# Patient Record
Sex: FEMALE | Race: WHITE | NOT HISPANIC OR LATINO | Employment: OTHER | URBAN - METROPOLITAN AREA
[De-identification: names, ages, dates, MRNs, and addresses within clinical notes are randomized per-mention and may not be internally consistent; named-entity substitution may affect disease eponyms.]

---

## 2023-03-10 ENCOUNTER — DOCUMENTATION (OUTPATIENT)
Dept: CARE COORDINATION | Facility: CLINIC | Age: 63
End: 2023-03-10
Payer: COMMERCIAL

## 2023-03-10 NOTE — PROGRESS NOTES
Reviewed Convers[employer] chat. No call back needed. Patient had a follow up with PCP 2/23/2023. Scheduled to see Pulmonologist on 3/13/2023.

## 2023-03-20 PROBLEM — R10.32 LEFT LOWER QUADRANT ABDOMINAL PAIN: Status: ACTIVE | Noted: 2023-03-20

## 2023-03-20 PROBLEM — M48.061 LUMBAR CANAL STENOSIS: Status: ACTIVE | Noted: 2023-03-20

## 2023-03-20 PROBLEM — M89.9 SKULL LESION: Status: ACTIVE | Noted: 2023-03-20

## 2023-03-20 PROBLEM — E78.5 HLD (HYPERLIPIDEMIA): Status: ACTIVE | Noted: 2023-03-20

## 2023-03-20 PROBLEM — N84.0 ENDOMETRIAL POLYP: Status: ACTIVE | Noted: 2023-03-20

## 2023-03-20 PROBLEM — N95.0 POSTMENOPAUSAL BLEEDING: Status: ACTIVE | Noted: 2023-03-20

## 2023-03-20 PROBLEM — M47.816 DJD (DEGENERATIVE JOINT DISEASE), LUMBAR: Status: ACTIVE | Noted: 2023-03-20

## 2023-03-20 PROBLEM — F09 COGNITIVE DISORDER: Status: ACTIVE | Noted: 2023-03-20

## 2023-03-20 PROBLEM — I27.0: Status: ACTIVE | Noted: 2023-03-20

## 2023-03-20 PROBLEM — N94.89 ADNEXAL MASS: Status: ACTIVE | Noted: 2023-03-20

## 2023-03-20 PROBLEM — E66.01 OBESITY, MORBID (MORE THAN 100 LBS OVER IDEAL WEIGHT OR BMI > 40) (MULTI): Status: ACTIVE | Noted: 2023-03-20

## 2023-03-20 PROBLEM — R06.02 SOB (SHORTNESS OF BREATH): Status: ACTIVE | Noted: 2023-03-20

## 2023-03-20 PROBLEM — M47.816 LUMBAR SPONDYLOSIS: Status: ACTIVE | Noted: 2023-03-20

## 2023-03-20 PROBLEM — K59.09 CHRONIC CONSTIPATION: Status: ACTIVE | Noted: 2023-03-20

## 2023-03-20 PROBLEM — N63.20 LEFT BREAST LUMP: Status: ACTIVE | Noted: 2023-03-20

## 2023-03-20 PROBLEM — R13.10 DYSPHAGIA: Status: ACTIVE | Noted: 2023-03-20

## 2023-03-20 PROBLEM — B37.9 YEAST INFECTION: Status: ACTIVE | Noted: 2023-03-20

## 2023-03-20 PROBLEM — J96.11 CHRONIC RESPIRATORY FAILURE WITH HYPOXIA (MULTI): Status: ACTIVE | Noted: 2023-03-20

## 2023-03-20 PROBLEM — N32.81 OAB (OVERACTIVE BLADDER): Status: ACTIVE | Noted: 2023-03-20

## 2023-03-20 PROBLEM — D50.9 IRON DEFICIENCY ANEMIA: Status: ACTIVE | Noted: 2023-03-20

## 2023-03-20 PROBLEM — M54.50 LUMBAR BACK PAIN: Status: ACTIVE | Noted: 2023-03-20

## 2023-03-20 PROBLEM — R23.4 FISSURE IN SKIN OF FOOT: Status: ACTIVE | Noted: 2023-03-20

## 2023-03-20 PROBLEM — F01.53: Status: ACTIVE | Noted: 2023-03-20

## 2023-03-20 PROBLEM — R32 URINARY INCONTINENCE: Status: ACTIVE | Noted: 2023-03-20

## 2023-03-20 PROBLEM — U07.1 COVID-19: Status: ACTIVE | Noted: 2023-03-20

## 2023-03-20 PROBLEM — K59.04 CHRONIC IDIOPATHIC CONSTIPATION: Status: ACTIVE | Noted: 2023-03-20

## 2023-03-20 PROBLEM — E55.9 VITAMIN D DEFICIENCY: Status: ACTIVE | Noted: 2023-03-20

## 2023-03-20 PROBLEM — M17.0 PRIMARY OSTEOARTHRITIS OF BOTH KNEES: Status: ACTIVE | Noted: 2023-03-20

## 2023-03-20 PROBLEM — R73.02 GLUCOSE INTOLERANCE (IMPAIRED GLUCOSE TOLERANCE): Status: ACTIVE | Noted: 2023-03-20

## 2023-03-20 PROBLEM — G47.33 OBSTRUCTIVE SLEEP APNEA SYNDROME: Status: ACTIVE | Noted: 2023-03-20

## 2023-03-20 PROBLEM — F11.20 OPIATE DEPENDENCE (MULTI): Status: ACTIVE | Noted: 2023-03-20

## 2023-03-20 PROBLEM — R31.9 HEMATURIA: Status: ACTIVE | Noted: 2023-03-20

## 2023-03-20 PROBLEM — E53.8 VITAMIN B 12 DEFICIENCY: Status: ACTIVE | Noted: 2023-03-20

## 2023-03-20 PROBLEM — R30.0 DYSURIA: Status: ACTIVE | Noted: 2023-03-20

## 2023-03-20 PROBLEM — K21.9 GERD WITHOUT ESOPHAGITIS: Status: ACTIVE | Noted: 2023-03-20

## 2023-03-20 PROBLEM — I10 BENIGN ESSENTIAL HYPERTENSION: Status: ACTIVE | Noted: 2023-03-20

## 2023-03-20 RX ORDER — DONEPEZIL HYDROCHLORIDE 5 MG/1
1 TABLET, FILM COATED ORAL NIGHTLY
COMMUNITY
Start: 2022-09-18 | End: 2023-07-11 | Stop reason: ALTCHOICE

## 2023-03-20 RX ORDER — MACITENTAN 10 MG/1
TABLET, FILM COATED ORAL
COMMUNITY
Start: 2021-09-29 | End: 2023-10-13

## 2023-03-20 RX ORDER — MAGNESIUM 200 MG
1000 TABLET ORAL
COMMUNITY
Start: 2021-06-03 | End: 2023-10-13

## 2023-03-20 RX ORDER — BUSPIRONE HYDROCHLORIDE 30 MG/1
1 TABLET ORAL 2 TIMES DAILY
COMMUNITY
Start: 2021-11-08

## 2023-03-20 RX ORDER — LISINOPRIL 5 MG/1
1 TABLET ORAL DAILY
COMMUNITY
Start: 2018-05-29 | End: 2023-10-13 | Stop reason: SINTOL

## 2023-03-20 RX ORDER — ACETAMINOPHEN 500 MG
1 TABLET ORAL DAILY
COMMUNITY
Start: 2016-08-18 | End: 2023-10-13

## 2023-03-20 RX ORDER — ASPIRIN 325 MG
1 TABLET ORAL DAILY
COMMUNITY
Start: 2015-05-06

## 2023-03-20 RX ORDER — LUBIPROSTONE 8 UG/1
1 CAPSULE ORAL DAILY
COMMUNITY
Start: 2022-06-24 | End: 2024-01-03 | Stop reason: SDUPTHER

## 2023-03-20 RX ORDER — BUPROPION HYDROCHLORIDE 300 MG/1
TABLET ORAL
COMMUNITY
Start: 2017-04-13 | End: 2024-01-24 | Stop reason: WASHOUT

## 2023-03-20 RX ORDER — OMEPRAZOLE 40 MG/1
1 CAPSULE, DELAYED RELEASE ORAL 2 TIMES DAILY
COMMUNITY
Start: 2021-03-24 | End: 2024-03-04 | Stop reason: SDUPTHER

## 2023-03-20 RX ORDER — HYDROXYZINE HYDROCHLORIDE 50 MG/1
50 TABLET, FILM COATED ORAL AS NEEDED
COMMUNITY
Start: 2020-05-18 | End: 2023-10-13

## 2023-03-20 RX ORDER — ALBUTEROL SULFATE 90 UG/1
1-2 AEROSOL, METERED RESPIRATORY (INHALATION)
COMMUNITY
Start: 2020-02-20 | End: 2023-10-09 | Stop reason: SDUPTHER

## 2023-03-20 RX ORDER — SIMVASTATIN 80 MG/1
1 TABLET, FILM COATED ORAL NIGHTLY
COMMUNITY
Start: 2013-07-31 | End: 2023-03-28

## 2023-03-20 RX ORDER — METHENAMINE HIPPURATE 1000 MG/1
1 TABLET ORAL 2 TIMES DAILY
COMMUNITY
Start: 2021-07-22

## 2023-03-20 RX ORDER — TROSPIUM CHLORIDE 20 MG/1
1 TABLET, FILM COATED ORAL 2 TIMES DAILY
COMMUNITY
Start: 2021-12-16 | End: 2024-02-16

## 2023-03-20 RX ORDER — DONEPEZIL HYDROCHLORIDE 10 MG/1
10 TABLET, FILM COATED ORAL NIGHTLY
COMMUNITY
Start: 2022-11-29 | End: 2023-07-11 | Stop reason: ALTCHOICE

## 2023-03-20 RX ORDER — SERTRALINE HYDROCHLORIDE 100 MG/1
2 TABLET, FILM COATED ORAL DAILY
COMMUNITY
Start: 2016-01-25

## 2023-03-20 RX ORDER — MONTELUKAST SODIUM 10 MG/1
1 TABLET ORAL DAILY
COMMUNITY
Start: 2020-02-20 | End: 2024-02-22

## 2023-03-20 RX ORDER — FLUTICASONE PROPIONATE AND SALMETEROL 250; 50 UG/1; UG/1
1 POWDER RESPIRATORY (INHALATION)
COMMUNITY
Start: 2022-09-26

## 2023-03-22 ENCOUNTER — OFFICE VISIT (OUTPATIENT)
Dept: PRIMARY CARE | Facility: CLINIC | Age: 63
End: 2023-03-22
Payer: COMMERCIAL

## 2023-03-22 VITALS
WEIGHT: 293 LBS | DIASTOLIC BLOOD PRESSURE: 88 MMHG | SYSTOLIC BLOOD PRESSURE: 144 MMHG | BODY MASS INDEX: 55.32 KG/M2 | HEART RATE: 81 BPM | RESPIRATION RATE: 18 BRPM | TEMPERATURE: 97.4 F | OXYGEN SATURATION: 91 % | HEIGHT: 61 IN

## 2023-03-22 DIAGNOSIS — H65.03 NON-RECURRENT ACUTE SEROUS OTITIS MEDIA OF BOTH EARS: Primary | ICD-10-CM

## 2023-03-22 PROCEDURE — 3079F DIAST BP 80-89 MM HG: CPT | Performed by: NURSE PRACTITIONER

## 2023-03-22 PROCEDURE — 99213 OFFICE O/P EST LOW 20 MIN: CPT | Performed by: NURSE PRACTITIONER

## 2023-03-22 PROCEDURE — 1036F TOBACCO NON-USER: CPT | Performed by: NURSE PRACTITIONER

## 2023-03-22 PROCEDURE — 3008F BODY MASS INDEX DOCD: CPT | Performed by: NURSE PRACTITIONER

## 2023-03-22 PROCEDURE — 3077F SYST BP >= 140 MM HG: CPT | Performed by: NURSE PRACTITIONER

## 2023-03-22 RX ORDER — CIPROFLOXACIN AND DEXAMETHASONE 3; 1 MG/ML; MG/ML
4 SUSPENSION/ DROPS AURICULAR (OTIC) 2 TIMES DAILY
Qty: 2.8 ML | Refills: 0 | Status: SHIPPED | OUTPATIENT
Start: 2023-03-22 | End: 2023-03-29

## 2023-03-22 ASSESSMENT — PAIN SCALES - GENERAL: PAINLEVEL: 3

## 2023-03-22 NOTE — PATIENT INSTRUCTIONS
Lotion for your stomach- lubriderm or eucerine. Nothing with a scent  Ear infection- start ear drops that are antibiotics 2 times a day for 7 days. If the pain is no better when the ear drops are done then come back to see me.   Restart the flonase nasal spray 1 spray each nostril 2 times a day for 14 days

## 2023-03-22 NOTE — PROGRESS NOTES
"Subjective   Patient ID: Fariad Traylor is a 62 y.o. female who presents for Earache (Poping, and pain x1mo ).    Here for 2 week follow up ED for RT ear infection treated with oral atbx and nasal spray. Now has decreased hearing in the RT ear. No drainage. No fever/chills.   Saw pulm last week was told there was \"wax\".     Earache   There is pain in the right ear. This is a new problem. The current episode started 1 to 4 weeks ago. The problem occurs constantly. The problem has been waxing and waning. There has been no fever. The pain is at a severity of 0/10. The patient is experiencing no pain. She has tried antibiotics for the symptoms.        Review of Systems   HENT:  Positive for ear pain.        Objective   /88   Pulse 81   Temp 36.3 °C (97.4 °F)   Resp 18   Ht 1.549 m (5' 1\")   Wt 135 kg (298 lb)   SpO2 91%   BMI 56.31 kg/m²     Physical Exam  Vitals reviewed.   HENT:      Head: Normocephalic and atraumatic.      Right Ear: Decreased hearing noted. Swelling and tenderness present. A middle ear effusion is present.      Left Ear: No decreased hearing noted. Swelling and tenderness present. A middle ear effusion is present.      Nose: No congestion.      Mouth/Throat:      Pharynx: Posterior oropharyngeal erythema present.   Pulmonary:      Effort: Pulmonary effort is normal.      Breath sounds: Normal breath sounds.   Lymphadenopathy:      Cervical: No cervical adenopathy.   Neurological:      Mental Status: She is alert.         Assessment/Plan     Farida was seen today for earache.  Diagnoses and all orders for this visit:  Non-recurrent acute serous otitis media of both ears (Primary)  Comments:  start cipro-dex 4 gtt BID x 7 days.  Orders:  -     ciprofloxacin-dexamethasone (CiproDEX) otic suspension; Administer 4 drops into each ear in the morning and 4 drops before bedtime. Do all this for 7 days.        "

## 2023-03-27 DIAGNOSIS — E78.2 MODERATE MIXED HYPERLIPIDEMIA NOT REQUIRING STATIN THERAPY: Primary | ICD-10-CM

## 2023-03-28 RX ORDER — SIMVASTATIN 80 MG/1
80 TABLET, FILM COATED ORAL NIGHTLY
Qty: 90 TABLET | Refills: 3 | Status: SHIPPED | OUTPATIENT
Start: 2023-03-28 | End: 2024-04-02 | Stop reason: SDUPTHER

## 2023-07-11 ENCOUNTER — OFFICE VISIT (OUTPATIENT)
Dept: PRIMARY CARE | Facility: CLINIC | Age: 63
End: 2023-07-11
Payer: COMMERCIAL

## 2023-07-11 VITALS
DIASTOLIC BLOOD PRESSURE: 58 MMHG | OXYGEN SATURATION: 93 % | BODY MASS INDEX: 54.94 KG/M2 | TEMPERATURE: 96.8 F | HEIGHT: 61 IN | WEIGHT: 291 LBS | HEART RATE: 84 BPM | SYSTOLIC BLOOD PRESSURE: 112 MMHG

## 2023-07-11 DIAGNOSIS — J96.11 CHRONIC RESPIRATORY FAILURE WITH HYPOXIA (MULTI): ICD-10-CM

## 2023-07-11 DIAGNOSIS — H25.9 SENILE CATARACT OF RIGHT EYE, UNSPECIFIED AGE-RELATED CATARACT TYPE: Primary | ICD-10-CM

## 2023-07-11 PROCEDURE — 3008F BODY MASS INDEX DOCD: CPT | Performed by: FAMILY MEDICINE

## 2023-07-11 PROCEDURE — 3078F DIAST BP <80 MM HG: CPT | Performed by: FAMILY MEDICINE

## 2023-07-11 PROCEDURE — 3074F SYST BP LT 130 MM HG: CPT | Performed by: FAMILY MEDICINE

## 2023-07-11 PROCEDURE — 1036F TOBACCO NON-USER: CPT | Performed by: FAMILY MEDICINE

## 2023-07-11 PROCEDURE — 1160F RVW MEDS BY RX/DR IN RCRD: CPT | Performed by: FAMILY MEDICINE

## 2023-07-11 PROCEDURE — 1157F ADVNC CARE PLAN IN RCRD: CPT | Performed by: FAMILY MEDICINE

## 2023-07-11 PROCEDURE — 1125F AMNT PAIN NOTED PAIN PRSNT: CPT | Performed by: FAMILY MEDICINE

## 2023-07-11 PROCEDURE — 99214 OFFICE O/P EST MOD 30 MIN: CPT | Performed by: FAMILY MEDICINE

## 2023-07-11 PROCEDURE — 1159F MED LIST DOCD IN RCRD: CPT | Performed by: FAMILY MEDICINE

## 2023-07-11 RX ORDER — FERROUS SULFATE 325(65) MG
TABLET ORAL EVERY 24 HOURS
COMMUNITY
End: 2023-10-13

## 2023-07-11 RX ORDER — MEMANTINE HYDROCHLORIDE 10 MG/1
10 TABLET ORAL 2 TIMES DAILY
COMMUNITY

## 2023-07-11 RX ORDER — NITROFURANTOIN 25; 75 MG/1; MG/1
CAPSULE ORAL 2 TIMES DAILY
COMMUNITY
End: 2024-02-12 | Stop reason: WASHOUT

## 2023-07-11 ASSESSMENT — ENCOUNTER SYMPTOMS
EYE ITCHING: 0
HEMATURIA: 0
LIGHT-HEADEDNESS: 0
DYSURIA: 0
FLANK PAIN: 0
ARTHRALGIAS: 0
DIARRHEA: 0
ABDOMINAL PAIN: 0
SORE THROAT: 0
PALPITATIONS: 0
VOMITING: 0
JOINT SWELLING: 0
APPETITE CHANGE: 0
EYE DISCHARGE: 0
SHORTNESS OF BREATH: 1
UNEXPECTED WEIGHT CHANGE: 0
HEADACHES: 0
BLOOD IN STOOL: 0
FEVER: 0
DIZZINESS: 0
NUMBNESS: 0
NERVOUS/ANXIOUS: 0
DYSPHORIC MOOD: 0
SINUS PRESSURE: 0
SLEEP DISTURBANCE: 0
CONSTIPATION: 0
NAUSEA: 0
WHEEZING: 0
RHINORRHEA: 0
WEAKNESS: 0
MYALGIAS: 0
COUGH: 0
ACTIVITY CHANGE: 0

## 2023-07-11 NOTE — PROGRESS NOTES
"Subjective   Patient ID: Farida Traylor is a 63 y.o. female who presents for Establish Care (MYAH BUTTS) and Pre-op Exam (CATARACT R SIDED 7/26/23-[facility]- YUNIOR MALIN MD).    HPI STABLE PULMONARY 02 DEPENDENT DISEASE   WILL SEE DR WOMACK NEXT WEEK     Review of Systems   Constitutional:  Negative for activity change, appetite change, fever and unexpected weight change.   HENT:  Negative for congestion, ear pain, postnasal drip, rhinorrhea, sinus pressure and sore throat.    Eyes:  Positive for visual disturbance. Negative for discharge and itching.   Respiratory:  Positive for shortness of breath. Negative for cough and wheezing.    Cardiovascular:  Negative for chest pain, palpitations and leg swelling.   Gastrointestinal:  Negative for abdominal pain, blood in stool, constipation, diarrhea, nausea and vomiting.   Endocrine: Negative for cold intolerance, heat intolerance and polyuria.   Genitourinary:  Negative for dysuria, flank pain and hematuria.   Musculoskeletal:  Negative for arthralgias, gait problem, joint swelling and myalgias.   Skin:  Negative for rash.   Allergic/Immunologic: Negative for environmental allergies and food allergies.   Neurological:  Negative for dizziness, syncope, weakness, light-headedness, numbness and headaches.   Psychiatric/Behavioral:  Negative for dysphoric mood and sleep disturbance. The patient is not nervous/anxious.        Objective   /58   Pulse 84   Temp 36 °C (96.8 °F)   Ht 1.549 m (5' 1\")   Wt 132 kg (291 lb)   SpO2 93%   BMI 54.98 kg/m²     Physical Exam  Constitutional:       Appearance: Normal appearance.   HENT:      Head: Normocephalic and atraumatic.      Nose: Nose normal.      Mouth/Throat:      Mouth: Mucous membranes are moist.   Eyes:      Extraocular Movements: Extraocular movements intact.      Pupils: Pupils are equal, round, and reactive to light.   Cardiovascular:      Rate and Rhythm: Normal rate and regular rhythm.   Pulmonary: "      Effort: Respiratory distress present.      Breath sounds: No wheezing or rales.      Comments: DOES  NOT MOVE AIR WELL/ASTHMA  Abdominal:      Palpations: Abdomen is soft.   Musculoskeletal:         General: Normal range of motion.      Cervical back: Normal range of motion and neck supple.   Skin:     General: Skin is warm and dry.   Neurological:      General: No focal deficit present.      Mental Status: She is alert and oriented to person, place, and time.   Psychiatric:         Mood and Affect: Mood normal.         Behavior: Behavior normal.         Assessment/Plan   Diagnoses and all orders for this visit:  Senile cataract of right eye, unspecified age-related cataract type  Chronic respiratory failure with hypoxia (CMS/HCC)

## 2023-09-10 PROBLEM — R93.89 ABNORMAL MAGNETIC RESONANCE IMAGING STUDY: Status: ACTIVE | Noted: 2023-09-10

## 2023-09-10 PROBLEM — I65.29 CAROTID ARTERY OCCLUSION: Status: ACTIVE | Noted: 2023-09-10

## 2023-09-10 PROBLEM — M79.609 PAIN IN LIMB: Status: ACTIVE | Noted: 2023-09-10

## 2023-09-10 PROBLEM — R41.3 MEMORY LOSS: Status: ACTIVE | Noted: 2023-09-10

## 2023-09-10 PROBLEM — M54.2 CERVICALGIA: Status: ACTIVE | Noted: 2023-09-10

## 2023-09-10 PROBLEM — E66.813 CLASS 3 SEVERE OBESITY DUE TO EXCESS CALORIES WITH BODY MASS INDEX (BMI) OF 50.0 TO 59.9 IN ADULT: Status: ACTIVE | Noted: 2023-09-10

## 2023-09-10 PROBLEM — E66.01 CLASS 3 SEVERE OBESITY DUE TO EXCESS CALORIES WITH BODY MASS INDEX (BMI) OF 50.0 TO 59.9 IN ADULT (MULTI): Status: ACTIVE | Noted: 2023-09-10

## 2023-09-10 PROBLEM — R25.1 TREMOR: Status: ACTIVE | Noted: 2023-09-10

## 2023-09-10 PROBLEM — F90.2 ATTENTION-DEFICIT HYPERACTIVITY DISORDER, COMBINED TYPE: Status: ACTIVE | Noted: 2021-06-15

## 2023-09-10 PROBLEM — R53.1 ASTHENIA: Status: ACTIVE | Noted: 2023-09-10

## 2023-09-10 PROBLEM — F32.1 CURRENT MODERATE EPISODE OF MAJOR DEPRESSIVE DISORDER WITHOUT PRIOR EPISODE (MULTI): Chronic | Status: ACTIVE | Noted: 2021-01-13

## 2023-09-10 PROBLEM — M79.662 PAIN OF LEFT CALF: Status: ACTIVE | Noted: 2023-09-10

## 2023-09-10 PROBLEM — R41.3 AMNESIA: Status: ACTIVE | Noted: 2023-09-10

## 2023-09-10 PROBLEM — L30.4 ERYTHEMA INTERTRIGO: Status: ACTIVE | Noted: 2017-03-22

## 2023-09-10 PROBLEM — F33.41 RECURRENT MAJOR DEPRESSIVE DISORDER, IN PARTIAL REMISSION (CMS-HCC): Chronic | Status: ACTIVE | Noted: 2021-06-15

## 2023-09-10 PROBLEM — R42 LIGHTHEADEDNESS: Status: ACTIVE | Noted: 2023-09-10

## 2023-09-10 PROBLEM — G45.9 TRANSIENT ISCHEMIC ATTACK: Status: ACTIVE | Noted: 2023-09-10

## 2023-09-10 PROBLEM — E66.01 MORBID OBESITY WITH BMI OF 50.0-59.9, ADULT (MULTI): Status: ACTIVE | Noted: 2023-09-10

## 2023-09-10 PROBLEM — Z79.891 CHRONIC PRESCRIPTION OPIATE USE: Status: ACTIVE | Noted: 2023-09-10

## 2023-09-10 PROBLEM — F41.1 GENERALIZED ANXIETY DISORDER: Status: ACTIVE | Noted: 2021-06-15

## 2023-09-10 PROBLEM — R47.01 APHASIA: Status: ACTIVE | Noted: 2023-09-10

## 2023-09-10 PROBLEM — I83.10 VARICOSE VEINS OF UNSPECIFIED LOWER EXTREMITY WITH INFLAMMATION: Status: ACTIVE | Noted: 2017-03-22

## 2023-09-10 PROBLEM — I63.9 CEREBROVASCULAR ACCIDENT (MULTI): Status: ACTIVE | Noted: 2023-09-10
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
## 2023-09-10 RX ORDER — BUSPIRONE HYDROCHLORIDE 15 MG/1
TABLET ORAL
COMMUNITY
End: 2023-10-13

## 2023-09-10 RX ORDER — CYCLOBENZAPRINE HCL 10 MG
1 TABLET ORAL 2 TIMES DAILY
COMMUNITY
Start: 2023-06-20 | End: 2023-10-13

## 2023-09-10 RX ORDER — MOXIFLOXACIN 5 MG/ML
1 SOLUTION/ DROPS OPHTHALMIC 3 TIMES DAILY
COMMUNITY
Start: 2023-06-26 | End: 2023-10-13

## 2023-09-10 RX ORDER — LISINOPRIL 10 MG/1
TABLET ORAL
COMMUNITY
End: 2023-10-13

## 2023-09-10 RX ORDER — DONEPEZIL HYDROCHLORIDE 5 MG/1
5 TABLET, FILM COATED ORAL NIGHTLY
COMMUNITY
End: 2023-10-13

## 2023-09-10 RX ORDER — AMBRISENTAN 10 MG/1
TABLET, FILM COATED ORAL
COMMUNITY
End: 2023-10-13

## 2023-09-10 RX ORDER — LIDOCAINE HCL 4 G/100ML
LIQUID TOPICAL
COMMUNITY
Start: 2023-06-20 | End: 2023-10-13

## 2023-09-10 RX ORDER — PRENATAL VIT 91/IRON/FOLIC/DHA 28-975-200
1 COMBINATION PACKAGE (EA) ORAL
COMMUNITY
Start: 2017-03-22 | End: 2023-10-13

## 2023-09-10 RX ORDER — FUROSEMIDE 20 MG/1
TABLET ORAL
COMMUNITY
End: 2023-10-13

## 2023-09-10 RX ORDER — HYDROXYZINE PAMOATE 50 MG/1
CAPSULE ORAL
COMMUNITY
End: 2023-10-16 | Stop reason: ALTCHOICE

## 2023-09-10 RX ORDER — OMEPRAZOLE 20 MG/1
CAPSULE, DELAYED RELEASE ORAL
COMMUNITY
End: 2023-10-13

## 2023-09-10 RX ORDER — SULFAMETHOXAZOLE AND TRIMETHOPRIM 800; 160 MG/1; MG/1
TABLET ORAL
COMMUNITY
End: 2023-10-13

## 2023-09-10 RX ORDER — OXYBUTYNIN CHLORIDE 15 MG/1
TABLET, EXTENDED RELEASE ORAL
COMMUNITY
End: 2023-10-13

## 2023-09-10 RX ORDER — FLUTICASONE PROPIONATE 50 MCG
SPRAY, SUSPENSION (ML) NASAL
COMMUNITY
End: 2023-10-13

## 2023-09-10 RX ORDER — DONEPEZIL HYDROCHLORIDE 10 MG/1
10 TABLET, FILM COATED ORAL NIGHTLY
COMMUNITY
End: 2023-10-13

## 2023-09-10 RX ORDER — CEPHALEXIN 500 MG/1
CAPSULE ORAL
COMMUNITY
End: 2023-10-13

## 2023-10-05 ENCOUNTER — CLINICAL SUPPORT (OUTPATIENT)
Dept: PRIMARY CARE | Facility: CLINIC | Age: 63
End: 2023-10-05
Payer: COMMERCIAL

## 2023-10-05 DIAGNOSIS — I10 BENIGN ESSENTIAL HYPERTENSION: ICD-10-CM

## 2023-10-05 DIAGNOSIS — E78.5 HYPERLIPIDEMIA, UNSPECIFIED HYPERLIPIDEMIA TYPE: ICD-10-CM

## 2023-10-05 DIAGNOSIS — E53.8 VITAMIN B 12 DEFICIENCY: ICD-10-CM

## 2023-10-05 PROCEDURE — 80053 COMPREHEN METABOLIC PANEL: CPT

## 2023-10-05 PROCEDURE — 82607 VITAMIN B-12: CPT

## 2023-10-05 PROCEDURE — 36415 COLL VENOUS BLD VENIPUNCTURE: CPT

## 2023-10-05 PROCEDURE — 80061 LIPID PANEL: CPT

## 2023-10-06 LAB
ALBUMIN SERPL BCP-MCNC: 4.1 G/DL (ref 3.4–5)
ALP SERPL-CCNC: 68 U/L (ref 33–136)
ALT SERPL W P-5'-P-CCNC: 10 U/L (ref 7–45)
ANION GAP SERPL CALC-SCNC: 13 MMOL/L (ref 10–20)
AST SERPL W P-5'-P-CCNC: 12 U/L (ref 9–39)
BILIRUB SERPL-MCNC: 0.4 MG/DL (ref 0–1.2)
BUN SERPL-MCNC: 14 MG/DL (ref 6–23)
CALCIUM SERPL-MCNC: 9.5 MG/DL (ref 8.6–10.6)
CHLORIDE SERPL-SCNC: 103 MMOL/L (ref 98–107)
CHOLEST SERPL-MCNC: 184 MG/DL (ref 0–199)
CHOLESTEROL/HDL RATIO: 3.6
CO2 SERPL-SCNC: 28 MMOL/L (ref 21–32)
CREAT SERPL-MCNC: 0.78 MG/DL (ref 0.5–1.05)
GFR SERPL CREATININE-BSD FRML MDRD: 84 ML/MIN/1.73M*2
GLUCOSE SERPL-MCNC: 87 MG/DL (ref 74–99)
HDLC SERPL-MCNC: 51.5 MG/DL
LDLC SERPL CALC-MCNC: 98 MG/DL (ref 140–190)
NON HDL CHOLESTEROL: 133 MG/DL (ref 0–149)
POTASSIUM SERPL-SCNC: 3.9 MMOL/L (ref 3.5–5.3)
PROT SERPL-MCNC: 6.4 G/DL (ref 6.4–8.2)
SODIUM SERPL-SCNC: 140 MMOL/L (ref 136–145)
TRIGL SERPL-MCNC: 175 MG/DL (ref 0–149)
VIT B12 SERPL-MCNC: 1680 PG/ML (ref 211–911)
VLDL: 35 MG/DL (ref 0–40)

## 2023-10-09 ENCOUNTER — OFFICE VISIT (OUTPATIENT)
Dept: PRIMARY CARE | Facility: CLINIC | Age: 63
End: 2023-10-09
Payer: COMMERCIAL

## 2023-10-09 VITALS
WEIGHT: 292 LBS | OXYGEN SATURATION: 93 % | DIASTOLIC BLOOD PRESSURE: 58 MMHG | SYSTOLIC BLOOD PRESSURE: 110 MMHG | BODY MASS INDEX: 55.17 KG/M2 | TEMPERATURE: 98.2 F | HEART RATE: 81 BPM

## 2023-10-09 DIAGNOSIS — Z23 FLU VACCINE NEED: ICD-10-CM

## 2023-10-09 DIAGNOSIS — M65.342 TRIGGER RING FINGER OF LEFT HAND: Primary | ICD-10-CM

## 2023-10-09 DIAGNOSIS — J96.11 CHRONIC RESPIRATORY FAILURE WITH HYPOXIA (MULTI): ICD-10-CM

## 2023-10-09 PROCEDURE — G0008 ADMIN INFLUENZA VIRUS VAC: HCPCS | Performed by: FAMILY MEDICINE

## 2023-10-09 PROCEDURE — 99214 OFFICE O/P EST MOD 30 MIN: CPT | Performed by: FAMILY MEDICINE

## 2023-10-09 PROCEDURE — 3074F SYST BP LT 130 MM HG: CPT | Performed by: FAMILY MEDICINE

## 2023-10-09 PROCEDURE — 1036F TOBACCO NON-USER: CPT | Performed by: FAMILY MEDICINE

## 2023-10-09 PROCEDURE — 1125F AMNT PAIN NOTED PAIN PRSNT: CPT | Performed by: FAMILY MEDICINE

## 2023-10-09 PROCEDURE — 3078F DIAST BP <80 MM HG: CPT | Performed by: FAMILY MEDICINE

## 2023-10-09 PROCEDURE — 90662 IIV NO PRSV INCREASED AG IM: CPT | Performed by: FAMILY MEDICINE

## 2023-10-09 PROCEDURE — 1159F MED LIST DOCD IN RCRD: CPT | Performed by: FAMILY MEDICINE

## 2023-10-09 PROCEDURE — 3008F BODY MASS INDEX DOCD: CPT | Performed by: FAMILY MEDICINE

## 2023-10-09 PROCEDURE — 1160F RVW MEDS BY RX/DR IN RCRD: CPT | Performed by: FAMILY MEDICINE

## 2023-10-11 ENCOUNTER — APPOINTMENT (OUTPATIENT)
Dept: PRIMARY CARE | Facility: CLINIC | Age: 63
End: 2023-10-11
Payer: COMMERCIAL

## 2023-10-11 ENCOUNTER — OFFICE VISIT (OUTPATIENT)
Dept: PODIATRY | Facility: CLINIC | Age: 63
End: 2023-10-11
Payer: COMMERCIAL

## 2023-10-11 DIAGNOSIS — B35.3 TINEA PEDIS OF BOTH FEET: ICD-10-CM

## 2023-10-11 DIAGNOSIS — B35.1 ONYCHOMYCOSIS: ICD-10-CM

## 2023-10-11 DIAGNOSIS — I83.12 VARICOSE VEINS OF BOTH LOWER EXTREMITIES WITH INFLAMMATION: Primary | ICD-10-CM

## 2023-10-11 DIAGNOSIS — M79.672 FOOT PAIN, BILATERAL: ICD-10-CM

## 2023-10-11 DIAGNOSIS — I83.11 VARICOSE VEINS OF BOTH LOWER EXTREMITIES WITH INFLAMMATION: Primary | ICD-10-CM

## 2023-10-11 DIAGNOSIS — M79.671 FOOT PAIN, BILATERAL: ICD-10-CM

## 2023-10-11 PROCEDURE — 1036F TOBACCO NON-USER: CPT | Performed by: PODIATRIST

## 2023-10-11 PROCEDURE — 3008F BODY MASS INDEX DOCD: CPT | Performed by: PODIATRIST

## 2023-10-11 PROCEDURE — 1125F AMNT PAIN NOTED PAIN PRSNT: CPT | Performed by: PODIATRIST

## 2023-10-11 PROCEDURE — 1160F RVW MEDS BY RX/DR IN RCRD: CPT | Performed by: PODIATRIST

## 2023-10-11 PROCEDURE — 99213 OFFICE O/P EST LOW 20 MIN: CPT | Performed by: PODIATRIST

## 2023-10-11 PROCEDURE — 1159F MED LIST DOCD IN RCRD: CPT | Performed by: PODIATRIST

## 2023-10-11 NOTE — PROGRESS NOTES
This is a 63 y.o. 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    Acquired plantar porokeratosis    Acute upper respiratory infection, unspecified 01/14/2020    URI, acute    Allergic rhinitis due to animal (cat) (dog) hair and dander 01/02/2020    Allergic rhinitis due to dogs    Allergic rhinitis due to pollen 01/02/2020    Allergic rhinitis due to pollen    Allergic rhinitis due to pollen 02/26/2018    Seasonal allergic rhinitis due to pollen    Allergy status to unspecified drugs, medicaments and biological substances 06/30/2015    History of allergy    Anemia, unspecified 07/23/2018    Normocytic anemia    Anxiety disorder due to known physiological condition 06/05/2013    Anxiety disorder due to medical condition    Anxiety disorder, unspecified 01/28/2016    Anxiety    Asymptomatic varicose veins of bilateral lower extremities 07/16/2019    Varicose veins of legs    Atypical squamous cells of undetermined significance on cytologic smear of cervix (ASC-US) 06/26/2013    Pap smear abnormality of cervix with ASCUS favoring benign    Candidiasis, unspecified 12/10/2019    Yeast infection    Cellulitis of left finger 07/31/2018    Paronychia of finger of left hand    Changes in skin texture 03/09/2021    Cracked skin    Contusion of left lesser toe(s) with damage to nail, initial encounter 02/22/2018    Subungual contusion of toenail of left foot    Contusion of right hand, sequela 08/04/2020    Traumatic ecchymosis of hand, right, sequela    Corns and callosities 05/25/2021    Callus of foot    Disorder of pigmentation, unspecified 09/28/2016    Atypical pigmented skin lesion    Disorder of the skin and  subcutaneous tissue, unspecified 08/27/2020    Lesion of subcutaneous tissue    Dorsalgia, unspecified 09/23/2021    Acute back pain    Dorsalgia, unspecified 01/29/2019    Costovertebral angle tenderness    Encounter for general adult medical examination without abnormal findings 06/08/2020    Medicare annual wellness visit, subsequent    Encounter for gynecological examination (general) (routine) without abnormal findings 12/14/2021    Encounter for gynecological examination    Encounter for gynecological examination (general) (routine) without abnormal findings 12/11/2020    Encounter for gynecological examination    Encounter for other screening for malignant neoplasm of breast     Breast cancer screening    Encounter for screening for malignant neoplasm of cervix     Encounter for screening for malignant neoplasm of cervix    Encounter for screening for malignant neoplasm of cervix 11/04/2014    Screening for malignant neoplasm of cervix    Encounter for screening for malignant neoplasm of cervix     Cervical cancer screening    Hepatomegaly, not elsewhere classified 04/21/2021    Liver mass, right lobe    History of falling 12/28/2018    Status post fall    Inappropriate diet and eating habits 05/13/2021    Inappropriate diet and eating habits    Irregular menstruation, unspecified     Irregular periods/menstrual cycles    Localized edema 07/29/2015    Pedal edema    Localized swelling, mass and lump, left lower limb 09/11/2020    Lump of left thigh    Localized swelling, mass and lump, left lower limb 09/24/2020    Mass of left thigh    Localized swelling, mass and lump, unspecified lower limb 09/24/2020    Mass of thigh    Melanocytic nevi, unspecified 09/10/2019    Numerous skin moles    Multiple births, unspecified, all liveborn     Multiple births, all liveborn    Other allergic rhinitis 01/02/2020    Allergic rhinitis due to dust mite    Other allergic rhinitis 01/02/2020    Allergic rhinitis due to mold     Other conditions influencing health status 06/26/2013    Uterine Rupture    Other conditions influencing health status     Normal colonoscopy    Other conditions influencing health status 02/05/2019    History of burning on urination    Other conditions influencing health status 12/14/2021    History of cough    Other conditions influencing health status 02/14/2019    History of dyspareunia    Other conditions influencing health status 01/14/2020    History of cough    Other conditions influencing health status 06/05/2013    Leiomyomatous Degeneration Of The Uterus    Other conditions influencing health status 06/05/2013    Viremia    Other hypertrophic disorders of the skin 07/29/2015    Skin tag    Other shoulder lesions, right shoulder 11/11/2019    Tendinitis of right rotator cuff    Other specified disorders of bone, thigh 09/09/2020    Pain of left femur    Other specified noninflammatory disorders of vagina 03/12/2019    Vaginal dryness    Other specified postprocedural states 05/04/2017    History of arthroscopic knee surgery    Other specified soft tissue disorders 10/08/2020    Soft tissue mass    Other specified soft tissue disorders 08/04/2020    Swelling of right hand    Pain in left thigh 08/27/2020    Pain of left thigh    Pain in right foot 03/09/2021    Right foot pain    Pain in right hip 12/28/2018    Right hip pain    Pain in right knee 03/12/2021    Bilateral knee pain    Pain in right knee 03/19/2021    Right knee pain    Pain in right leg 05/25/2021    Bilateral pain of leg and foot    Pain in right leg 01/12/2022    Bilateral leg and foot pain    Pain in unspecified joint     Joint pain    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 02/22/2016    History of tetralogy of Fallot    Personal history of contraception     Personal history of contraceptive use    Personal history of diseases of the skin and subcutaneous tissue 12/22/2020    History of  ingrown nail    Personal history of malignant neoplasm of other parts of uterus     History of malignant neoplasm of endometrium    Personal history of other (healed) physical injury and trauma 03/01/2017    History of sprain of ankle    Personal history of other benign neoplasm 09/10/2019    History of other benign neoplasm    Personal history of other benign neoplasm 05/06/2014    History of uterine leiomyoma    Personal history of other benign neoplasm 12/11/2020    History of uterine leiomyoma    Personal history of other diseases of the circulatory system 04/14/2021    History of atrial fibrillation    Personal history of other diseases of the circulatory system 04/14/2021    History of atrial fibrillation    Personal history of other diseases of the circulatory system 04/14/2021    History of atrial fibrillation    Personal history of other diseases of the circulatory system 02/01/2017    History of hypertension    Personal history of other diseases of the circulatory system 04/14/2021    History of hypotension    Personal history of other diseases of the digestive system 03/24/2021    History of gastroesophageal reflux (GERD)    Personal history of other diseases of the female genital tract 12/28/2016    History of postmenopausal bleeding    Personal history of other diseases of the female genital tract 05/19/2022    History of postmenopausal bleeding    Personal history of other diseases of the female genital tract     History of vaginal discharge    Personal history of other diseases of the female genital tract     Vaginal delivery    Personal history of other diseases of the musculoskeletal system and connective tissue     Personal history of arthritis    Personal history of other diseases of the musculoskeletal system and connective tissue 11/18/2019    History of muscle spasm    Personal history of other diseases of the respiratory system 05/27/2021    History of asthma    Personal history of other  diseases of the respiratory system 04/12/2019    History of acute bronchitis    Personal history of other diseases of the respiratory system 01/04/2020    History of bronchitis    Personal history of other endocrine, nutritional and metabolic disease 02/01/2017    History of hyperlipidemia    Personal history of other endocrine, nutritional and metabolic disease 04/18/2016    History of obesity    Personal history of other endocrine, nutritional and metabolic disease 05/26/2020    History of thyroid nodule    Personal history of other infectious and parasitic diseases     History of varicella    Personal history of other medical treatment 12/11/2020    History of screening mammography    Personal history of other medical treatment 12/14/2021    History of screening mammography    Personal history of other medical treatment     History of mammogram    Personal history of other specified conditions 08/21/2019    History of gross hematuria    Personal history of other specified conditions 02/02/2017    History of weight gain    Personal history of other specified conditions 12/07/2020    History of chest pain    Personal history of other specified conditions 04/14/2021    History of palpitations    Personal history of other specified conditions 12/05/2014    History of vertigo    Personal history of other specified conditions 08/25/2021    History of exertional chest pain    Personal history of other specified conditions     History of shortness of breath    Personal history of other specified conditions     H/O heartburn    Personal history of other specified conditions 01/12/2018    History of urinary frequency    Personal history of transient ischemic attack (TIA), and 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 12/30/2015    History of TIA (transient ischemic attack)    Personal history of urinary (tract) infections 09/02/2021    History of recurrent cystitis    Personal history of urinary (tract) infections  05/23/2019    History of cystitis    Personal history of urinary (tract) infections 09/02/2021    History of recurrent urinary tract infection    Personal history of urinary calculi 09/12/2019    History of renal calculi    Polyphagia 05/13/2021    Polyphagia    Primary central sleep apnea 10/21/2017    Central sleep apnea    Pure hypercholesterolemia, unspecified     High cholesterol    Residual hemorrhoidal skin tags 07/06/2017    External hemorrhoid    Slurred speech 01/02/2015    Slurred speech    Tinea pedis 05/25/2021    Tinea pedis of right foot    Unspecified abdominal pain 04/13/2021    Abdominal pain, acute    Unspecified injury of right wrist, hand and finger(s), sequela 08/04/2020    Hand trauma, right, sequela    Unspecified internal derangement of unspecified knee 03/01/2017    Internal derangement of knee    Unspecified symptoms and signs involving the genitourinary system 12/16/2021    UTI symptoms    Unspecified symptoms and signs involving the genitourinary system 02/05/2019    UTI symptoms    Unspecified symptoms and signs involving the genitourinary system 09/03/2020    UTI symptoms    Urinary tract infection, site not specified 01/17/2020    Acute urinary tract infection    Urinary tract infection, site not specified 01/10/2022    Acute UTI    Xerosis cutis 09/10/2019    Dry skin dermatitis       Medications and Allergies have been reviewed.    Review Of Systems:  GENERAL: No weight loss, malaise or fevers.  HEENT: Negative for frequent or significant headaches,   RESPIRATORY: Negative for cough, wheezing or shortness of breath.  CARDIOVASCULAR: Negative for chest pain, leg swelling or palpitations.    Physical Exam:  Patient is a pleasant, cooperative, well developed 63 y.o.  adult female. The patient is alert and oriented to time, place and person.   Patient has normal affect and mood.    Examination of Both Lower Extremities:   Objective:   Vasc: DP and PT pulses are palpable bilateral 2/4.   CFT is less than 3 seconds bilateral.  Skin temperature is warm to cool proximal to distal bilateral.  No hair growth noted. Varicosities noted    Neuro:Gross sensation intact b    Derm: Nails 1-5 bilateral are intact.  R hallux not attached to base. HPK and fissure to medial hallux IPJ.   No SOI noted.     Ortho: Muscle strength is 5/5 for all pedal groups tested.         A comprehensive history and physical exam was performed. The patient was educated on clinical and radiographic findings, diagnosis, and treatment plans.    1. Varicose veins of both lower extremities with inflammation        2. Foot pain, bilateral        3. Onychomycosis        4. Tinea pedis of both feet              Patient educated on proper  foot care.  Debrided R hallux  Keep trimmed and filed down  Discussed oral and topical AF. Deferred treatment at this time  Debrided hpk tissue  Keep filed down  Fu prn  Patient was in agreement to this plan. All questions answered.      Brionna Douglas DPM  [phone]  [facility] [address]  Fax: [phone]

## 2023-10-12 RX ORDER — ALBUTEROL SULFATE 90 UG/1
1-2 AEROSOL, METERED RESPIRATORY (INHALATION) EVERY 4 HOURS PRN
Qty: 1 G | Refills: 11 | Status: SHIPPED | OUTPATIENT
Start: 2023-10-12

## 2023-10-13 ENCOUNTER — TELEPHONE (OUTPATIENT)
Dept: GASTROENTEROLOGY | Facility: CLINIC | Age: 63
End: 2023-10-13

## 2023-10-13 ENCOUNTER — OFFICE VISIT (OUTPATIENT)
Dept: CARDIOLOGY | Facility: HOSPITAL | Age: 63
End: 2023-10-13
Payer: COMMERCIAL

## 2023-10-13 VITALS — HEART RATE: 66 BPM | OXYGEN SATURATION: 93 % | DIASTOLIC BLOOD PRESSURE: 68 MMHG | SYSTOLIC BLOOD PRESSURE: 102 MMHG

## 2023-10-13 DIAGNOSIS — I10 HYPERTENSION, UNSPECIFIED TYPE: ICD-10-CM

## 2023-10-13 DIAGNOSIS — E78.00 HYPERCHOLESTEREMIA: Primary | ICD-10-CM

## 2023-10-13 PROCEDURE — 3078F DIAST BP <80 MM HG: CPT | Performed by: NURSE PRACTITIONER

## 2023-10-13 PROCEDURE — 1160F RVW MEDS BY RX/DR IN RCRD: CPT | Performed by: NURSE PRACTITIONER

## 2023-10-13 PROCEDURE — 99214 OFFICE O/P EST MOD 30 MIN: CPT | Performed by: NURSE PRACTITIONER

## 2023-10-13 PROCEDURE — 1159F MED LIST DOCD IN RCRD: CPT | Performed by: NURSE PRACTITIONER

## 2023-10-13 PROCEDURE — 3008F BODY MASS INDEX DOCD: CPT | Performed by: NURSE PRACTITIONER

## 2023-10-13 PROCEDURE — 1036F TOBACCO NON-USER: CPT | Performed by: NURSE PRACTITIONER

## 2023-10-13 PROCEDURE — 1125F AMNT PAIN NOTED PAIN PRSNT: CPT | Performed by: NURSE PRACTITIONER

## 2023-10-13 PROCEDURE — 3074F SYST BP LT 130 MM HG: CPT | Performed by: NURSE PRACTITIONER

## 2023-10-13 ASSESSMENT — ENCOUNTER SYMPTOMS
PSYCHIATRIC NEGATIVE: 1
SHORTNESS OF BREATH: 1
GASTROINTESTINAL NEGATIVE: 1
EYES NEGATIVE: 1
MYALGIAS: 1
DIZZINESS: 1
ALLERGIC/IMMUNOLOGIC NEGATIVE: 1
DEPRESSION: 0
HEMATOLOGIC/LYMPHATIC NEGATIVE: 1
DYSPNEA ON EXERTION: 1
ENDOCRINE NEGATIVE: 1

## 2023-10-13 NOTE — PROGRESS NOTES
Referred by Dr. Lopez ref. provider found for Follow-up (Routine.)     History Of Present Illness:    Farida Traylor is a very pleasant 63 year old female with a history of PAH, MARLON and HTN, she is here for a follow up visit. The patient is seen in collaboration with Dr. Faulkner. Mrs. Garza complains of lightheadedness and dizziness around the house or taking the garbage out. She denies chest pain or heart palpitations, states her breathing has been stable. Currently wears Oxygen all of the time.       Review of Systems   HENT: Negative.     Eyes: Negative.    Cardiovascular:  Positive for dyspnea on exertion.   Respiratory:  Positive for shortness of breath.    Endocrine: Negative.    Hematologic/Lymphatic: Negative.    Skin: Negative.    Musculoskeletal:  Positive for muscle weakness and myalgias.   Gastrointestinal: Negative.    Neurological:  Positive for dizziness.   Psychiatric/Behavioral: Negative.     Allergic/Immunologic: Negative.       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), Acute upper respiratory infection, unspecified (01/14/2020), Allergic rhinitis due to animal (cat) (dog) hair and dander (01/02/2020), Allergic rhinitis due to pollen (01/02/2020), Allergic rhinitis due to pollen (02/26/2018), Allergy status to unspecified drugs, medicaments and biological substances (06/30/2015), Anemia, unspecified (07/23/2018), Anxiety disorder due to known physiological condition (06/05/2013), Anxiety disorder, unspecified (01/28/2016), Asymptomatic varicose veins of bilateral lower extremities (07/16/2019), Atypical squamous cells of undetermined significance on cytologic smear of cervix (ASC-US) (06/26/2013), Candidiasis, unspecified (12/10/2019), Cellulitis of left finger (07/31/2018), Changes in skin texture (03/09/2021), Contusion of left lesser toe(s) with damage to nail, initial encounter (02/22/2018), Contusion of  right hand, sequela (08/04/2020), Corns and callosities (05/25/2021), Disorder of pigmentation, unspecified (09/28/2016), Disorder of the skin and subcutaneous tissue, unspecified (08/27/2020), Dorsalgia, unspecified (09/23/2021), Dorsalgia, unspecified (01/29/2019), Encounter for general adult medical examination without abnormal findings (06/08/2020), Encounter for gynecological examination (general) (routine) without abnormal findings (12/14/2021), Encounter for gynecological examination (general) (routine) without abnormal findings (12/11/2020), Encounter for other screening for malignant neoplasm of breast, Encounter for screening for malignant neoplasm of cervix, Encounter for screening for malignant neoplasm of cervix (11/04/2014), Encounter for screening for malignant neoplasm of cervix, Hepatomegaly, not elsewhere classified (04/21/2021), History of falling (12/28/2018), Inappropriate diet and eating habits (05/13/2021), Irregular menstruation, unspecified, Localized edema (07/29/2015), Localized swelling, mass and lump, left lower limb (09/11/2020), Localized swelling, mass and lump, left lower limb (09/24/2020), Localized swelling, mass and lump, unspecified lower limb (09/24/2020), Melanocytic nevi, unspecified (09/10/2019), Multiple births, unspecified, all liveborn, Other allergic rhinitis (01/02/2020), Other allergic rhinitis (01/02/2020), Other conditions influencing health status (06/26/2013), Other conditions influencing health status, Other conditions influencing health status (02/05/2019), Other conditions influencing health status (12/14/2021), Other conditions influencing health status (02/14/2019), Other conditions influencing health status (01/14/2020), Other conditions influencing health status (06/05/2013), Other conditions influencing health status (06/05/2013), Other hypertrophic disorders of the skin (07/29/2015), Other shoulder lesions, right shoulder (11/11/2019), Other specified  disorders of bone, thigh (09/09/2020), Other specified noninflammatory disorders of vagina (03/12/2019), Other specified postprocedural states (05/04/2017), Other specified soft tissue disorders (10/08/2020), Other specified soft tissue disorders (08/04/2020), Pain in left thigh (08/27/2020), Pain in right foot (03/09/2021), Pain in right hip (12/28/2018), Pain in right knee (03/12/2021), Pain in right knee (03/19/2021), Pain in right leg (05/25/2021), Pain in right leg (01/12/2022), Pain in unspecified joint, 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 (02/22/2016), Personal history of contraception, Personal history of diseases of the skin and subcutaneous tissue (12/22/2020), Personal history of malignant neoplasm of other parts of uterus, Personal history of other (healed) physical injury and trauma (03/01/2017), Personal history of other benign neoplasm (09/10/2019), Personal history of other benign neoplasm (05/06/2014), Personal history of other benign neoplasm (12/11/2020), Personal history of other diseases of the circulatory system (04/14/2021), Personal history of other diseases of the circulatory system (04/14/2021), Personal history of other diseases of the circulatory system (04/14/2021), Personal history of other diseases of the circulatory system (02/01/2017), Personal history of other diseases of the circulatory system (04/14/2021), Personal history of other diseases of the digestive system (03/24/2021), Personal history of other diseases of the female genital tract (12/28/2016), Personal history of other diseases of the female genital tract (05/19/2022), Personal history of other diseases of the female genital tract, Personal history of other diseases of the female genital tract, Personal history of other diseases of the musculoskeletal system and connective tissue, Personal history of other diseases of the musculoskeletal system and connective tissue  (11/18/2019), Personal history of other diseases of the respiratory system (05/27/2021), Personal history of other diseases of the respiratory system (04/12/2019), Personal history of other diseases of the respiratory system (01/04/2020), Personal history of other endocrine, nutritional and metabolic disease (02/01/2017), Personal history of other endocrine, nutritional and metabolic disease (04/18/2016), Personal history of other endocrine, nutritional and metabolic disease (05/26/2020), Personal history of other infectious and parasitic diseases, Personal history of other medical treatment (12/11/2020), Personal history of other medical treatment (12/14/2021), Personal history of other medical treatment, Personal history of other specified conditions (08/21/2019), Personal history of other specified conditions (02/02/2017), Personal history of other specified conditions (12/07/2020), Personal history of other specified conditions (04/14/2021), Personal history of other specified conditions (12/05/2014), Personal history of other specified conditions (08/25/2021), Personal history of other specified conditions, Personal history of other specified conditions, Personal history of other specified conditions (01/12/2018), Personal history of transient ischemic attack (TIA), and 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 (12/30/2015), Personal history of urinary (tract) infections (09/02/2021), Personal history of urinary (tract) infections (05/23/2019), Personal history of urinary (tract) infections (09/02/2021), Personal history of urinary calculi (09/12/2019), Polyphagia (05/13/2021), Primary central sleep apnea (10/21/2017), Pure hypercholesterolemia, unspecified, Residual hemorrhoidal skin tags (07/06/2017), Slurred speech (01/02/2015), Tinea pedis (05/25/2021), Unspecified abdominal pain (04/13/2021), Unspecified injury of right wrist, hand and finger(s), sequela (08/04/2020), Unspecified internal derangement  of unspecified knee (03/01/2017), Unspecified symptoms and signs involving the genitourinary system (12/16/2021), Unspecified symptoms and signs involving the genitourinary system (02/05/2019), Unspecified symptoms and signs involving the genitourinary system (09/03/2020), Urinary tract infection, site not specified (01/17/2020), Urinary tract infection, site not specified (01/10/2022), and Xerosis cutis (09/10/2019).    Past Surgical History:  She has a past surgical history that includes Other surgical history (07/31/2013); Other surgical history (09/10/2019); Other surgical history (11/30/2018); Mouth surgery (11/04/2014); Knee surgery (05/04/2017); Other surgical history (06/08/2020); and MR angio head wo IV contrast (2/9/2016).      Social History:  She reports that she has never smoked. She has never used smokeless tobacco. She reports that she does not currently use alcohol. She reports that she does not use drugs.    Family History:  Family History   Problem Relation Name Age of Onset    Hypertension Mother      Breast cancer Mother      Other (cardiac disorder) Mother      Cardiomyopathy Mother      Diabetes Mother      Other (chronic kidney disease) Mother      Stroke Brother      Brain Aneurysm Brother          Allergies:  Other and Pollen extracts    Outpatient Medications:  Current Outpatient Medications   Medication Instructions    albuterol 90 mcg/actuation inhaler 1-2 puffs, inhalation, Every 4 hours PRN, Every 4 to 6 hours as needed    aspirin 325 mg tablet 1 tablet, oral, Daily    buPROPion XL (Wellbutrin XL) 300 mg 24 hr tablet buPROPion HCl ER (XL) 300 MG Oral Tablet Extended Release 24 Hour   Quantity: 30  Refills: 0        Start : 13-Apr-2017   Active    busPIRone (Buspar) 30 mg tablet 1 tablet, oral, 2 times daily    fluticasone propion-salmeteroL (Advair Diskus) 250-50 mcg/dose diskus inhaler 1 puff, inhalation, 2 times daily RT    hydrOXYzine pamoate (Vistaril) 50 mg capsule     lisinopril 5  mg tablet 1 tablet, oral, Daily    lubiprostone (Amitiza) 8 mcg capsule 1 capsule, oral, Daily    memantine (NAMENDA) 10 mg, oral, 2 times daily    methenamine hippurate (Hiprex) 1 gram tablet 1 tablet, oral, 2 times daily    montelukast (Singulair) 10 mg tablet 1 tablet, oral, Daily    nitrofurantoin, macrocrystal-monohydrate, (Macrobid) 100 mg capsule oral, 2 times daily    omeprazole (PriLOSEC) 40 mg DR capsule 1 capsule, oral, 2 times daily    sertraline (Zoloft) 100 mg tablet 2 tablets, oral, Daily    simvastatin (ZOCOR) 80 mg, oral, Nightly    trospium (Sanctura) 20 mg tablet 1 tablet, oral, 2 times daily        Last Recorded Vitals:  Vitals:    10/13/23 1125   BP: 102/68   Pulse: 66   SpO2: 93%       Physical Exam:  Physical Exam  Vitals reviewed.   HENT:      Head: Normocephalic.      Nose: Nose normal.   Eyes:      Pupils: Pupils are equal, round, and reactive to light.   Cardiovascular:      Rate and Rhythm: Normal rate and regular rhythm with a 2/6 CARLITO   Pulmonary:      Effort: Pulmonary effort is normal.      Breath sounds: diminished in the bases   Abdominal:      General: Abdomen is flat.      Palpations: Abdomen is soft.   Musculoskeletal:         General: Normal range of motion.      Cervical back: Normal range of motion.   Skin:     General: Skin is warm and dry.   Neurological:      General: No focal deficit present.      Mental Status: He is alert and oriented to person, place, and time.   Psychiatric:         Mood and Affect: Mood normal.            Last Labs:  CBC -  Lab Results   Component Value Date    WBC 6.5 03/08/2023    HGB 13.9 03/08/2023    HCT 42.7 03/08/2023    MCV 87 03/08/2023     03/08/2023       CMP -  Lab Results   Component Value Date    CALCIUM 9.5 10/05/2023    PHOS 3.9 02/13/2023    PROT 6.4 10/05/2023    ALBUMIN 4.1 10/05/2023    AST 12 10/05/2023    ALT 10 10/05/2023    ALKPHOS 68 10/05/2023    BILITOT 0.4 10/05/2023       LIPID PANEL -   Lab Results   Component Value  Date    CHOL 184 10/05/2023    TRIG 175 (H) 10/05/2023    HDL 51.5 10/05/2023    CHHDL 3.6 10/05/2023    LDLF 94 12/15/2022    VLDL 35 10/05/2023    NHDL 133 10/05/2023       RENAL FUNCTION PANEL -   Lab Results   Component Value Date    GLUCOSE 87 10/05/2023     10/05/2023    K 3.9 10/05/2023     10/05/2023    CO2 28 10/05/2023    ANIONGAP 13 10/05/2023    BUN 14 10/05/2023    CREATININE 0.78 10/05/2023    CALCIUM 9.5 10/05/2023    PHOS 3.9 02/13/2023    ALBUMIN 4.1 10/05/2023        Lab Results   Component Value Date    BNP 37 03/08/2023    HGBA1C 5.5 12/15/2022       Last Cardiology Tests:  ECG:    Echo:  Echocardiogram 1/18/2023  1. Left ventricular systolic function is normal with a 60-65% estimated ejection fraction.  2. Technically difficult study precludes definitive valvular assessment, albeit no gross abnormalities evident.  3. There is no evidence of a patent foramen ovale.  Echocardiogram 10/7/2020   1. The left ventricular systolic function is hyperdynamic with a 65-70%  estimated ejection fraction.  2. Moderately enlarged right ventricle.  3. There is mild to moderate tricuspid regurgitation.  4. Moderately elevated pulmonary artery pressure, RVSP 62 mmHg.  Echo 11/2018:   1. Suboptimal image quality due to body habitus.   2. The left ventricular systolic function is normal with a 60-65% estimated  ejection fraction.   3. RV and RA appear mildly dilated.   4. Mildly elevated RVSP.   Ejection Fractions  LVEF 60-65%    Cath:  Right heart cath 10/11/2022  1. Normal cardiac output.  2. MPAP 31, PAOP 11, PVR 2.6, CO 7.7.  3. Pulmonary arterial hypertension.      Right heart cath 4/12/2021  1. Normal cardiac output.  2. RA 15 mmHg, RV 35/15, PA 44/26 (35 mmHg), PAOP 2 mmHg.  3. Thermo CO 7.4 l/min, CI 3.4 l/min/m2.  4. PVR 4.5.  5. Pulmonary arterial hypertension.   Stress Test:  Stress test 5/2019:  1. Normal myocardial perfusion imaging in response to pharmacologic  stress, no evidence of  ischemia or prior infarct.  2. Well-maintained left ventricular function, estimated to be greater  than 65%.  Cardiac Imaging:  Zio monitor 6/3/2019   sinus rhythm first degree AV block   12 SVT   max heart rate 182 bpm   min heart rate 42 bpm   average heart rate 60 bpm     Assessment/Plan   Very pleasant 63 year-old female with PAH, asthma, sleep apnea, and HTN, she complains of increased dizziness and lightheadedness, the Lisinopril will be stopped to help with her symptoms.  She currently is seeing pulmonary for her pulmonary hypertension. Blood pressure and heart rate are well controlled today.     Plan   -stop the Lisinopril   -call in two weeks to review symptoms   -continue Aspirin and Simvastatin   -follow up in six months   -call with any questions         Hortencia Ferraro, ANNETTE-CNP

## 2023-10-13 NOTE — PATIENT INSTRUCTIONS
CALL WITH ANY QUESTIONS   STOP THE LISINOPRIL   CALL IN TWO WEEKS TO REVIEW SYMPTOMS   FOLLOW UP IN APRIL     
yes

## 2023-10-13 NOTE — TELEPHONE ENCOUNTER
----- Message from MAGDALENA Tamayo sent at 10/13/2023  3:40 PM EDT -----  Regarding: RE: question  Contact: [phone]  Looks like she had internal hemorrhoids on her Colonoscopy in 2018. Probably from them, She should use preparation H cream. If keeps bleeding after using the cream she should see me in the office.     ----- Message -----  From: Lise Penn MA  Sent: 10/13/2023   3:36 PM EDT  To: MAGDALENA Tamayo  Subject: question                                         Pt called in stating she has blood in her stool on / off. She is taking the Amitiza and it is helping, so she is not constipated. I asked her if she was having harder stools when she was having the blood and she said she was not. I also asked her if she was ever diagnosed with hemorrhoids and she said she was not.

## 2023-10-16 ENCOUNTER — APPOINTMENT (OUTPATIENT)
Dept: PRIMARY CARE | Facility: CLINIC | Age: 63
End: 2023-10-16
Payer: COMMERCIAL

## 2023-10-16 ASSESSMENT — ENCOUNTER SYMPTOMS
BLOOD IN STOOL: 0
ARTHRALGIAS: 1
HEADACHES: 0
UNEXPECTED WEIGHT CHANGE: 0
SHORTNESS OF BREATH: 0
JOINT SWELLING: 0
APPETITE CHANGE: 0
FEVER: 0
NAUSEA: 0
FLANK PAIN: 0
DYSURIA: 0
DIARRHEA: 0
WHEEZING: 0
SINUS PRESSURE: 0
EYE ITCHING: 0
NUMBNESS: 0
MYALGIAS: 0
VOMITING: 0
SORE THROAT: 0
SLEEP DISTURBANCE: 0
HEMATURIA: 0
PALPITATIONS: 0
RHINORRHEA: 0
CONSTIPATION: 0
WEAKNESS: 0
COUGH: 0
ABDOMINAL PAIN: 0
LIGHT-HEADEDNESS: 0
ACTIVITY CHANGE: 0
DIZZINESS: 0
NERVOUS/ANXIOUS: 0
EYE DISCHARGE: 0
DYSPHORIC MOOD: 0

## 2023-10-16 NOTE — PROGRESS NOTES
Subjective   Patient ID: Farida Traylor is a 63 y.o. female who presents for Follow-up (Results/) and Trigger Finger (L hand).    HPI HAS TRIGGER FINGER WITH MINIMAL DISABILITY   HER LIPID PANEL AND OTHER LABS THAT WERE DONE ARE GOOD     Review of Systems   Constitutional:  Negative for activity change, appetite change, fever and unexpected weight change.   HENT:  Negative for congestion, ear pain, postnasal drip, rhinorrhea, sinus pressure and sore throat.    Eyes:  Negative for discharge, itching and visual disturbance.   Respiratory:  Negative for cough, shortness of breath and wheezing.    Cardiovascular:  Negative for chest pain, palpitations and leg swelling.   Gastrointestinal:  Negative for abdominal pain, blood in stool, constipation, diarrhea, nausea and vomiting.   Endocrine: Negative for cold intolerance, heat intolerance and polyuria.   Genitourinary:  Negative for dysuria, flank pain and hematuria.   Musculoskeletal:  Positive for arthralgias. Negative for gait problem, joint swelling and myalgias.   Skin:  Negative for rash.   Allergic/Immunologic: Negative for environmental allergies and food allergies.   Neurological:  Negative for dizziness, syncope, weakness, light-headedness, numbness and headaches.   Psychiatric/Behavioral:  Negative for dysphoric mood and sleep disturbance. The patient is not nervous/anxious.        Objective   /58   Pulse 81   Temp 36.8 °C (98.2 °F)   Wt 132 kg (292 lb)   SpO2 93%   BMI 55.17 kg/m²     Physical Exam  Vitals and nursing note reviewed.   Constitutional:       Appearance: Normal appearance.   HENT:      Head: Normocephalic.      Mouth/Throat:      Mouth: Mucous membranes are moist.   Cardiovascular:      Rate and Rhythm: Normal rate and regular rhythm.      Pulses: Normal pulses.      Heart sounds: Normal heart sounds. No murmur heard.     No friction rub. No gallop.   Pulmonary:      Effort: Pulmonary effort is normal. No respiratory distress.       Breath sounds: Normal breath sounds. No wheezing.   Abdominal:      General: Bowel sounds are normal. There is no distension.      Palpations: Abdomen is soft.      Tenderness: There is no abdominal tenderness.   Musculoskeletal:         General: No deformity.      Comments: TRIGGER FINGER LEFT HAND /STILL ABLE TO USE   SHE IS LEFT HANDED AND THIS DOES INTERFERE WITH WORK    Skin:     General: Skin is warm and dry.      Capillary Refill: Capillary refill takes less than 2 seconds.   Neurological:      General: No focal deficit present.      Mental Status: She is alert and oriented to person, place, and time.   Psychiatric:         Mood and Affect: Mood normal.         Assessment/Plan   Diagnoses and all orders for this visit:  Trigger ring finger of left hand  -     Referral to Orthopaedic Surgery; Future  Flu vaccine need  -     Flu vaccine, quadrivalent, high-dose, preservative free, age 65y+ (FLUZONE)

## 2023-10-18 ENCOUNTER — TELEPHONE (OUTPATIENT)
Dept: CARDIOLOGY | Facility: HOSPITAL | Age: 63
End: 2023-10-18
Payer: COMMERCIAL

## 2023-10-18 DIAGNOSIS — R03.0 ELEVATED HEART RATE WITH ELEVATED BLOOD PRESSURE WITHOUT DIAGNOSIS OF HYPERTENSION: ICD-10-CM

## 2023-10-18 DIAGNOSIS — I10 ESSENTIAL HYPERTENSION: Primary | ICD-10-CM

## 2023-10-18 DIAGNOSIS — R00.9 ELEVATED HEART RATE WITH ELEVATED BLOOD PRESSURE WITHOUT DIAGNOSIS OF HYPERTENSION: ICD-10-CM

## 2023-10-18 DIAGNOSIS — I10 BENIGN ESSENTIAL HYPERTENSION: ICD-10-CM

## 2023-10-19 RX ORDER — ACETAMINOPHEN 500 MG
1 TABLET ORAL AS NEEDED
COMMUNITY
End: 2023-10-19 | Stop reason: SDUPTHER

## 2023-10-23 RX ORDER — ACETAMINOPHEN 500 MG
1 TABLET ORAL AS NEEDED
Qty: 1 KIT | Refills: 0 | Status: SHIPPED | OUTPATIENT
Start: 2023-10-23 | End: 2023-10-24 | Stop reason: SDUPTHER

## 2023-10-24 RX ORDER — ACETAMINOPHEN 500 MG
1 TABLET ORAL AS NEEDED
Qty: 1 KIT | Refills: 0 | Status: SHIPPED | OUTPATIENT
Start: 2023-10-24

## 2023-10-24 RX ORDER — BLOOD-GLUCOSE METER
1 KIT MISCELLANEOUS 2 TIMES DAILY
COMMUNITY
End: 2023-10-24 | Stop reason: SDUPTHER

## 2023-11-06 ENCOUNTER — OFFICE VISIT (OUTPATIENT)
Dept: ORTHOPEDIC SURGERY | Facility: CLINIC | Age: 63
End: 2023-11-06
Payer: COMMERCIAL

## 2023-11-06 DIAGNOSIS — M65.342 TRIGGER RING FINGER OF LEFT HAND: Primary | ICD-10-CM

## 2023-11-06 PROCEDURE — 1159F MED LIST DOCD IN RCRD: CPT | Performed by: ORTHOPAEDIC SURGERY

## 2023-11-06 PROCEDURE — 1160F RVW MEDS BY RX/DR IN RCRD: CPT | Performed by: ORTHOPAEDIC SURGERY

## 2023-11-06 PROCEDURE — 1036F TOBACCO NON-USER: CPT | Performed by: ORTHOPAEDIC SURGERY

## 2023-11-06 PROCEDURE — 3078F DIAST BP <80 MM HG: CPT | Performed by: ORTHOPAEDIC SURGERY

## 2023-11-06 PROCEDURE — 76942 ECHO GUIDE FOR BIOPSY: CPT | Performed by: ORTHOPAEDIC SURGERY

## 2023-11-06 PROCEDURE — 1125F AMNT PAIN NOTED PAIN PRSNT: CPT | Performed by: ORTHOPAEDIC SURGERY

## 2023-11-06 PROCEDURE — 99203 OFFICE O/P NEW LOW 30 MIN: CPT | Performed by: ORTHOPAEDIC SURGERY

## 2023-11-06 PROCEDURE — 3008F BODY MASS INDEX DOCD: CPT | Performed by: ORTHOPAEDIC SURGERY

## 2023-11-06 PROCEDURE — 3074F SYST BP LT 130 MM HG: CPT | Performed by: ORTHOPAEDIC SURGERY

## 2023-11-06 PROCEDURE — 20550 NJX 1 TENDON SHEATH/LIGAMENT: CPT | Performed by: ORTHOPAEDIC SURGERY

## 2023-11-06 ASSESSMENT — PAIN - FUNCTIONAL ASSESSMENT: PAIN_FUNCTIONAL_ASSESSMENT: 0-10

## 2023-11-06 ASSESSMENT — PAIN SCALES - GENERAL: PAINLEVEL_OUTOF10: 4

## 2023-11-07 RX ORDER — METHYLPREDNISOLONE ACETATE 40 MG/ML
40 INJECTION, SUSPENSION INTRA-ARTICULAR; INTRALESIONAL; INTRAMUSCULAR; SOFT TISSUE ONCE
Status: COMPLETED | OUTPATIENT
Start: 2023-11-07 | End: 2024-04-16

## 2023-11-07 ASSESSMENT — ENCOUNTER SYMPTOMS
JOINT SWELLING: 1
SINUS PAIN: 0
TROUBLE SWALLOWING: 0
CHILLS: 0
SHORTNESS OF BREATH: 0
WHEEZING: 0
FEVER: 0
EYE DISCHARGE: 0

## 2023-11-07 NOTE — PROGRESS NOTES
Reason for Appointment  Chief Complaint   Patient presents with    Left Ring Finger - Trigger Finger     History of Present Illness  New patient is a 63 y.o. female here today for evaluation of a left ring trigger finger.  For the last few months she had increased pain with heavy gripping and the finger will lock down on her at times.  She has significant stiffness in the morning.  No specific injury she can recall.  No numbness or tingling in the hand.     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    Acquired plantar porokeratosis    Acute upper respiratory infection, unspecified 01/14/2020    URI, acute    Allergic rhinitis due to animal (cat) (dog) hair and dander 01/02/2020    Allergic rhinitis due to dogs    Allergic rhinitis due to pollen 01/02/2020    Allergic rhinitis due to pollen    Allergic rhinitis due to pollen 02/26/2018    Seasonal allergic rhinitis due to pollen    Allergy status to unspecified drugs, medicaments and biological substances 06/30/2015    History of allergy    Anemia, unspecified 07/23/2018    Normocytic anemia    Anxiety disorder due to known physiological condition 06/05/2013    Anxiety disorder due to medical condition    Anxiety disorder, unspecified 01/28/2016    Anxiety    Asymptomatic varicose veins of bilateral lower extremities 07/16/2019    Varicose veins of legs    Atypical squamous cells of undetermined significance on cytologic smear of cervix (ASC-US) 06/26/2013    Pap smear abnormality of cervix with ASCUS favoring benign    Candidiasis, unspecified 12/10/2019    Yeast infection    Cellulitis of left finger 07/31/2018    Paronychia of finger of left hand    Changes in skin texture 03/09/2021    Cracked skin    Contusion of left lesser toe(s) with damage to nail, initial encounter 02/22/2018    Subungual contusion of toenail of left foot    Contusion of right hand, sequela 08/04/2020    Traumatic ecchymosis  of hand, right, sequela    Corns and callosities 05/25/2021    Callus of foot    Disorder of pigmentation, unspecified 09/28/2016    Atypical pigmented skin lesion    Disorder of the skin and subcutaneous tissue, unspecified 08/27/2020    Lesion of subcutaneous tissue    Dorsalgia, unspecified 09/23/2021    Acute back pain    Dorsalgia, unspecified 01/29/2019    Costovertebral angle tenderness    Encounter for general adult medical examination without abnormal findings 06/08/2020    Medicare annual wellness visit, subsequent    Encounter for gynecological examination (general) (routine) without abnormal findings 12/14/2021    Encounter for gynecological examination    Encounter for gynecological examination (general) (routine) without abnormal findings 12/11/2020    Encounter for gynecological examination    Encounter for other screening for malignant neoplasm of breast     Breast cancer screening    Encounter for screening for malignant neoplasm of cervix     Encounter for screening for malignant neoplasm of cervix    Encounter for screening for malignant neoplasm of cervix 11/04/2014    Screening for malignant neoplasm of cervix    Encounter for screening for malignant neoplasm of cervix     Cervical cancer screening    Hepatomegaly, not elsewhere classified 04/21/2021    Liver mass, right lobe    History of falling 12/28/2018    Status post fall    Inappropriate diet and eating habits 05/13/2021    Inappropriate diet and eating habits    Irregular menstruation, unspecified     Irregular periods/menstrual cycles    Localized edema 07/29/2015    Pedal edema    Localized swelling, mass and lump, left lower limb 09/11/2020    Lump of left thigh    Localized swelling, mass and lump, left lower limb 09/24/2020    Mass of left thigh    Localized swelling, mass and lump, unspecified lower limb 09/24/2020    Mass of thigh    Melanocytic nevi, unspecified 09/10/2019    Numerous skin moles    Multiple births, unspecified,  all liveborn     Multiple births, all liveborn    Other allergic rhinitis 01/02/2020    Allergic rhinitis due to dust mite    Other allergic rhinitis 01/02/2020    Allergic rhinitis due to mold    Other conditions influencing health status 06/26/2013    Uterine Rupture    Other conditions influencing health status     Normal colonoscopy    Other conditions influencing health status 02/05/2019    History of burning on urination    Other conditions influencing health status 12/14/2021    History of cough    Other conditions influencing health status 02/14/2019    History of dyspareunia    Other conditions influencing health status 01/14/2020    History of cough    Other conditions influencing health status 06/05/2013    Leiomyomatous Degeneration Of The Uterus    Other conditions influencing health status 06/05/2013    Viremia    Other hypertrophic disorders of the skin 07/29/2015    Skin tag    Other shoulder lesions, right shoulder 11/11/2019    Tendinitis of right rotator cuff    Other specified disorders of bone, thigh 09/09/2020    Pain of left femur    Other specified noninflammatory disorders of vagina 03/12/2019    Vaginal dryness    Other specified postprocedural states 05/04/2017    History of arthroscopic knee surgery    Other specified soft tissue disorders 10/08/2020    Soft tissue mass    Other specified soft tissue disorders 08/04/2020    Swelling of right hand    Pain in left thigh 08/27/2020    Pain of left thigh    Pain in right foot 03/09/2021    Right foot pain    Pain in right hip 12/28/2018    Right hip pain    Pain in right knee 03/12/2021    Bilateral knee pain    Pain in right knee 03/19/2021    Right knee pain    Pain in right leg 05/25/2021    Bilateral pain of leg and foot    Pain in right leg 01/12/2022    Bilateral leg and foot pain    Pain in unspecified joint     Joint pain    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 02/22/2016    History  of tetralogy of Fallot    Personal history of contraception     Personal history of contraceptive use    Personal history of diseases of the skin and subcutaneous tissue 12/22/2020    History of ingrown nail    Personal history of malignant neoplasm of other parts of uterus     History of malignant neoplasm of endometrium    Personal history of other (healed) physical injury and trauma 03/01/2017    History of sprain of ankle    Personal history of other benign neoplasm 09/10/2019    History of other benign neoplasm    Personal history of other benign neoplasm 05/06/2014    History of uterine leiomyoma    Personal history of other benign neoplasm 12/11/2020    History of uterine leiomyoma    Personal history of other diseases of the circulatory system 04/14/2021    History of atrial fibrillation    Personal history of other diseases of the circulatory system 04/14/2021    History of atrial fibrillation    Personal history of other diseases of the circulatory system 04/14/2021    History of atrial fibrillation    Personal history of other diseases of the circulatory system 02/01/2017    History of hypertension    Personal history of other diseases of the circulatory system 04/14/2021    History of hypotension    Personal history of other diseases of the digestive system 03/24/2021    History of gastroesophageal reflux (GERD)    Personal history of other diseases of the female genital tract 12/28/2016    History of postmenopausal bleeding    Personal history of other diseases of the female genital tract 05/19/2022    History of postmenopausal bleeding    Personal history of other diseases of the female genital tract     History of vaginal discharge    Personal history of other diseases of the female genital tract     Vaginal delivery    Personal history of other diseases of the musculoskeletal system and connective tissue     Personal history of arthritis    Personal history of other diseases of the musculoskeletal  system and connective tissue 11/18/2019    History of muscle spasm    Personal history of other diseases of the respiratory system 05/27/2021    History of asthma    Personal history of other diseases of the respiratory system 04/12/2019    History of acute bronchitis    Personal history of other diseases of the respiratory system 01/04/2020    History of bronchitis    Personal history of other endocrine, nutritional and metabolic disease 02/01/2017    History of hyperlipidemia    Personal history of other endocrine, nutritional and metabolic disease 04/18/2016    History of obesity    Personal history of other endocrine, nutritional and metabolic disease 05/26/2020    History of thyroid nodule    Personal history of other infectious and parasitic diseases     History of varicella    Personal history of other medical treatment 12/11/2020    History of screening mammography    Personal history of other medical treatment 12/14/2021    History of screening mammography    Personal history of other medical treatment     History of mammogram    Personal history of other specified conditions 08/21/2019    History of gross hematuria    Personal history of other specified conditions 02/02/2017    History of weight gain    Personal history of other specified conditions 12/07/2020    History of chest pain    Personal history of other specified conditions 04/14/2021    History of palpitations    Personal history of other specified conditions 12/05/2014    History of vertigo    Personal history of other specified conditions 08/25/2021    History of exertional chest pain    Personal history of other specified conditions     History of shortness of breath    Personal history of other specified conditions     H/O heartburn    Personal history of other specified conditions 01/12/2018    History of urinary frequency    Personal history of transient ischemic attack (TIA), and 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 12/30/2015     History of TIA (transient ischemic attack)    Personal history of urinary (tract) infections 09/02/2021    History of recurrent cystitis    Personal history of urinary (tract) infections 05/23/2019    History of cystitis    Personal history of urinary (tract) infections 09/02/2021    History of recurrent urinary tract infection    Personal history of urinary calculi 09/12/2019    History of renal calculi    Polyphagia 05/13/2021    Polyphagia    Primary central sleep apnea 10/21/2017    Central sleep apnea    Pure hypercholesterolemia, unspecified     High cholesterol    Residual hemorrhoidal skin tags 07/06/2017    External hemorrhoid    Slurred speech 01/02/2015    Slurred speech    Tinea pedis 05/25/2021    Tinea pedis of right foot    Unspecified abdominal pain 04/13/2021    Abdominal pain, acute    Unspecified injury of right wrist, hand and finger(s), sequela 08/04/2020    Hand trauma, right, sequela    Unspecified internal derangement of unspecified knee 03/01/2017    Internal derangement of knee    Unspecified symptoms and signs involving the genitourinary system 12/16/2021    UTI symptoms    Unspecified symptoms and signs involving the genitourinary system 02/05/2019    UTI symptoms    Unspecified symptoms and signs involving the genitourinary system 09/03/2020    UTI symptoms    Urinary tract infection, site not specified 01/17/2020    Acute urinary tract infection    Urinary tract infection, site not specified 01/10/2022    Acute UTI    Xerosis cutis 09/10/2019    Dry skin dermatitis       Past Surgical History:   Procedure Laterality Date    KNEE SURGERY  05/04/2017    Knee Surgery    MOUTH SURGERY  11/04/2014    Oral Surgery Tooth Extraction    MR HEAD ANGIO WO IV CONTRAST  2/9/2016    MR HEAD ANGIO WO IV CONTRAST [facility]    OTHER SURGICAL HISTORY  07/31/2013    Wrist Carpectomy    OTHER SURGICAL HISTORY  09/10/2019    [place] tooth extraction    OTHER SURGICAL HISTORY  11/30/2018     Complete colonoscopy    OTHER SURGICAL HISTORY  06/08/2020    Thyroid biopsy       Medication Documentation Review Audit       Reviewed by Greta Upton MA (Medical Assistant) on 11/06/23 at 1508      Medication Order Taking? Sig Documenting Provider Last Dose Status   albuterol 90 mcg/actuation inhaler [id number] No Inhale 1-2 puffs every 4 hours if needed for wheezing. Every 4 to 6 hours as needed Richy Raman MD Taking Active   aspirin 325 mg tablet [id number] No Take 1 tablet (325 mg) by mouth once daily. Historical MD Pavithra Taking Active   blood pressure monitor (Blood Pressure Kit) kit [id number]  1 kit if needed (take BP as needed). Hortencia Sotomayor, APRN-CNP  Active   buPROPion XL (Wellbutrin XL) 300 mg 24 hr tablet [id number] No buPROPion HCl ER (XL) 300 MG Oral Tablet Extended Release 24 Hour   Quantity: 30  Refills: 0        Start : 13-Apr-2017   Active Historical Provider, MD Taking Active   busPIRone (Buspar) 30 mg tablet [id number] No Take 1 tablet (30 mg) by mouth 2 times a day. Historical Provider, MD Taking Active   fluticasone propion-salmeteroL (Advair Diskus) 250-50 mcg/dose diskus inhaler [id number] No Inhale 1 puff 2 times a day. Historical Provider, MD Taking Active   lubiprostone (Amitiza) 8 mcg capsule [id number] No Take 1 capsule (8 mcg) by mouth once daily. Historical Provider, MD Taking Active   memantine (Namenda) 10 mg tablet [id number] No Take 1 tablet (10 mg) by mouth 2 times a day. Historical Provider, MD Taking Active   methenamine hippurate (Hiprex) 1 gram tablet [id number] No Take 1 tablet (1 g) by mouth 2 times a day. Historical Provider, MD Taking Active   montelukast (Singulair) 10 mg tablet [id number] No Take 1 tablet (10 mg) by mouth once daily. Historical Provider, MD Taking Active   nitrofurantoin, macrocrystal-monohydrate, (Macrobid) 100 mg capsule [id number] No Take by mouth 2 times a day. Historical MD Pavithra Taking Active   omeprazole (PriLOSEC) 40 mg DR capsule [id number]  No Take 1 capsule (40 mg) by mouth 2 times a day. Historical Provider, MD Taking Active   sertraline (Zoloft) 100 mg tablet [id number] No Take 2 tablets (200 mg) by mouth once daily. Historical Provider, MD Taking Active   simvastatin (Zocor) 80 mg tablet [id number] No Take 1 tablet (80 mg) by mouth once daily at bedtime. Sujatha Mcneal, APRN-CNP Taking Active   trospium (Sanctura) 20 mg tablet [id number] No Take 1 tablet (20 mg) by mouth 2 times a day. Historical Provider, MD Taking Active                    Allergies   Allergen Reactions    Other Unknown    Pollen Extracts Unknown       Review of Systems   Constitutional:  Negative for chills and fever.   HENT:  Negative for sinus pain and trouble swallowing.    Eyes:  Negative for discharge.   Respiratory:  Negative for shortness of breath and wheezing.    Cardiovascular:  Negative for chest pain.   Musculoskeletal:  Positive for joint swelling.   All other systems reviewed and are negative.    Exam   On exam patient is alert, oriented, and in no acute distress.  Head is no cephalic, no JVD, no auditory wheezes.  She has decent motion of the shoulders, elbows, and wrist.  Mild DJD in the hands.  She has active triggering of the left ring finger and tenderness and crepitation over the left ring A1 pulley tendon sheath.  Triggering of the other digits.  Pulses and sensation in the upper extremity.    Assessment   L ring trigger finger     Plan   We discussed conservative treatment today with an injection to calm down her thumbs.  We sterilely injected under ultrasound guidance Depo-Medrol lidocaine into the ring A1 pulley tendon sheath.  Patient understands the small risk of infection and the signs to look for as well as flare reaction.  We will also show her a night splint.  She can follow-up with us as needed    Procedures  After discussing the risks and benefits of the procedure we proceeded with the injection. Under ultrasound guidance we identified the metacarpal bone  and overlying tendon sheath with the FDS and FDP tendons, images obtained. We then sterilely injected the left ring A1 pulley with a mixture of 20 mg of DepoMedrol and .5 cc of 1% lidocaine. Pt tolerated the procedure well without any adverse reactions.     Written by Josey Salcedo saw, evaluated, and treated the patient with the PA

## 2023-11-21 ENCOUNTER — OFFICE VISIT (OUTPATIENT)
Dept: PULMONOLOGY | Facility: CLINIC | Age: 63
End: 2023-11-21
Payer: COMMERCIAL

## 2023-11-21 VITALS
SYSTOLIC BLOOD PRESSURE: 127 MMHG | RESPIRATION RATE: 16 BRPM | DIASTOLIC BLOOD PRESSURE: 76 MMHG | HEART RATE: 71 BPM | OXYGEN SATURATION: 96 %

## 2023-11-21 DIAGNOSIS — J45.909 ASTHMA, UNSPECIFIED ASTHMA SEVERITY, UNSPECIFIED WHETHER COMPLICATED, UNSPECIFIED WHETHER PERSISTENT (HHS-HCC): ICD-10-CM

## 2023-11-21 DIAGNOSIS — G47.33 OBSTRUCTIVE SLEEP APNEA SYNDROME: ICD-10-CM

## 2023-11-21 DIAGNOSIS — I27.0 IDIOPATHIC PAH (PULMONARY ARTERIAL HYPERTENSION) (MULTI): Primary | ICD-10-CM

## 2023-11-21 DIAGNOSIS — J96.11 CHRONIC RESPIRATORY FAILURE WITH HYPOXIA (MULTI): ICD-10-CM

## 2023-11-21 PROCEDURE — 1160F RVW MEDS BY RX/DR IN RCRD: CPT | Performed by: INTERNAL MEDICINE

## 2023-11-21 PROCEDURE — 99214 OFFICE O/P EST MOD 30 MIN: CPT | Performed by: INTERNAL MEDICINE

## 2023-11-21 PROCEDURE — 1159F MED LIST DOCD IN RCRD: CPT | Performed by: INTERNAL MEDICINE

## 2023-11-21 PROCEDURE — 1036F TOBACCO NON-USER: CPT | Performed by: INTERNAL MEDICINE

## 2023-11-21 PROCEDURE — 3078F DIAST BP <80 MM HG: CPT | Performed by: INTERNAL MEDICINE

## 2023-11-21 PROCEDURE — 3074F SYST BP LT 130 MM HG: CPT | Performed by: INTERNAL MEDICINE

## 2023-11-21 PROCEDURE — 3008F BODY MASS INDEX DOCD: CPT | Performed by: INTERNAL MEDICINE

## 2023-11-21 PROCEDURE — 1126F AMNT PAIN NOTED NONE PRSNT: CPT | Performed by: INTERNAL MEDICINE

## 2023-11-21 ASSESSMENT — ENCOUNTER SYMPTOMS
DEPRESSION: 0
COUGH: 0
CARDIOVASCULAR NEGATIVE: 1
PSYCHIATRIC NEGATIVE: 1
FEVER: 0
SHORTNESS OF BREATH: 1
NEUROLOGICAL NEGATIVE: 1
GASTROINTESTINAL NEGATIVE: 1
CHILLS: 0

## 2023-11-21 ASSESSMENT — PATIENT HEALTH QUESTIONNAIRE - PHQ9
2. FEELING DOWN, DEPRESSED OR HOPELESS: NOT AT ALL
1. LITTLE INTEREST OR PLEASURE IN DOING THINGS: NOT AT ALL
SUM OF ALL RESPONSES TO PHQ9 QUESTIONS 1 AND 2: 0

## 2023-11-21 ASSESSMENT — PAIN SCALES - GENERAL: PAINLEVEL: 0-NO PAIN

## 2023-11-21 ASSESSMENT — COLUMBIA-SUICIDE SEVERITY RATING SCALE - C-SSRS
2. HAVE YOU ACTUALLY HAD ANY THOUGHTS OF KILLING YOURSELF?: NO
6. HAVE YOU EVER DONE ANYTHING, STARTED TO DO ANYTHING, OR PREPARED TO DO ANYTHING TO END YOUR LIFE?: NO

## 2023-11-21 NOTE — ASSESSMENT & PLAN NOTE
Pulmonary arterial hypertension - WHO Functional Class II/III. Idiopathic. Cont macitentan and riociguat. Letitia started 7/2021. Lasix as needed when weight increases. Repeat RHC in January.     RHC: 11/2022 mPAP 31, PAOP 11, PVR 2.6 CO 7.7 4/2021 mPAP 35, PAOP 2, PVR 4.5 CO 7.4 10/2019 mPAP 31   RVSP: 1/2023 mildly enlarged RV 1/2022 54.5 10/2020 62.1 11/2018 43.6  6MWT: 4/2023 180m 1/2022 150m 6/2021 231.7m 1/2021 140m 11/2019 250m

## 2023-11-21 NOTE — PROGRESS NOTES
Subjective   Patient ID: Farida Traylor is a 63 y.o. female who presents for Lung Eval.  h/o PAH, complex sleep apnea and asthma here for follow up visit. Pt currently on 3L. Thinks breathing is ok.  No fevers, chills.  Some cough.  No palpitations, presyncopal symptoms, LE edema. ET is a few hundred feet.        Review of Systems   Constitutional:  Negative for chills and fever.   Respiratory:  Positive for shortness of breath. Negative for cough.    Cardiovascular: Negative.    Gastrointestinal: Negative.    Neurological: Negative.    Psychiatric/Behavioral: Negative.     All other systems reviewed and are negative.      Objective   Physical Exam  Vitals reviewed.   Constitutional:       Appearance: Normal appearance. She is obese.   HENT:      Head: Normocephalic and atraumatic.   Eyes:      Extraocular Movements: Extraocular movements intact.   Cardiovascular:      Rate and Rhythm: Normal rate and regular rhythm.      Heart sounds: Normal heart sounds.   Pulmonary:      Effort: Pulmonary effort is normal.      Breath sounds: Decreased air movement present.   Abdominal:      Palpations: Abdomen is soft.      Tenderness: There is no abdominal tenderness.   Musculoskeletal:      Cervical back: Normal range of motion.   Skin:     General: Skin is warm.   Neurological:      General: No focal deficit present.      Mental Status: She is alert and oriented to person, place, and time. Mental status is at baseline.   Psychiatric:         Mood and Affect: Mood normal.         Behavior: Behavior normal.         Assessment/Plan   Problem List Items Addressed This Visit       Idiopathic PAH (pulmonary arterial hypertension) (CMS/HCC) - Primary     Pulmonary arterial hypertension - WHO Functional Class II/III. Idiopathic. Cont macitentan and riociguat. Letitia started 7/2021. Lasix as needed when weight increases. Repeat RHC in January.     RHC: 11/2022 mPAP 31, PAOP 11, PVR 2.6 CO 7.7 4/2021 mPAP 35, PAOP 2, PVR 4.5 CO 7.4  10/2019 mPAP 31   RVSP: 1/2023 mildly enlarged RV 1/2022 54.5 10/2020 62.1 11/2018 43.6  6MWT: 4/2023 180m 1/2022 150m 6/2021 231.7m 1/2021 140m 11/2019 250m          Chronic respiratory failure with hypoxia (CMS/HCC)     Cont 2L at night w/ ASV. Cont 3 L O2 during day         Obstructive sleep apnea syndrome     Complex sleep apnea - Cont ASV. Wears 100% of nights for [id number] on avg with AHI 1.1.         Asthma     NL PFT 1/2022. controlled on Advair, singulair and proair. Cont Flonase.              RTC in 3 months

## 2023-11-22 DIAGNOSIS — I27.21 PAH (PULMONARY ARTERY HYPERTENSION) (MULTI): ICD-10-CM

## 2023-11-28 ENCOUNTER — APPOINTMENT (OUTPATIENT)
Dept: OBSTETRICS AND GYNECOLOGY | Facility: CLINIC | Age: 63
End: 2023-11-28
Payer: COMMERCIAL

## 2023-12-12 ENCOUNTER — LAB (OUTPATIENT)
Dept: LAB | Facility: LAB | Age: 63
End: 2023-12-12
Payer: COMMERCIAL

## 2023-12-12 ENCOUNTER — TELEMEDICINE (OUTPATIENT)
Dept: UROLOGY | Facility: CLINIC | Age: 63
End: 2023-12-12
Payer: COMMERCIAL

## 2023-12-12 DIAGNOSIS — R30.0 DYSURIA: Primary | ICD-10-CM

## 2023-12-12 DIAGNOSIS — R30.0 DYSURIA: ICD-10-CM

## 2023-12-12 PROCEDURE — 87086 URINE CULTURE/COLONY COUNT: CPT

## 2023-12-12 PROCEDURE — 99442 PR PHYS/QHP TELEPHONE EVALUATION 11-20 MIN: CPT | Performed by: STUDENT IN AN ORGANIZED HEALTH CARE EDUCATION/TRAINING PROGRAM

## 2023-12-12 NOTE — PROGRESS NOTES
CHIEF COMPLAINT: Virtual FUV 3 months      HISTORY OF PRESENT ILLNESS:  This is a 63 y.o. female,  who presents with a virtual 3 month follow up visit. Having dysuria and feels like botox is wearing off.              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), Acute upper respiratory infection, unspecified (01/14/2020), Allergic rhinitis due to animal (cat) (dog) hair and dander (01/02/2020), Allergic rhinitis due to pollen (01/02/2020), Allergic rhinitis due to pollen (02/26/2018), Allergy status to unspecified drugs, medicaments and biological substances (06/30/2015), Anemia, unspecified (07/23/2018), Anxiety disorder due to known physiological condition (06/05/2013), Anxiety disorder, unspecified (01/28/2016), Asymptomatic varicose veins of bilateral lower extremities (07/16/2019), Atypical squamous cells of undetermined significance on cytologic smear of cervix (ASC-US) (06/26/2013), Candidiasis, unspecified (12/10/2019), Cellulitis of left finger (07/31/2018), Changes in skin texture (03/09/2021), Contusion of left lesser toe(s) with damage to nail, initial encounter (02/22/2018), Contusion of right hand, sequela (08/04/2020), Corns and callosities (05/25/2021), Disorder of pigmentation, unspecified (09/28/2016), Disorder of the skin and subcutaneous tissue, unspecified (08/27/2020), Dorsalgia, unspecified (09/23/2021), Dorsalgia, unspecified (01/29/2019), Encounter for general adult medical examination without abnormal findings (06/08/2020), Encounter for gynecological examination (general) (routine) without abnormal findings (12/14/2021), Encounter for gynecological examination (general) (routine) without abnormal findings (12/11/2020), Encounter for other screening for malignant neoplasm of breast, Encounter for screening for malignant neoplasm of cervix, Encounter for screening for  malignant neoplasm of cervix (11/04/2014), Encounter for screening for malignant neoplasm of cervix, Hepatomegaly, not elsewhere classified (04/21/2021), History of falling (12/28/2018), Inappropriate diet and eating habits (05/13/2021), Irregular menstruation, unspecified, Localized edema (07/29/2015), Localized swelling, mass and lump, left lower limb (09/11/2020), Localized swelling, mass and lump, left lower limb (09/24/2020), Localized swelling, mass and lump, unspecified lower limb (09/24/2020), Melanocytic nevi, unspecified (09/10/2019), Multiple births, unspecified, all liveborn, Other allergic rhinitis (01/02/2020), Other allergic rhinitis (01/02/2020), Other conditions influencing health status (06/26/2013), Other conditions influencing health status, Other conditions influencing health status (02/05/2019), Other conditions influencing health status (12/14/2021), Other conditions influencing health status (02/14/2019), Other conditions influencing health status (01/14/2020), Other conditions influencing health status (06/05/2013), Other conditions influencing health status (06/05/2013), Other hypertrophic disorders of the skin (07/29/2015), Other shoulder lesions, right shoulder (11/11/2019), Other specified disorders of bone, thigh (09/09/2020), Other specified noninflammatory disorders of vagina (03/12/2019), Other specified postprocedural states (05/04/2017), Other specified soft tissue disorders (10/08/2020), Other specified soft tissue disorders (08/04/2020), Pain in left thigh (08/27/2020), Pain in right foot (03/09/2021), Pain in right hip (12/28/2018), Pain in right knee (03/12/2021), Pain in right knee (03/19/2021), Pain in right leg (05/25/2021), Pain in right leg (01/12/2022), Pain in unspecified joint, 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 (02/22/2016), Personal history of contraception, Personal history of diseases of the skin and subcutaneous  tissue (12/22/2020), Personal history of malignant neoplasm of other parts of uterus, Personal history of other (healed) physical injury and trauma (03/01/2017), Personal history of other benign neoplasm (09/10/2019), Personal history of other benign neoplasm (05/06/2014), Personal history of other benign neoplasm (12/11/2020), Personal history of other diseases of the circulatory system (04/14/2021), Personal history of other diseases of the circulatory system (04/14/2021), Personal history of other diseases of the circulatory system (04/14/2021), Personal history of other diseases of the circulatory system (02/01/2017), Personal history of other diseases of the circulatory system (04/14/2021), Personal history of other diseases of the digestive system (03/24/2021), Personal history of other diseases of the female genital tract (12/28/2016), Personal history of other diseases of the female genital tract (05/19/2022), Personal history of other diseases of the female genital tract, Personal history of other diseases of the female genital tract, Personal history of other diseases of the musculoskeletal system and connective tissue, Personal history of other diseases of the musculoskeletal system and connective tissue (11/18/2019), Personal history of other diseases of the respiratory system (05/27/2021), Personal history of other diseases of the respiratory system (04/12/2019), Personal history of other diseases of the respiratory system (01/04/2020), Personal history of other endocrine, nutritional and metabolic disease (02/01/2017), Personal history of other endocrine, nutritional and metabolic disease (04/18/2016), Personal history of other endocrine, nutritional and metabolic disease (05/26/2020), Personal history of other infectious and parasitic diseases, Personal history of other medical treatment (12/11/2020), Personal history of other medical treatment (12/14/2021), Personal history of other medical  treatment, Personal history of other specified conditions (08/21/2019), Personal history of other specified conditions (02/02/2017), Personal history of other specified conditions (12/07/2020), Personal history of other specified conditions (04/14/2021), Personal history of other specified conditions (12/05/2014), Personal history of other specified conditions (08/25/2021), Personal history of other specified conditions, Personal history of other specified conditions, Personal history of other specified conditions (01/12/2018), Personal history of transient ischemic attack (TIA), and 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 (12/30/2015), Personal history of urinary (tract) infections (09/02/2021), Personal history of urinary (tract) infections (05/23/2019), Personal history of urinary (tract) infections (09/02/2021), Personal history of urinary calculi (09/12/2019), Polyphagia (05/13/2021), Primary central sleep apnea (10/21/2017), Pure hypercholesterolemia, unspecified, Residual hemorrhoidal skin tags (07/06/2017), Slurred speech (01/02/2015), Tinea pedis (05/25/2021), Unspecified abdominal pain (04/13/2021), Unspecified injury of right wrist, hand and finger(s), sequela (08/04/2020), Unspecified internal derangement of unspecified knee (03/01/2017), Unspecified symptoms and signs involving the genitourinary system (12/16/2021), Unspecified symptoms and signs involving the genitourinary system (02/05/2019), Unspecified symptoms and signs involving the genitourinary system (09/03/2020), Urinary tract infection, site not specified (01/17/2020), Urinary tract infection, site not specified (01/10/2022), and Xerosis cutis (09/10/2019).    Surgical History  She has a past surgical history that includes Other surgical history (07/31/2013); Other surgical history (09/10/2019); Other surgical history (11/30/2018); Mouth surgery (11/04/2014); Knee surgery (05/04/2017); Other surgical history (06/08/2020); and  angio  head wo IV contrast (2/9/2016).     Social History  She reports that she has never smoked. She has never used smokeless tobacco. She reports that she does not currently use alcohol. She reports that she does not use drugs.    Family History  Family History   Problem Relation Name Age of Onset    Hypertension Mother      Breast cancer Mother      Other (cardiac disorder) Mother      Cardiomyopathy Mother      Diabetes Mother      Other (chronic kidney disease) Mother      Stroke Brother      Brain Aneurysm Brother          Allergies  Other and Pollen extracts      A comprehensive 10+ review of systems was negative except for: see hpi                     Assessment:    63-year-old with recurrent UTIs, genitourinary syndrome menopause, and urinary urge incontinence, presenting for Botox.     Alyce/dysuria:   ok to stop methenamine and Macrobid for now   continue estradiol   sx improved with above      RYAN:  failed trospium and myrbetriq   Cystoscopy was performed today.    s/p Botox, 5/26/22, 5/25/2023, 100 units, not presently on trospium     Schedule another injection and check culture       Follow up in 3 months       Sanford Arredondo MD

## 2023-12-14 LAB — BACTERIA UR CULT: NO GROWTH

## 2024-01-03 ENCOUNTER — TELEPHONE (OUTPATIENT)
Dept: GASTROENTEROLOGY | Facility: CLINIC | Age: 64
End: 2024-01-03
Payer: COMMERCIAL

## 2024-01-03 DIAGNOSIS — K59.04 CHRONIC IDIOPATHIC CONSTIPATION: ICD-10-CM

## 2024-01-03 DIAGNOSIS — K59.09 CHRONIC CONSTIPATION: ICD-10-CM

## 2024-01-03 RX ORDER — LUBIPROSTONE 8 UG/1
8 CAPSULE ORAL DAILY
Qty: 90 CAPSULE | Refills: 3 | Status: SHIPPED | OUTPATIENT
Start: 2024-01-03 | End: 2024-02-22 | Stop reason: SDUPTHER

## 2024-01-03 NOTE — TELEPHONE ENCOUNTER
Requested Prescriptions     Pending Prescriptions Disp Refills    lubiprostone (Amitiza) 8 mcg capsule 90 capsule 3     Sig: Take 1 capsule (8 mcg) by mouth once daily.       ----- Message from Corine Khalil MA sent at 1/3/2024 11:02 AM EST -----  Regarding: refill med  [facility] called with a refill request for pt's script lubiprostone.

## 2024-01-04 ENCOUNTER — HOSPITAL ENCOUNTER (OUTPATIENT)
Facility: HOSPITAL | Age: 64
Setting detail: OUTPATIENT SURGERY
Discharge: HOME | End: 2024-01-04
Attending: INTERNAL MEDICINE | Admitting: INTERNAL MEDICINE
Payer: COMMERCIAL

## 2024-01-04 VITALS
RESPIRATION RATE: 15 BRPM | OXYGEN SATURATION: 95 % | WEIGHT: 291.01 LBS | HEART RATE: 65 BPM | SYSTOLIC BLOOD PRESSURE: 140 MMHG | BODY MASS INDEX: 54.99 KG/M2 | DIASTOLIC BLOOD PRESSURE: 85 MMHG

## 2024-01-04 DIAGNOSIS — I27.21 PAH (PULMONARY ARTERY HYPERTENSION) (MULTI): ICD-10-CM

## 2024-01-04 DIAGNOSIS — I27.0 PRIMARY PULMONARY HYPERTENSION (MULTI): ICD-10-CM

## 2024-01-04 LAB
ANION GAP SERPL CALC-SCNC: 12 MMOL/L (ref 10–20)
BUN SERPL-MCNC: 9 MG/DL (ref 6–23)
CALCIUM SERPL-MCNC: 8.7 MG/DL (ref 8.6–10.3)
CHLORIDE SERPL-SCNC: 105 MMOL/L (ref 98–107)
CO2 SERPL-SCNC: 27 MMOL/L (ref 21–32)
CREAT SERPL-MCNC: 0.77 MG/DL (ref 0.5–1.05)
ERYTHROCYTE [DISTWIDTH] IN BLOOD BY AUTOMATED COUNT: 14.1 % (ref 11.5–14.5)
GFR SERPL CREATININE-BSD FRML MDRD: 86 ML/MIN/1.73M*2
GLUCOSE SERPL-MCNC: 98 MG/DL (ref 74–99)
HCT VFR BLD AUTO: 42.9 % (ref 36–46)
HGB BLD-MCNC: 14 G/DL (ref 12–16)
MCH RBC QN AUTO: 28.7 PG (ref 26–34)
MCHC RBC AUTO-ENTMCNC: 32.6 G/DL (ref 32–36)
MCV RBC AUTO: 88 FL (ref 80–100)
NRBC BLD-RTO: 0 /100 WBCS (ref 0–0)
PLATELET # BLD AUTO: 209 X10*3/UL (ref 150–450)
POTASSIUM SERPL-SCNC: 3.7 MMOL/L (ref 3.5–5.3)
RBC # BLD AUTO: 4.87 X10*6/UL (ref 4–5.2)
SODIUM SERPL-SCNC: 140 MMOL/L (ref 136–145)
WBC # BLD AUTO: 5 X10*3/UL (ref 4.4–11.3)

## 2024-01-04 PROCEDURE — 99153 MOD SED SAME PHYS/QHP EA: CPT | Performed by: INTERNAL MEDICINE

## 2024-01-04 PROCEDURE — 7100000009 HC PHASE TWO TIME - INITIAL BASE CHARGE: Performed by: INTERNAL MEDICINE

## 2024-01-04 PROCEDURE — 2720000007 HC OR 272 NO HCPCS: Performed by: INTERNAL MEDICINE

## 2024-01-04 PROCEDURE — 2500000005 HC RX 250 GENERAL PHARMACY W/O HCPCS: Performed by: INTERNAL MEDICINE

## 2024-01-04 PROCEDURE — 2500000004 HC RX 250 GENERAL PHARMACY W/ HCPCS (ALT 636 FOR OP/ED): Performed by: INTERNAL MEDICINE

## 2024-01-04 PROCEDURE — 99152 MOD SED SAME PHYS/QHP 5/>YRS: CPT | Performed by: INTERNAL MEDICINE

## 2024-01-04 PROCEDURE — 93451 RIGHT HEART CATH: CPT | Performed by: INTERNAL MEDICINE

## 2024-01-04 PROCEDURE — C1894 INTRO/SHEATH, NON-LASER: HCPCS | Performed by: INTERNAL MEDICINE

## 2024-01-04 PROCEDURE — 76937 US GUIDE VASCULAR ACCESS: CPT | Performed by: INTERNAL MEDICINE

## 2024-01-04 PROCEDURE — 7100000010 HC PHASE TWO TIME - EACH INCREMENTAL 1 MINUTE: Performed by: INTERNAL MEDICINE

## 2024-01-04 PROCEDURE — 36415 COLL VENOUS BLD VENIPUNCTURE: CPT | Performed by: INTERNAL MEDICINE

## 2024-01-04 PROCEDURE — 80048 BASIC METABOLIC PNL TOTAL CA: CPT | Performed by: INTERNAL MEDICINE

## 2024-01-04 PROCEDURE — 85027 COMPLETE CBC AUTOMATED: CPT | Performed by: INTERNAL MEDICINE

## 2024-01-04 PROCEDURE — C1751 CATH, INF, PER/CENT/MIDLINE: HCPCS | Performed by: INTERNAL MEDICINE

## 2024-01-04 RX ORDER — HYDRALAZINE HYDROCHLORIDE 50 MG/1
50 TABLET, FILM COATED ORAL 2 TIMES DAILY
COMMUNITY

## 2024-01-04 RX ORDER — SODIUM CHLORIDE 9 MG/ML
INJECTION, SOLUTION INTRAVENOUS CONTINUOUS PRN
Status: DISCONTINUED | OUTPATIENT
Start: 2024-01-04 | End: 2024-01-04 | Stop reason: HOSPADM

## 2024-01-04 RX ORDER — MIDAZOLAM HYDROCHLORIDE 1 MG/ML
INJECTION, SOLUTION INTRAMUSCULAR; INTRAVENOUS AS NEEDED
Status: DISCONTINUED | OUTPATIENT
Start: 2024-01-04 | End: 2024-01-04 | Stop reason: HOSPADM

## 2024-01-04 RX ORDER — LIDOCAINE HYDROCHLORIDE 20 MG/ML
INJECTION, SOLUTION INFILTRATION; PERINEURAL AS NEEDED
Status: DISCONTINUED | OUTPATIENT
Start: 2024-01-04 | End: 2024-01-04 | Stop reason: HOSPADM

## 2024-01-04 RX ORDER — RIOCIGUAT 2.5 MG/1
2.5 TABLET, FILM COATED ORAL 3 TIMES DAILY
COMMUNITY
Start: 2023-11-13

## 2024-01-04 RX ORDER — MACITENTAN 10 MG/1
10 TABLET, FILM COATED ORAL DAILY
COMMUNITY
Start: 2023-11-13 | End: 2024-05-28 | Stop reason: SDUPTHER

## 2024-01-04 ASSESSMENT — ENCOUNTER SYMPTOMS
CARDIOVASCULAR NEGATIVE: 1
PSYCHIATRIC NEGATIVE: 1
GASTROINTESTINAL NEGATIVE: 1
RESPIRATORY NEGATIVE: 1
CHILLS: 0
COUGH: 0
NEUROLOGICAL NEGATIVE: 1
FEVER: 0

## 2024-01-04 ASSESSMENT — COLUMBIA-SUICIDE SEVERITY RATING SCALE - C-SSRS
1. IN THE PAST MONTH, HAVE YOU WISHED YOU WERE DEAD OR WISHED YOU COULD GO TO SLEEP AND NOT WAKE UP?: NO
2. HAVE YOU ACTUALLY HAD ANY THOUGHTS OF KILLING YOURSELF?: NO
6. HAVE YOU EVER DONE ANYTHING, STARTED TO DO ANYTHING, OR PREPARED TO DO ANYTHING TO END YOUR LIFE?: NO

## 2024-01-04 ASSESSMENT — PAIN - FUNCTIONAL ASSESSMENT
PAIN_FUNCTIONAL_ASSESSMENT: 0-10

## 2024-01-04 ASSESSMENT — PAIN SCALES - GENERAL
PAINLEVEL_OUTOF10: 0 - NO PAIN

## 2024-01-04 NOTE — DISCHARGE INSTRUCTIONS
Keep incision clean and dry.  May shower tomorrow.  Remove dressing in 24hrs.  Do not get into any standing water for 1 week.  Do not drive or operate any machinery for 24hrs..  Do not drink any alcohol for 24hrs.   Do not make any important decisions for 24hrs.

## 2024-01-04 NOTE — Clinical Note
Patient Clipped and Prepped: right neck and right subclavian. Prepped with ChloraPrep, a minimum of 3 minute dry time, longer if needed, no pooling noted, patient draped in sterile fashion.

## 2024-01-04 NOTE — H&P
History Of Present Illness  Farida Traylor is a 63 y.o. female presenting with follow-up [facility]HC for PAH.     Past Medical History  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    Acquired plantar porokeratosis    Acute upper respiratory infection, unspecified 01/14/2020    URI, acute    Allergic rhinitis due to animal (cat) (dog) hair and dander 01/02/2020    Allergic rhinitis due to dogs    Allergic rhinitis due to pollen 01/02/2020    Allergic rhinitis due to pollen    Allergic rhinitis due to pollen 02/26/2018    Seasonal allergic rhinitis due to pollen    Allergy status to unspecified drugs, medicaments and biological substances 06/30/2015    History of allergy    Anemia, unspecified 07/23/2018    Normocytic anemia    Anxiety disorder due to known physiological condition 06/05/2013    Anxiety disorder due to medical condition    Anxiety disorder, unspecified 01/28/2016    Anxiety    Asymptomatic varicose veins of bilateral lower extremities 07/16/2019    Varicose veins of legs    Atypical squamous cells of undetermined significance on cytologic smear of cervix (ASC-US) 06/26/2013    Pap smear abnormality of cervix with ASCUS favoring benign    Candidiasis, unspecified 12/10/2019    Yeast infection    Cellulitis of left finger 07/31/2018    Paronychia of finger of left hand    Changes in skin texture 03/09/2021    Cracked skin    Contusion of left lesser toe(s) with damage to nail, initial encounter 02/22/2018    Subungual contusion of toenail of left foot    Contusion of right hand, sequela 08/04/2020    Traumatic ecchymosis of hand, right, sequela    Corns and callosities 05/25/2021    Callus of foot    Disorder of pigmentation, unspecified 09/28/2016    Atypical pigmented skin lesion    Disorder of the skin and subcutaneous tissue, unspecified 08/27/2020    Lesion of subcutaneous tissue    Dorsalgia, unspecified 09/23/2021    Acute back pain     Dorsalgia, unspecified 01/29/2019    Costovertebral angle tenderness    Encounter for general adult medical examination without abnormal findings 06/08/2020    Medicare annual wellness visit, subsequent    Encounter for gynecological examination (general) (routine) without abnormal findings 12/14/2021    Encounter for gynecological examination    Encounter for gynecological examination (general) (routine) without abnormal findings 12/11/2020    Encounter for gynecological examination    Encounter for other screening for malignant neoplasm of breast     Breast cancer screening    Encounter for screening for malignant neoplasm of cervix     Encounter for screening for malignant neoplasm of cervix    Encounter for screening for malignant neoplasm of cervix 11/04/2014    Screening for malignant neoplasm of cervix    Encounter for screening for malignant neoplasm of cervix     Cervical cancer screening    Hepatomegaly, not elsewhere classified 04/21/2021    Liver mass, right lobe    History of falling 12/28/2018    Status post fall    Inappropriate diet and eating habits 05/13/2021    Inappropriate diet and eating habits    Irregular menstruation, unspecified     Irregular periods/menstrual cycles    Localized edema 07/29/2015    Pedal edema    Localized swelling, mass and lump, left lower limb 09/11/2020    Lump of left thigh    Localized swelling, mass and lump, left lower limb 09/24/2020    Mass of left thigh    Localized swelling, mass and lump, unspecified lower limb 09/24/2020    Mass of thigh    Melanocytic nevi, unspecified 09/10/2019    Numerous skin moles    Multiple births, unspecified, all liveborn     Multiple births, all liveborn    Other allergic rhinitis 01/02/2020    Allergic rhinitis due to dust mite    Other allergic rhinitis 01/02/2020    Allergic rhinitis due to mold    Other conditions influencing health status 06/26/2013    Uterine Rupture    Other conditions influencing health status     Normal  colonoscopy    Other conditions influencing health status 02/05/2019    History of burning on urination    Other conditions influencing health status 12/14/2021    History of cough    Other conditions influencing health status 02/14/2019    History of dyspareunia    Other conditions influencing health status 01/14/2020    History of cough    Other conditions influencing health status 06/05/2013    Leiomyomatous Degeneration Of The Uterus    Other conditions influencing health status 06/05/2013    Viremia    Other hypertrophic disorders of the skin 07/29/2015    Skin tag    Other shoulder lesions, right shoulder 11/11/2019    Tendinitis of right rotator cuff    Other specified disorders of bone, thigh 09/09/2020    Pain of left femur    Other specified noninflammatory disorders of vagina 03/12/2019    Vaginal dryness    Other specified postprocedural states 05/04/2017    History of arthroscopic knee surgery    Other specified soft tissue disorders 10/08/2020    Soft tissue mass    Other specified soft tissue disorders 08/04/2020    Swelling of right hand    Pain in left thigh 08/27/2020    Pain of left thigh    Pain in right foot 03/09/2021    Right foot pain    Pain in right hip 12/28/2018    Right hip pain    Pain in right knee 03/12/2021    Bilateral knee pain    Pain in right knee 03/19/2021    Right knee pain    Pain in right leg 05/25/2021    Bilateral pain of leg and foot    Pain in right leg 01/12/2022    Bilateral leg and foot pain    Pain in unspecified joint     Joint pain    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 02/22/2016    History of tetralogy of Fallot    Personal history of contraception     Personal history of contraceptive use    Personal history of diseases of the skin and subcutaneous tissue 12/22/2020    History of ingrown nail    Personal history of malignant neoplasm of other parts of uterus     History of malignant neoplasm of endometrium     Personal history of other (healed) physical injury and trauma 03/01/2017    History of sprain of ankle    Personal history of other benign neoplasm 09/10/2019    History of other benign neoplasm    Personal history of other benign neoplasm 05/06/2014    History of uterine leiomyoma    Personal history of other benign neoplasm 12/11/2020    History of uterine leiomyoma    Personal history of other diseases of the circulatory system 04/14/2021    History of atrial fibrillation    Personal history of other diseases of the circulatory system 04/14/2021    History of atrial fibrillation    Personal history of other diseases of the circulatory system 04/14/2021    History of atrial fibrillation    Personal history of other diseases of the circulatory system 02/01/2017    History of hypertension    Personal history of other diseases of the circulatory system 04/14/2021    History of hypotension    Personal history of other diseases of the digestive system 03/24/2021    History of gastroesophageal reflux (GERD)    Personal history of other diseases of the female genital tract 12/28/2016    History of postmenopausal bleeding    Personal history of other diseases of the female genital tract 05/19/2022    History of postmenopausal bleeding    Personal history of other diseases of the female genital tract     History of vaginal discharge    Personal history of other diseases of the female genital tract     Vaginal delivery    Personal history of other diseases of the musculoskeletal system and connective tissue     Personal history of arthritis    Personal history of other diseases of the musculoskeletal system and connective tissue 11/18/2019    History of muscle spasm    Personal history of other diseases of the respiratory system 05/27/2021    History of asthma    Personal history of other diseases of the respiratory system 04/12/2019    History of acute bronchitis    Personal history of other diseases of the respiratory  system 01/04/2020    History of bronchitis    Personal history of other endocrine, nutritional and metabolic disease 02/01/2017    History of hyperlipidemia    Personal history of other endocrine, nutritional and metabolic disease 04/18/2016    History of obesity    Personal history of other endocrine, nutritional and metabolic disease 05/26/2020    History of thyroid nodule    Personal history of other infectious and parasitic diseases     History of varicella    Personal history of other medical treatment 12/11/2020    History of screening mammography    Personal history of other medical treatment 12/14/2021    History of screening mammography    Personal history of other medical treatment     History of mammogram    Personal history of other specified conditions 08/21/2019    History of gross hematuria    Personal history of other specified conditions 02/02/2017    History of weight gain    Personal history of other specified conditions 12/07/2020    History of chest pain    Personal history of other specified conditions 04/14/2021    History of palpitations    Personal history of other specified conditions 12/05/2014    History of vertigo    Personal history of other specified conditions 08/25/2021    History of exertional chest pain    Personal history of other specified conditions     History of shortness of breath    Personal history of other specified conditions     H/O heartburn    Personal history of other specified conditions 01/12/2018    History of urinary frequency    Personal history of transient ischemic attack (TIA), and 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 12/30/2015    History of TIA (transient ischemic attack)    Personal history of urinary (tract) infections 09/02/2021    History of recurrent cystitis    Personal history of urinary (tract) infections 05/23/2019    History of cystitis    Personal history of urinary (tract) infections 09/02/2021    History of recurrent urinary tract  infection    Personal history of urinary calculi 09/12/2019    History of renal calculi    Polyphagia 05/13/2021    Polyphagia    Primary central sleep apnea 10/21/2017    Central sleep apnea    Pure hypercholesterolemia, unspecified     High cholesterol    Residual hemorrhoidal skin tags 07/06/2017    External hemorrhoid    Slurred speech 01/02/2015    Slurred speech    Tinea pedis 05/25/2021    Tinea pedis of right foot    Unspecified abdominal pain 04/13/2021    Abdominal pain, acute    Unspecified injury of right wrist, hand and finger(s), sequela 08/04/2020    Hand trauma, right, sequela    Unspecified internal derangement of unspecified knee 03/01/2017    Internal derangement of knee    Unspecified symptoms and signs involving the genitourinary system 12/16/2021    UTI symptoms    Unspecified symptoms and signs involving the genitourinary system 02/05/2019    UTI symptoms    Unspecified symptoms and signs involving the genitourinary system 09/03/2020    UTI symptoms    Urinary tract infection, site not specified 01/17/2020    Acute urinary tract infection    Urinary tract infection, site not specified 01/10/2022    Acute UTI    Xerosis cutis 09/10/2019    Dry skin dermatitis       Surgical History  Past Surgical History:   Procedure Laterality Date    KNEE SURGERY  05/04/2017    Knee Surgery    MOUTH SURGERY  11/04/2014    Oral Surgery Tooth Extraction    MR HEAD ANGIO WO IV CONTRAST  2/9/2016    MR HEAD ANGIO WO IV CONTRAST [facility]    OTHER SURGICAL HISTORY  07/31/2013    Wrist Carpectomy    OTHER SURGICAL HISTORY  09/10/2019    [place] tooth extraction    OTHER SURGICAL HISTORY  11/30/2018    Complete colonoscopy    OTHER SURGICAL HISTORY  06/08/2020    Thyroid biopsy        Social History  She reports that she has never smoked. She has never used smokeless tobacco. She reports that she does not currently use alcohol. She reports that she does not use drugs.    Family History  Family History    Problem Relation Name Age of Onset    Hypertension Mother      Breast cancer Mother      Other (cardiac disorder) Mother      Cardiomyopathy Mother      Diabetes Mother      Other (chronic kidney disease) Mother      Stroke Brother      Brain Aneurysm Brother          Allergies  Other and Pollen extracts    Review of Systems   Constitutional:  Negative for chills and fever.   Respiratory: Negative.  Negative for cough.    Cardiovascular: Negative.    Gastrointestinal: Negative.    Neurological: Negative.    Psychiatric/Behavioral: Negative.     All other systems reviewed and are negative.       Physical Exam  Vitals reviewed.   Constitutional:       Appearance: Normal appearance. She is obese.   HENT:      Head: Normocephalic and atraumatic.   Eyes:      Extraocular Movements: Extraocular movements intact.   Cardiovascular:      Rate and Rhythm: Normal rate and regular rhythm.      Heart sounds: Normal heart sounds.   Pulmonary:      Effort: Pulmonary effort is normal.      Breath sounds: Decreased breath sounds present.   Abdominal:      Palpations: Abdomen is soft.      Tenderness: There is no abdominal tenderness.   Musculoskeletal:      Cervical back: Normal range of motion.   Skin:     General: Skin is warm.   Neurological:      General: No focal deficit present.      Mental Status: She is alert and oriented to person, place, and time. Mental status is at baseline.   Psychiatric:         Mood and Affect: Mood normal.         Behavior: Behavior normal.          Last Recorded Vitals  Blood pressure 150/65, pulse 67, resp. rate 17, weight 132 kg (291 lb 0.1 oz), SpO2 95 %.    Relevant Results             Assessment/Plan   Principal Problem:    PAH (pulmonary artery hypertension) (CMS/HCC)      62 yo woman w/ h/o PAH here for follow-up [facility] for PAH.      IPAH - on adempas and macitentan.  [facility]HC for disease monitoring.     Richy Raman MD

## 2024-01-04 NOTE — SIGNIFICANT EVENT
Pt ambulatory to restroom and back to bed with standby assist, no c/o at this time, sitting up, drinking ginger ale

## 2024-01-04 NOTE — SIGNIFICANT EVENT
DC instructions given to patient with verbal affirmation of understanding. Pt ambulated without   difficulty or incident. PIV removed and patient transported to Front door via wheel chair.

## 2024-01-04 NOTE — Clinical Note
Sheath was exchanged with [employer], S-SARAH MINI, 5FR 10CM 0.018IN 40CM, NT/PT, ECHO ENHANCE NEEDLE.

## 2024-01-09 ENCOUNTER — APPOINTMENT (OUTPATIENT)
Dept: PRIMARY CARE | Facility: CLINIC | Age: 64
End: 2024-01-09
Payer: COMMERCIAL

## 2024-01-09 DIAGNOSIS — K59.04 CHRONIC IDIOPATHIC CONSTIPATION: ICD-10-CM

## 2024-01-10 ENCOUNTER — PROCEDURE VISIT (OUTPATIENT)
Dept: PODIATRY | Facility: CLINIC | Age: 64
End: 2024-01-10
Payer: COMMERCIAL

## 2024-01-10 DIAGNOSIS — B35.1 ONYCHOMYCOSIS: ICD-10-CM

## 2024-01-10 DIAGNOSIS — L84 CALLUS: ICD-10-CM

## 2024-01-10 DIAGNOSIS — K59.04 CHRONIC IDIOPATHIC CONSTIPATION: ICD-10-CM

## 2024-01-10 DIAGNOSIS — B35.3 TINEA PEDIS OF BOTH FEET: ICD-10-CM

## 2024-01-10 DIAGNOSIS — I83.12 VARICOSE VEINS OF BOTH LOWER EXTREMITIES WITH INFLAMMATION: Primary | ICD-10-CM

## 2024-01-10 DIAGNOSIS — I83.11 VARICOSE VEINS OF BOTH LOWER EXTREMITIES WITH INFLAMMATION: Primary | ICD-10-CM

## 2024-01-10 DIAGNOSIS — M79.672 FOOT PAIN, BILATERAL: ICD-10-CM

## 2024-01-10 DIAGNOSIS — M79.671 FOOT PAIN, BILATERAL: ICD-10-CM

## 2024-01-10 PROCEDURE — 99212 OFFICE O/P EST SF 10 MIN: CPT | Performed by: PODIATRIST

## 2024-01-10 RX ORDER — LUBIPROSTONE 8 UG/1
8 CAPSULE ORAL 2 TIMES DAILY
Qty: 60 CAPSULE | Refills: 10 | OUTPATIENT
Start: 2024-01-10

## 2024-01-10 NOTE — PROGRESS NOTES
This is a 63 y.o. 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    Acquired plantar porokeratosis    Acute upper respiratory infection, unspecified 01/14/2020    URI, acute    Allergic rhinitis due to animal (cat) (dog) hair and dander 01/02/2020    Allergic rhinitis due to dogs    Allergic rhinitis due to pollen 01/02/2020    Allergic rhinitis due to pollen    Allergic rhinitis due to pollen 02/26/2018    Seasonal allergic rhinitis due to pollen    Allergy status to unspecified drugs, medicaments and biological substances 06/30/2015    History of allergy    Anemia, unspecified 07/23/2018    Normocytic anemia    Anxiety disorder due to known physiological condition 06/05/2013    Anxiety disorder due to medical condition    Anxiety disorder, unspecified 01/28/2016    Anxiety    Asymptomatic varicose veins of bilateral lower extremities 07/16/2019    Varicose veins of legs    Atypical squamous cells of undetermined significance on cytologic smear of cervix (ASC-US) 06/26/2013    Pap smear abnormality of cervix with ASCUS favoring benign    Candidiasis, unspecified 12/10/2019    Yeast infection    Cellulitis of left finger 07/31/2018    Paronychia of finger of left hand    Changes in skin texture 03/09/2021    Cracked skin    Contusion of left lesser toe(s) with damage to nail, initial encounter 02/22/2018    Subungual contusion of toenail of left foot    Contusion of right hand, sequela 08/04/2020    Traumatic ecchymosis of hand, right, sequela    Corns and callosities 05/25/2021    Callus of foot    Disorder of pigmentation, unspecified 09/28/2016    Atypical pigmented skin lesion    Disorder of the skin and  subcutaneous tissue, unspecified 08/27/2020    Lesion of subcutaneous tissue    Dorsalgia, unspecified 09/23/2021    Acute back pain    Dorsalgia, unspecified 01/29/2019    Costovertebral angle tenderness    Encounter for general adult medical examination without abnormal findings 06/08/2020    Medicare annual wellness visit, subsequent    Encounter for gynecological examination (general) (routine) without abnormal findings 12/14/2021    Encounter for gynecological examination    Encounter for gynecological examination (general) (routine) without abnormal findings 12/11/2020    Encounter for gynecological examination    Encounter for other screening for malignant neoplasm of breast     Breast cancer screening    Encounter for screening for malignant neoplasm of cervix     Encounter for screening for malignant neoplasm of cervix    Encounter for screening for malignant neoplasm of cervix 11/04/2014    Screening for malignant neoplasm of cervix    Encounter for screening for malignant neoplasm of cervix     Cervical cancer screening    Hepatomegaly, not elsewhere classified 04/21/2021    Liver mass, right lobe    History of falling 12/28/2018    Status post fall    Inappropriate diet and eating habits 05/13/2021    Inappropriate diet and eating habits    Irregular menstruation, unspecified     Irregular periods/menstrual cycles    Localized edema 07/29/2015    Pedal edema    Localized swelling, mass and lump, left lower limb 09/11/2020    Lump of left thigh    Localized swelling, mass and lump, left lower limb 09/24/2020    Mass of left thigh    Localized swelling, mass and lump, unspecified lower limb 09/24/2020    Mass of thigh    Melanocytic nevi, unspecified 09/10/2019    Numerous skin moles    Multiple births, unspecified, all liveborn     Multiple births, all liveborn    Other allergic rhinitis 01/02/2020    Allergic rhinitis due to dust mite    Other allergic rhinitis 01/02/2020    Allergic rhinitis due to mold     Other conditions influencing health status 06/26/2013    Uterine Rupture    Other conditions influencing health status     Normal colonoscopy    Other conditions influencing health status 02/05/2019    History of burning on urination    Other conditions influencing health status 12/14/2021    History of cough    Other conditions influencing health status 02/14/2019    History of dyspareunia    Other conditions influencing health status 01/14/2020    History of cough    Other conditions influencing health status 06/05/2013    Leiomyomatous Degeneration Of The Uterus    Other conditions influencing health status 06/05/2013    Viremia    Other hypertrophic disorders of the skin 07/29/2015    Skin tag    Other shoulder lesions, right shoulder 11/11/2019    Tendinitis of right rotator cuff    Other specified disorders of bone, thigh 09/09/2020    Pain of left femur    Other specified noninflammatory disorders of vagina 03/12/2019    Vaginal dryness    Other specified postprocedural states 05/04/2017    History of arthroscopic knee surgery    Other specified soft tissue disorders 10/08/2020    Soft tissue mass    Other specified soft tissue disorders 08/04/2020    Swelling of right hand    Pain in left thigh 08/27/2020    Pain of left thigh    Pain in right foot 03/09/2021    Right foot pain    Pain in right hip 12/28/2018    Right hip pain    Pain in right knee 03/12/2021    Bilateral knee pain    Pain in right knee 03/19/2021    Right knee pain    Pain in right leg 05/25/2021    Bilateral pain of leg and foot    Pain in right leg 01/12/2022    Bilateral leg and foot pain    Pain in unspecified joint     Joint pain    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 02/22/2016    History of tetralogy of Fallot    Personal history of contraception     Personal history of contraceptive use    Personal history of diseases of the skin and subcutaneous tissue 12/22/2020    History of  ingrown nail    Personal history of malignant neoplasm of other parts of uterus     History of malignant neoplasm of endometrium    Personal history of other (healed) physical injury and trauma 03/01/2017    History of sprain of ankle    Personal history of other benign neoplasm 09/10/2019    History of other benign neoplasm    Personal history of other benign neoplasm 05/06/2014    History of uterine leiomyoma    Personal history of other benign neoplasm 12/11/2020    History of uterine leiomyoma    Personal history of other diseases of the circulatory system 04/14/2021    History of atrial fibrillation    Personal history of other diseases of the circulatory system 04/14/2021    History of atrial fibrillation    Personal history of other diseases of the circulatory system 04/14/2021    History of atrial fibrillation    Personal history of other diseases of the circulatory system 02/01/2017    History of hypertension    Personal history of other diseases of the circulatory system 04/14/2021    History of hypotension    Personal history of other diseases of the digestive system 03/24/2021    History of gastroesophageal reflux (GERD)    Personal history of other diseases of the female genital tract 12/28/2016    History of postmenopausal bleeding    Personal history of other diseases of the female genital tract 05/19/2022    History of postmenopausal bleeding    Personal history of other diseases of the female genital tract     History of vaginal discharge    Personal history of other diseases of the female genital tract     Vaginal delivery    Personal history of other diseases of the musculoskeletal system and connective tissue     Personal history of arthritis    Personal history of other diseases of the musculoskeletal system and connective tissue 11/18/2019    History of muscle spasm    Personal history of other diseases of the respiratory system 05/27/2021    History of asthma    Personal history of other  diseases of the respiratory system 04/12/2019    History of acute bronchitis    Personal history of other diseases of the respiratory system 01/04/2020    History of bronchitis    Personal history of other endocrine, nutritional and metabolic disease 02/01/2017    History of hyperlipidemia    Personal history of other endocrine, nutritional and metabolic disease 04/18/2016    History of obesity    Personal history of other endocrine, nutritional and metabolic disease 05/26/2020    History of thyroid nodule    Personal history of other infectious and parasitic diseases     History of varicella    Personal history of other medical treatment 12/11/2020    History of screening mammography    Personal history of other medical treatment 12/14/2021    History of screening mammography    Personal history of other medical treatment     History of mammogram    Personal history of other specified conditions 08/21/2019    History of gross hematuria    Personal history of other specified conditions 02/02/2017    History of weight gain    Personal history of other specified conditions 12/07/2020    History of chest pain    Personal history of other specified conditions 04/14/2021    History of palpitations    Personal history of other specified conditions 12/05/2014    History of vertigo    Personal history of other specified conditions 08/25/2021    History of exertional chest pain    Personal history of other specified conditions     History of shortness of breath    Personal history of other specified conditions     H/O heartburn    Personal history of other specified conditions 01/12/2018    History of urinary frequency    Personal history of transient ischemic attack (TIA), and 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 12/30/2015    History of TIA (transient ischemic attack)    Personal history of urinary (tract) infections 09/02/2021    History of recurrent cystitis    Personal history of urinary (tract) infections  05/23/2019    History of cystitis    Personal history of urinary (tract) infections 09/02/2021    History of recurrent urinary tract infection    Personal history of urinary calculi 09/12/2019    History of renal calculi    Polyphagia 05/13/2021    Polyphagia    Primary central sleep apnea 10/21/2017    Central sleep apnea    Pure hypercholesterolemia, unspecified     High cholesterol    Residual hemorrhoidal skin tags 07/06/2017    External hemorrhoid    Slurred speech 01/02/2015    Slurred speech    Tinea pedis 05/25/2021    Tinea pedis of right foot    Unspecified abdominal pain 04/13/2021    Abdominal pain, acute    Unspecified injury of right wrist, hand and finger(s), sequela 08/04/2020    Hand trauma, right, sequela    Unspecified internal derangement of unspecified knee 03/01/2017    Internal derangement of knee    Unspecified symptoms and signs involving the genitourinary system 12/16/2021    UTI symptoms    Unspecified symptoms and signs involving the genitourinary system 02/05/2019    UTI symptoms    Unspecified symptoms and signs involving the genitourinary system 09/03/2020    UTI symptoms    Urinary tract infection, site not specified 01/17/2020    Acute urinary tract infection    Urinary tract infection, site not specified 01/10/2022    Acute UTI    Xerosis cutis 09/10/2019    Dry skin dermatitis       Medications and Allergies have been reviewed.    Review Of Systems:  GENERAL: No weight loss, malaise or fevers.  HEENT: Negative for frequent or significant headaches,   RESPIRATORY: Negative for cough, wheezing or shortness of breath.  CARDIOVASCULAR: Negative for chest pain, leg swelling or palpitations.    Examination of Both Lower Extremities:   Objective:   Vasc: DP and PT pulses are palpable bilateral 2/4.  CFT is less than 3 seconds bilateral.  Skin temperature is warm to cool proximal to distal bilateral.  No hair growth noted. Varicosities noted    Neuro:Gross sensation intact bl    Derm:  Nails 1-5 bilateral are intact.  R hallux not attached to base. HPK and fissure to medial hallux IPJ.   No SOI noted.     Ortho: Muscle strength is 5/5 for all pedal groups tested.       1. Varicose veins of both lower extremities with inflammation        2. Foot pain, bilateral        3. Tinea pedis of both feet        4. Onychomycosis        5. Callus          Patient educated on proper  foot care.  Debrided R hallux  Keep trimmed and filed down  Discussed oral and topical AF. Deferred treatment at this time  Debrided hpk tissue  Keep filed down  Re-Discussed removing the nail it will still continue to grow discolored  R hallux bruised - no pain  - will continue to monitor  Fu prn  Patient was in agreement to this plan. All questions answered.      Brionna Douglas DPM  [phone]  [facility]  Fax: [phone]

## 2024-01-11 RX ORDER — LUBIPROSTONE 8 UG/1
8 CAPSULE ORAL 2 TIMES DAILY
Qty: 60 CAPSULE | Refills: 10 | OUTPATIENT
Start: 2024-01-11

## 2024-01-15 ENCOUNTER — OFFICE VISIT (OUTPATIENT)
Dept: ORTHOPEDIC SURGERY | Facility: CLINIC | Age: 64
End: 2024-01-15
Payer: COMMERCIAL

## 2024-01-15 ENCOUNTER — ANCILLARY PROCEDURE (OUTPATIENT)
Dept: RADIOLOGY | Facility: CLINIC | Age: 64
End: 2024-01-15
Payer: COMMERCIAL

## 2024-01-15 DIAGNOSIS — M65.342 TRIGGER RING FINGER OF LEFT HAND: ICD-10-CM

## 2024-01-15 DIAGNOSIS — M19.032 PRIMARY OSTEOARTHRITIS OF BOTH WRISTS: Primary | ICD-10-CM

## 2024-01-15 DIAGNOSIS — M19.031 PRIMARY OSTEOARTHRITIS OF BOTH WRISTS: Primary | ICD-10-CM

## 2024-01-15 DIAGNOSIS — M25.531 RIGHT WRIST PAIN: ICD-10-CM

## 2024-01-15 PROCEDURE — 1126F AMNT PAIN NOTED NONE PRSNT: CPT | Performed by: ORTHOPAEDIC SURGERY

## 2024-01-15 PROCEDURE — 3008F BODY MASS INDEX DOCD: CPT | Performed by: ORTHOPAEDIC SURGERY

## 2024-01-15 PROCEDURE — 73110 X-RAY EXAM OF WRIST: CPT | Mod: RT

## 2024-01-15 PROCEDURE — 99213 OFFICE O/P EST LOW 20 MIN: CPT | Performed by: ORTHOPAEDIC SURGERY

## 2024-01-15 PROCEDURE — 1036F TOBACCO NON-USER: CPT | Performed by: ORTHOPAEDIC SURGERY

## 2024-01-15 PROCEDURE — 1159F MED LIST DOCD IN RCRD: CPT | Performed by: ORTHOPAEDIC SURGERY

## 2024-01-15 PROCEDURE — 20606 DRAIN/INJ JOINT/BURSA W/US: CPT | Performed by: ORTHOPAEDIC SURGERY

## 2024-01-15 RX ORDER — METHYLPREDNISOLONE ACETATE 40 MG/ML
20 INJECTION, SUSPENSION INTRA-ARTICULAR; INTRALESIONAL; INTRAMUSCULAR; SOFT TISSUE
Status: COMPLETED | OUTPATIENT
Start: 2024-01-15 | End: 2024-01-15

## 2024-01-15 RX ORDER — LIDOCAINE HYDROCHLORIDE 10 MG/ML
0.5 INJECTION INFILTRATION; PERINEURAL
Status: COMPLETED | OUTPATIENT
Start: 2024-01-15 | End: 2024-01-15

## 2024-01-15 RX ADMIN — LIDOCAINE HYDROCHLORIDE 0.5 ML: 10 INJECTION INFILTRATION; PERINEURAL at 14:56

## 2024-01-15 RX ADMIN — METHYLPREDNISOLONE ACETATE 20 MG: 40 INJECTION, SUSPENSION INTRA-ARTICULAR; INTRALESIONAL; INTRAMUSCULAR; SOFT TISSUE at 14:56

## 2024-01-15 ASSESSMENT — ENCOUNTER SYMPTOMS
CHILLS: 0
FEVER: 0
JOINT SWELLING: 1
WHEEZING: 0
SHORTNESS OF BREATH: 0
EYE DISCHARGE: 0
TROUBLE SWALLOWING: 0

## 2024-01-15 ASSESSMENT — PAIN - FUNCTIONAL ASSESSMENT: PAIN_FUNCTIONAL_ASSESSMENT: 0-10

## 2024-01-15 ASSESSMENT — PAIN SCALES - GENERAL: PAINLEVEL_OUTOF10: 3

## 2024-01-15 NOTE — PROGRESS NOTES
Reason for Appointment  B/l thumb pain    History of Present Illness  Patient is a 63 y.o. female here today for follow-up evaluation of bilateral thumb pain.  X-rays taken today of the right wrist are reviewed and show severe CMC DJD.  She has pain at the base of the right thumb more than the left, worse with pinching and gripping.  She was seen recently for a left ring trigger finger and did well with an injection.  No recent injuries or falls.  No other changes in her past medical history, allergies, or medications..     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    Acquired plantar porokeratosis    Acute upper respiratory infection, unspecified 01/14/2020    URI, acute    Allergic rhinitis due to animal (cat) (dog) hair and dander 01/02/2020    Allergic rhinitis due to dogs    Allergic rhinitis due to pollen 01/02/2020    Allergic rhinitis due to pollen    Allergic rhinitis due to pollen 02/26/2018    Seasonal allergic rhinitis due to pollen    Allergy status to unspecified drugs, medicaments and biological substances 06/30/2015    History of allergy    Anemia, unspecified 07/23/2018    Normocytic anemia    Anxiety disorder due to known physiological condition 06/05/2013    Anxiety disorder due to medical condition    Anxiety disorder, unspecified 01/28/2016    Anxiety    Asymptomatic varicose veins of bilateral lower extremities 07/16/2019    Varicose veins of legs    Atypical squamous cells of undetermined significance on cytologic smear of cervix (ASC-US) 06/26/2013    Pap smear abnormality of cervix with ASCUS favoring benign    Candidiasis, unspecified 12/10/2019    Yeast infection    Cellulitis of left finger 07/31/2018    Paronychia of finger of left hand    Changes in skin texture 03/09/2021    Cracked skin    Contusion of left lesser toe(s) with damage to nail, initial encounter 02/22/2018    Subungual contusion of toenail of left foot     Contusion of right hand, sequela 08/04/2020    Traumatic ecchymosis of hand, right, sequela    Corns and callosities 05/25/2021    Callus of foot    Disorder of pigmentation, unspecified 09/28/2016    Atypical pigmented skin lesion    Disorder of the skin and subcutaneous tissue, unspecified 08/27/2020    Lesion of subcutaneous tissue    Dorsalgia, unspecified 09/23/2021    Acute back pain    Dorsalgia, unspecified 01/29/2019    Costovertebral angle tenderness    Encounter for general adult medical examination without abnormal findings 06/08/2020    Medicare annual wellness visit, subsequent    Encounter for gynecological examination (general) (routine) without abnormal findings 12/14/2021    Encounter for gynecological examination    Encounter for gynecological examination (general) (routine) without abnormal findings 12/11/2020    Encounter for gynecological examination    Encounter for other screening for malignant neoplasm of breast     Breast cancer screening    Encounter for screening for malignant neoplasm of cervix     Encounter for screening for malignant neoplasm of cervix    Encounter for screening for malignant neoplasm of cervix 11/04/2014    Screening for malignant neoplasm of cervix    Encounter for screening for malignant neoplasm of cervix     Cervical cancer screening    Hepatomegaly, not elsewhere classified 04/21/2021    Liver mass, right lobe    History of falling 12/28/2018    Status post fall    Inappropriate diet and eating habits 05/13/2021    Inappropriate diet and eating habits    Irregular menstruation, unspecified     Irregular periods/menstrual cycles    Localized edema 07/29/2015    Pedal edema    Localized swelling, mass and lump, left lower limb 09/11/2020    Lump of left thigh    Localized swelling, mass and lump, left lower limb 09/24/2020    Mass of left thigh    Localized swelling, mass and lump, unspecified lower limb 09/24/2020    Mass of thigh    Melanocytic nevi, unspecified  09/10/2019    Numerous skin moles    Multiple births, unspecified, all liveborn     Multiple births, all liveborn    Other allergic rhinitis 01/02/2020    Allergic rhinitis due to dust mite    Other allergic rhinitis 01/02/2020    Allergic rhinitis due to mold    Other conditions influencing health status 06/26/2013    Uterine Rupture    Other conditions influencing health status     Normal colonoscopy    Other conditions influencing health status 02/05/2019    History of burning on urination    Other conditions influencing health status 12/14/2021    History of cough    Other conditions influencing health status 02/14/2019    History of dyspareunia    Other conditions influencing health status 01/14/2020    History of cough    Other conditions influencing health status 06/05/2013    Leiomyomatous Degeneration Of The Uterus    Other conditions influencing health status 06/05/2013    Viremia    Other hypertrophic disorders of the skin 07/29/2015    Skin tag    Other shoulder lesions, right shoulder 11/11/2019    Tendinitis of right rotator cuff    Other specified disorders of bone, thigh 09/09/2020    Pain of left femur    Other specified noninflammatory disorders of vagina 03/12/2019    Vaginal dryness    Other specified postprocedural states 05/04/2017    History of arthroscopic knee surgery    Other specified soft tissue disorders 10/08/2020    Soft tissue mass    Other specified soft tissue disorders 08/04/2020    Swelling of right hand    Pain in left thigh 08/27/2020    Pain of left thigh    Pain in right foot 03/09/2021    Right foot pain    Pain in right hip 12/28/2018    Right hip pain    Pain in right knee 03/12/2021    Bilateral knee pain    Pain in right knee 03/19/2021    Right knee pain    Pain in right leg 05/25/2021    Bilateral pain of leg and foot    Pain in right leg 01/12/2022    Bilateral leg and foot pain    Pain in unspecified joint     Joint pain    Personal history of (corrected) congenital  malformations of heart and circulatory system 02/22/2016    History of tetralogy of Fallot    Personal history of contraception     Personal history of contraceptive use    Personal history of diseases of the skin and subcutaneous tissue 12/22/2020    History of ingrown nail    Personal history of malignant neoplasm of other parts of uterus     History of malignant neoplasm of endometrium    Personal history of other (healed) physical injury and trauma 03/01/2017    History of sprain of ankle    Personal history of other benign neoplasm 09/10/2019    History of other benign neoplasm    Personal history of other benign neoplasm 05/06/2014    History of uterine leiomyoma    Personal history of other benign neoplasm 12/11/2020    History of uterine leiomyoma    Personal history of other diseases of the circulatory system 04/14/2021    History of atrial fibrillation    Personal history of other diseases of the circulatory system 04/14/2021    History of atrial fibrillation    Personal history of other diseases of the circulatory system 04/14/2021    History of atrial fibrillation    Personal history of other diseases of the circulatory system 02/01/2017    History of hypertension    Personal history of other diseases of the circulatory system 04/14/2021    History of hypotension    Personal history of other diseases of the digestive system 03/24/2021    History of gastroesophageal reflux (GERD)    Personal history of other diseases of the female genital tract 12/28/2016    History of postmenopausal bleeding    Personal history of other diseases of the female genital tract 05/19/2022    History of postmenopausal bleeding    Personal history of other diseases of the female genital tract     History of vaginal discharge    Personal history of other diseases of the female genital tract     Vaginal delivery    Personal history of other diseases of the musculoskeletal system and connective tissue     Personal history of  arthritis    Personal history of other diseases of the musculoskeletal system and connective tissue 11/18/2019    History of muscle spasm    Personal history of other diseases of the respiratory system 05/27/2021    History of asthma    Personal history of other diseases of the respiratory system 04/12/2019    History of acute bronchitis    Personal history of other diseases of the respiratory system 01/04/2020    History of bronchitis    Personal history of other endocrine, nutritional and metabolic disease 02/01/2017    History of hyperlipidemia    Personal history of other endocrine, nutritional and metabolic disease 04/18/2016    History of obesity    Personal history of other endocrine, nutritional and metabolic disease 05/26/2020    History of thyroid nodule    Personal history of other infectious and parasitic diseases     History of varicella    Personal history of other medical treatment 12/11/2020    History of screening mammography    Personal history of other medical treatment 12/14/2021    History of screening mammography    Personal history of other medical treatment     History of mammogram    Personal history of other specified conditions 08/21/2019    History of gross hematuria    Personal history of other specified conditions 02/02/2017    History of weight gain    Personal history of other specified conditions 12/07/2020    History of chest pain    Personal history of other specified conditions 04/14/2021    History of palpitations    Personal history of other specified conditions 12/05/2014    History of vertigo    Personal history of other specified conditions 08/25/2021    History of exertional chest pain    Personal history of other specified conditions     History of shortness of breath    Personal history of other specified conditions     H/O heartburn    Personal history of other specified conditions 01/12/2018    History of urinary frequency    Personal history of transient ischemic attack  (TIA), and 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 12/30/2015    History of TIA (transient ischemic attack)    Personal history of urinary (tract) infections 09/02/2021    History of recurrent cystitis    Personal history of urinary (tract) infections 05/23/2019    History of cystitis    Personal history of urinary (tract) infections 09/02/2021    History of recurrent urinary tract infection    Personal history of urinary calculi 09/12/2019    History of renal calculi    Polyphagia 05/13/2021    Polyphagia    Primary central sleep apnea 10/21/2017    Central sleep apnea    Pure hypercholesterolemia, unspecified     High cholesterol    Residual hemorrhoidal skin tags 07/06/2017    External hemorrhoid    Slurred speech 01/02/2015    Slurred speech    Tinea pedis 05/25/2021    Tinea pedis of right foot    Unspecified abdominal pain 04/13/2021    Abdominal pain, acute    Unspecified injury of right wrist, hand and finger(s), sequela 08/04/2020    Hand trauma, right, sequela    Unspecified internal derangement of unspecified knee 03/01/2017    Internal derangement of knee    Unspecified symptoms and signs involving the genitourinary system 12/16/2021    UTI symptoms    Unspecified symptoms and signs involving the genitourinary system 02/05/2019    UTI symptoms    Unspecified symptoms and signs involving the genitourinary system 09/03/2020    UTI symptoms    Urinary tract infection, site not specified 01/17/2020    Acute urinary tract infection    Urinary tract infection, site not specified 01/10/2022    Acute UTI    Xerosis cutis 09/10/2019    Dry skin dermatitis       Past Surgical History:   Procedure Laterality Date    CARDIAC CATHETERIZATION Right 1/4/2024    Procedure: Right Heart Cath;  Surgeon: Richy Raman MD;  Location: [facility];  Service: Cardiovascular;  Laterality: Right;    KNEE SURGERY  05/04/2017    Knee Surgery    MOUTH SURGERY  11/04/2014    Oral Surgery Tooth Extraction    MR HEAD  ANGIO WO IV CONTRAST  2/9/2016    MR HEAD ANGIO WO IV CONTRAST [facility]    OTHER SURGICAL HISTORY  07/31/2013    Wrist Carpectomy    OTHER SURGICAL HISTORY  09/10/2019    [place] tooth extraction    OTHER SURGICAL HISTORY  11/30/2018    Complete colonoscopy    OTHER SURGICAL HISTORY  06/08/2020    Thyroid biopsy       Medication Documentation Review Audit       Reviewed by Josey Gallego PA-C (Physician Assistant) on 01/15/24 at 1441      Medication Order Taking? Sig Documenting Provider Last Dose Status   Adempas 2.5 mg tablet [id number] Yes Take 1 tablet (2.5 mg) by mouth 3 times a day. Historical Provider, MD Taking Active   albuterol 90 mcg/actuation inhaler [id number] Yes Inhale 1-2 puffs every 4 hours if needed for wheezing. Every 4 to 6 hours as needed Richy Raman MD Taking Active   aspirin 325 mg tablet [id number] Yes Take 1 tablet (325 mg) by mouth once daily. Historical Provider, MD Taking Active   blood pressure monitor (Blood Pressure Kit) kit [id number] Yes 1 kit if needed (take BP as needed). Hortencia Sotomayor APRN-CNP Taking Active   buPROPion XL (Wellbutrin XL) 300 mg 24 hr tablet [id number] Yes buPROPion HCl ER (XL) 300 MG Oral Tablet Extended Release 24 Hour   Quantity: 30  Refills: 0        Start : 13-Apr-2017   Active Historical Provider, MD Taking Active   busPIRone (Buspar) 30 mg tablet [id number] Yes Take 1 tablet (30 mg) by mouth 2 times a day. Historical Provider, MD Taking Active   fluticasone propion-salmeteroL (Advair Diskus) 250-50 mcg/dose diskus inhaler [id number] Yes Inhale 1 puff 2 times a day. Historical Provider, MD Taking Active   hydrALAZINE (Apresoline) 50 mg tablet [id number] Yes Take 1 tablet (50 mg) by mouth 2 times a day. 1 tablet in the AM and 3 tablets in the PM. Historical Provider, MD Taking Active   lubiprostone (Amitiza) 8 mcg capsule [id number] Yes Take 1 capsule (8 mcg) by mouth once daily. Mikayla Maguire APRN-CNP Taking Active   memantine (Namenda) 10 mg  tablet [id number] Yes Take 1 tablet (10 mg) by mouth 2 times a day. Historical Provider, MD Taking Active   methenamine hippurate (Hiprex) 1 gram tablet [id number] Yes Take 1 tablet (1 g) by mouth 2 times a day. Historical Provider, MD Taking Active   methylPREDNISolone acetate (DEPO-Medrol) injection 40 mg [id number]   Josey Gallego PA-C  Active   montelukast (Singulair) 10 mg tablet [id number] Yes Take 1 tablet (10 mg) by mouth once daily. Historical Provider, MD Taking Active   nitrofurantoin, macrocrystal-monohydrate, (Macrobid) 100 mg capsule [id number] Yes Take by mouth 2 times a day. Historical Provider, MD Taking Active   omeprazole (PriLOSEC) 40 mg DR capsule [id number] Yes Take 1 capsule (40 mg) by mouth 2 times a day. Historical Provider, MD Taking Active   Opsumit 10 mg tablet tablet [id number] Yes Take 1 tablet (10 mg) by mouth once daily. Historical Provider, MD Taking Active   sertraline (Zoloft) 100 mg tablet [id number] Yes Take 2 tablets (200 mg) by mouth once daily. Historical Provider, MD Taking Active   simvastatin (Zocor) 80 mg tablet [id number] Yes Take 1 tablet (80 mg) by mouth once daily at bedtime. Sujatha Mcneal APRN-CNP Taking Active   trospium (Sanctura) 20 mg tablet [id number] Yes Take 1 tablet (20 mg) by mouth 2 times a day. Historical Provider, MD Taking Active                    Allergies   Allergen Reactions    Other Unknown    Pollen Extracts Unknown       Review of Systems   Constitutional:  Negative for chills and fever.   HENT:  Negative for mouth sores and trouble swallowing.    Eyes:  Negative for discharge.   Respiratory:  Negative for shortness of breath and wheezing.    Cardiovascular:  Negative for chest pain.   Musculoskeletal:  Positive for joint swelling.   Skin:  Negative for pallor and rash.   All other systems reviewed and are negative.    Exam   On exam bilateral hands show DJD, she has swelling over the base of both thumbs right greater than left.  Tenderness over bilateral thumb  CMC joints and painful CMC grind test.  Decent motion of the digits otherwise with no active triggering today.  Good pulses and sensation in the upper extremities.    Assessment   Encounter Diagnoses   Name Primary?    Right wrist pain     Trigger ring finger of left hand    Bilateral wrist osteoarthritis    Plan   We discussed concern we discussed conservative treatment today with injections to calm down her symptoms.  We sterilely injected under ultrasound guidance Depo-Medrol and lidocaine in bilateral thumb CMC joints.  Patient understands the small risk of infection and the signs look for as well as flare reaction.  Hopefully these give her good relief.  She can follow-up with us as needed.    M Inj/Asp: R radiocarpal (R CMC) on 1/15/2024 2:56 PM  Indications: pain  Details: 25 G needle, ultrasound-guided  Medications: 0.5 mL lidocaine 10 mg/mL (1 %); 20 mg methylPREDNISolone acetate 40 mg/mL  Outcome: tolerated well, no immediate complications    After discussing the risks and benefits of the procedure we proceeded with the injection. Using ultrasound guidance we obtained images of the thumb CMC joint and subsequently we sterilely injected a mixture of 20 mg of DepoMedrol and .5 cc of 1% lidocaine into the bilateral CMC joint. Pt tolerated the procedure well without any adverse effects.   Procedure, treatment alternatives, risks and benefits explained, specific risks discussed. Consent was given by the patient. Immediately prior to procedure a time out was called to verify the correct patient, procedure, equipment, support staff and site/side marked as required. Patient was prepped and draped in the usual sterile fashion.       M Inj/Asp: L radiocarpal (L CMC) on 1/15/2024 2:56 PM  Indications: pain  Details: 25 G needle, ultrasound-guided  Medications: 0.5 mL lidocaine 10 mg/mL (1 %); 20 mg methylPREDNISolone acetate 40 mg/mL  Outcome: tolerated well, no immediate complications  Procedure, treatment  alternatives, risks and benefits explained, specific risks discussed. Consent was given by the patient. Immediately prior to procedure a time out was called to verify the correct patient, procedure, equipment, support staff and site/side marked as required. Patient was prepped and draped in the usual sterile fashion.       Written by Josey Salcedo saw, evaluated, and treated the patient with the PA

## 2024-01-16 ENCOUNTER — APPOINTMENT (OUTPATIENT)
Dept: PRIMARY CARE | Facility: CLINIC | Age: 64
End: 2024-01-16
Payer: COMMERCIAL

## 2024-01-24 ENCOUNTER — OFFICE VISIT (OUTPATIENT)
Dept: PRIMARY CARE | Facility: CLINIC | Age: 64
End: 2024-01-24
Payer: COMMERCIAL

## 2024-01-24 VITALS
HEART RATE: 86 BPM | DIASTOLIC BLOOD PRESSURE: 56 MMHG | WEIGHT: 286 LBS | BODY MASS INDEX: 54.04 KG/M2 | SYSTOLIC BLOOD PRESSURE: 122 MMHG | TEMPERATURE: 98.2 F | OXYGEN SATURATION: 92 %

## 2024-01-24 DIAGNOSIS — I87.2 VENOUS STASIS DERMATITIS OF LEFT LOWER EXTREMITY: Primary | ICD-10-CM

## 2024-01-24 PROCEDURE — 1036F TOBACCO NON-USER: CPT | Performed by: FAMILY MEDICINE

## 2024-01-24 PROCEDURE — 1160F RVW MEDS BY RX/DR IN RCRD: CPT | Performed by: FAMILY MEDICINE

## 2024-01-24 PROCEDURE — 1126F AMNT PAIN NOTED NONE PRSNT: CPT | Performed by: FAMILY MEDICINE

## 2024-01-24 PROCEDURE — 1157F ADVNC CARE PLAN IN RCRD: CPT | Performed by: FAMILY MEDICINE

## 2024-01-24 PROCEDURE — 99214 OFFICE O/P EST MOD 30 MIN: CPT | Performed by: FAMILY MEDICINE

## 2024-01-24 PROCEDURE — 3078F DIAST BP <80 MM HG: CPT | Performed by: FAMILY MEDICINE

## 2024-01-24 PROCEDURE — 3074F SYST BP LT 130 MM HG: CPT | Performed by: FAMILY MEDICINE

## 2024-01-24 PROCEDURE — 3008F BODY MASS INDEX DOCD: CPT | Performed by: FAMILY MEDICINE

## 2024-01-24 PROCEDURE — 1159F MED LIST DOCD IN RCRD: CPT | Performed by: FAMILY MEDICINE

## 2024-01-24 RX ORDER — HYDROXYZINE PAMOATE 50 MG/1
CAPSULE ORAL
COMMUNITY
Start: 2024-01-16

## 2024-01-24 RX ORDER — BUPROPION HYDROCHLORIDE 150 MG/1
TABLET, EXTENDED RELEASE ORAL
COMMUNITY
Start: 2024-01-16

## 2024-01-24 NOTE — PROGRESS NOTES
Subjective   Patient ID: Farida Traylor is a 63 y.o. female who presents for Follow-up (3 month follow up) and scabs (L lower leg and abdomen).    HPI seen for various issues, recurrent utis's and sent to DR [facility]  SHE FOLLOWS REGULARLY WITH DR [facility]   SEEN LAT IT WAS ABOUT HER TRIGGER FINGER   TODAY THIS IS NOT DISCUSSED VERY MUCH BUT SHE IS WORRIED ABOUT SCABS ON HER LOWER LEGS     Review of Systems   Constitutional:  Negative for activity change, appetite change, fever and unexpected weight change.   HENT:  Negative for congestion, ear pain, postnasal drip, rhinorrhea, sinus pressure and sore throat.    Eyes:  Negative for discharge, itching and visual disturbance.   Respiratory:  Negative for cough, shortness of breath and wheezing.    Cardiovascular:  Positive for leg swelling. Negative for chest pain and palpitations.   Gastrointestinal:  Negative for abdominal pain, blood in stool, constipation, diarrhea, nausea and vomiting.   Endocrine: Negative for cold intolerance, heat intolerance and polyuria.   Genitourinary:  Negative for dysuria, flank pain and hematuria.   Musculoskeletal:  Positive for arthralgias. Negative for gait problem, joint swelling and myalgias.        HAND ARTHRITIS AND TRIGGER FINGER    Skin:  Positive for rash and wound.        PATIENT HAS VENOUS STASIS DUE TO VARICOSE VEINS AND THESE ARE SMALL AREAS OF SCABBED OVER EXCORIATION     Allergic/Immunologic: Negative for environmental allergies and food allergies.   Neurological:  Negative for dizziness, syncope, weakness, light-headedness, numbness and headaches.   Psychiatric/Behavioral:  Negative for dysphoric mood and sleep disturbance. The patient is not nervous/anxious.        Objective   /56   Pulse 86   Temp 36.8 °C (98.2 °F)   Wt 130 kg (286 lb)   SpO2 92%   BMI 54.04 kg/m²     Physical Exam  Vitals and nursing note reviewed.   Constitutional:       Appearance: Normal appearance.   HENT:       Head: Normocephalic.      Mouth/Throat:      Mouth: Mucous membranes are moist.   Cardiovascular:      Rate and Rhythm: Normal rate and regular rhythm.      Pulses: Normal pulses.      Heart sounds: Normal heart sounds. No murmur heard.     No friction rub. No gallop.   Pulmonary:      Effort: Pulmonary effort is normal. No respiratory distress.      Breath sounds: Normal breath sounds. No wheezing.   Abdominal:      General: Bowel sounds are normal. There is no distension.      Palpations: Abdomen is soft.      Tenderness: There is no abdominal tenderness.   Musculoskeletal:         General: Deformity present.      Comments: TRIGGER FINGER WITH MILD DISABILITY NOTED    Skin:     General: Skin is warm and dry.      Capillary Refill: Capillary refill takes less than 2 seconds.   Neurological:      General: No focal deficit present.      Mental Status: She is alert and oriented to person, place, and time.   Psychiatric:         Mood and Affect: Mood normal.         Assessment/Plan   Diagnoses and all orders for this visit:  Venous stasis dermatitis of left lower extremity

## 2024-01-25 ENCOUNTER — APPOINTMENT (OUTPATIENT)
Dept: UROLOGY | Facility: CLINIC | Age: 64
End: 2024-01-25
Payer: COMMERCIAL

## 2024-01-28 ASSESSMENT — ENCOUNTER SYMPTOMS
RHINORRHEA: 0
SHORTNESS OF BREATH: 0
DIZZINESS: 0
JOINT SWELLING: 0
HEMATURIA: 0
LIGHT-HEADEDNESS: 0
FEVER: 0
MYALGIAS: 0
EYE DISCHARGE: 0
SORE THROAT: 0
DYSURIA: 0
HEADACHES: 0
VOMITING: 0
NERVOUS/ANXIOUS: 0
APPETITE CHANGE: 0
BLOOD IN STOOL: 0
FLANK PAIN: 0
SLEEP DISTURBANCE: 0
ABDOMINAL PAIN: 0
NAUSEA: 0
WEAKNESS: 0
WHEEZING: 0
DYSPHORIC MOOD: 0
ARTHRALGIAS: 1
UNEXPECTED WEIGHT CHANGE: 0
DIARRHEA: 0
PALPITATIONS: 0
EYE ITCHING: 0
NUMBNESS: 0
SINUS PRESSURE: 0
COUGH: 0
WOUND: 1
CONSTIPATION: 0
ACTIVITY CHANGE: 0

## 2024-02-06 ENCOUNTER — OFFICE VISIT (OUTPATIENT)
Dept: OBSTETRICS AND GYNECOLOGY | Facility: CLINIC | Age: 64
End: 2024-02-06
Payer: COMMERCIAL

## 2024-02-06 VITALS
DIASTOLIC BLOOD PRESSURE: 70 MMHG | SYSTOLIC BLOOD PRESSURE: 144 MMHG | BODY MASS INDEX: 53.92 KG/M2 | HEIGHT: 62 IN | WEIGHT: 293 LBS

## 2024-02-06 DIAGNOSIS — Z78.0 ASYMPTOMATIC POSTMENOPAUSAL STATE: ICD-10-CM

## 2024-02-06 DIAGNOSIS — Z12.4 CERVICAL CANCER SCREENING: ICD-10-CM

## 2024-02-06 DIAGNOSIS — Z12.31 ENCOUNTER FOR SCREENING MAMMOGRAM FOR BREAST CANCER: Primary | ICD-10-CM

## 2024-02-06 DIAGNOSIS — Z01.419 WELL WOMAN EXAM WITH ROUTINE GYNECOLOGICAL EXAM: ICD-10-CM

## 2024-02-06 DIAGNOSIS — Z13.820 SCREENING FOR OSTEOPOROSIS: ICD-10-CM

## 2024-02-06 PROCEDURE — 3078F DIAST BP <80 MM HG: CPT | Performed by: OBSTETRICS & GYNECOLOGY

## 2024-02-06 PROCEDURE — 99397 PER PM REEVAL EST PAT 65+ YR: CPT | Performed by: OBSTETRICS & GYNECOLOGY

## 2024-02-06 PROCEDURE — 1159F MED LIST DOCD IN RCRD: CPT | Performed by: OBSTETRICS & GYNECOLOGY

## 2024-02-06 PROCEDURE — 1126F AMNT PAIN NOTED NONE PRSNT: CPT | Performed by: OBSTETRICS & GYNECOLOGY

## 2024-02-06 PROCEDURE — 1157F ADVNC CARE PLAN IN RCRD: CPT | Performed by: OBSTETRICS & GYNECOLOGY

## 2024-02-06 PROCEDURE — 87624 HPV HI-RISK TYP POOLED RSLT: CPT

## 2024-02-06 PROCEDURE — 3008F BODY MASS INDEX DOCD: CPT | Performed by: OBSTETRICS & GYNECOLOGY

## 2024-02-06 PROCEDURE — 3077F SYST BP >= 140 MM HG: CPT | Performed by: OBSTETRICS & GYNECOLOGY

## 2024-02-06 PROCEDURE — 1160F RVW MEDS BY RX/DR IN RCRD: CPT | Performed by: OBSTETRICS & GYNECOLOGY

## 2024-02-06 PROCEDURE — 1036F TOBACCO NON-USER: CPT | Performed by: OBSTETRICS & GYNECOLOGY

## 2024-02-06 PROCEDURE — 88175 CYTOPATH C/V AUTO FLUID REDO: CPT

## 2024-02-06 ASSESSMENT — ENCOUNTER SYMPTOMS
SLEEP DISTURBANCE: 1
FREQUENCY: 1
FATIGUE: 1
BACK PAIN: 1
SHORTNESS OF BREATH: 1

## 2024-02-06 NOTE — PROGRESS NOTES
Subjective   Patient ID: Farida Traylor is a 63 y.o. female who presents for Annual Exam.    PAP 12-11-20 NEG always normal  MAMMO 2-28-23  DEXA NEVER, no FH  COLON 8-20-18 polyps and hemorrhoids, some bleeding. Back in 5 years. Has appointment with GI.     CHAPERONE: DECLINED.     Having stomach problems, seeing GI next week.     has kidney stones.     CVA 1 1.2 years ago. no VB since removal of polyp.     Recurrent UTIs on cranberry tablet. Used estrace cream. No more UTIs. Not using Estrace cream anymore.     Pulmonary rehab for COPD. On O2, pulmonary hypertension. A little worse. No h/o systemic steroids.     Has yeast under panus and in groin. Not using nystatin anymore. Uses aloe vera. Using hair dryer.       Review of Systems   Constitutional:  Positive for fatigue.   Eyes:         Dry eyes   Respiratory:  Positive for shortness of breath.    Genitourinary:  Positive for frequency.   Musculoskeletal:  Positive for back pain.   Psychiatric/Behavioral:  Positive for sleep disturbance.         Anxiety  depression   All other systems reviewed and are negative.      Objective   Constitutional: Alert and in no acute distress. Well developed, well nourished.  Neck: no neck asymmetry. Supple and thyroid not enlarged and there were no palpable thyroid nodules.  Chest: Breasts normal appearance, no nipple discharge and no skin changes and palpation of breasts and axillae: no palpable mass and no axillary lymphadenopathy.  Abdomen: soft nontender; no abdominal mass palpated, no organomegaly and no hernias.  Genitourinary: external genitalia: normal, no inguinal lymphadenopathy, Bartholin's urethral and [place]'s glands: normal, urethra: normal and bladder: normal on palpation.  Vagina: normal.  Cervix: normal.  Uterus: normal. Exam limited by habitus.  Right adnexa/parametria: normal.  Left adnexa/parametria: normal.  Skin: normal skin color and pigmentation, normal skin turgor and no rash.  Psychiatric: alert and  oriented x 3, affect normal to patient baseline and mood appropriate.     Assessment/Plan   -Pap-HPV  -rhett-kristine  -DEXA, taking vit D.

## 2024-02-08 ENCOUNTER — PROCEDURE VISIT (OUTPATIENT)
Dept: UROLOGY | Facility: CLINIC | Age: 64
End: 2024-02-08
Payer: COMMERCIAL

## 2024-02-08 DIAGNOSIS — N32.81 OAB (OVERACTIVE BLADDER): Primary | ICD-10-CM

## 2024-02-08 LAB
POC APPEARANCE, URINE: CLEAR
POC BILIRUBIN, URINE: NEGATIVE
POC BLOOD, URINE: ABNORMAL
POC COLOR, URINE: YELLOW
POC GLUCOSE, URINE: NEGATIVE MG/DL
POC KETONES, URINE: NEGATIVE MG/DL
POC LEUKOCYTES, URINE: ABNORMAL
POC NITRITE,URINE: NEGATIVE
POC PH, URINE: 6.5 PH
POC PROTEIN, URINE: NEGATIVE MG/DL
POC SPECIFIC GRAVITY, URINE: 1.01
POC UROBILINOGEN, URINE: 0.2 EU/DL

## 2024-02-08 PROCEDURE — 52287 CYSTOSCOPY CHEMODENERVATION: CPT | Performed by: STUDENT IN AN ORGANIZED HEALTH CARE EDUCATION/TRAINING PROGRAM

## 2024-02-08 RX ORDER — LIDOCAINE HYDROCHLORIDE 10 MG/ML
50 INJECTION INFILTRATION; PERINEURAL ONCE
Status: COMPLETED | OUTPATIENT
Start: 2024-02-08 | End: 2024-02-08

## 2024-02-08 RX ORDER — NITROFURANTOIN 25; 75 MG/1; MG/1
100 CAPSULE ORAL 2 TIMES DAILY
Qty: 14 CAPSULE | Refills: 0 | Status: SHIPPED | OUTPATIENT
Start: 2024-02-08 | End: 2024-02-15

## 2024-02-08 RX ORDER — SODIUM BICARBONATE 1 MEQ/ML
5 SYRINGE (ML) INTRAVENOUS ONCE
Status: COMPLETED | OUTPATIENT
Start: 2024-02-08 | End: 2024-02-08

## 2024-02-08 RX ADMIN — Medication 5 MEQ: at 16:00

## 2024-02-08 RX ADMIN — LIDOCAINE HYDROCHLORIDE 500 MG: 10 INJECTION INFILTRATION; PERINEURAL at 15:58

## 2024-02-08 NOTE — PROGRESS NOTES
CHIEF COMPLAINT: Botox         HISTORY OF PRESENT ILLNESS:  Farida Traylor is a 62 y/o female presenting on 2/8/24 for a Botox 100 units injection.            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), Acute upper respiratory infection, unspecified (01/14/2020), Allergic rhinitis due to animal (cat) (dog) hair and dander (01/02/2020), Allergic rhinitis due to pollen (01/02/2020), Allergic rhinitis due to pollen (02/26/2018), Allergy status to unspecified drugs, medicaments and biological substances (06/30/2015), Anemia, unspecified (07/23/2018), Anxiety disorder due to known physiological condition (06/05/2013), Anxiety disorder, unspecified (01/28/2016), Asymptomatic varicose veins of bilateral lower extremities (07/16/2019), Atypical squamous cells of undetermined significance on cytologic smear of cervix (ASC-US) (06/26/2013), Candidiasis, unspecified (12/10/2019), Cellulitis of left finger (07/31/2018), Changes in skin texture (03/09/2021), Contusion of left lesser toe(s) with damage to nail, initial encounter (02/22/2018), Contusion of right hand, sequela (08/04/2020), Corns and callosities (05/25/2021), Disorder of pigmentation, unspecified (09/28/2016), Disorder of the skin and subcutaneous tissue, unspecified (08/27/2020), Dorsalgia, unspecified (09/23/2021), Dorsalgia, unspecified (01/29/2019), Encounter for general adult medical examination without abnormal findings (06/08/2020), Encounter for gynecological examination (general) (routine) without abnormal findings (12/14/2021), Encounter for gynecological examination (general) (routine) without abnormal findings (12/11/2020), Encounter for other screening for malignant neoplasm of breast, Encounter for screening for malignant neoplasm of cervix, Encounter for screening for malignant neoplasm of cervix (11/04/2014), Encounter for screening for malignant neoplasm of  cervix, Hepatomegaly, not elsewhere classified (04/21/2021), History of falling (12/28/2018), Inappropriate diet and eating habits (05/13/2021), Irregular menstruation, unspecified, Localized edema (07/29/2015), Localized swelling, mass and lump, left lower limb (09/11/2020), Localized swelling, mass and lump, left lower limb (09/24/2020), Localized swelling, mass and lump, unspecified lower limb (09/24/2020), Melanocytic nevi, unspecified (09/10/2019), Multiple births, unspecified, all liveborn, Other allergic rhinitis (01/02/2020), Other allergic rhinitis (01/02/2020), Other conditions influencing health status (06/26/2013), Other conditions influencing health status, Other conditions influencing health status (02/05/2019), Other conditions influencing health status (12/14/2021), Other conditions influencing health status (02/14/2019), Other conditions influencing health status (01/14/2020), Other conditions influencing health status (06/05/2013), Other conditions influencing health status (06/05/2013), Other hypertrophic disorders of the skin (07/29/2015), Other shoulder lesions, right shoulder (11/11/2019), Other specified disorders of bone, thigh (09/09/2020), Other specified noninflammatory disorders of vagina (03/12/2019), Other specified postprocedural states (05/04/2017), Other specified soft tissue disorders (10/08/2020), Other specified soft tissue disorders (08/04/2020), Pain in left thigh (08/27/2020), Pain in right foot (03/09/2021), Pain in right hip (12/28/2018), Pain in right knee (03/12/2021), Pain in right knee (03/19/2021), Pain in right leg (05/25/2021), Pain in right leg (01/12/2022), Pain in unspecified joint, 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 (02/22/2016), Personal history of contraception, Personal history of diseases of the skin and subcutaneous tissue (12/22/2020), Personal history of malignant neoplasm of other parts of uterus,  Personal history of other (healed) physical injury and trauma (03/01/2017), Personal history of other benign neoplasm (09/10/2019), Personal history of other benign neoplasm (05/06/2014), Personal history of other benign neoplasm (12/11/2020), Personal history of other diseases of the circulatory system (04/14/2021), Personal history of other diseases of the circulatory system (04/14/2021), Personal history of other diseases of the circulatory system (04/14/2021), Personal history of other diseases of the circulatory system (02/01/2017), Personal history of other diseases of the circulatory system (04/14/2021), Personal history of other diseases of the digestive system (03/24/2021), Personal history of other diseases of the female genital tract (12/28/2016), Personal history of other diseases of the female genital tract (05/19/2022), Personal history of other diseases of the female genital tract, Personal history of other diseases of the female genital tract, Personal history of other diseases of the musculoskeletal system and connective tissue, Personal history of other diseases of the musculoskeletal system and connective tissue (11/18/2019), Personal history of other diseases of the respiratory system (05/27/2021), Personal history of other diseases of the respiratory system (04/12/2019), Personal history of other diseases of the respiratory system (01/04/2020), Personal history of other endocrine, nutritional and metabolic disease (02/01/2017), Personal history of other endocrine, nutritional and metabolic disease (04/18/2016), Personal history of other endocrine, nutritional and metabolic disease (05/26/2020), Personal history of other infectious and parasitic diseases, Personal history of other medical treatment (12/11/2020), Personal history of other medical treatment (12/14/2021), Personal history of other medical treatment, Personal history of other specified conditions (08/21/2019), Personal history of  other specified conditions (02/02/2017), Personal history of other specified conditions (12/07/2020), Personal history of other specified conditions (04/14/2021), Personal history of other specified conditions (12/05/2014), Personal history of other specified conditions (08/25/2021), Personal history of other specified conditions, Personal history of other specified conditions, Personal history of other specified conditions (01/12/2018), Personal history of transient ischemic attack (TIA), and 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 (12/30/2015), Personal history of urinary (tract) infections (09/02/2021), Personal history of urinary (tract) infections (05/23/2019), Personal history of urinary (tract) infections (09/02/2021), Personal history of urinary calculi (09/12/2019), Polyphagia (05/13/2021), Primary central sleep apnea (10/21/2017), Pure hypercholesterolemia, unspecified, Residual hemorrhoidal skin tags (07/06/2017), Slurred speech (01/02/2015), Tinea pedis (05/25/2021), Unspecified abdominal pain (04/13/2021), Unspecified injury of right wrist, hand and finger(s), sequela (08/04/2020), Unspecified internal derangement of unspecified knee (03/01/2017), Unspecified symptoms and signs involving the genitourinary system (12/16/2021), Unspecified symptoms and signs involving the genitourinary system (02/05/2019), Unspecified symptoms and signs involving the genitourinary system (09/03/2020), Urinary tract infection, site not specified (01/17/2020), Urinary tract infection, site not specified (01/10/2022), and Xerosis cutis (09/10/2019).    Surgical History  She has a past surgical history that includes Other surgical history (07/31/2013); Other surgical history (09/10/2019); Other surgical history (11/30/2018); Mouth surgery (11/04/2014); Knee surgery (05/04/2017); Other surgical history (06/08/2020); MR angio head wo IV contrast (02/09/2016); Cardiac catheterization (Right, 01/04/2024); and Cardiac  "catheterization.     Social History  She reports that she has never smoked. She has never used smokeless tobacco. She reports that she does not currently use alcohol. She reports that she does not use drugs.    Family History  Family History   Problem Relation Name Age of Onset    Hypertension Mother      Breast cancer Mother      Other (cardiac disorder) Mother      Cardiomyopathy Mother      Diabetes Mother      Other (chronic kidney disease) Mother      Stroke Brother      Brain Aneurysm Brother          Allergies  Grass pollen, Other, Pollen extracts, and Tree and shrub pollen      A comprehensive 10+ review of systems was negative except for: see hpi                          PHYSICAL EXAMINATION:  BP Readings from Last 3 Encounters:   02/06/24 144/70   01/24/24 122/56   01/04/24 140/85      Wt Readings from Last 3 Encounters:   02/06/24 134 kg (296 lb)   01/24/24 130 kg (286 lb)   01/04/24 132 kg (291 lb 0.1 oz)      BMI: Estimated body mass index is 54.14 kg/m² as calculated from the following:    Height as of 2/6/24: 1.575 m (5' 2\").    Weight as of 2/6/24: 134 kg (296 lb).  BSA: Estimated body surface area is 2.42 meters squared as calculated from the following:    Height as of 2/6/24: 1.575 m (5' 2\").    Weight as of 2/6/24: 134 kg (296 lb).  HEENT: Normocephalic, atraumatic, PER EOMI, nonicteric, trachea normal, thyroid normal, oropharynx normal.  CARDIAC: regular rate & rhythm, S1 & S2 normal.  No heaves, thrills, gallops or murmurs.  LUNGS: Clear to auscultation, no spinal or CV tenderness.  EXTREMITIES: No evidence of cyanosis, clubbing or edema.    Procedure Note:  Botox          Procedure and Findings: After informed consent was obtained, the patient was placed under anesthesia and  the patient was placed in the dorsal lithotomy position and was prepped and draped in a sterile fashion. A 21 [place]ic scope was inserted and the entire urethra and bladder were examined.  A complete cystoscopy was " performed. There were no mucosal lesions noted. The ureteral orifices were in normal location.     100 units of Botox were injected into the bladder muscle in 6 sites diluted in 10 ml of normal saline . The patient tolerated the procedure well. There were no complications. Routine post-cystoscopy instructions were given.     I was present for the entire procedure and performed all critical aspects of the procedure myself.           Provider Impression:  63-year-old with recurrent UTIs, genitourinary syndrome menopause, and urinary urge incontinence, presenting for Botox.     Alyce/dysuria:   continue estradiol     Follow up with Dr. Demetrius ISABELI:  failed trospium and myrbetriq   C     s/p Botox, 5/26/22, 5/25/2023, 2/8/24, 100 units, not presently on trospium      Follow up in 4-6 weeks         Sanford Arredondo MD    By signing my name below, I, Harpreet Ibarra   attest that this documentation has been prepared under the direction and in the presence of Dr. Sanford Arredondo.

## 2024-02-12 ENCOUNTER — OFFICE VISIT (OUTPATIENT)
Dept: PRIMARY CARE | Facility: CLINIC | Age: 64
End: 2024-02-12
Payer: COMMERCIAL

## 2024-02-12 VITALS
SYSTOLIC BLOOD PRESSURE: 110 MMHG | TEMPERATURE: 97.9 F | WEIGHT: 292 LBS | BODY MASS INDEX: 53.41 KG/M2 | HEART RATE: 76 BPM | OXYGEN SATURATION: 94 % | DIASTOLIC BLOOD PRESSURE: 58 MMHG

## 2024-02-12 DIAGNOSIS — L03.116 CELLULITIS OF LEFT LOWER EXTREMITY: Primary | ICD-10-CM

## 2024-02-12 DIAGNOSIS — I87.2 VENOUS STASIS DERMATITIS OF LEFT LOWER EXTREMITY: ICD-10-CM

## 2024-02-12 PROCEDURE — 3008F BODY MASS INDEX DOCD: CPT | Performed by: FAMILY MEDICINE

## 2024-02-12 PROCEDURE — 1126F AMNT PAIN NOTED NONE PRSNT: CPT | Performed by: FAMILY MEDICINE

## 2024-02-12 PROCEDURE — 1160F RVW MEDS BY RX/DR IN RCRD: CPT | Performed by: FAMILY MEDICINE

## 2024-02-12 PROCEDURE — 99214 OFFICE O/P EST MOD 30 MIN: CPT | Performed by: FAMILY MEDICINE

## 2024-02-12 PROCEDURE — 3074F SYST BP LT 130 MM HG: CPT | Performed by: FAMILY MEDICINE

## 2024-02-12 PROCEDURE — 1036F TOBACCO NON-USER: CPT | Performed by: FAMILY MEDICINE

## 2024-02-12 PROCEDURE — 3078F DIAST BP <80 MM HG: CPT | Performed by: FAMILY MEDICINE

## 2024-02-12 PROCEDURE — 1157F ADVNC CARE PLAN IN RCRD: CPT | Performed by: FAMILY MEDICINE

## 2024-02-12 PROCEDURE — 1159F MED LIST DOCD IN RCRD: CPT | Performed by: FAMILY MEDICINE

## 2024-02-12 RX ORDER — MUPIROCIN 20 MG/G
OINTMENT TOPICAL 3 TIMES DAILY
Qty: 22 G | Refills: 0 | Status: SHIPPED | OUTPATIENT
Start: 2024-02-12 | End: 2024-02-22

## 2024-02-12 NOTE — PROGRESS NOTES
Subjective   Patient ID: Farida Traylor is a 63 y.o. female who presents for SCABS (L LOWER LEG X 2 WEEKS-DISCUSS SEEING VASCULAR).    HPI PATIENT IS OBESE AND OXYGEN DEPENDENT AND HAS UROLOGICAL ISSUES. THAT PHYSICIAN SUGGESTED FOLLOW UP ON A LEG SORE NOT HEALING     Review of Systems   Constitutional:  Positive for fatigue. Negative for activity change, appetite change, fever and unexpected weight change.   HENT:  Negative for congestion, ear pain, postnasal drip, rhinorrhea, sinus pressure and sore throat.    Eyes:  Negative for discharge, itching and visual disturbance.   Respiratory:  Positive for shortness of breath and wheezing. Negative for cough.         CHRONIC RESPIRATORY FAILURE    Cardiovascular:  Negative for chest pain, palpitations and leg swelling.   Gastrointestinal:  Negative for abdominal pain, blood in stool, constipation, diarrhea, nausea and vomiting.   Endocrine: Negative for cold intolerance, heat intolerance and polyuria.   Genitourinary:  Positive for difficulty urinating and dysuria. Negative for flank pain and hematuria.        SEEING UROLOGY    Musculoskeletal:  Negative for arthralgias, gait problem, joint swelling and myalgias.   Skin:  Positive for wound. Negative for rash.        LARGE SCRAPE ANTERIOR LOWER LEG    Allergic/Immunologic: Negative for environmental allergies and food allergies.   Neurological:  Negative for dizziness, syncope, weakness, light-headedness, numbness and headaches.   Psychiatric/Behavioral:  Negative for dysphoric mood and sleep disturbance. The patient is not nervous/anxious.        Objective   /58   Pulse 76   Temp 36.6 °C (97.9 °F)   Wt 132 kg (292 lb)   SpO2 94% Comment: with o2  BMI 53.41 kg/m²     Physical Exam  Vitals and nursing note reviewed.   Constitutional:       Appearance: Normal appearance. She is obese. She is ill-appearing.   HENT:      Head: Normocephalic.   Cardiovascular:      Rate and Rhythm: Normal rate and regular  rhythm.      Pulses: Normal pulses.      Heart sounds: Normal heart sounds. No murmur heard.     No friction rub. No gallop.   Pulmonary:      Effort: Respiratory distress present.      Breath sounds: Wheezing, rhonchi and rales present.      Comments: ON 02   Abdominal:      General: There is no distension.      Tenderness: There is no abdominal tenderness.   Musculoskeletal:         General: No deformity.   Skin:     General: Skin is warm and dry.      Capillary Refill: Capillary refill takes less than 2 seconds.      Findings: Lesion present.      Comments: DRY ESCHAR LOWER LOWER LEG WHERE SHE HAS EXCORIATED THE LEG DUE TO ITCHING   SLIGHT WARMTH   PULSES DIMINISHED BUT PRESENT   WILL TREAT AND FOLLOW   PERHAPS VASCULAR NEEDED    Neurological:      General: No focal deficit present.      Mental Status: She is alert and oriented to person, place, and time.   Psychiatric:         Mood and Affect: Mood normal.         Assessment/Plan   Diagnoses and all orders for this visit:  Cellulitis of left lower extremity  -     mupirocin (Bactroban) 2 % ointment; Apply topically 3 times a day for 10 days. apply to affected area  Venous stasis dermatitis of left lower extremity  -     mupirocin (Bactroban) 2 % ointment; Apply topically 3 times a day for 10 days. apply to affected area

## 2024-02-13 ENCOUNTER — APPOINTMENT (OUTPATIENT)
Dept: GASTROENTEROLOGY | Facility: CLINIC | Age: 64
End: 2024-02-13
Payer: COMMERCIAL

## 2024-02-15 DIAGNOSIS — N32.81 OAB (OVERACTIVE BLADDER): Primary | ICD-10-CM

## 2024-02-15 ASSESSMENT — ENCOUNTER SYMPTOMS
EYE DISCHARGE: 0
FEVER: 0
VOMITING: 0
NAUSEA: 0
SINUS PRESSURE: 0
RHINORRHEA: 0
SHORTNESS OF BREATH: 1
LIGHT-HEADEDNESS: 0
WHEEZING: 1
WEAKNESS: 0
SORE THROAT: 0
HEMATURIA: 0
APPETITE CHANGE: 0
FLANK PAIN: 0
CONSTIPATION: 0
ABDOMINAL PAIN: 0
HEADACHES: 0
COUGH: 0
SLEEP DISTURBANCE: 0
WOUND: 1
DYSURIA: 1
DIFFICULTY URINATING: 1
BLOOD IN STOOL: 0
DIZZINESS: 0
ARTHRALGIAS: 0
EYE ITCHING: 0
NUMBNESS: 0
MYALGIAS: 0
NERVOUS/ANXIOUS: 0
ACTIVITY CHANGE: 0
JOINT SWELLING: 0
UNEXPECTED WEIGHT CHANGE: 0
DYSPHORIC MOOD: 0
PALPITATIONS: 0
DIARRHEA: 0
FATIGUE: 1

## 2024-02-16 RX ORDER — TROSPIUM CHLORIDE 20 MG/1
20 TABLET, FILM COATED ORAL 2 TIMES DAILY
Qty: 60 TABLET | Refills: 10 | Status: SHIPPED | OUTPATIENT
Start: 2024-02-16

## 2024-02-21 DIAGNOSIS — J96.11 CHRONIC RESPIRATORY FAILURE WITH HYPOXIA (MULTI): Primary | ICD-10-CM

## 2024-02-22 DIAGNOSIS — K59.04 CHRONIC IDIOPATHIC CONSTIPATION: ICD-10-CM

## 2024-02-22 RX ORDER — LUBIPROSTONE 8 UG/1
8 CAPSULE ORAL
Qty: 180 CAPSULE | Refills: 3 | Status: SHIPPED | OUTPATIENT
Start: 2024-02-22 | End: 2025-02-21

## 2024-02-22 RX ORDER — MONTELUKAST SODIUM 10 MG/1
10 TABLET ORAL DAILY
Qty: 30 TABLET | Refills: 10 | Status: SHIPPED | OUTPATIENT
Start: 2024-02-22

## 2024-02-27 ENCOUNTER — OFFICE VISIT (OUTPATIENT)
Dept: PULMONOLOGY | Facility: CLINIC | Age: 64
End: 2024-02-27
Payer: COMMERCIAL

## 2024-02-27 VITALS
WEIGHT: 292 LBS | BODY MASS INDEX: 53.41 KG/M2 | HEART RATE: 96 BPM | DIASTOLIC BLOOD PRESSURE: 76 MMHG | OXYGEN SATURATION: 96 % | RESPIRATION RATE: 16 BRPM | SYSTOLIC BLOOD PRESSURE: 144 MMHG

## 2024-02-27 DIAGNOSIS — G47.33 OBSTRUCTIVE SLEEP APNEA SYNDROME: ICD-10-CM

## 2024-02-27 DIAGNOSIS — I27.0 IDIOPATHIC PAH (PULMONARY ARTERIAL HYPERTENSION) (MULTI): Primary | ICD-10-CM

## 2024-02-27 DIAGNOSIS — J45.909 UNCOMPLICATED ASTHMA, UNSPECIFIED ASTHMA SEVERITY, UNSPECIFIED WHETHER PERSISTENT (HHS-HCC): ICD-10-CM

## 2024-02-27 DIAGNOSIS — J96.11 CHRONIC RESPIRATORY FAILURE WITH HYPOXIA (MULTI): ICD-10-CM

## 2024-02-27 LAB
CYTOLOGY CMNT CVX/VAG CYTO-IMP: NORMAL
HPV HR 12 DNA GENITAL QL NAA+PROBE: NEGATIVE
HPV HR GENOTYPES PNL CVX NAA+PROBE: NEGATIVE
HPV16 DNA SPEC QL NAA+PROBE: NEGATIVE
HPV18 DNA SPEC QL NAA+PROBE: NEGATIVE
LAB AP HPV GENOTYPE QUESTION: YES
LAB AP HPV HR: NORMAL
LABORATORY COMMENT REPORT: NORMAL
MENSTRUAL HX REPORTED: NORMAL
PATH REPORT.TOTAL CANCER: NORMAL

## 2024-02-27 PROCEDURE — 3077F SYST BP >= 140 MM HG: CPT | Performed by: INTERNAL MEDICINE

## 2024-02-27 PROCEDURE — 1159F MED LIST DOCD IN RCRD: CPT | Performed by: INTERNAL MEDICINE

## 2024-02-27 PROCEDURE — 1157F ADVNC CARE PLAN IN RCRD: CPT | Performed by: INTERNAL MEDICINE

## 2024-02-27 PROCEDURE — 1160F RVW MEDS BY RX/DR IN RCRD: CPT | Performed by: INTERNAL MEDICINE

## 2024-02-27 PROCEDURE — 3078F DIAST BP <80 MM HG: CPT | Performed by: INTERNAL MEDICINE

## 2024-02-27 PROCEDURE — 1126F AMNT PAIN NOTED NONE PRSNT: CPT | Performed by: INTERNAL MEDICINE

## 2024-02-27 PROCEDURE — 1036F TOBACCO NON-USER: CPT | Performed by: INTERNAL MEDICINE

## 2024-02-27 PROCEDURE — 99214 OFFICE O/P EST MOD 30 MIN: CPT | Performed by: INTERNAL MEDICINE

## 2024-02-27 PROCEDURE — 3008F BODY MASS INDEX DOCD: CPT | Performed by: INTERNAL MEDICINE

## 2024-02-27 ASSESSMENT — ENCOUNTER SYMPTOMS
HEADACHES: 1
DEPRESSION: 0
FEVER: 0
SHORTNESS OF BREATH: 1
CARDIOVASCULAR NEGATIVE: 1
PSYCHIATRIC NEGATIVE: 1
CHILLS: 0
COUGH: 0
GASTROINTESTINAL NEGATIVE: 1

## 2024-02-27 ASSESSMENT — PAIN SCALES - GENERAL: PAINLEVEL: 0-NO PAIN

## 2024-02-27 NOTE — PROGRESS NOTES
Subjective   Patient ID: Farida Traylor is a 63 y.o. female who presents for Lung Eval.  h/o PAH, complex sleep apnea and asthma here for follow up visit. Pt currently on 3L. Has had some headaches.  Thinks breathing is ok.  No fevers, chills, cough.   No palpitations, presyncopal symptoms, LE edema. ET is a few hundred feet.        Review of Systems   Constitutional:  Negative for chills and fever.   Respiratory:  Positive for shortness of breath. Negative for cough.    Cardiovascular: Negative.    Gastrointestinal: Negative.    Neurological:  Positive for headaches.   Psychiatric/Behavioral: Negative.     All other systems reviewed and are negative.      Objective   Physical Exam  Vitals reviewed.   Constitutional:       Appearance: Normal appearance. She is obese.   HENT:      Head: Normocephalic and atraumatic.   Eyes:      Extraocular Movements: Extraocular movements intact.   Cardiovascular:      Rate and Rhythm: Normal rate and regular rhythm.      Heart sounds: Normal heart sounds.   Pulmonary:      Effort: Pulmonary effort is normal.      Breath sounds: Decreased air movement present.   Abdominal:      Palpations: Abdomen is soft.      Tenderness: There is no abdominal tenderness.   Musculoskeletal:      Cervical back: Normal range of motion.   Skin:     General: Skin is warm.   Neurological:      General: No focal deficit present.      Mental Status: She is alert and oriented to person, place, and time. Mental status is at baseline.   Psychiatric:         Mood and Affect: Mood normal.         Behavior: Behavior normal.         Assessment/Plan   Problem List Items Addressed This Visit       Idiopathic PAH (pulmonary arterial hypertension) (CMS/HCC) - Primary     Pulmonary arterial hypertension - WHO Functional Class II/III. Idiopathic. Cont macitentan and riociguat. Letitia started 7/2021. Lasix as needed when weight increases. Repeat 6MWT for distance.     RHC: 1/2024 mPAP 31 PAOP 10 PVR 3.1 CO 6.7   11/2022 mPAP 31, PAOP 11, PVR 2.6 CO 7.7 4/2021 mPAP 35, PAOP 2, PVR 4.5 CO 7.4 10/2019 mPAP 31   RVSP: 1/2023 mildly enlarged RV 1/2022 54.5 10/2020 62.1 11/2018 43.6  6MWT: 4/2023 180m 1/2022 150m 6/2021 231.7m 1/2021 140m 11/2019 250m          Relevant Orders    Pulmonary Stress Test (6 Min. Walk)    Chronic respiratory failure with hypoxia (CMS/HCC)     Cont 2L at night w/ ASV. Cont 3 L O2 during day         Obstructive sleep apnea syndrome     Complex sleep apnea - Cont ASV.          Asthma     NL PFT 1/2022. controlled on Advair, singulair and proair. Cont Flonase. Repeat PFTs         Relevant Orders    Complete Pulmonary Function Test Pre/Post Bronchodialator (Spirometry Pre/Post/DLCO/Lung Volumes)       RTC in 4 months    Time Spent  Prep time on day of patient encounter: 10 minutes  Time spent directly with patient, family or caregiver: 15 minutes  Additional Time Spent on Patient Care Activities: 0 minutes  Documentation Time: 5 minutes  Other Time Spent: 0 minutes  Total: 30 minutes        Richy Raman MD 02/27/24 11:18 PM

## 2024-02-28 NOTE — ASSESSMENT & PLAN NOTE
Pulmonary arterial hypertension - WHO Functional Class II/III. Idiopathic. Cont macitentan and riociguat. Letitia started 7/2021. Lasix as needed when weight increases. Repeat 6MWT for distance.     RHC: 1/2024 mPAP 31 PAOP 10 PVR 3.1 CO 6.7  11/2022 mPAP 31, PAOP 11, PVR 2.6 CO 7.7 4/2021 mPAP 35, PAOP 2, PVR 4.5 CO 7.4 10/2019 mPAP 31   RVSP: 1/2023 mildly enlarged RV 1/2022 54.5 10/2020 62.1 11/2018 43.6  6MWT: 4/2023 180m 1/2022 150m 6/2021 231.7m 1/2021 140m 11/2019 250m

## 2024-02-29 ENCOUNTER — HOSPITAL ENCOUNTER (OUTPATIENT)
Dept: RADIOLOGY | Facility: HOSPITAL | Age: 64
Discharge: HOME | End: 2024-02-29
Payer: COMMERCIAL

## 2024-02-29 DIAGNOSIS — Z78.0 ASYMPTOMATIC POSTMENOPAUSAL STATE: ICD-10-CM

## 2024-02-29 DIAGNOSIS — Z12.31 ENCOUNTER FOR SCREENING MAMMOGRAM FOR BREAST CANCER: ICD-10-CM

## 2024-02-29 DIAGNOSIS — Z13.820 SCREENING FOR OSTEOPOROSIS: ICD-10-CM

## 2024-02-29 PROCEDURE — 77067 SCR MAMMO BI INCL CAD: CPT

## 2024-02-29 PROCEDURE — 77063 BREAST TOMOSYNTHESIS BI: CPT | Performed by: RADIOLOGY

## 2024-02-29 PROCEDURE — 77067 SCR MAMMO BI INCL CAD: CPT | Performed by: RADIOLOGY

## 2024-02-29 PROCEDURE — 77080 DXA BONE DENSITY AXIAL: CPT

## 2024-03-04 DIAGNOSIS — K21.9 GERD WITHOUT ESOPHAGITIS: Primary | ICD-10-CM

## 2024-03-04 RX ORDER — OMEPRAZOLE 40 MG/1
40 CAPSULE, DELAYED RELEASE ORAL 2 TIMES DAILY
Qty: 180 CAPSULE | Refills: 0 | Status: SHIPPED | OUTPATIENT
Start: 2024-03-04 | End: 2024-06-11 | Stop reason: SDUPTHER

## 2024-03-13 ENCOUNTER — HOSPITAL ENCOUNTER (OUTPATIENT)
Dept: RESPIRATORY THERAPY | Facility: HOSPITAL | Age: 64
Discharge: HOME | End: 2024-03-13
Payer: COMMERCIAL

## 2024-03-13 DIAGNOSIS — J45.909 UNCOMPLICATED ASTHMA, UNSPECIFIED ASTHMA SEVERITY, UNSPECIFIED WHETHER PERSISTENT (HHS-HCC): ICD-10-CM

## 2024-03-13 DIAGNOSIS — I27.0 IDIOPATHIC PAH (PULMONARY ARTERIAL HYPERTENSION) (MULTI): ICD-10-CM

## 2024-03-13 PROCEDURE — 94726 PLETHYSMOGRAPHY LUNG VOLUMES: CPT

## 2024-03-13 PROCEDURE — 94060 EVALUATION OF WHEEZING: CPT | Performed by: INTERNAL MEDICINE

## 2024-03-13 PROCEDURE — 94060 EVALUATION OF WHEEZING: CPT

## 2024-03-13 PROCEDURE — 98960 EDU&TRN PT SELF-MGMT NQHP 1: CPT

## 2024-03-13 PROCEDURE — 94618 PULMONARY STRESS TESTING: CPT | Performed by: INTERNAL MEDICINE

## 2024-03-13 PROCEDURE — 94726 PLETHYSMOGRAPHY LUNG VOLUMES: CPT | Performed by: INTERNAL MEDICINE

## 2024-03-13 PROCEDURE — 94729 DIFFUSING CAPACITY: CPT | Performed by: INTERNAL MEDICINE

## 2024-03-13 PROCEDURE — 94760 N-INVAS EAR/PLS OXIMETRY 1: CPT

## 2024-03-13 PROCEDURE — 94664 DEMO&/EVAL PT USE INHALER: CPT

## 2024-03-13 PROCEDURE — 94729 DIFFUSING CAPACITY: CPT

## 2024-03-13 PROCEDURE — 94618 PULMONARY STRESS TESTING: CPT

## 2024-03-15 DIAGNOSIS — N32.81 OAB (OVERACTIVE BLADDER): ICD-10-CM

## 2024-03-21 ENCOUNTER — TELEMEDICINE (OUTPATIENT)
Dept: UROLOGY | Facility: CLINIC | Age: 64
End: 2024-03-21
Payer: COMMERCIAL

## 2024-03-21 DIAGNOSIS — R30.0 DYSURIA: ICD-10-CM

## 2024-03-21 DIAGNOSIS — N32.81 OAB (OVERACTIVE BLADDER): ICD-10-CM

## 2024-03-21 DIAGNOSIS — N39.0 RECURRENT UTI: Primary | ICD-10-CM

## 2024-03-21 PROCEDURE — 1160F RVW MEDS BY RX/DR IN RCRD: CPT | Performed by: STUDENT IN AN ORGANIZED HEALTH CARE EDUCATION/TRAINING PROGRAM

## 2024-03-21 PROCEDURE — 1157F ADVNC CARE PLAN IN RCRD: CPT | Performed by: STUDENT IN AN ORGANIZED HEALTH CARE EDUCATION/TRAINING PROGRAM

## 2024-03-21 PROCEDURE — 1159F MED LIST DOCD IN RCRD: CPT | Performed by: STUDENT IN AN ORGANIZED HEALTH CARE EDUCATION/TRAINING PROGRAM

## 2024-03-21 PROCEDURE — 1036F TOBACCO NON-USER: CPT | Performed by: STUDENT IN AN ORGANIZED HEALTH CARE EDUCATION/TRAINING PROGRAM

## 2024-03-21 PROCEDURE — 99213 OFFICE O/P EST LOW 20 MIN: CPT | Performed by: STUDENT IN AN ORGANIZED HEALTH CARE EDUCATION/TRAINING PROGRAM

## 2024-03-21 PROCEDURE — 3008F BODY MASS INDEX DOCD: CPT | Performed by: STUDENT IN AN ORGANIZED HEALTH CARE EDUCATION/TRAINING PROGRAM

## 2024-03-21 NOTE — PROGRESS NOTES
HISTORY OF PRESENT ILLNESS:  Farida Traylor is a 63 y.o. female who presents today for a virtual follow up visit. She reports she has some improvement with the botox, but she has some pain in her back. She states that occasionally her urine is darker than normal.          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), Acute upper respiratory infection, unspecified (01/14/2020), Allergic rhinitis due to animal (cat) (dog) hair and dander (01/02/2020), Allergic rhinitis due to pollen (01/02/2020), Allergic rhinitis due to pollen (02/26/2018), Allergy status to unspecified drugs, medicaments and biological substances (06/30/2015), Anemia, unspecified (07/23/2018), Anxiety disorder due to known physiological condition (06/05/2013), Anxiety disorder, unspecified (01/28/2016), Asymptomatic varicose veins of bilateral lower extremities (07/16/2019), Atypical squamous cells of undetermined significance on cytologic smear of cervix (ASC-US) (06/26/2013), Candidiasis, unspecified (12/10/2019), Cellulitis of left finger (07/31/2018), Changes in skin texture (03/09/2021), Contusion of left lesser toe(s) with damage to nail, initial encounter (02/22/2018), Contusion of right hand, sequela (08/04/2020), Corns and callosities (05/25/2021), Disorder of pigmentation, unspecified (09/28/2016), Disorder of the skin and subcutaneous tissue, unspecified (08/27/2020), Dorsalgia, unspecified (09/23/2021), Dorsalgia, unspecified (01/29/2019), Encounter for general adult medical examination without abnormal findings (06/08/2020), Encounter for gynecological examination (general) (routine) without abnormal findings (12/14/2021), Encounter for gynecological examination (general) (routine) without abnormal findings (12/11/2020), Encounter for other screening for malignant neoplasm of breast, Encounter for screening for malignant neoplasm of cervix, Encounter for  screening for malignant neoplasm of cervix (11/04/2014), Encounter for screening for malignant neoplasm of cervix, Hepatomegaly, not elsewhere classified (04/21/2021), History of falling (12/28/2018), Inappropriate diet and eating habits (05/13/2021), Irregular menstruation, unspecified, Localized edema (07/29/2015), Localized swelling, mass and lump, left lower limb (09/11/2020), Localized swelling, mass and lump, left lower limb (09/24/2020), Localized swelling, mass and lump, unspecified lower limb (09/24/2020), Melanocytic nevi, unspecified (09/10/2019), Multiple births, unspecified, all liveborn, Other allergic rhinitis (01/02/2020), Other allergic rhinitis (01/02/2020), Other conditions influencing health status (06/26/2013), Other conditions influencing health status, Other conditions influencing health status (02/05/2019), Other conditions influencing health status (12/14/2021), Other conditions influencing health status (02/14/2019), Other conditions influencing health status (01/14/2020), Other conditions influencing health status (06/05/2013), Other conditions influencing health status (06/05/2013), Other hypertrophic disorders of the skin (07/29/2015), Other shoulder lesions, right shoulder (11/11/2019), Other specified disorders of bone, thigh (09/09/2020), Other specified noninflammatory disorders of vagina (03/12/2019), Other specified postprocedural states (05/04/2017), Other specified soft tissue disorders (10/08/2020), Other specified soft tissue disorders (08/04/2020), Pain in left thigh (08/27/2020), Pain in right foot (03/09/2021), Pain in right hip (12/28/2018), Pain in right knee (03/12/2021), Pain in right knee (03/19/2021), Pain in right leg (05/25/2021), Pain in right leg (01/12/2022), Pain in unspecified joint, 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 (02/22/2016), Personal history of contraception, Personal history of diseases of the skin  and subcutaneous tissue (12/22/2020), Personal history of malignant neoplasm of other parts of uterus, Personal history of other (healed) physical injury and trauma (03/01/2017), Personal history of other benign neoplasm (09/10/2019), Personal history of other benign neoplasm (05/06/2014), Personal history of other benign neoplasm (12/11/2020), Personal history of other diseases of the circulatory system (04/14/2021), Personal history of other diseases of the circulatory system (04/14/2021), Personal history of other diseases of the circulatory system (04/14/2021), Personal history of other diseases of the circulatory system (02/01/2017), Personal history of other diseases of the circulatory system (04/14/2021), Personal history of other diseases of the digestive system (03/24/2021), Personal history of other diseases of the female genital tract (12/28/2016), Personal history of other diseases of the female genital tract (05/19/2022), Personal history of other diseases of the female genital tract, Personal history of other diseases of the female genital tract, Personal history of other diseases of the musculoskeletal system and connective tissue, Personal history of other diseases of the musculoskeletal system and connective tissue (11/18/2019), Personal history of other diseases of the respiratory system (05/27/2021), Personal history of other diseases of the respiratory system (04/12/2019), Personal history of other diseases of the respiratory system (01/04/2020), Personal history of other endocrine, nutritional and metabolic disease (02/01/2017), Personal history of other endocrine, nutritional and metabolic disease (04/18/2016), Personal history of other endocrine, nutritional and metabolic disease (05/26/2020), Personal history of other infectious and parasitic diseases, Personal history of other medical treatment (12/11/2020), Personal history of other medical treatment (12/14/2021), Personal history of other  medical treatment, Personal history of other specified conditions (08/21/2019), Personal history of other specified conditions (02/02/2017), Personal history of other specified conditions (12/07/2020), Personal history of other specified conditions (04/14/2021), Personal history of other specified conditions (12/05/2014), Personal history of other specified conditions (08/25/2021), Personal history of other specified conditions, Personal history of other specified conditions, Personal history of other specified conditions (01/12/2018), Personal history of transient ischemic attack (TIA), and 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 (12/30/2015), Personal history of urinary (tract) infections (09/02/2021), Personal history of urinary (tract) infections (05/23/2019), Personal history of urinary (tract) infections (09/02/2021), Personal history of urinary calculi (09/12/2019), Polyphagia (05/13/2021), Primary central sleep apnea (10/21/2017), Pure hypercholesterolemia, unspecified, Residual hemorrhoidal skin tags (07/06/2017), Slurred speech (01/02/2015), Tinea pedis (05/25/2021), Unspecified abdominal pain (04/13/2021), Unspecified injury of right wrist, hand and finger(s), sequela (08/04/2020), Unspecified internal derangement of unspecified knee (03/01/2017), Unspecified symptoms and signs involving the genitourinary system (12/16/2021), Unspecified symptoms and signs involving the genitourinary system (02/05/2019), Unspecified symptoms and signs involving the genitourinary system (09/03/2020), Urinary tract infection, site not specified (01/17/2020), Urinary tract infection, site not specified (01/10/2022), and Xerosis cutis (09/10/2019).    Surgical History  She has a past surgical history that includes Other surgical history (07/31/2013); Other surgical history (09/10/2019); Other surgical history (11/30/2018); Mouth surgery (11/04/2014); Knee surgery (05/04/2017); Other surgical history (06/08/2020);   angio head wo IV contrast (02/09/2016); Cardiac catheterization (Right, 01/04/2024); and Cardiac catheterization.     Social History  She reports that she has never smoked. She has never used smokeless tobacco. She reports that she does not currently use alcohol. She reports that she does not use drugs.    Family History  Family History   Problem Relation Name Age of Onset    Hypertension Mother      Breast cancer Mother      Other (cardiac disorder) Mother      Cardiomyopathy Mother      Diabetes Mother      Other (chronic kidney disease) Mother      Stroke Brother      Brain Aneurysm Brother          Allergies  Grass pollen, Other, Pollen extracts, and Tree and shrub pollen      A comprehensive 10+ review of systems was negative except for: see hpi                        Assessment:  63-year-old with recurrent UTIs, genitourinary syndrome menopause, and urinary urge incontinence, presenting for Botox.     Alyce/dysuria:   continue estradiol   Check urine culture today     RYAN:  failed trospium and myrbetriq      s/p Botox, 5/26/22, 5/25/2023, 2/8/24, 100 units, doing well     Follow up in 3 months     Sanford Arredondo MD    Scribe Attestation  By signing my name below, I, Zina Fuentes, Scribe   attest that this documentation has been prepared under the direction and in the presence of Sanford Arredondo MD.

## 2024-03-29 ENCOUNTER — LAB (OUTPATIENT)
Dept: LAB | Facility: LAB | Age: 64
End: 2024-03-29
Payer: COMMERCIAL

## 2024-03-29 ENCOUNTER — OFFICE VISIT (OUTPATIENT)
Dept: GASTROENTEROLOGY | Facility: CLINIC | Age: 64
End: 2024-03-29
Payer: COMMERCIAL

## 2024-03-29 VITALS — HEART RATE: 73 BPM | DIASTOLIC BLOOD PRESSURE: 73 MMHG | SYSTOLIC BLOOD PRESSURE: 110 MMHG

## 2024-03-29 DIAGNOSIS — R13.10 DYSPHAGIA, UNSPECIFIED TYPE: ICD-10-CM

## 2024-03-29 DIAGNOSIS — D12.6 TUBULAR ADENOMA OF COLON: Primary | ICD-10-CM

## 2024-03-29 DIAGNOSIS — R14.0 BLOATING: ICD-10-CM

## 2024-03-29 DIAGNOSIS — R10.13 DYSPEPSIA: ICD-10-CM

## 2024-03-29 DIAGNOSIS — K59.04 CHRONIC IDIOPATHIC CONSTIPATION: ICD-10-CM

## 2024-03-29 DIAGNOSIS — E66.01 MORBID OBESITY WITH BMI OF 50.0-59.9, ADULT (MULTI): ICD-10-CM

## 2024-03-29 DIAGNOSIS — R30.0 DYSURIA: ICD-10-CM

## 2024-03-29 LAB
CREAT SERPL-MCNC: 0.83 MG/DL (ref 0.5–1.05)
EGFRCR SERPLBLD CKD-EPI 2021: 78 ML/MIN/1.73M*2

## 2024-03-29 PROCEDURE — 99214 OFFICE O/P EST MOD 30 MIN: CPT | Performed by: STUDENT IN AN ORGANIZED HEALTH CARE EDUCATION/TRAINING PROGRAM

## 2024-03-29 PROCEDURE — 1159F MED LIST DOCD IN RCRD: CPT | Performed by: STUDENT IN AN ORGANIZED HEALTH CARE EDUCATION/TRAINING PROGRAM

## 2024-03-29 PROCEDURE — 1160F RVW MEDS BY RX/DR IN RCRD: CPT | Performed by: STUDENT IN AN ORGANIZED HEALTH CARE EDUCATION/TRAINING PROGRAM

## 2024-03-29 PROCEDURE — 1157F ADVNC CARE PLAN IN RCRD: CPT | Performed by: STUDENT IN AN ORGANIZED HEALTH CARE EDUCATION/TRAINING PROGRAM

## 2024-03-29 PROCEDURE — 3074F SYST BP LT 130 MM HG: CPT | Performed by: STUDENT IN AN ORGANIZED HEALTH CARE EDUCATION/TRAINING PROGRAM

## 2024-03-29 PROCEDURE — 1036F TOBACCO NON-USER: CPT | Performed by: STUDENT IN AN ORGANIZED HEALTH CARE EDUCATION/TRAINING PROGRAM

## 2024-03-29 PROCEDURE — 87086 URINE CULTURE/COLONY COUNT: CPT

## 2024-03-29 PROCEDURE — 3008F BODY MASS INDEX DOCD: CPT | Performed by: STUDENT IN AN ORGANIZED HEALTH CARE EDUCATION/TRAINING PROGRAM

## 2024-03-29 PROCEDURE — 82565 ASSAY OF CREATININE: CPT

## 2024-03-29 PROCEDURE — 36415 COLL VENOUS BLD VENIPUNCTURE: CPT

## 2024-03-29 PROCEDURE — 3078F DIAST BP <80 MM HG: CPT | Performed by: STUDENT IN AN ORGANIZED HEALTH CARE EDUCATION/TRAINING PROGRAM

## 2024-03-29 ASSESSMENT — ENCOUNTER SYMPTOMS
UNEXPECTED WEIGHT CHANGE: 0
FEVER: 0
DIARRHEA: 0
TROUBLE SWALLOWING: 1
BLOOD IN STOOL: 0
CHILLS: 0
COLOR CHANGE: 0
ANAL BLEEDING: 0
NAUSEA: 0
VOMITING: 0
SHORTNESS OF BREATH: 0
RECTAL PAIN: 0
ABDOMINAL DISTENTION: 0
ABDOMINAL PAIN: 0
CONSTIPATION: 1

## 2024-03-29 NOTE — ASSESSMENT & PLAN NOTE
Chronic intermittent dysphagia, esophagram w mild esophageal dysmotility  - discussed further w.up with manometry  - patient declines this testing at this time which is reasonable given medical co-morbids  - ensure proper mastication  - c/w PPI

## 2024-03-29 NOTE — PROGRESS NOTES
Chief Complaint:  Chief Complaint   Patient presents with    Constipation    GERD     Former pt of Mikayla Maguire, CNP       Prior patient of Mikayla Maguire NP, following for CIC and GERD. On Amitiza 8mg BID.   Medical co-morbids of morbid obesity, idiopathic PAH, sleep apnea, asthma on 3 L oxygen, following with pulmonology.     Overall doing well since last visit ~8 mo ago.     BM daily, bristol 3/4.     Last couple of days with constipation so taking Miralax    Usually has bloating and gas all the time.   Worse after eating and end of day.  Does not wake up feeling bloated     Feels she has an upset stomach when she is stressed or anxious  Denies epigastric pain or melena, weight loss, change in appetite or early satiety  Reports intermittent dysphagia, prior esophogram done with noted mild esophageal dysmotility.     Last colon 2018 with 1 TA in cecum.           Constipation  Pertinent negatives include no abdominal pain, diarrhea, fever, nausea, rectal pain or vomiting.   GERD  She reports no abdominal pain, no chest pain or no nausea.             Review of Systems   Constitutional:  Negative for chills, fever and unexpected weight change.   HENT:  Positive for trouble swallowing.    Respiratory:  Negative for shortness of breath.    Cardiovascular:  Negative for chest pain.   Gastrointestinal:  Positive for constipation. Negative for abdominal distention, abdominal pain, anal bleeding, blood in stool, diarrhea, nausea, rectal pain and vomiting.   Skin:  Negative for color change.     Family History   Problem Relation Name Age of Onset    Hypertension Mother      Breast cancer Mother      Other (cardiac disorder) Mother      Cardiomyopathy Mother      Diabetes Mother      Other (chronic kidney disease) Mother      Stroke Brother      Brain Aneurysm Brother         Medications    Current Outpatient Medications:     Adempas 2.5 mg tablet, Take 1 tablet (2.5 mg) by mouth 3 times a day., Disp: , Rfl:     albuterol 90  mcg/actuation inhaler, Inhale 1-2 puffs every 4 hours if needed for wheezing. Every 4 to 6 hours as needed, Disp: 1 g, Rfl: 11    aspirin 325 mg tablet, Take 1 tablet (325 mg) by mouth once daily., Disp: , Rfl:     buPROPion SR (Wellbutrin SR) 150 mg 12 hr tablet, TAKE 1 TABLET BY MOUTH EVERY MORNING AND AFTERNOON, Disp: , Rfl:     busPIRone (Buspar) 30 mg tablet, Take 1 tablet (30 mg) by mouth 2 times a day., Disp: , Rfl:     fluticasone propion-salmeteroL (Advair Diskus) 250-50 mcg/dose diskus inhaler, Inhale 1 puff 2 times a day., Disp: , Rfl:     hydrALAZINE (Apresoline) 50 mg tablet, Take 1 tablet (50 mg) by mouth 2 times a day. 1 tablet in the AM and 3 tablets in the PM., Disp: , Rfl:     hydrOXYzine pamoate (Vistaril) 50 mg capsule, TAKE 3 CAPSULES BY MOUTH EVERY NIGHT AT BEDTIME AND TAKE 1 CAPSULE BY MOUTH EVERY MORNING, Disp: , Rfl:     lubiprostone (Amitiza) 8 mcg capsule, Take 1 capsule (8 mcg) by mouth 2 times a day with meals., Disp: 180 capsule, Rfl: 3    memantine (Namenda) 10 mg tablet, Take 1 tablet (10 mg) by mouth 2 times a day., Disp: , Rfl:     methenamine hippurate (Hiprex) 1 gram tablet, Take 1 tablet (1 g) by mouth 2 times a day., Disp: , Rfl:     montelukast (Singulair) 10 mg tablet, TAKE 1 TABLET BY MOUTH ONCE DAILY *EMERGENCY REFILL*, Disp: 30 tablet, Rfl: 10    omeprazole (PriLOSEC) 40 mg DR capsule, Take 1 capsule (40 mg) by mouth 2 times a day., Disp: 180 capsule, Rfl: 0    Opsumit 10 mg tablet tablet, Take 1 tablet (10 mg) by mouth once daily., Disp: , Rfl:     sertraline (Zoloft) 100 mg tablet, Take 2 tablets (200 mg) by mouth once daily., Disp: , Rfl:     trospium (Sanctura) 20 mg tablet, TAKE ONE (1) TABLET BY MOUTH TWICE DAILY, Disp: 60 tablet, Rfl: 10    blood pressure monitor (Blood Pressure Kit) kit, 1 kit if needed (take BP as needed)., Disp: 1 kit, Rfl: 0    simvastatin (Zocor) 80 mg tablet, Take 1 tablet (80 mg) by mouth once daily at bedtime., Disp: 90 tablet, Rfl:  "3    Current Facility-Administered Medications:     methylPREDNISolone acetate (DEPO-Medrol) injection 40 mg, 40 mg, intra-articular, Once, Josey Gallego PA-C    Vitals  /73 (BP Location: Left arm, Patient Position: Sitting)   Pulse 73     Physical Exam  Constitutional:       General: She is not in acute distress.     Appearance: She is obese.   HENT:      Head: Normocephalic and atraumatic.   Eyes:      General: No scleral icterus.     Conjunctiva/sclera: Conjunctivae normal.   Cardiovascular:      Rate and Rhythm: Normal rate.      Heart sounds: Normal heart sounds.   Pulmonary:      Effort: Pulmonary effort is normal.      Breath sounds: No wheezing.      Comments: On supplemental nasal canula   Abdominal:      General: Bowel sounds are normal. There is no distension.      Palpations: Abdomen is soft.      Tenderness: There is no abdominal tenderness. There is no guarding or rebound.      Hernia: No hernia is present.   Skin:     Coloration: Skin is not jaundiced.   Neurological:      Mental Status: She is alert and oriented to person, place, and time.   Psychiatric:         Mood and Affect: Mood normal.           Labs:  No results found for: \"AFP\"No results found for: \"ASMAB\", \"MITOAB\"No results found for: \"FRANCISCO\"No results found for: \"ASMAB\", \"MITOAB\"  Lab Results   Component Value Date    [id number] 1,680 (H) 10/05/2023   No results found for: \"HEPCAB\"No results found for: \"HEPATOT\", \"HEPAIGM\", \"HEPBCIGM\", \"HEPBCAB\", \"HBEAG\", \"HEPCAB\"No results found for: \"HIV1X2\"  Lab Results   Component Value Date    IRON 91 01/05/2022    TIBC 336 01/05/2022    FERRITIN 88 02/13/2023     Lab Results   Component Value Date    INR 1.0 10/06/2022    INR 1.0 08/11/2021    INR 1.0 11/07/2019    PROTIME 11.5 10/06/2022    PROTIME 11.6 08/11/2021    PROTIME 10.8 11/07/2019     Lab Results   Component Value Date    TSH 1.24 12/15/2022       Radiology  XR DEXA bone density  Narrative: Interpreted By:  Aarti Walters, "   STUDY:  DEXA BONE DENSITY2/29/2024 2:18 pm      INDICATION:  Signs/Symptoms:Asymptomatic postmenopausal state.  Screening for  osteoporosis..  The patient is a 64 y/o  year old F.      COMPARISON:  None.      ACCESSION NUMBER(S):  [id number]      ORDERING CLINICIAN:  DARRYL GRAYSON      TECHNIQUE:  DEXA BONE DENSITY      FINDINGS:  SPINE L1-L4  Bone Mineral Density: 0.953  T-Score -0.9  Z-Score 1  Bone Mineral Density change vs baseline:  Not reported  Bone Mineral Density change vs previous: Not reported      LEFT FEMUR -TOTAL  Bone Mineral Density: 0.951  T-Score 0.1   Z-Score  1.3  Bone Mineral Density change vs baseline: Not reported  Bone Mineral Density change vs previous: Not reported      LEFT FEMUR -NECK  Bone Mineral Density: 0.718  T-Score -1.2  Z-Score 0.4  Bone Mineral Density change vs baseline: Not reported  Bone Mineral Density change vs previous: Not reported      LEFT FOREARM  Total  Bone Mineral Density: Not reported  T-Score Not reported  Z-Score Not reported  UD  Bone Mineral Density: Not reported  T-Score Not reported Z-Score Not reported  MID  Bone Mineral Density: Not reported  T-Score Not reported Z-Score Not reported  1/3  Bone Mineral Density: Not reported  T-Score Not reported   Z-Score Not reported  Bone Mineral Density change vs baseline:  Not reported  Bone Mineral Density change vs previous:  Not reported      RIGHT FEMUR -TOTAL  Bone Mineral Density: Not reported  T-Score Not reported   Z-Score Not reported  Bone Mineral Density change vs baseline:  Not reported  Bone Mineral Density change vs previous: Not reported      RIGHT FEMUR -NECK  Bone Mineral Density: Not reported  T-Score Not reported   Z-Score  Not reported  Bone Mineral Density change vs baseline:  Bone Mineral Density change vs previous:      RIGHT FOREARM  Bone Mineral Density: Not reported  T-Score Not reported   Z-Score Not reported  UD  Bone Mineral Density: Not reported  T-Score Not reported  Z-Score Not  reported  MID  Bone Mineral Density: Not reported  T-Score Not reported  Z-Score  Not reported  1/3  Bone Mineral Density: Not reported  T-Score Not reported  Z-Score Not reported  Bone Mineral Density change vs baseline:  Not reported  Bone Mineral Density change vs previous:  Not reported          World Health Organization (WHO) criteria for post-menopausal,   Women:  Normal:         T-score at or above -1 SD  Osteopenia:   T-score between -1 and -2.5 SD  Osteoporosis: T-score at or below -2.5 SD          10-year Fracture Risk:  Major Osteoporotic Fracture  6.8  Hip Fracture                        0.5      Note:  If no FRAX score is reported, it is because:  Some T-score for Spine Total or Hip Total or Femoral Neck at or below  -2.5      This exam was performed at [facility] on a [employer] Dexa Unit.      Impression: DEXA:  According to World Health Organization criteria,  classification is low bone mass (osteopenia)      Followup recommended in two years or sooner as clinically warranted.      All images and detailed analysis are available on the  Radiology  PACS.      MACRO:  None      Signed by: Aarti Walters 2/29/2024 3:37 PM  Dictation workstation:   [id number]  BI mammo bilateral screening tomosynthesis  Narrative: Interpreted By:  Aarti Walters,   STUDY:  BI MAMMO BILATERAL SCREENING TOMOSYNTHESIS;  2/29/2024 1:35 pm      ACCESSION NUMBER(S):  [id number]      ORDERING CLINICIAN:  DARRYL GRAYSON      INDICATION:  Screening.      COMPARISON:  02/28/2023      FINDINGS:  2D and tomosynthesis images were reviewed at 1 mm slice thickness.      Density:  There are areas of scattered fibroglandular tissue.      No suspicious masses or calcifications are identified.      Impression: No mammographic evidence of malignancy.      BI-RADS CATEGORY:  BI-RADS Category:  1 Negative.  Recommendation:  Routine Screening Mammogram in 1 Year.  Recommended Date:  1 Year.  Laterality:   Bilateral.      For any future breast imaging appointments, please call [phone]).          MACRO:  None      Signed by: Aarti Walters 2/29/2024 2:12 PM  Dictation workstation:   [id number]      A/P   Farida was seen today for constipation and gerd.  Diagnoses and all orders for this visit:  Tubular adenoma of colon (Primary)  -     Colonoscopy Screening; High Risk Patient; Hx of tubular adenoma; Future  Dysphagia, unspecified type  -     FL upper GI single contrast w small bowel follow through; Future  -     Creatinine, Serum; Future  -     Follow Up In Gastroenterology; Future  Chronic idiopathic constipation  -     Follow Up In Gastroenterology; Future  Morbid obesity with BMI of 50.0-59.9, adult (CMS/[facility])  Dyspepsia  Bloating     Problem List Items Addressed This Visit             ICD-10-CM    Chronic idiopathic constipation K59.04     - c.w amitiza 8mg BID  - Miralax prn  - water intake and physical activity as tolerated         Relevant Orders    Follow Up In Gastroenterology    Dysphagia R13.10     Chronic intermittent dysphagia, esophagram w mild esophageal dysmotility  - discussed further w.up with manometry  - patient declines this testing at this time which is reasonable given medical co-morbids  - ensure proper mastication  - c/w PPI         Relevant Orders    FL upper GI single contrast w small bowel follow through    Creatinine, Serum    Follow Up In Gastroenterology    Morbid obesity with BMI of 50.0-59.9, adult (CMS/[facility]) E66.01, Z68.43    Tubular adenoma of colon - Primary D12.6     Last colonoscopy 2018 w one tubular adenoma, report and path reviewed. Recommended 5 yr recall, discussed new recommendations for 7 yr recall for 1 TA.  - patient would like to repeat colonoscopy in fall   - R/B/A of procedure discussed w patient including but not limited to risk of bleeding, infection, missed polyps, perforation, larger polyps which can not be removed endoscopically or by myself, incomplete  procedure due to looping or preparation; benefits to detect and remove pre-cancerous polyps and alternatives such as virtual colon or stool based testing   - patient agreeable to colonoscopy  --> high risk given respiratory issues, to be done at [facility], needs pulm optimization prior   - split dose Miralax and Gatorade prep  - if having constipation the week before preparation then she is instructed to use Miralax every day the week before   - , pulmonolgy, for pulm optimization for prior to colonoscopy   - low fiber diet 3 days before   - hold fiber supplement 5 days before  - no medications need adjusting   - pre and post procedure instructions discussed in detail by myself and  with hand outs provided          Relevant Orders    Colonoscopy Screening; High Risk Patient; Hx of tubular adenoma    Dyspepsia R10.13     Reports abdominal discomfort when stressed or anxious. No alarm sxs  - plan for UGIS given high risk airway, if any concerning findings can plan for EGD  - c.w PPI          Bloating R14.0     Bothersome gas and bloating, worse after meals and at night  - Discussed mechanisms of gas and bloating including the viscerosomatic reflex, diet, SIBO, constipation and FODMAPs  - treat constipation   - discussed and provided handout on low FODMAP diet

## 2024-03-29 NOTE — PATIENT INSTRUCTIONS
See you in September for your colonoscopy.     If you are having constipation the week before your colonoscopy please take additional Miralax 1 cap BID in addition to the Amitiza for the 7 days before you start the colon prep.     Get the stomach XRAY test done to look at the stomach    Use Bean-o before eating to help with Gas.  Gas-X is also helpful   Review the low FODMAP diet, try to identify foods on the high FODMAP list that you may eat which can be causing your symptoms, Reduce or eliminate your intake of these foods for 6-8 weeks then slowly reintroduce at lower amounts

## 2024-03-29 NOTE — ASSESSMENT & PLAN NOTE
Last colonoscopy 2018 w one tubular adenoma, report and path reviewed. Recommended 5 yr recall, discussed new recommendations for 7 yr recall for 1 TA.  - patient would like to repeat colonoscopy in fall   - R/B/A of procedure discussed w patient including but not limited to risk of bleeding, infection, missed polyps, perforation, larger polyps which can not be removed endoscopically or by myself, incomplete procedure due to looping or preparation; benefits to detect and remove pre-cancerous polyps and alternatives such as virtual colon or stool based testing   - patient agreeable to colonoscopy  --> high risk given respiratory issues, to be done at [facility], needs pulm optimization prior   - split dose Miralax and Gatorade prep  - if having constipation the week before preparation then she is instructed to use Miralax every day the week before   - , pulmonolgy, for pulm optimization for prior to colonoscopy   - low fiber diet 3 days before   - hold fiber supplement 5 days before  - no medications need adjusting   - pre and post procedure instructions discussed in detail by myself and  with hand outs provided

## 2024-03-29 NOTE — ASSESSMENT & PLAN NOTE
Bothersome gas and bloating, worse after meals and at night  - Discussed mechanisms of gas and bloating including the viscerosomatic reflex, diet, SIBO, constipation and FODMAPs  - treat constipation   - discussed and provided handout on low FODMAP diet

## 2024-03-29 NOTE — ASSESSMENT & PLAN NOTE
Reports abdominal discomfort when stressed or anxious. No alarm sxs  - plan for UGIS given high risk airway, if any concerning findings can plan for EGD  - c.w PPI

## 2024-03-31 LAB — BACTERIA UR CULT: NORMAL

## 2024-04-01 LAB
MGC ASCENT PFT - FEV1 - POST: 1.65
MGC ASCENT PFT - FEV1 - POST: 1.65
MGC ASCENT PFT - FEV1 - PRE: 1.54
MGC ASCENT PFT - FEV1 - PRE: 1.54
MGC ASCENT PFT - FEV1 - PREDICTED: 2.08
MGC ASCENT PFT - FEV1 - PREDICTED: 2.08
MGC ASCENT PFT - FVC - POST: 2.19
MGC ASCENT PFT - FVC - POST: 2.19
MGC ASCENT PFT - FVC - PRE: 2.05
MGC ASCENT PFT - FVC - PRE: 2.05
MGC ASCENT PFT - FVC - PREDICTED: 2.62
MGC ASCENT PFT - FVC - PREDICTED: 2.62

## 2024-04-02 DIAGNOSIS — E78.2 MODERATE MIXED HYPERLIPIDEMIA NOT REQUIRING STATIN THERAPY: ICD-10-CM

## 2024-04-02 RX ORDER — SIMVASTATIN 80 MG/1
80 TABLET, FILM COATED ORAL NIGHTLY
Qty: 90 TABLET | Refills: 1 | Status: SHIPPED | OUTPATIENT
Start: 2024-04-02 | End: 2025-04-02

## 2024-04-09 ENCOUNTER — APPOINTMENT (OUTPATIENT)
Dept: ORTHOPEDIC SURGERY | Facility: CLINIC | Age: 64
End: 2024-04-09
Payer: COMMERCIAL

## 2024-04-10 ENCOUNTER — HOSPITAL ENCOUNTER (OUTPATIENT)
Dept: RADIOLOGY | Facility: HOSPITAL | Age: 64
Discharge: HOME | End: 2024-04-10
Payer: COMMERCIAL

## 2024-04-10 DIAGNOSIS — R10.13 DYSPEPSIA: ICD-10-CM

## 2024-04-10 DIAGNOSIS — R13.10 DYSPHAGIA, UNSPECIFIED TYPE: ICD-10-CM

## 2024-04-10 DIAGNOSIS — R14.0 BLOATING: ICD-10-CM

## 2024-04-10 PROCEDURE — 74248 X-RAY SM INT F-THRU STD: CPT

## 2024-04-10 PROCEDURE — 2500000001 HC RX 250 WO HCPCS SELF ADMINISTERED DRUGS (ALT 637 FOR MEDICARE OP): Performed by: STUDENT IN AN ORGANIZED HEALTH CARE EDUCATION/TRAINING PROGRAM

## 2024-04-10 PROCEDURE — 2500000004 HC RX 250 GENERAL PHARMACY W/ HCPCS (ALT 636 FOR OP/ED): Performed by: STUDENT IN AN ORGANIZED HEALTH CARE EDUCATION/TRAINING PROGRAM

## 2024-04-10 PROCEDURE — 74246 X-RAY XM UPR GI TRC 2CNTRST: CPT | Performed by: STUDENT IN AN ORGANIZED HEALTH CARE EDUCATION/TRAINING PROGRAM

## 2024-04-10 PROCEDURE — A9698 NON-RAD CONTRAST MATERIALNOC: HCPCS | Performed by: STUDENT IN AN ORGANIZED HEALTH CARE EDUCATION/TRAINING PROGRAM

## 2024-04-10 RX ORDER — NITROFURANTOIN 25; 75 MG/1; MG/1
100 CAPSULE ORAL 2 TIMES DAILY
Qty: 14 CAPSULE | Refills: 10 | OUTPATIENT
Start: 2024-04-10 | End: 2024-04-17

## 2024-04-10 RX ADMIN — BARIUM SULFATE 700 MG: 700 TABLET ORAL at 10:49

## 2024-04-10 RX ADMIN — BARIUM SULFATE 100 ML: 0.6 SUSPENSION ORAL at 10:48

## 2024-04-10 RX ADMIN — BARIUM SULFATE 90 ML: 980 POWDER, FOR SUSPENSION ORAL at 10:48

## 2024-04-10 RX ADMIN — ANTACID/ANTIFLATULENT 1 PACKET: 380; 550; 10; 10 GRANULE, EFFERVESCENT ORAL at 10:49

## 2024-04-12 ENCOUNTER — OFFICE VISIT (OUTPATIENT)
Dept: CARDIOLOGY | Facility: HOSPITAL | Age: 64
End: 2024-04-12
Payer: COMMERCIAL

## 2024-04-12 VITALS
BODY MASS INDEX: 54.52 KG/M2 | DIASTOLIC BLOOD PRESSURE: 70 MMHG | WEIGHT: 293 LBS | OXYGEN SATURATION: 94 % | SYSTOLIC BLOOD PRESSURE: 145 MMHG | HEART RATE: 69 BPM

## 2024-04-12 DIAGNOSIS — I27.21 PAH (PULMONARY ARTERY HYPERTENSION) (MULTI): ICD-10-CM

## 2024-04-12 DIAGNOSIS — E78.00 HYPERCHOLESTEREMIA: ICD-10-CM

## 2024-04-12 DIAGNOSIS — I10 BENIGN ESSENTIAL HYPERTENSION: Primary | ICD-10-CM

## 2024-04-12 PROCEDURE — 99213 OFFICE O/P EST LOW 20 MIN: CPT | Performed by: NURSE PRACTITIONER

## 2024-04-12 PROCEDURE — 3077F SYST BP >= 140 MM HG: CPT | Performed by: NURSE PRACTITIONER

## 2024-04-12 PROCEDURE — 1157F ADVNC CARE PLAN IN RCRD: CPT | Performed by: NURSE PRACTITIONER

## 2024-04-12 PROCEDURE — 3008F BODY MASS INDEX DOCD: CPT | Performed by: NURSE PRACTITIONER

## 2024-04-12 PROCEDURE — 1036F TOBACCO NON-USER: CPT | Performed by: NURSE PRACTITIONER

## 2024-04-12 PROCEDURE — 1159F MED LIST DOCD IN RCRD: CPT | Performed by: NURSE PRACTITIONER

## 2024-04-12 PROCEDURE — 93005 ELECTROCARDIOGRAM TRACING: CPT | Performed by: NURSE PRACTITIONER

## 2024-04-12 PROCEDURE — 3078F DIAST BP <80 MM HG: CPT | Performed by: NURSE PRACTITIONER

## 2024-04-12 PROCEDURE — 93010 ELECTROCARDIOGRAM REPORT: CPT | Performed by: INTERNAL MEDICINE

## 2024-04-12 PROCEDURE — 1160F RVW MEDS BY RX/DR IN RCRD: CPT | Performed by: NURSE PRACTITIONER

## 2024-04-12 ASSESSMENT — ENCOUNTER SYMPTOMS
DYSPNEA ON EXERTION: 1
SHORTNESS OF BREATH: 1
DEPRESSION: 0
ENDOCRINE NEGATIVE: 1
DIZZINESS: 1
MYALGIAS: 1
ALLERGIC/IMMUNOLOGIC NEGATIVE: 1
EYES NEGATIVE: 1
GASTROINTESTINAL NEGATIVE: 1
PSYCHIATRIC NEGATIVE: 1
HEMATOLOGIC/LYMPHATIC NEGATIVE: 1

## 2024-04-13 LAB
ATRIAL RATE: 67 BPM
P AXIS: 60 DEGREES
P OFFSET: 156 MS
P ONSET: 105 MS
PR INTERVAL: 202 MS
Q ONSET: 206 MS
QRS COUNT: 11 BEATS
QRS DURATION: 116 MS
QT INTERVAL: 446 MS
QTC CALCULATION(BAZETT): 471 MS
QTC FREDERICIA: 463 MS
R AXIS: 33 DEGREES
T AXIS: 83 DEGREES
T OFFSET: 429 MS
VENTRICULAR RATE: 67 BPM

## 2024-04-15 ENCOUNTER — HOSPITAL ENCOUNTER (OUTPATIENT)
Dept: RADIOLOGY | Facility: HOSPITAL | Age: 64
Discharge: HOME | End: 2024-04-15
Payer: COMMERCIAL

## 2024-04-15 DIAGNOSIS — M79.642 LEFT HAND PAIN: ICD-10-CM

## 2024-04-15 PROCEDURE — 73130 X-RAY EXAM OF HAND: CPT | Mod: LT

## 2024-04-15 PROCEDURE — 73130 X-RAY EXAM OF HAND: CPT | Mod: LEFT SIDE | Performed by: RADIOLOGY

## 2024-04-16 ENCOUNTER — OFFICE VISIT (OUTPATIENT)
Dept: ORTHOPEDIC SURGERY | Facility: CLINIC | Age: 64
End: 2024-04-16
Payer: COMMERCIAL

## 2024-04-16 DIAGNOSIS — M19.032 OSTEOARTHRITIS OF LEFT WRIST, UNSPECIFIED OSTEOARTHRITIS TYPE: Primary | ICD-10-CM

## 2024-04-16 DIAGNOSIS — M65.342 TRIGGER RING FINGER OF LEFT HAND: ICD-10-CM

## 2024-04-16 DIAGNOSIS — M79.642 LEFT HAND PAIN: ICD-10-CM

## 2024-04-16 PROCEDURE — 2500000004 HC RX 250 GENERAL PHARMACY W/ HCPCS (ALT 636 FOR OP/ED): Performed by: PHYSICIAN ASSISTANT

## 2024-04-16 PROCEDURE — 20550 NJX 1 TENDON SHEATH/LIGAMENT: CPT | Performed by: ORTHOPAEDIC SURGERY

## 2024-04-16 PROCEDURE — 99213 OFFICE O/P EST LOW 20 MIN: CPT | Performed by: ORTHOPAEDIC SURGERY

## 2024-04-16 PROCEDURE — 1125F AMNT PAIN NOTED PAIN PRSNT: CPT | Performed by: ORTHOPAEDIC SURGERY

## 2024-04-16 PROCEDURE — 1160F RVW MEDS BY RX/DR IN RCRD: CPT | Performed by: ORTHOPAEDIC SURGERY

## 2024-04-16 PROCEDURE — 1159F MED LIST DOCD IN RCRD: CPT | Performed by: ORTHOPAEDIC SURGERY

## 2024-04-16 PROCEDURE — 3008F BODY MASS INDEX DOCD: CPT | Performed by: ORTHOPAEDIC SURGERY

## 2024-04-16 PROCEDURE — 20606 DRAIN/INJ JOINT/BURSA W/US: CPT | Performed by: ORTHOPAEDIC SURGERY

## 2024-04-16 PROCEDURE — 1036F TOBACCO NON-USER: CPT | Performed by: ORTHOPAEDIC SURGERY

## 2024-04-16 PROCEDURE — 2500000004 HC RX 250 GENERAL PHARMACY W/ HCPCS (ALT 636 FOR OP/ED): Performed by: ORTHOPAEDIC SURGERY

## 2024-04-16 PROCEDURE — 2500000005 HC RX 250 GENERAL PHARMACY W/O HCPCS: Performed by: ORTHOPAEDIC SURGERY

## 2024-04-16 PROCEDURE — 76942 ECHO GUIDE FOR BIOPSY: CPT | Performed by: ORTHOPAEDIC SURGERY

## 2024-04-16 PROCEDURE — 1157F ADVNC CARE PLAN IN RCRD: CPT | Performed by: ORTHOPAEDIC SURGERY

## 2024-04-16 RX ORDER — METHYLPREDNISOLONE ACETATE 40 MG/ML
30 INJECTION, SUSPENSION INTRA-ARTICULAR; INTRALESIONAL; INTRAMUSCULAR; SOFT TISSUE
Status: COMPLETED | OUTPATIENT
Start: 2024-04-16 | End: 2024-04-16

## 2024-04-16 RX ORDER — LIDOCAINE HYDROCHLORIDE 10 MG/ML
1 INJECTION INFILTRATION; PERINEURAL
Status: COMPLETED | OUTPATIENT
Start: 2024-04-16 | End: 2024-04-16

## 2024-04-16 RX ORDER — METHYLPREDNISOLONE ACETATE 40 MG/ML
20 INJECTION, SUSPENSION INTRA-ARTICULAR; INTRALESIONAL; INTRAMUSCULAR; SOFT TISSUE
Status: COMPLETED | OUTPATIENT
Start: 2024-04-16 | End: 2024-04-16

## 2024-04-16 RX ADMIN — METHYLPREDNISOLONE ACETATE 20 MG: 40 INJECTION, SUSPENSION INTRA-ARTICULAR; INTRALESIONAL; INTRAMUSCULAR; INTRASYNOVIAL; SOFT TISSUE at 13:19

## 2024-04-16 RX ADMIN — LIDOCAINE HYDROCHLORIDE 1 ML: 10 INJECTION, SOLUTION INFILTRATION; PERINEURAL at 13:19

## 2024-04-16 RX ADMIN — METHYLPREDNISOLONE ACETATE 30 MG: 40 INJECTION, SUSPENSION INTRA-ARTICULAR; INTRALESIONAL; INTRAMUSCULAR; INTRASYNOVIAL; SOFT TISSUE at 13:19

## 2024-04-16 ASSESSMENT — ENCOUNTER SYMPTOMS
TROUBLE SWALLOWING: 0
EYE DISCHARGE: 0
JOINT SWELLING: 1
WHEEZING: 0
SHORTNESS OF BREATH: 0
FEVER: 0
CHILLS: 0

## 2024-04-16 ASSESSMENT — PAIN - FUNCTIONAL ASSESSMENT: PAIN_FUNCTIONAL_ASSESSMENT: 0-10

## 2024-04-16 ASSESSMENT — PAIN SCALES - GENERAL: PAINLEVEL_OUTOF10: 3

## 2024-04-16 NOTE — PROGRESS NOTES
Reason for Appointment  Chief Complaint   Patient presents with    Left Hand - Pain     History of Present Illness  Patient is a 63 y.o. female here today for follow-up evaluation of left thumb and ring finger pain.  She was seen previously for bilateral CMC arthritis.  She has had injections in the past that have given her good relief.  She is not having any severe right thumb pain today.  She is getting more pain at the base of the left thumb and pain in the left ring finger with gripping.  No recent injuries or falls.  No other changes in her past medical history, allergies, or medications.    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    Acquired plantar porokeratosis    Acute upper respiratory infection, unspecified 01/14/2020    URI, acute    Allergic rhinitis due to animal (cat) (dog) hair and dander 01/02/2020    Allergic rhinitis due to dogs    Allergic rhinitis due to pollen 01/02/2020    Allergic rhinitis due to pollen    Allergic rhinitis due to pollen 02/26/2018    Seasonal allergic rhinitis due to pollen    Allergy status to unspecified drugs, medicaments and biological substances 06/30/2015    History of allergy    Anemia, unspecified 07/23/2018    Normocytic anemia    Anxiety disorder due to known physiological condition 06/05/2013    Anxiety disorder due to medical condition    Anxiety disorder, unspecified 01/28/2016    Anxiety    Asymptomatic varicose veins of bilateral lower extremities 07/16/2019    Varicose veins of legs    Atypical squamous cells of undetermined significance on cytologic smear of cervix (ASC-US) 06/26/2013    Pap smear abnormality of cervix with ASCUS favoring benign    Candidiasis, unspecified 12/10/2019    Yeast infection    Cellulitis of left finger 07/31/2018    Paronychia of finger of left hand    Changes in skin texture 03/09/2021    Cracked skin    Contusion of left lesser toe(s) with damage to nail,  initial encounter 02/22/2018    Subungual contusion of toenail of left foot    Contusion of right hand, sequela 08/04/2020    Traumatic ecchymosis of hand, right, sequela    Corns and callosities 05/25/2021    Callus of foot    Disorder of pigmentation, unspecified 09/28/2016    Atypical pigmented skin lesion    Disorder of the skin and subcutaneous tissue, unspecified 08/27/2020    Lesion of subcutaneous tissue    Dorsalgia, unspecified 09/23/2021    Acute back pain    Dorsalgia, unspecified 01/29/2019    Costovertebral angle tenderness    Encounter for general adult medical examination without abnormal findings 06/08/2020    Medicare annual wellness visit, subsequent    Encounter for gynecological examination (general) (routine) without abnormal findings 12/14/2021    Encounter for gynecological examination    Encounter for gynecological examination (general) (routine) without abnormal findings 12/11/2020    Encounter for gynecological examination    Encounter for other screening for malignant neoplasm of breast     Breast cancer screening    Encounter for screening for malignant neoplasm of cervix     Encounter for screening for malignant neoplasm of cervix    Encounter for screening for malignant neoplasm of cervix 11/04/2014    Screening for malignant neoplasm of cervix    Encounter for screening for malignant neoplasm of cervix     Cervical cancer screening    Hepatomegaly, not elsewhere classified 04/21/2021    Liver mass, right lobe    History of falling 12/28/2018    Status post fall    Inappropriate diet and eating habits 05/13/2021    Inappropriate diet and eating habits    Irregular menstruation, unspecified     Irregular periods/menstrual cycles    Localized edema 07/29/2015    Pedal edema    Localized swelling, mass and lump, left lower limb 09/11/2020    Lump of left thigh    Localized swelling, mass and lump, left lower limb 09/24/2020    Mass of left thigh    Localized swelling, mass and lump,  unspecified lower limb 09/24/2020    Mass of thigh    Melanocytic nevi, unspecified 09/10/2019    Numerous skin moles    Multiple births, unspecified, all liveborn [facility])     Multiple births, all liveborn    Other allergic rhinitis 01/02/2020    Allergic rhinitis due to dust mite    Other allergic rhinitis 01/02/2020    Allergic rhinitis due to mold    Other conditions influencing health status 06/26/2013    Uterine Rupture    Other conditions influencing health status     Normal colonoscopy    Other conditions influencing health status 02/05/2019    History of burning on urination    Other conditions influencing health status 12/14/2021    History of cough    Other conditions influencing health status 02/14/2019    History of dyspareunia    Other conditions influencing health status 01/14/2020    History of cough    Other conditions influencing health status 06/05/2013    Leiomyomatous Degeneration Of The Uterus    Other conditions influencing health status 06/05/2013    Viremia    Other hypertrophic disorders of the skin 07/29/2015    Skin tag    Other shoulder lesions, right shoulder 11/11/2019    Tendinitis of right rotator cuff    Other specified disorders of bone, thigh 09/09/2020    Pain of left femur    Other specified noninflammatory disorders of vagina 03/12/2019    Vaginal dryness    Other specified postprocedural states 05/04/2017    History of arthroscopic knee surgery    Other specified soft tissue disorders 10/08/2020    Soft tissue mass    Other specified soft tissue disorders 08/04/2020    Swelling of right hand    Pain in left thigh 08/27/2020    Pain of left thigh    Pain in right foot 03/09/2021    Right foot pain    Pain in right hip 12/28/2018    Right hip pain    Pain in right knee 03/12/2021    Bilateral knee pain    Pain in right knee 03/19/2021    Right knee pain    Pain in right leg 05/25/2021    Bilateral pain of leg and foot    Pain in right leg 01/12/2022    Bilateral leg and foot  pain    Pain in unspecified joint     Joint pain    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 02/22/2016    History of tetralogy of Fallot    Personal history of contraception     Personal history of contraceptive use    Personal history of diseases of the skin and subcutaneous tissue 12/22/2020    History of ingrown nail    Personal history of malignant neoplasm of other parts of uterus     History of malignant neoplasm of endometrium    Personal history of other (healed) physical injury and trauma 03/01/2017    History of sprain of ankle    Personal history of other benign neoplasm 09/10/2019    History of other benign neoplasm    Personal history of other benign neoplasm 05/06/2014    History of uterine leiomyoma    Personal history of other benign neoplasm 12/11/2020    History of uterine leiomyoma    Personal history of other diseases of the circulatory system 04/14/2021    History of atrial fibrillation    Personal history of other diseases of the circulatory system 04/14/2021    History of atrial fibrillation    Personal history of other diseases of the circulatory system 04/14/2021    History of atrial fibrillation    Personal history of other diseases of the circulatory system 02/01/2017    History of hypertension    Personal history of other diseases of the circulatory system 04/14/2021    History of hypotension    Personal history of other diseases of the digestive system 03/24/2021    History of gastroesophageal reflux (GERD)    Personal history of other diseases of the female genital tract 12/28/2016    History of postmenopausal bleeding    Personal history of other diseases of the female genital tract 05/19/2022    History of postmenopausal bleeding    Personal history of other diseases of the female genital tract     History of vaginal discharge    Personal history of other diseases of the female genital tract     Vaginal delivery    Personal history of  other diseases of the musculoskeletal system and connective tissue     Personal history of arthritis    Personal history of other diseases of the musculoskeletal system and connective tissue 11/18/2019    History of muscle spasm    Personal history of other diseases of the respiratory system 05/27/2021    History of asthma    Personal history of other diseases of the respiratory system 04/12/2019    History of acute bronchitis    Personal history of other diseases of the respiratory system 01/04/2020    History of bronchitis    Personal history of other endocrine, nutritional and metabolic disease 02/01/2017    History of hyperlipidemia    Personal history of other endocrine, nutritional and metabolic disease 04/18/2016    History of obesity    Personal history of other endocrine, nutritional and metabolic disease 05/26/2020    History of thyroid nodule    Personal history of other infectious and parasitic diseases     History of varicella    Personal history of other medical treatment 12/11/2020    History of screening mammography    Personal history of other medical treatment 12/14/2021    History of screening mammography    Personal history of other medical treatment     History of mammogram    Personal history of other specified conditions 08/21/2019    History of gross hematuria    Personal history of other specified conditions 02/02/2017    History of weight gain    Personal history of other specified conditions 12/07/2020    History of chest pain    Personal history of other specified conditions 04/14/2021    History of palpitations    Personal history of other specified conditions 12/05/2014    History of vertigo    Personal history of other specified conditions 08/25/2021    History of exertional chest pain    Personal history of other specified conditions     History of shortness of breath    Personal history of other specified conditions     H/O heartburn    Personal history of other specified conditions  01/12/2018    History of urinary frequency    Personal history of transient ischemic attack (TIA), and 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 12/30/2015    History of TIA (transient ischemic attack)    Personal history of urinary (tract) infections 09/02/2021    History of recurrent cystitis    Personal history of urinary (tract) infections 05/23/2019    History of cystitis    Personal history of urinary (tract) infections 09/02/2021    History of recurrent urinary tract infection    Personal history of urinary calculi 09/12/2019    History of renal calculi    Polyphagia 05/13/2021    Polyphagia    Primary central sleep apnea 10/21/2017    Central sleep apnea    Pure hypercholesterolemia, unspecified     High cholesterol    Residual hemorrhoidal skin tags 07/06/2017    External hemorrhoid    Slurred speech 01/02/2015    Slurred speech    Tinea pedis 05/25/2021    Tinea pedis of right foot    Unspecified abdominal pain 04/13/2021    Abdominal pain, acute    Unspecified injury of right wrist, hand and finger(s), sequela 08/04/2020    Hand trauma, right, sequela    Unspecified internal derangement of unspecified knee 03/01/2017    Internal derangement of knee    Unspecified symptoms and signs involving the genitourinary system 12/16/2021    UTI symptoms    Unspecified symptoms and signs involving the genitourinary system 02/05/2019    UTI symptoms    Unspecified symptoms and signs involving the genitourinary system 09/03/2020    UTI symptoms    Urinary tract infection, site not specified 01/17/2020    Acute urinary tract infection    Urinary tract infection, site not specified 01/10/2022    Acute UTI    Xerosis cutis 09/10/2019    Dry skin dermatitis       Past Surgical History:   Procedure Laterality Date    CARDIAC CATHETERIZATION Right 01/04/2024    Procedure: Right Heart Cath;  Surgeon: Richy Raman MD;  Location: [facility] Cardiac [facility] Lab;  Service: Cardiovascular;  Laterality: Right;    CARDIAC  CATHETERIZATION      KNEE SURGERY  05/04/2017    Knee Surgery    MOUTH SURGERY  11/04/2014    Oral Surgery Tooth Extraction    MR HEAD ANGIO WO IV CONTRAST  02/09/2016    MR HEAD ANGIO WO IV CONTRAST [facility]    OTHER SURGICAL HISTORY  07/31/2013    Wrist Carpectomy    OTHER SURGICAL HISTORY  09/10/2019    Center tooth extraction    OTHER SURGICAL HISTORY  11/30/2018    Complete colonoscopy    OTHER SURGICAL HISTORY  06/08/2020    Thyroid biopsy       Medication Documentation Review Audit       Reviewed by Josey Gallego PA-C (Physician Assistant) on 04/16/24 at 1304      Medication Order Taking? Sig Documenting Provider Last Dose Status   Adempas 2.5 mg tablet [id number] Yes Take 1 tablet (2.5 mg) by mouth 3 times a day. Historical Provider, MD Taking Active   albuterol 90 mcg/actuation inhaler [id number] Yes Inhale 1-2 puffs every 4 hours if needed for wheezing. Every 4 to 6 hours as needed Richy Raman MD Taking Active   aspirin 325 mg tablet [id number] Yes Take 1 tablet (325 mg) by mouth once daily. Historical Provider, MD Taking Active   blood pressure monitor (Blood Pressure Kit) kit [id number] Yes 1 kit if needed (take BP as needed). Hortencia Sotomayor, APRN-CNP Taking Active   buPROPion SR (Wellbutrin SR) 150 mg 12 hr tablet [id number] Yes TAKE 1 TABLET BY MOUTH EVERY MORNING AND AFTERNOON Historical Provider, MD Taking Active   busPIRone (Buspar) 30 mg tablet [id number] Yes Take 1 tablet (30 mg) by mouth 2 times a day. Historical Provider, MD Taking Active   fluticasone propion-salmeteroL (Advair Diskus) 250-50 mcg/dose diskus inhaler [id number] Yes Inhale 1 puff 2 times a day. Historical Provider, MD Taking Active   hydrALAZINE (Apresoline) 50 mg tablet [id number] Yes Take 1 tablet (50 mg) by mouth 2 times a day. 1 tablet in the AM and 3 tablets in the PM. Historical Provider, MD Taking Active   hydrOXYzine pamoate (Vistaril) 50 mg capsule [id number] Yes TAKE 3 CAPSULES BY MOUTH EVERY NIGHT AT  BEDTIME AND TAKE 1 CAPSULE BY MOUTH EVERY MORNING Historical Provider, MD Taking Active   lubiprostone (Amitiza) 8 mcg capsule [id number] Yes Take 1 capsule (8 mcg) by mouth 2 times a day with meals. MAGDALENA Tamayo Taking Active   memantine (Namenda) 10 mg tablet [id number] Yes Take 1 tablet (10 mg) by mouth 2 times a day. Historical Provider, MD Taking Active   methenamine hippurate (Hiprex) 1 gram tablet [id number] Yes Take 1 tablet (1 g) by mouth 2 times a day. Historical Provider, MD Taking Active   methylPREDNISolone acetate (DEPO-Medrol) injection 40 mg [id number]   Josey Gallego PA-C  Active   montelukast (Singulair) 10 mg tablet [id number] Yes TAKE 1 TABLET BY MOUTH ONCE DAILY *EMERGENCY REFILL* Jelena Romo DO Taking Active   omeprazole (PriLOSEC) 40 mg DR capsule [id number] Yes Take 1 capsule (40 mg) by mouth 2 times a day. MAGDALENA Tamayo Taking Active   Opsumit 10 mg tablet tablet [id number] Yes Take 1 tablet (10 mg) by mouth once daily. Historical Provider, MD Taking Active   sertraline (Zoloft) 100 mg tablet [id number] Yes Take 2 tablets (200 mg) by mouth once daily. Historical Provider, MD Taking Active   simvastatin (Zocor) 80 mg tablet [id number] Yes Take 1 tablet (80 mg) by mouth once daily at bedtime. Jelena Romo DO Taking Active   trospium (Sanctura) 20 mg tablet [id number] Yes TAKE ONE (1) TABLET BY MOUTH TWICE DAILY Sanford Arredondo MD Taking Active                    Allergies   Allergen Reactions    Grass Pollen Other    Other Unknown    Pollen Extracts Unknown    Tree And Shrub Pollen Other       Review of Systems   Constitutional:  Negative for chills and fever.   HENT:  Negative for nosebleeds and trouble swallowing.    Eyes:  Negative for discharge.   Respiratory:  Negative for shortness of breath and wheezing.    Cardiovascular:  Negative for chest pain.   Musculoskeletal:  Positive for joint swelling.   Skin:  Negative for pallor and rash.   All other systems  reviewed and are negative.    Exam   On exam bilateral hands show DJD, she has tenderness over the left thumb CMC joint and over the left ring A1 pulley tendon sheath.  Painful CMC grind test.  Decent motion of the other digits with no triggering.  Good pulses and sensation in the upper extremity.    Assessment   Encounter Diagnosis   Name Primary?    Left hand pain    Left wrist osteoarthritis  Left ring trigger finger    Plan   We discussed treatment today with repeat injections.  We sterilely injected under ultrasound guidance Depo-Medrol and lidocaine into the left thumb CMC joint and the left ring finger A1 pulley tendon sheath.  Patient understands the small risk of infection and the signs to look for as well as flare reaction.  Hopefully these give her good relief.  She can follow-up with us as needed.    M Inj/Asp: L radiocarpal (L CMC) on 4/16/2024 1:19 PM  Indications: pain  Details: 25 G needle, ultrasound-guided  Medications: 1 mL lidocaine 10 mg/mL (1 %); 30 mg methylPREDNISolone acetate 40 mg/mL; 20 mg methylPREDNISolone acetate (DEPO-Medrol) injection 40 mg/mL  Outcome: tolerated well, no immediate complications    After discussing the risks and benefits of the procedure we proceeded with the injection. Using ultrasound guidance we obtained images of the thumb CMC joint and subsequently we sterilely injected a mixture of 20 mg of DepoMedrol and .5 cc of 1% lidocaine into the left CMC joint. Pt tolerated the procedure well without any adverse effects.   Procedure, treatment alternatives, risks and benefits explained, specific risks discussed. Consent was given by the patient. Immediately prior to procedure a time out was called to verify the correct patient, procedure, equipment, support staff and site/side marked as required. Patient was prepped and draped in the usual sterile fashion.       Hand / UE Inj/Asp: L ring A1 for trigger finger on 4/16/2024 1:19 PM  Indications: pain  Details: 25 G needle,  ultrasound-guided  Medications: 1 mL lidocaine 10 mg/mL (1 %); 20 mg methylPREDNISolone acetate 40 mg/mL  Outcome: tolerated well, no immediate complications    After discussing the risks and benefits of the procedure we proceeded with the injection. Under ultrasound guidance we identified the metacarpal bone and overlying tendon sheath with the FDS and FDP tendons, images obtained. We then sterilely injected the left ring finger A1 pulley with a mixture of 20 mg of DepoMedrol and .5 cc of 1% lidocaine. Pt tolerated the procedure well without any adverse reactions.    Procedure, treatment alternatives, risks and benefits explained, specific risks discussed. Consent was given by the patient. Immediately prior to procedure a time out was called to verify the correct patient, procedure, equipment, support staff and site/side marked as required. Patient was prepped and draped in the usual sterile fashion.       Written by Josey Salcedo saw, evaluated, and treated the patient with the PA

## 2024-04-25 ENCOUNTER — APPOINTMENT (OUTPATIENT)
Dept: GASTROENTEROLOGY | Facility: CLINIC | Age: 64
End: 2024-04-25
Payer: COMMERCIAL

## 2024-04-30 NOTE — ADDENDUM NOTE
Encounter addended by: Ulysses Velazco, RRT on: 4/30/2024 2:48 PM   Actions taken: Imaging Exam ended

## 2024-05-14 ENCOUNTER — APPOINTMENT (OUTPATIENT)
Dept: PODIATRY | Facility: CLINIC | Age: 64
End: 2024-05-14
Payer: COMMERCIAL

## 2024-05-14 DIAGNOSIS — K21.9 GERD WITHOUT ESOPHAGITIS: ICD-10-CM

## 2024-05-15 ENCOUNTER — OFFICE VISIT (OUTPATIENT)
Dept: PODIATRY | Facility: CLINIC | Age: 64
End: 2024-05-15
Payer: COMMERCIAL

## 2024-05-15 ENCOUNTER — LAB (OUTPATIENT)
Dept: LAB | Facility: LAB | Age: 64
End: 2024-05-15
Payer: COMMERCIAL

## 2024-05-15 DIAGNOSIS — R30.0 DYSURIA: ICD-10-CM

## 2024-05-15 DIAGNOSIS — I83.12 VARICOSE VEINS OF BOTH LOWER EXTREMITIES WITH INFLAMMATION: ICD-10-CM

## 2024-05-15 DIAGNOSIS — L60.1 ONYCHOLYSIS: ICD-10-CM

## 2024-05-15 DIAGNOSIS — B35.3 TINEA PEDIS OF BOTH FEET: ICD-10-CM

## 2024-05-15 DIAGNOSIS — R30.0 DYSURIA: Primary | ICD-10-CM

## 2024-05-15 DIAGNOSIS — I83.11 VARICOSE VEINS OF BOTH LOWER EXTREMITIES WITH INFLAMMATION: ICD-10-CM

## 2024-05-15 DIAGNOSIS — M79.672 FOOT PAIN, BILATERAL: Primary | ICD-10-CM

## 2024-05-15 DIAGNOSIS — M79.671 FOOT PAIN, BILATERAL: Primary | ICD-10-CM

## 2024-05-15 PROCEDURE — 1160F RVW MEDS BY RX/DR IN RCRD: CPT | Performed by: PODIATRIST

## 2024-05-15 PROCEDURE — 1157F ADVNC CARE PLAN IN RCRD: CPT | Performed by: PODIATRIST

## 2024-05-15 PROCEDURE — 1159F MED LIST DOCD IN RCRD: CPT | Performed by: PODIATRIST

## 2024-05-15 PROCEDURE — 99212 OFFICE O/P EST SF 10 MIN: CPT | Performed by: PODIATRIST

## 2024-05-15 PROCEDURE — 87086 URINE CULTURE/COLONY COUNT: CPT

## 2024-05-15 PROCEDURE — 1036F TOBACCO NON-USER: CPT | Performed by: PODIATRIST

## 2024-05-15 PROCEDURE — 3008F BODY MASS INDEX DOCD: CPT | Performed by: PODIATRIST

## 2024-05-15 NOTE — PROGRESS NOTES
This is a 63 y.o. 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    Acquired plantar porokeratosis    Acute upper respiratory infection, unspecified 01/14/2020    URI, acute    Allergic rhinitis due to animal (cat) (dog) hair and dander 01/02/2020    Allergic rhinitis due to dogs    Allergic rhinitis due to pollen 01/02/2020    Allergic rhinitis due to pollen    Allergic rhinitis due to pollen 02/26/2018    Seasonal allergic rhinitis due to pollen    Allergy status to unspecified drugs, medicaments and biological substances 06/30/2015    History of allergy    Anemia, unspecified 07/23/2018    Normocytic anemia    Anxiety disorder due to known physiological condition 06/05/2013    Anxiety disorder due to medical condition    Anxiety disorder, unspecified 01/28/2016    Anxiety    Asymptomatic varicose veins of bilateral lower extremities 07/16/2019    Varicose veins of legs    Atypical squamous cells of undetermined significance on cytologic smear of cervix (ASC-US) 06/26/2013    Pap smear abnormality of cervix with ASCUS favoring benign    Candidiasis, unspecified 12/10/2019    Yeast infection    Cellulitis of left finger 07/31/2018    Paronychia of finger of left hand    Changes in skin texture 03/09/2021    Cracked skin    Contusion of left lesser toe(s) with damage to nail, initial encounter 02/22/2018    Subungual contusion of toenail of left foot    Contusion of right hand, sequela 08/04/2020    Traumatic ecchymosis of hand, right, sequela    Corns and callosities 05/25/2021    Callus of foot    Disorder of pigmentation, unspecified 09/28/2016    Atypical  pigmented skin lesion    Disorder of the skin and subcutaneous tissue, unspecified 08/27/2020    Lesion of subcutaneous tissue    Dorsalgia, unspecified 09/23/2021    Acute back pain    Dorsalgia, unspecified 01/29/2019    Costovertebral angle tenderness    Encounter for general adult medical examination without abnormal findings 06/08/2020    Medicare annual wellness visit, subsequent    Encounter for gynecological examination (general) (routine) without abnormal findings 12/14/2021    Encounter for gynecological examination    Encounter for gynecological examination (general) (routine) without abnormal findings 12/11/2020    Encounter for gynecological examination    Encounter for other screening for malignant neoplasm of breast     Breast cancer screening    Encounter for screening for malignant neoplasm of cervix     Encounter for screening for malignant neoplasm of cervix    Encounter for screening for malignant neoplasm of cervix 11/04/2014    Screening for malignant neoplasm of cervix    Encounter for screening for malignant neoplasm of cervix     Cervical cancer screening    Hepatomegaly, not elsewhere classified 04/21/2021    Liver mass, right lobe    History of falling 12/28/2018    Status post fall    Inappropriate diet and eating habits 05/13/2021    Inappropriate diet and eating habits    Irregular menstruation, unspecified     Irregular periods/menstrual cycles    Localized edema 07/29/2015    Pedal edema    Localized swelling, mass and lump, left lower limb 09/11/2020    Lump of left thigh    Localized swelling, mass and lump, left lower limb 09/24/2020    Mass of left thigh    Localized swelling, mass and lump, unspecified lower limb 09/24/2020    Mass of thigh    Melanocytic nevi, unspecified 09/10/2019    Numerous skin moles    Multiple births, unspecified, all liveborn     Multiple births, all liveborn    Other allergic rhinitis 01/02/2020    Allergic rhinitis due to dust mite    Other allergic  rhinitis 01/02/2020    Allergic rhinitis due to mold    Other conditions influencing health status 06/26/2013    Uterine Rupture    Other conditions influencing health status     Normal colonoscopy    Other conditions influencing health status 02/05/2019    History of burning on urination    Other conditions influencing health status 12/14/2021    History of cough    Other conditions influencing health status 02/14/2019    History of dyspareunia    Other conditions influencing health status 01/14/2020    History of cough    Other conditions influencing health status 06/05/2013    Leiomyomatous Degeneration Of The Uterus    Other conditions influencing health status 06/05/2013    Viremia    Other hypertrophic disorders of the skin 07/29/2015    Skin tag    Other shoulder lesions, right shoulder 11/11/2019    Tendinitis of right rotator cuff    Other specified disorders of bone, thigh 09/09/2020    Pain of left femur    Other specified noninflammatory disorders of vagina 03/12/2019    Vaginal dryness    Other specified postprocedural states 05/04/2017    History of arthroscopic knee surgery    Other specified soft tissue disorders 10/08/2020    Soft tissue mass    Other specified soft tissue disorders 08/04/2020    Swelling of right hand    Pain in left thigh 08/27/2020    Pain of left thigh    Pain in right foot 03/09/2021    Right foot pain    Pain in right hip 12/28/2018    Right hip pain    Pain in right knee 03/12/2021    Bilateral knee pain    Pain in right knee 03/19/2021    Right knee pain    Pain in right leg 05/25/2021    Bilateral pain of leg and foot    Pain in right leg 01/12/2022    Bilateral leg and foot pain    Pain in unspecified joint     Joint pain    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 02/22/2016    History of tetralogy of Fallot    Personal history of contraception     Personal history of contraceptive use    Personal history of diseases of the  skin and subcutaneous tissue 12/22/2020    History of ingrown nail    Personal history of malignant neoplasm of other parts of uterus     History of malignant neoplasm of endometrium    Personal history of other (healed) physical injury and trauma 03/01/2017    History of sprain of ankle    Personal history of other benign neoplasm 09/10/2019    History of other benign neoplasm    Personal history of other benign neoplasm 05/06/2014    History of uterine leiomyoma    Personal history of other benign neoplasm 12/11/2020    History of uterine leiomyoma    Personal history of other diseases of the circulatory system 04/14/2021    History of atrial fibrillation    Personal history of other diseases of the circulatory system 04/14/2021    History of atrial fibrillation    Personal history of other diseases of the circulatory system 04/14/2021    History of atrial fibrillation    Personal history of other diseases of the circulatory system 02/01/2017    History of hypertension    Personal history of other diseases of the circulatory system 04/14/2021    History of hypotension    Personal history of other diseases of the digestive system 03/24/2021    History of gastroesophageal reflux (GERD)    Personal history of other diseases of the female genital tract 12/28/2016    History of postmenopausal bleeding    Personal history of other diseases of the female genital tract 05/19/2022    History of postmenopausal bleeding    Personal history of other diseases of the female genital tract     History of vaginal discharge    Personal history of other diseases of the female genital tract     Vaginal delivery    Personal history of other diseases of the musculoskeletal system and connective tissue     Personal history of arthritis    Personal history of other diseases of the musculoskeletal system and connective tissue 11/18/2019    History of muscle spasm    Personal history of other diseases of the respiratory system 05/27/2021     History of asthma    Personal history of other diseases of the respiratory system 04/12/2019    History of acute bronchitis    Personal history of other diseases of the respiratory system 01/04/2020    History of bronchitis    Personal history of other endocrine, nutritional and metabolic disease 02/01/2017    History of hyperlipidemia    Personal history of other endocrine, nutritional and metabolic disease 04/18/2016    History of obesity    Personal history of other endocrine, nutritional and metabolic disease 05/26/2020    History of thyroid nodule    Personal history of other infectious and parasitic diseases     History of varicella    Personal history of other medical treatment 12/11/2020    History of screening mammography    Personal history of other medical treatment 12/14/2021    History of screening mammography    Personal history of other medical treatment     History of mammogram    Personal history of other specified conditions 08/21/2019    History of gross hematuria    Personal history of other specified conditions 02/02/2017    History of weight gain    Personal history of other specified conditions 12/07/2020    History of chest pain    Personal history of other specified conditions 04/14/2021    History of palpitations    Personal history of other specified conditions 12/05/2014    History of vertigo    Personal history of other specified conditions 08/25/2021    History of exertional chest pain    Personal history of other specified conditions     History of shortness of breath    Personal history of other specified conditions     H/O heartburn    Personal history of other specified conditions 01/12/2018    History of urinary frequency    Personal history of transient ischemic attack (TIA), and 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 12/30/2015    History of TIA (transient ischemic attack)    Personal history of urinary (tract) infections 09/02/2021    History of recurrent cystitis     Personal history of urinary (tract) infections 05/23/2019    History of cystitis    Personal history of urinary (tract) infections 09/02/2021    History of recurrent urinary tract infection    Personal history of urinary calculi 09/12/2019    History of renal calculi    Polyphagia 05/13/2021    Polyphagia    Primary central sleep apnea 10/21/2017    Central sleep apnea    Pure hypercholesterolemia, unspecified     High cholesterol    Residual hemorrhoidal skin tags 07/06/2017    External hemorrhoid    Slurred speech 01/02/2015    Slurred speech    Tinea pedis 05/25/2021    Tinea pedis of right foot    Unspecified abdominal pain 04/13/2021    Abdominal pain, acute    Unspecified injury of right wrist, hand and finger(s), sequela 08/04/2020    Hand trauma, right, sequela    Unspecified internal derangement of unspecified knee 03/01/2017    Internal derangement of knee    Unspecified symptoms and signs involving the genitourinary system 12/16/2021    UTI symptoms    Unspecified symptoms and signs involving the genitourinary system 02/05/2019    UTI symptoms    Unspecified symptoms and signs involving the genitourinary system 09/03/2020    UTI symptoms    Urinary tract infection, site not specified 01/17/2020    Acute urinary tract infection    Urinary tract infection, site not specified 01/10/2022    Acute UTI    Xerosis cutis 09/10/2019    Dry skin dermatitis       Medications and Allergies have been reviewed.    Review Of Systems:  GENERAL: No weight loss, malaise or fevers.  HEENT: Negative for frequent or significant headaches,   RESPIRATORY: Negative for cough, wheezing or shortness of breath.  CARDIOVASCULAR: Negative for chest pain, leg swelling or palpitations.    Examination of Both Lower Extremities:   Objective:   Vasc: DP and PT pulses are palpable bilateral 2/4.  CFT is less than 3 seconds bilateral.  Skin temperature is warm to cool proximal to distal bilateral.  No hair growth noted. Varicosities  noted    Neuro:Gross sensation intact bl    Derm: Nails 1-5 bilateral are intact.  R hallux not attached to base. Debris noted HPK and fissure to medial hallux IPJ.   No SOI noted.     Ortho: Muscle strength is 5/5 for all pedal groups tested.       1. Varicose veins of both lower extremities with inflammation        2. Foot pain, bilateral        3. Tinea pedis of both feet        4. Onychomycosis        5. Callus          Patient educated on proper  foot care.  Debrided R hallux  Keep trimmed and filed down  Discussed oral and topical AF. Deferred treatment at this time  Debrided hpk tissue  Keep filed down  Re-Discussed removing the nail it will still continue to grow discolored  Debris will come off with shower . Removed lifted nail. No SOI noted.   Fu prn  Patient was in agreement to this plan. All questions answered.      Brionna Douglas DPM  [phone]  [facility]  Fax: [phone]

## 2024-05-16 LAB — BACTERIA UR CULT: NORMAL

## 2024-05-20 RX ORDER — OMEPRAZOLE 40 MG/1
40 CAPSULE, DELAYED RELEASE ORAL 2 TIMES DAILY
Qty: 60 CAPSULE | Refills: 10 | OUTPATIENT
Start: 2024-05-20

## 2024-05-28 DIAGNOSIS — R14.0 ABDOMINAL BLOATING: Primary | ICD-10-CM

## 2024-05-28 DIAGNOSIS — I27.0 IDIOPATHIC PAH (PULMONARY ARTERIAL HYPERTENSION) (MULTI): Primary | ICD-10-CM

## 2024-05-28 RX ORDER — MACITENTAN 10 MG/1
TABLET, FILM COATED ORAL
Qty: 30 TABLET | Refills: 11 | Status: SHIPPED | OUTPATIENT
Start: 2024-05-28

## 2024-06-04 ENCOUNTER — TELEPHONE (OUTPATIENT)
Dept: PRIMARY CARE | Facility: CLINIC | Age: 64
End: 2024-06-04
Payer: COMMERCIAL

## 2024-06-11 DIAGNOSIS — K21.9 GERD WITHOUT ESOPHAGITIS: ICD-10-CM

## 2024-06-11 RX ORDER — OMEPRAZOLE 40 MG/1
40 CAPSULE, DELAYED RELEASE ORAL
Qty: 90 CAPSULE | Refills: 1 | Status: SHIPPED | OUTPATIENT
Start: 2024-06-11 | End: 2025-06-11

## 2024-06-19 NOTE — PROGRESS NOTES
HISTORY OF PRESENT ILLNESS:  Farida Traylor is a 64 y.o. female who presents today for a follow-up visit. She was last seen on 3/21/2024 presenting for botox with recurrent UTIs, genitourinary syndrome menopause, and urinary urge incontinence. The patient describes no concerns with her bladder at the moment, describes herself as being 70% better. The patient is taking methenamine and trospium.          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), Acute upper respiratory infection, unspecified (01/14/2020), Allergic rhinitis due to animal (cat) (dog) hair and dander (01/02/2020), Allergic rhinitis due to pollen (01/02/2020), Allergic rhinitis due to pollen (02/26/2018), Allergy status to unspecified drugs, medicaments and biological substances (06/30/2015), Anemia, unspecified (07/23/2018), Anxiety disorder due to known physiological condition (06/05/2013), Anxiety disorder, unspecified (01/28/2016), Asymptomatic varicose veins of bilateral lower extremities (07/16/2019), Atypical squamous cells of undetermined significance on cytologic smear of cervix (ASC-US) (06/26/2013), Candidiasis, unspecified (12/10/2019), Cellulitis of left finger (07/31/2018), Changes in skin texture (03/09/2021), Contusion of left lesser toe(s) with damage to nail, initial encounter (02/22/2018), Contusion of right hand, sequela (08/04/2020), Corns and callosities (05/25/2021), Disorder of pigmentation, unspecified (09/28/2016), Disorder of the skin and subcutaneous tissue, unspecified (08/27/2020), Dorsalgia, unspecified (09/23/2021), Dorsalgia, unspecified (01/29/2019), Encounter for general adult medical examination without abnormal findings (06/08/2020), Encounter for gynecological examination (general) (routine) without abnormal findings (12/14/2021), Encounter for gynecological examination (general) (routine) without abnormal findings (12/11/2020),  Encounter for other screening for malignant neoplasm of breast, Encounter for screening for malignant neoplasm of cervix, Encounter for screening for malignant neoplasm of cervix (11/04/2014), Encounter for screening for malignant neoplasm of cervix, Hepatomegaly, not elsewhere classified (04/21/2021), History of falling (12/28/2018), Inappropriate diet and eating habits (05/13/2021), Irregular menstruation, unspecified, Localized edema (07/29/2015), Localized swelling, mass and lump, left lower limb (09/11/2020), Localized swelling, mass and lump, left lower limb (09/24/2020), Localized swelling, mass and lump, unspecified lower limb (09/24/2020), Melanocytic nevi, unspecified (09/10/2019), Multiple births, unspecified, all liveborn ([facility]), Other allergic rhinitis (01/02/2020), Other allergic rhinitis (01/02/2020), Other conditions influencing health status (06/26/2013), Other conditions influencing health status, Other conditions influencing health status (02/05/2019), Other conditions influencing health status (12/14/2021), Other conditions influencing health status (02/14/2019), Other conditions influencing health status (01/14/2020), Other conditions influencing health status (06/05/2013), Other conditions influencing health status (06/05/2013), Other hypertrophic disorders of the skin (07/29/2015), Other shoulder lesions, right shoulder (11/11/2019), Other specified disorders of bone, thigh (09/09/2020), Other specified noninflammatory disorders of vagina (03/12/2019), Other specified postprocedural states (05/04/2017), Other specified soft tissue disorders (10/08/2020), Other specified soft tissue disorders (08/04/2020), Pain in left thigh (08/27/2020), Pain in right foot (03/09/2021), Pain in right hip (12/28/2018), Pain in right knee (03/12/2021), Pain in right knee (03/19/2021), Pain in right leg (05/25/2021), Pain in right leg (01/12/2022), Pain in unspecified joint, 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), Personal history of contraception, Personal history of diseases of the skin and subcutaneous tissue (12/22/2020), Personal history of malignant neoplasm of other parts of uterus, Personal history of other (healed) physical injury and trauma (03/01/2017), Personal history of other benign neoplasm (09/10/2019), Personal history of other benign neoplasm (05/06/2014), Personal history of other benign neoplasm (12/11/2020), Personal history of other diseases of the circulatory system (04/14/2021), Personal history of other diseases of the circulatory system (04/14/2021), Personal history of other diseases of the circulatory system (04/14/2021), Personal history of other diseases of the circulatory system (02/01/2017), Personal history of other diseases of the circulatory system (04/14/2021), Personal history of other diseases of the digestive system (03/24/2021), Personal history of other diseases of the female genital tract (12/28/2016), Personal history of other diseases of the female genital tract (05/19/2022), Personal history of other diseases of the female genital tract, Personal history of other diseases of the female genital tract, Personal history of other diseases of the musculoskeletal system and connective tissue, Personal history of other diseases of the musculoskeletal system and connective tissue (11/18/2019), Personal history of other diseases of the respiratory system (05/27/2021), Personal history of other diseases of the respiratory system (04/12/2019), Personal history of other diseases of the respiratory system (01/04/2020), Personal history of other endocrine, nutritional and metabolic disease (02/01/2017), Personal history of other endocrine, nutritional and metabolic disease (04/18/2016), Personal history of other endocrine, nutritional and metabolic disease (05/26/2020), Personal history of other infectious and parasitic  diseases, Personal history of other medical treatment (12/11/2020), Personal history of other medical treatment (12/14/2021), Personal history of other medical treatment, Personal history of other specified conditions (08/21/2019), Personal history of other specified conditions (02/02/2017), Personal history of other specified conditions (12/07/2020), Personal history of other specified conditions (04/14/2021), Personal history of other specified conditions (12/05/2014), Personal history of other specified conditions (08/25/2021), Personal history of other specified conditions, Personal history of other specified conditions, Personal history of other specified conditions (01/12/2018), Personal history of transient ischemic attack (TIA), and 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 (12/30/2015), Personal history of urinary (tract) infections (09/02/2021), Personal history of urinary (tract) infections (05/23/2019), Personal history of urinary (tract) infections (09/02/2021), Personal history of urinary calculi (09/12/2019), Polyphagia (05/13/2021), Primary central sleep apnea (10/21/2017), Pure hypercholesterolemia, unspecified, Residual hemorrhoidal skin tags (07/06/2017), Slurred speech (01/02/2015), Tinea pedis (05/25/2021), Unspecified abdominal pain (04/13/2021), Unspecified injury of right wrist, hand and finger(s), sequela (08/04/2020), Unspecified internal derangement of unspecified knee (03/01/2017), Unspecified symptoms and signs involving the genitourinary system (12/16/2021), Unspecified symptoms and signs involving the genitourinary system (02/05/2019), Unspecified symptoms and signs involving the genitourinary system (09/03/2020), Urinary tract infection, site not specified (01/17/2020), Urinary tract infection, site not specified (01/10/2022), and Xerosis cutis (09/10/2019).    Surgical History  She has a past surgical history that includes Other surgical history (07/31/2013); Other surgical  "history (09/10/2019); Other surgical history (11/30/2018); Mouth surgery (11/04/2014); Knee surgery (05/04/2017); Other surgical history (06/08/2020); MR angio head wo IV contrast (02/09/2016); Cardiac catheterization (Right, 01/04/2024); and Cardiac catheterization.     Social History  She reports that she has never smoked. She has never used smokeless tobacco. She reports current alcohol use. She reports that she does not use drugs.    Family History  Family History   Problem Relation Name Age of Onset    Hypertension Mother      Breast cancer Mother      Other (cardiac disorder) Mother      Cardiomyopathy Mother      Diabetes Mother      Other (chronic kidney disease) Mother      Stroke Brother      Brain Aneurysm Brother          Allergies  Grass pollen, Other, Pollen extracts, and Tree and shrub pollen      A comprehensive 10+ review of systems was negative except for: see hpi                          PHYSICAL EXAMINATION:  BP Readings from Last 3 Encounters:   04/12/24 145/70   03/29/24 110/73   02/27/24 144/76      Wt Readings from Last 3 Encounters:   04/12/24 135 kg (298 lb 1 oz)   02/27/24 132 kg (292 lb)   02/12/24 132 kg (292 lb)      BMI: Estimated body mass index is 54.52 kg/m² as calculated from the following:    Height as of 2/6/24: 1.575 m (5' 2\").    Weight as of 4/12/24: 135 kg (298 lb 1 oz).  BSA: Estimated body surface area is 2.43 meters squared as calculated from the following:    Height as of 2/6/24: 1.575 m (5' 2\").    Weight as of 4/12/24: 135 kg (298 lb 1 oz).  HEENT: Normocephalic, atraumatic, PER EOMI, nonicteric, trachea normal, thyroid normal, oropharynx normal.  CARDIAC: regular rate & rhythm, S1 & S2 normal.  No heaves, thrills, gallops or murmurs.  LUNGS: Clear to auscultation, no spinal or CV tenderness.  EXTREMITIES: No evidence of cyanosis, clubbing or edema.         Assessment:  63-year-old with recurrent UTIs, genitourinary syndrome menopause, and urinary urge incontinence, " presenting for a virtual follow-up visit.     Alyce/dysuria:   continue estradiol   Patient is taking methenamine    RYAN:  Failed myrbetriq   Patient is currently taking trospium     s/p Botox, 5/26/22, 5/25/2023, 2/8/24, 100 units, doing well     Follow up virtually in 3 months     All questions and concerns were answered and addressed.  The patient expressed understanding and agrees with the plan.     Sanford Arredondo MD     Scribe Attestation  By signing my name below, I, Latesha Auguste, Scribfrederick   attest that this documentation has been prepared under the direction and in the presence of Sanford Arredondo MD.

## 2024-06-20 ENCOUNTER — APPOINTMENT (OUTPATIENT)
Dept: UROLOGY | Facility: CLINIC | Age: 64
End: 2024-06-20
Payer: COMMERCIAL

## 2024-06-20 DIAGNOSIS — N39.0 RECURRENT UTI: Primary | ICD-10-CM

## 2024-06-20 DIAGNOSIS — R30.0 DYSURIA: ICD-10-CM

## 2024-06-20 DIAGNOSIS — N32.81 OAB (OVERACTIVE BLADDER): ICD-10-CM

## 2024-06-20 PROCEDURE — 99213 OFFICE O/P EST LOW 20 MIN: CPT | Performed by: STUDENT IN AN ORGANIZED HEALTH CARE EDUCATION/TRAINING PROGRAM

## 2024-06-20 PROCEDURE — 1157F ADVNC CARE PLAN IN RCRD: CPT | Performed by: STUDENT IN AN ORGANIZED HEALTH CARE EDUCATION/TRAINING PROGRAM

## 2024-06-20 PROCEDURE — 3008F BODY MASS INDEX DOCD: CPT | Performed by: STUDENT IN AN ORGANIZED HEALTH CARE EDUCATION/TRAINING PROGRAM

## 2024-06-20 RX ORDER — TROSPIUM CHLORIDE 20 MG/1
20 TABLET, FILM COATED ORAL 2 TIMES DAILY
Qty: 60 TABLET | Refills: 10 | Status: SHIPPED | OUTPATIENT
Start: 2024-06-20

## 2024-06-21 ENCOUNTER — OFFICE VISIT (OUTPATIENT)
Dept: PRIMARY CARE | Facility: CLINIC | Age: 64
End: 2024-06-21
Payer: COMMERCIAL

## 2024-06-21 VITALS
BODY MASS INDEX: 51.94 KG/M2 | SYSTOLIC BLOOD PRESSURE: 110 MMHG | OXYGEN SATURATION: 92 % | TEMPERATURE: 97.1 F | DIASTOLIC BLOOD PRESSURE: 52 MMHG | WEIGHT: 284 LBS | HEART RATE: 80 BPM

## 2024-06-21 DIAGNOSIS — M54.50 ACUTE RIGHT-SIDED LOW BACK PAIN WITHOUT SCIATICA: Primary | ICD-10-CM

## 2024-06-21 PROCEDURE — 1157F ADVNC CARE PLAN IN RCRD: CPT | Performed by: FAMILY MEDICINE

## 2024-06-21 PROCEDURE — 1036F TOBACCO NON-USER: CPT | Performed by: FAMILY MEDICINE

## 2024-06-21 PROCEDURE — 3008F BODY MASS INDEX DOCD: CPT | Performed by: FAMILY MEDICINE

## 2024-06-21 PROCEDURE — 3074F SYST BP LT 130 MM HG: CPT | Performed by: FAMILY MEDICINE

## 2024-06-21 PROCEDURE — 3078F DIAST BP <80 MM HG: CPT | Performed by: FAMILY MEDICINE

## 2024-06-21 PROCEDURE — 1159F MED LIST DOCD IN RCRD: CPT | Performed by: FAMILY MEDICINE

## 2024-06-21 PROCEDURE — 99214 OFFICE O/P EST MOD 30 MIN: CPT | Performed by: FAMILY MEDICINE

## 2024-06-21 RX ORDER — CYCLOBENZAPRINE HCL 10 MG
10 TABLET ORAL 3 TIMES DAILY PRN
Qty: 60 TABLET | Refills: 0 | Status: SHIPPED | OUTPATIENT
Start: 2024-06-21 | End: 2024-07-21

## 2024-06-21 RX ORDER — LISINOPRIL 5 MG/1
1 TABLET ORAL
COMMUNITY
Start: 2023-11-15 | End: 2024-06-21 | Stop reason: WASHOUT

## 2024-06-21 ASSESSMENT — ENCOUNTER SYMPTOMS
UNEXPECTED WEIGHT CHANGE: 0
MYALGIAS: 0
WHEEZING: 0
ABDOMINAL PAIN: 0
BLOOD IN STOOL: 0
COUGH: 0
APPETITE CHANGE: 0
BACK PAIN: 1
ACTIVITY CHANGE: 0
SLEEP DISTURBANCE: 0
VOMITING: 0
SHORTNESS OF BREATH: 0
JOINT SWELLING: 0
ARTHRALGIAS: 0
NUMBNESS: 0
WEAKNESS: 0
HEADACHES: 0
NERVOUS/ANXIOUS: 0
EYE ITCHING: 0
SORE THROAT: 0
EYE DISCHARGE: 0
DIZZINESS: 0
SINUS PRESSURE: 0
FEVER: 0
PALPITATIONS: 0
CONSTIPATION: 0
DIARRHEA: 0
NAUSEA: 0
RHINORRHEA: 0
FLANK PAIN: 0
LIGHT-HEADEDNESS: 0
DYSPHORIC MOOD: 0
DYSURIA: 0
HEMATURIA: 0

## 2024-06-21 NOTE — PROGRESS NOTES
Subjective   Patient ID: Farida Traylor is a 64 y.o. female who presents for Mass (R lower back.  Unable to locate today. Farida states it was painful and made it difficult for her to do things.).    HPI PATIENT FOLLOWS WITH [facility] AND HER PULMONOLOGIST AND GI  SHE IS OBESE  B/P IS GOOD   NOTICE HAS STOPPED THE LISINOPRIL     Review of Systems   Constitutional:  Negative for activity change, appetite change, fever and unexpected weight change.   HENT:  Negative for congestion, ear pain, postnasal drip, rhinorrhea, sinus pressure and sore throat.    Eyes:  Negative for discharge, itching and visual disturbance.   Respiratory:  Negative for cough, shortness of breath and wheezing.         O2 DEPENDENT COPD   92%    Cardiovascular:  Negative for chest pain, palpitations and leg swelling.   Gastrointestinal:  Negative for abdominal pain, blood in stool, constipation, diarrhea, nausea and vomiting.   Endocrine: Negative for cold intolerance, heat intolerance and polyuria.   Genitourinary:  Negative for dysuria, flank pain and hematuria.   Musculoskeletal:  Positive for back pain. Negative for arthralgias, gait problem, joint swelling and myalgias.        LIFTING 35 LB BOXES AND FELT PAIN IN BACK AND LATER A SWELLING IN THE LOW BACK WHICH IS GONE NOW    Skin:  Negative for rash.   Allergic/Immunologic: Negative for environmental allergies and food allergies.   Neurological:  Negative for dizziness, syncope, weakness, light-headedness, numbness and headaches.   Psychiatric/Behavioral:  Negative for dysphoric mood and sleep disturbance. The patient is not nervous/anxious.        Objective   /52   Pulse 80   Temp 36.2 °C (97.1 °F)   Wt 129 kg (284 lb)   SpO2 92% Comment: with 02 on  BMI 51.94 kg/m²     Physical Exam  Vitals and nursing note reviewed.   Constitutional:       Appearance: Normal appearance.   HENT:      Head: Normocephalic.   Cardiovascular:      Rate and Rhythm: Normal rate and  What Type Of Note Output Would You Prefer (Optional)?: Bullet Format "regular rhythm.      Pulses: Normal pulses.      Heart sounds: Normal heart sounds. No murmur heard.     No friction rub. No gallop.   Pulmonary:      Effort: Pulmonary effort is normal. No respiratory distress.      Breath sounds: Normal breath sounds. No wheezing.   Abdominal:      General: Bowel sounds are normal. There is no distension.      Palpations: Abdomen is soft.      Tenderness: There is no abdominal tenderness.   Musculoskeletal:         General: Tenderness and signs of injury present. No swelling or deformity.      Comments: PALPATION REVEALS NO \"LUMP\" BUT SPASM AND TENDERNESS IN RIGHT LOWER LUMBAR AREA  KNOWN DDD IN HER SPINE   NOTHING RADICULAR    Skin:     General: Skin is warm and dry.      Capillary Refill: Capillary refill takes less than 2 seconds.   Neurological:      General: No focal deficit present.      Mental Status: She is alert and oriented to person, place, and time.   Psychiatric:         Mood and Affect: Mood normal.         Assessment/Plan   Problem List Items Addressed This Visit    None  Visit Diagnoses         Codes    Acute right-sided low back pain without sciatica    -  Primary M54.50    Relevant Medications    cyclobenzaprine (Flexeril) 10 mg tablet    Other Relevant Orders    Referral to Physical Therapy               " 

## 2024-06-25 ENCOUNTER — APPOINTMENT (OUTPATIENT)
Dept: PODIATRY | Facility: CLINIC | Age: 64
End: 2024-06-25
Payer: COMMERCIAL

## 2024-07-01 ENCOUNTER — APPOINTMENT (OUTPATIENT)
Dept: PULMONOLOGY | Facility: CLINIC | Age: 64
End: 2024-07-01
Payer: COMMERCIAL

## 2024-07-08 ENCOUNTER — EVALUATION (OUTPATIENT)
Dept: PHYSICAL THERAPY | Facility: HOSPITAL | Age: 64
End: 2024-07-08
Payer: COMMERCIAL

## 2024-07-08 DIAGNOSIS — M54.50 ACUTE RIGHT-SIDED LOW BACK PAIN WITHOUT SCIATICA: Primary | ICD-10-CM

## 2024-07-08 PROCEDURE — 97110 THERAPEUTIC EXERCISES: CPT | Mod: GP | Performed by: PHYSICAL THERAPIST

## 2024-07-08 PROCEDURE — 97162 PT EVAL MOD COMPLEX 30 MIN: CPT | Mod: GP | Performed by: PHYSICAL THERAPIST

## 2024-07-08 ASSESSMENT — COLUMBIA-SUICIDE SEVERITY RATING SCALE - C-SSRS
6. HAVE YOU EVER DONE ANYTHING, STARTED TO DO ANYTHING, OR PREPARED TO DO ANYTHING TO END YOUR LIFE?: NO
1. IN THE PAST MONTH, HAVE YOU WISHED YOU WERE DEAD OR WISHED YOU COULD GO TO SLEEP AND NOT WAKE UP?: NO
2. HAVE YOU ACTUALLY HAD ANY THOUGHTS OF KILLING YOURSELF?: NO

## 2024-07-08 ASSESSMENT — PATIENT HEALTH QUESTIONNAIRE - PHQ9
SUM OF ALL RESPONSES TO PHQ9 QUESTIONS 1 AND 2: 0
2. FEELING DOWN, DEPRESSED OR HOPELESS: NOT AT ALL
1. LITTLE INTEREST OR PLEASURE IN DOING THINGS: NOT AT ALL

## 2024-07-08 ASSESSMENT — ENCOUNTER SYMPTOMS
OCCASIONAL FEELINGS OF UNSTEADINESS: 0
LOSS OF SENSATION IN FEET: 0
DEPRESSION: 0

## 2024-07-08 ASSESSMENT — LIFESTYLE VARIABLES
HOW OFTEN DO YOU HAVE SIX OR MORE DRINKS ON ONE OCCASION: NEVER
SKIP TO QUESTIONS 9-10: 1
HOW OFTEN DO YOU HAVE A DRINK CONTAINING ALCOHOL: NEVER
AUDIT-C TOTAL SCORE: 0
HOW MANY STANDARD DRINKS CONTAINING ALCOHOL DO YOU HAVE ON A TYPICAL DAY: PATIENT DOES NOT DRINK

## 2024-07-08 ASSESSMENT — PAIN - FUNCTIONAL ASSESSMENT: PAIN_FUNCTIONAL_ASSESSMENT: 0-10

## 2024-07-08 ASSESSMENT — PAIN SCALES - GENERAL: PAINLEVEL_OUTOF10: 1

## 2024-07-08 NOTE — PROGRESS NOTES
"  Physical Therapy  Physical Therapy Orthopedic Evaluation    Patient Name: Farida Traylor  MRN: [id number]  Encounter Date: 7/8/2024  Time Calculation  Start Time: 1004  Stop Time: 1045  Time Calculation (min): 41 min    PT Evaluation Time Entry  PT Evaluation (Moderate) Time Entry: 28  PT Therapeutic Procedures Time Entry  Therapeutic Exercise Time Entry: 13      Insurance:  Visit number: 1 of [place]  Authorization info: No auth required  Payor: [employer] DUAL COMPLETE / Plan: [employer] DUAL COMPLETE / Product Type: *No Product type* /     Current Problem  Problem List Items Addressed This Visit    None  Visit Diagnoses         Codes    Acute right-sided low back pain without sciatica    -  Primary M54.50    Relevant Orders    Follow Up In Physical Therapy          1. Acute right-sided low back pain without sciatica  Referral to Physical Therapy    Follow Up In Physical Therapy          General:  General  Reason for Referral: LBP  Referred By: Jelena Romo DO  Past Medical History Relevant to Rehab: Pulmonary HTN (PAH), chronic respiratory failure, obesity  Preferred Learning Style: verbal, visual, written      Precautions:   Precautions  STEADI Fall Risk Score (The score of 4 or more indicates an increased risk of falling): 5  Medical Precautions: Cardiac precautions, Fall precautions, Oxygen therapy device and L/min (3L of O2)    Medical History Form: Reviewed (scanned into chart)    Subjective:   Subjective   Chief Complaint: Patient presents to clinic insidious onset of R sided low back starting over 3 weeks ago. The patient denies radicular symptoms. It is localized to the R side of her low back and \"feel likes a painful knot\". \"I can only stand or walk a short time and then have to sit. It does go away once I sit.\"  Onset Date: MD visit 06/21/2024  SANTOS: Insidious    Current Condition:   Better    Pain:  Pain Assessment: 0-10  0-10 (Numeric) Pain Score: 1  Highest: 7/10 pain  Lowest: 0/10 " pain  Location: R sided LBP  Description: achy  Aggravating Factors:  Standing, Walking, and Carrying  Relieving Factors:  Rest    Relevant Information (PMH & Previous Tests/Imaging): CT scan 06/2023: Marked degenerative facet osteoarthropathy, most pronounced at   L4-5 and L5-S1 bilaterally.   Previous Interventions/Treatments: None    Prior Level of Function (PLOF)  Patient previously independent with all ADLs  Exercise/Physical Activity: No  Work/School: On disability   Hobbies: Spending time on the computer, [employer], [employer], play games    Patients Living Environment: Lives in mobile home with a few steps in. No difficulty getting up/down the steps. Lives alone.    Primary Language: English    Patient's Goal(s) for Therapy: The patient frequently has to sit and would like to be able to stand/walk in order to perform household tasks and self care.    Red Flags: Do you have any of the following? No  Fever/chills, unexplained weight changes, dizziness/fainting, unexplained change in bowel or bladder functions, unexplained malaise or muscle weakness, night pain/sweats, numbness or tingling    Objective:  Objective       ROM    Lumbar AROM (%)    Flexion: 50%  Extension: 25%  (L) Side Bend: 50%  (R) Side Bend: 50%  (L) Rotation: 50%  (R) Rotation: 50%      Hip AROM (Degrees)      (R)  (L)  Flexion: 95  100         Strength Testing    Core/Abdominals: Poor TA contraction    Hip      (R)  (L)  Flexion: 4+/5  4+/5     Extension: 4/5  4/5    Abduction: 4/5  4+/5    Adduction: 4+/5  4+/5         Knee    (R)  (L)  Flexion: 4/5  4+/5      Extension: 4/5  4+/5     Ankle    (R)  (L)  Plantarflexion: 4+/5  4+/5      Dorsiflexion: 4+/5  4+/5         Functional Screening    Lower Extremity Functional Movements  Transfers: Independent  Gait: Patient ambulating with lateral trunk lean, trendelenburg gait, slow myke, flexed posture  Assistive Device: No assistive device but does have a std. Cane and RW at home  DL Squat: Unable  to perform due to weakness  Balance  Feet Together: Unable to perform due to decreased balance  Tandem: Unable to perform due to decreased balance  SLS: Unable to perform due to decreased balance  Timed Sit to Stand: 7 reps in 30 seconds    Palpation: (+) TTP R lumbar paraspinals, R glutes    Joint Mobility: Unable to easily assess. Patient unable to lay prone. (+) obesity so difficulty with deep palpation.    Flexibility: Mod. Restrict. B HF    Posture: shoulder rounded forward, head forward, and decreased lumbar lordosis    Orthotics: No          Special Tests    Hip Scouring: Unable to tolerate position for scour  SAHRA Test: (+) B  Slump Test: (-)      Outcome Measures:  Other Measures  Oswestry Disablity Index (YESSENIA): 18%     EDUCATION:   Individual(s) Educated: Patient  Education Provided: Home exercise program, plan of care, activity modifications, pain management, and injury pathology  Handout(s) Provided: Scanned into chart  Home Program: See below treatment  Risk and Benefits Discussed with Patient/Caregiver/Other: Yes   Patient/Caregiver Demonstrated Understanding: Yes   Plan of Care Discussed and Agreed Upon: Yes   Patient Response to Education: Patient/Caregiver verbalized understanding of information, Patient/Caregiver performed return demonstration of exercises/activities, and Patient/Caregiver asked appropriate questions    Assessment: Patient presents with signs and symptoms consistent with R sided LBP/R glute pain due to degenerative changes and deconditioning, resulting in limited participation in pain-free ADLs and inability to perform at their prior level of function. The patient denies radicular symptoms. The patient does not have a good bed to perform exercises, so PT focused on seated exercises today. The patient uses 3L of 02 as well. The patient demonstrates decreased L/S AROM, core stability, flexibility, posture, strength, balance, and gait. Skilled PT warranted to address the above stated  impairments, so the patient can perform FA's without increased pain or difficulty.    PT Assessment Results: Decreased strength, Decreased range of motion, Decreased endurance, Impaired balance, Decreased mobility  Rehab Prognosis: Good    Clinical Presentation: Evolving with changing characteristics    Complexity: Moderate    Plan:  Treatment/Interventions: Education/ Instruction, Electrical stimulation, Gait training, Hot pack, Manual therapy, Neuromuscular re-education, Therapeutic activities, Therapeutic exercises  PT Plan: Skilled PT  PT Frequency: 2 times per week  Duration: 5 weeks  Onset Date: 06/21/24  Certification Period Start Date: 07/08/24  Certification Period End Date: 08/05/24  Number of Treatments Authorized: 1 of 10  Rehab Potential: Good  Plan of Care Agreement: Patient    Goals: Set and discussed today  Active       PT Problem       Patient will be independent with HEP.        Start:  07/08/24    Expected End:  07/22/24            Patient will demonstrate an increase of at least 25% in all directions to improve her ability to perform FA's.       Start:  07/08/24    Expected End:  08/12/24            Patient will report no >/= 2/10 pain with standing up to 10 minutes to cook and clean at home without difficulty.       Start:  07/08/24    Expected End:  08/12/24            Patient will score </= 6% on modified YESSENIA to improve her ability to perform self care and FA's.       Start:  07/08/24    Expected End:  08/12/24            Patient will demonstrate >/= 5/5 with B LE MMT to improve her ability to stand and ambulate in the community.       Start:  07/08/24    Expected End:  08/12/24                Plan of care was developed with input and agreement by the patient      Treatment Performed:  Access Code: [email or web address]  URL: [email or web address]  Date: 07/08/2024  Prepared by: Jennifer Polanco    Exercises  - Seated Transversus Abdominis Bracing  - 2 x daily - 7 x weekly - 1 sets - 10  reps - 5 second hold  - Seated Scapular Retraction  - 2 x daily - 7 x weekly - 1 sets - 10 reps  - Seated Hip Abduction with Resistance  - 1 x daily - 7 x weekly - 2 sets - 10 reps - 5 second hold  - Seated Hip Adduction Isometrics with Ball  - 1 x daily - 7 x weekly - 2 sets - 10 reps - 5 second hold    Jennifer Polanco, PT

## 2024-07-15 ENCOUNTER — TREATMENT (OUTPATIENT)
Dept: PHYSICAL THERAPY | Facility: HOSPITAL | Age: 64
End: 2024-07-15
Payer: COMMERCIAL

## 2024-07-15 DIAGNOSIS — M54.50 ACUTE RIGHT-SIDED LOW BACK PAIN WITHOUT SCIATICA: ICD-10-CM

## 2024-07-15 DIAGNOSIS — N39.0 RECURRENT URINARY TRACT INFECTION: Primary | ICD-10-CM

## 2024-07-15 PROCEDURE — 97110 THERAPEUTIC EXERCISES: CPT | Mod: GP,CQ

## 2024-07-15 ASSESSMENT — PAIN - FUNCTIONAL ASSESSMENT: PAIN_FUNCTIONAL_ASSESSMENT: 0-10

## 2024-07-15 ASSESSMENT — PAIN SCALES - GENERAL: PAINLEVEL_OUTOF10: 0 - NO PAIN

## 2024-07-15 NOTE — PROGRESS NOTES
"  Physical Therapy Treatment    Patient Name: Farida Traylor  MRN: [id number]  Today's Date: 7/15/2024  Time Calculation  Start Time: 0955  Stop Time: 1029  Time Calculation (min): 34 min        PT Therapeutic Procedures Time Entry  Manual Therapy Time Entry: 3  Therapeutic Exercise Time Entry: 31                Current Problem  1. Acute right-sided low back pain without sciatica  Follow Up In Physical Therapy          General  Reason for Referral: LBP  Referred By: Jelena Romo DO  Past Medical History Relevant to Rehab: Pulmonary HTN (PAH), chronic respiratory failure, obesity    Subjective   Current Condition:   Same  Patient reports no problems performing the HEP issued last PT session.     Performing HEP?: Yes    Precautions  Precautions  Medical Precautions: Cardiac precautions, Fall precautions, Oxygen therapy device and L/min  Pain  Pain Assessment: 0-10  0-10 (Numeric) Pain Score: 0 - No pain  Pain Type: Chronic pain  Pain Location: Hip  Pain Orientation: Right    Objective           Treatments:    Therapeutic Exercise  Therapeutic Exercise Performed: Yes  Therapeutic Exercise Activity 1: LTR x10 3\"  Therapeutic Exercise Activity 2: His ADD x15 3\"  Therapeutic Exercise Activity 3: R Piriformis stretch with towel x3 20\"  Therapeutic Exercise Activity 4: SKTC x3 20\"  Therapeutic Exercise Activity 5: TA x10 3\"  Therapeutic Exercise Activity 6: Hip ABD Blue x15  Therapeutic Exercise Activity 7: Bridge x10 3\"  Therapeutic Exercise Activity 8: Seated Hip Toss and Golf Swing 4# MB x10 R/L  Therapeutic Exercise Activity 9: Seated blow out the candle x10  Therapeutic Exercise Activity 10: Standing Christiano x10 R/L  Therapeutic Exercise Activity 11: Standing Hip ABD x10  Therapeutic Exercise Activity 12: Standing Hip Extension x10         Manual Therapy  Manual Therapy Performed: Yes  Manual Therapy Activity 1: TPR R Gluteus Medius to decrease mm tension                   EDUCATION:   Individual(s) Educated: " Patient  Education Provided: Benefits of TPR   Risk and Benefits Discussed with Patient/Caregiver/Other: Yes   Patient/Caregiver Demonstrated Understanding: Yes   Patient Response to Education: Patient/Caregiver verbalized understanding of information    Assessment: Pt noted slight increase with pain in the R hip after performing standing exercises initiated today. Pt rated pain level a 2/10 at the end of session.   PT Assessment  PT Assessment Results: Decreased strength, Decreased range of motion, Decreased endurance, Impaired balance, Decreased mobility  Rehab Prognosis: Good    Plan: Continue to progress current POC as tolerated to facilitate ability to perform functional activities.   OP PT Plan  PT Plan: Skilled PT  PT Frequency: 2 times per week  Duration: 5 weeks  Onset Date: 06/21/24  Certification Period Start Date: 07/08/24  Certification Period End Date: 08/05/24  Number of Treatments Authorized: 2 of 10    Goals:  Active       PT Problem       Patient will be independent with HEP.        Start:  07/08/24    Expected End:  07/22/24            Patient will demonstrate an increase of at least 25% in all directions to improve her ability to perform FA's.       Start:  07/08/24    Expected End:  08/12/24            Patient will report no >/= 2/10 pain with standing up to 10 minutes to cook and clean at home without difficulty.       Start:  07/08/24    Expected End:  08/12/24            Patient will score </= 6% on modified YESSENIA to improve her ability to perform self care and FA's.       Start:  07/08/24    Expected End:  08/12/24            Patient will demonstrate >/= 5/5 with B LE MMT to improve her ability to stand and ambulate in the community.       Start:  07/08/24    Expected End:  08/12/24                 Yannick Merlos, PTA

## 2024-07-16 DIAGNOSIS — I27.0 IDIOPATHIC PAH (PULMONARY ARTERIAL HYPERTENSION) (MULTI): ICD-10-CM

## 2024-07-16 RX ORDER — METHENAMINE HIPPURATE 1000 MG/1
1 TABLET ORAL 2 TIMES DAILY
Qty: 60 TABLET | Refills: 10 | Status: SHIPPED | OUTPATIENT
Start: 2024-07-16

## 2024-07-18 ENCOUNTER — TREATMENT (OUTPATIENT)
Dept: PHYSICAL THERAPY | Facility: HOSPITAL | Age: 64
End: 2024-07-18
Payer: COMMERCIAL

## 2024-07-18 DIAGNOSIS — M54.50 ACUTE RIGHT-SIDED LOW BACK PAIN WITHOUT SCIATICA: ICD-10-CM

## 2024-07-18 DIAGNOSIS — M89.9 SKULL LESION: ICD-10-CM

## 2024-07-18 PROCEDURE — 97110 THERAPEUTIC EXERCISES: CPT | Mod: GP | Performed by: PHYSICAL THERAPIST

## 2024-07-18 PROCEDURE — 97140 MANUAL THERAPY 1/> REGIONS: CPT | Mod: GP | Performed by: PHYSICAL THERAPIST

## 2024-07-18 ASSESSMENT — PAIN - FUNCTIONAL ASSESSMENT: PAIN_FUNCTIONAL_ASSESSMENT: 0-10

## 2024-07-18 ASSESSMENT — PAIN SCALES - GENERAL: PAINLEVEL_OUTOF10: 0 - NO PAIN

## 2024-07-18 NOTE — PROGRESS NOTES
"  Physical Therapy Treatment    Patient Name: Farida Traylor  MRN: [id number]  Encounter Date: 7/18/2024  Time Calculation  Start Time: 1002  Stop Time: 1043  Time Calculation (min): 41 min    PT Therapeutic Procedures Time Entry  Manual Therapy Time Entry: 8  Therapeutic Exercise Time Entry: 31    Non-Billable Time  Non-billable time: 2  Non-billable time reason: patient used the restroom during tx time      Current Problem  Problem List Items Addressed This Visit    None  Visit Diagnoses         Codes    Acute right-sided low back pain without sciatica     M54.50          1. Acute right-sided low back pain without sciatica  Follow Up In Physical Therapy          General  Reason for Referral: LBP  Referred By: Jelena Romo DO  Past Medical History Relevant to Rehab: Pulmonary HTN (PAH), chronic respiratory failure, obesity    Subjective   Current Condition:   Better  Patient reports that \"I was in screaming pain. I was standing to clean the house. I was also cooking. I had to bend forward to alleviate the pain. The pain was across the low back.\" The patient doesn't have significant pain today.    Performing HEP?: Yes    Precautions  Precautions  Medical Precautions: Cardiac precautions, Fall precautions, Oxygen therapy device and L/min (3L of O2)  Pain  Pain Assessment: 0-10  0-10 (Numeric) Pain Score: 0 - No pain  Pain Type: Chronic pain    Objective   Patient wearing 3 L02  SpO2: 95% at baseline  Drops to 93% with exercise. Recovers quickly to 95% after stopping. Min. VC's for breathing technique to avoid holding breath during reps    Treatments:    Therapeutic Exercise  Therapeutic Exercise Performed: Yes  Therapeutic Exercise Activity 1: LTR x10 3\"  Therapeutic Exercise Activity 2: H/L Hip add sm. ball 2 x 10 5\" hold  Therapeutic Exercise Activity 3: H/L hip abd. blue band 2 x 10 5\" hold  Therapeutic Exercise Activity 4: Quad set with SLR x 10 ea. R/L  Therapeutic Exercise Activity 5: TA x10 " "3\"  Therapeutic Exercise Activity 6: Bridge x10 3\"  Therapeutic Exercise Activity 7: Seated blow out the candle x10  Therapeutic Exercise Activity 9: Standing Christiano x10 R/L  Therapeutic Exercise Activity 10: Standing Hip ABD x10  Therapeutic Exercise Activity 11: Standing Hip Extension x10    Manual Therapy  Manual Therapy Performed: Yes  Manual Therapy Activity 1: TPR R Gluteus Medius to decrease mm tension      EDUCATION:   Individual(s) Educated: Patient  Education Provided: Breathing technique with exercise  Handout(s) Provided: Scanned into chart  Home Program: Updated HEP. See below  Risk and Benefits Discussed with Patient/Caregiver/Other: Yes   Patient/Caregiver Demonstrated Understanding: Yes   Patient Response to Education: Patient/Caregiver verbalized understanding of information, Patient/Caregiver performed return demonstration of exercises/activities, and Patient/Caregiver asked appropriate questions  Access Code: [email or web address]  URL: [email or web address]  Date: 07/18/2024  Prepared by: Jennifer Polanco    Exercises  - Seated Transversus Abdominis Bracing  - 2 x daily - 7 x weekly - 1 sets - 10 reps - 5 second hold  - Seated Scapular Retraction  - 2 x daily - 7 x weekly - 1 sets - 10 reps  - Seated Hip Abduction with Resistance  - 1 x daily - 7 x weekly - 2 sets - 10 reps - 5 second hold  - Seated Hip Adduction Isometrics with Ball  - 1 x daily - 7 x weekly - 2 sets - 10 reps - 5 second hold  - Supine Bridge  - 1 x daily - 7 x weekly - 1-2 sets - 10 reps - 5 second hold  - Active Straight Leg Raise with Quad Set  - 1 x daily - 7 x weekly - 1-2 sets - 10 reps  - Standing Hip Abduction with Counter Support  - 1 x daily - 7 x weekly - 1-2 sets - 10 reps  - Standing Hip Extension with Counter Support  - 1 x daily - 7 x weekly - 1-2 sets - 10 reps    Assessment:   Patient is able to complete all exercises without significant pain. Her pain increased to a 1/10 end of session but it was more \"muscle " "soreness\" than pain. The patient requires min VC's throughout the session to improve her breathing technique. The patient is most challenged with bridges and has difficulty clearing her buttocks up off the mat table.  PT Assessment  PT Assessment Results: Decreased strength, Decreased range of motion, Decreased endurance, Impaired balance, Decreased mobility  Rehab Prognosis: Good    Plan: Continue with POC.   Continue with core stability, LE strengthening, ROM, flexibility, and balance, so the patient can perform FA's without increased pain or difficulty.     OP PT Plan  PT Plan: Skilled PT  PT Frequency: 2 times per week  Duration: 5 weeks  Onset Date: 06/21/24  Certification Period Start Date: 07/08/24  Certification Period End Date: 08/05/24  Number of Treatments Authorized: 3 of 10  Rehab Potential: Good  Plan of Care Agreement: Patient  Payor: [employer] COMPLETE / Plan: [employer] DUAL COMPLETE / Product Type: *No Product type* /     Goals:  Active       PT Problem       Patient will be independent with HEP.        Start:  07/08/24    Expected End:  07/22/24            Patient will demonstrate an increase of at least 25% in all directions to improve her ability to perform FA's.       Start:  07/08/24    Expected End:  08/12/24            Patient will report no >/= 2/10 pain with standing up to 10 minutes to cook and clean at home without difficulty.       Start:  07/08/24    Expected End:  08/12/24            Patient will score </= 6% on modified YESSENIA to improve her ability to perform self care and FA's.       Start:  07/08/24    Expected End:  08/12/24            Patient will demonstrate >/= 5/5 with B LE MMT to improve her ability to stand and ambulate in the community.       Start:  07/08/24    Expected End:  08/12/24                 Jennifer Polanco, PT  "

## 2024-07-19 ENCOUNTER — LAB (OUTPATIENT)
Dept: LAB | Facility: LAB | Age: 64
End: 2024-07-19
Payer: COMMERCIAL

## 2024-07-19 DIAGNOSIS — I27.0 IDIOPATHIC PAH (PULMONARY ARTERIAL HYPERTENSION) (MULTI): ICD-10-CM

## 2024-07-19 LAB
ALBUMIN SERPL BCP-MCNC: 3.9 G/DL (ref 3.4–5)
ALP SERPL-CCNC: 69 U/L (ref 33–136)
ALT SERPL W P-5'-P-CCNC: 15 U/L (ref 7–45)
AST SERPL W P-5'-P-CCNC: 15 U/L (ref 9–39)
BILIRUB DIRECT SERPL-MCNC: 0.1 MG/DL (ref 0–0.3)
BILIRUB SERPL-MCNC: 0.3 MG/DL (ref 0–1.2)
PROT SERPL-MCNC: 6.4 G/DL (ref 6.4–8.2)

## 2024-07-19 PROCEDURE — 36415 COLL VENOUS BLD VENIPUNCTURE: CPT

## 2024-07-19 PROCEDURE — 80076 HEPATIC FUNCTION PANEL: CPT

## 2024-07-23 ENCOUNTER — APPOINTMENT (OUTPATIENT)
Dept: PHYSICAL THERAPY | Facility: HOSPITAL | Age: 64
End: 2024-07-23
Payer: COMMERCIAL

## 2024-07-24 ENCOUNTER — APPOINTMENT (OUTPATIENT)
Dept: PRIMARY CARE | Facility: CLINIC | Age: 64
End: 2024-07-24
Payer: COMMERCIAL

## 2024-07-24 ENCOUNTER — PROCEDURE VISIT (OUTPATIENT)
Dept: GASTROENTEROLOGY | Facility: CLINIC | Age: 64
End: 2024-07-24
Payer: COMMERCIAL

## 2024-07-24 DIAGNOSIS — R14.0 ABDOMINAL BLOATING: ICD-10-CM

## 2024-07-24 PROCEDURE — 91065 BREATH HYDROGEN/METHANE TEST: CPT | Performed by: NURSE PRACTITIONER

## 2024-07-24 PROCEDURE — 91065 BREATH HYDROGEN/METHANE TEST: CPT

## 2024-07-24 NOTE — PROGRESS NOTES
Patient ID: Farida Traylor is a 64 y.o. female.    Breath Hydrogen Test-Bacterial Overgrowth    Date/Time: 7/24/2024 11:17 AM    Performed by: Betsy Peterson RN  Authorized by: Jason Hoang MD    Consent:     Consent obtained:  Verbal  Universal protocol:     Patient identity confirmed:  Verbally with patient  Post-procedure details:     Procedure completion:  Tolerated  Hydrogen Breath Analysis Consultation Sheet    Referring Provider: Jason Hoang MD  [id number] [place] [address]    Indication: Abdominal Bloating    Age: 64 y.o.  Weight: There were no vitals filed for this visit.  Substrate: 75 GRAMS DEXTROSE    Last Meal: 1700  Recent Antibiotics: Denies    RESULTS:   Time PPM (H2) APPM* (CH4) CO2 Correction   Baseline #1 0920 4 3 4.4 1.28   Baseline #2 0925 6 2 4.5 1.22   *Challenge Dose Sugar: 0930  15' 0945 10 4 4.2 1.30   30' 1000 10 3 3.9 1.41   45' 1015 7 3 4.0 1.37   60' 1030 8 3 4.3 1.27   75' 1045 7 3 3.9 1.41   90' 1100 11 3 4.0 1.37   105' 1115 9 3 4.0 1.37   120'        135'        150'        165'        180'          Impression: Negative for SIBO and methane

## 2024-07-25 ENCOUNTER — TREATMENT (OUTPATIENT)
Dept: PHYSICAL THERAPY | Facility: HOSPITAL | Age: 64
End: 2024-07-25
Payer: COMMERCIAL

## 2024-07-25 DIAGNOSIS — M54.50 ACUTE RIGHT-SIDED LOW BACK PAIN WITHOUT SCIATICA: ICD-10-CM

## 2024-07-25 PROCEDURE — 97110 THERAPEUTIC EXERCISES: CPT | Mod: GP,CQ

## 2024-07-25 PROCEDURE — 97140 MANUAL THERAPY 1/> REGIONS: CPT | Mod: GP,CQ

## 2024-07-25 ASSESSMENT — PAIN - FUNCTIONAL ASSESSMENT: PAIN_FUNCTIONAL_ASSESSMENT: 0-10

## 2024-07-25 ASSESSMENT — PAIN SCALES - GENERAL: PAINLEVEL_OUTOF10: 1

## 2024-07-25 NOTE — PROGRESS NOTES
"  Physical Therapy Treatment    Patient Name: Farida Traylor  MRN: [id number]  Today's Date: 7/25/2024  Time Calculation  Start Time: 1520  Stop Time: 1606  Time Calculation (min): 46 min  Current Problem  1. Acute right-sided low back pain without sciatica  Follow Up In Physical Therapy          General  Reason for Referral: LBP  Referred By: Jelena Romo DO  Past Medical History Relevant to Rehab: Pulmonary HTN (PAH), chronic respiratory failure, obesity    Subjective   Current Condition:   Better  Patient reports she is feeling better since starting therapy, she is having less pain and is able to tolerate more activities with less difficulty.    Performing HEP?: Partially    Precautions  Precautions  STEADI Fall Risk Score (The score of 4 or more indicates an increased risk of falling): 5  Medical Precautions: Cardiac precautions, Fall precautions, Oxygen therapy device and L/min    Pain  Pain Assessment: 0-10  0-10 (Numeric) Pain Score: 1  Pain Type: Chronic pain  Pain Location: Back  Pain Orientation: Right, Mid    Objective   Lumbar Spine  Observation   Pain decreased after manual   Lumbar Palpation/Joint Mobility Assessment   Pain with palpation R lower thoracic/lumbar area.  Lumbar AROM   Lacks rotation to the R  Hip AROM   Lacks ER to the R  Treatments:    Therapeutic Exercise  Therapeutic Exercise Performed: Yes  Therapeutic Exercise Activity 1: LTR x10 3\"  Therapeutic Exercise Activity 2: H/L Hip add sm. ball 2 x 10 5\" hold  Therapeutic Exercise Activity 3: H/L hip abd. blue band 2 x 10 5\" hold  Therapeutic Exercise Activity 4: Quad set with SLR x 10 ea. R/L  Therapeutic Exercise Activity 5: TA x10 3\"  Therapeutic Exercise Activity 6: Bridge x10 3\"  Therapeutic Exercise Activity 7: Seated blow out the candle x10  Therapeutic Exercise Activity 8: Seated Hip Toss and Golf Swing 4# MB x10 R/L  Therapeutic Exercise Activity 9: Standing Christiano x10 R/L  Therapeutic Exercise Activity 10: Standing Hip ABD " x10  Therapeutic Exercise Activity 11: Standing Hip Extension x10  Therapeutic Exercise Activity 12: standing HS curl x 10    Manual Therapy  Manual Therapy Performed: Yes  Manual Therapy Activity 1: IASTM to R glut  Assessment:Pt. Able to tolerate all exercises and reported decreased pain to 0/10 at conclusion of therapy. Pt able to complete therapy without 02 this day.  PT Assessment  PT Assessment Results: Decreased strength, Decreased range of motion, Decreased endurance, Impaired balance, Decreased mobility  Rehab Prognosis: Good    Plan:continue with current PT POC with focus on increasing core and LE strength for improved LB support.  OP PT Plan  PT Plan: Skilled PT  PT Frequency: 2 times per week  Duration: 5 weeks  Onset Date: 06/21/24  Certification Period Start Date: 07/08/24  Certification Period End Date: 08/05/24  Number of Treatments Authorized: 4 of 10  Rehab Potential: Good  Plan of Care Agreement: Patient    Goals:  Active       PT Problem       Patient will be independent with HEP.        Start:  07/08/24    Expected End:  07/22/24            Patient will demonstrate an increase of at least 25% in all directions to improve her ability to perform FA's.       Start:  07/08/24    Expected End:  08/12/24            Patient will report no >/= 2/10 pain with standing up to 10 minutes to cook and clean at home without difficulty.       Start:  07/08/24    Expected End:  08/12/24            Patient will score </= 6% on modified YESSENIA to improve her ability to perform self care and FA's.       Start:  07/08/24    Expected End:  08/12/24            Patient will demonstrate >/= 5/5 with B LE MMT to improve her ability to stand and ambulate in the community.       Start:  07/08/24    Expected End:  08/12/24                 Johanna Mckay, PTA

## 2024-07-26 ENCOUNTER — APPOINTMENT (OUTPATIENT)
Dept: PRIMARY CARE | Facility: CLINIC | Age: 64
End: 2024-07-26
Payer: COMMERCIAL

## 2024-07-30 ENCOUNTER — TELEPHONE (OUTPATIENT)
Dept: GASTROENTEROLOGY | Facility: CLINIC | Age: 64
End: 2024-07-30
Payer: COMMERCIAL

## 2024-07-30 ENCOUNTER — TREATMENT (OUTPATIENT)
Dept: PHYSICAL THERAPY | Facility: HOSPITAL | Age: 64
End: 2024-07-30
Payer: COMMERCIAL

## 2024-07-30 DIAGNOSIS — M54.50 ACUTE RIGHT-SIDED LOW BACK PAIN WITHOUT SCIATICA: ICD-10-CM

## 2024-07-30 PROCEDURE — 97140 MANUAL THERAPY 1/> REGIONS: CPT | Mod: GP,CQ

## 2024-07-30 PROCEDURE — 97110 THERAPEUTIC EXERCISES: CPT | Mod: GP,CQ

## 2024-07-30 ASSESSMENT — PAIN - FUNCTIONAL ASSESSMENT: PAIN_FUNCTIONAL_ASSESSMENT: 0-10

## 2024-07-30 ASSESSMENT — PAIN SCALES - GENERAL: PAINLEVEL_OUTOF10: 1

## 2024-07-30 NOTE — PROGRESS NOTES
"  Physical Therapy Treatment    Patient Name: Farida Traylor  MRN: [id number]  Today's Date: 7/30/2024  Time Calculation  Start Time: 1432  Stop Time: 1515  Time Calculation (min): 43 min        PT Therapeutic Procedures Time Entry  Manual Therapy Time Entry: 9  Therapeutic Exercise Time Entry: 33                Current Problem  1. Acute right-sided low back pain without sciatica  Follow Up In Physical Therapy          General  Reason for Referral: LBP  Referred By: Jelena Romo DO  Past Medical History Relevant to Rehab: Pulmonary HTN (PAH), chronic respiratory failure, obesity    Subjective   Current Condition:   Better  Patient reports her pain was a 3-4/10 earlier, states it is feeling better now.     Performing HEP?: Yes    Precautions  Precautions  Medical Precautions: Cardiac precautions, Fall precautions, Oxygen therapy device and L/min  Pain  Pain Assessment: 0-10  0-10 (Numeric) Pain Score: 1  Pain Type: Chronic pain  Pain Location: Back    Objective           Treatments:    Therapeutic Exercise  Therapeutic Exercise Performed: Yes  Therapeutic Exercise Activity 1: LTR x10 3\"  Therapeutic Exercise Activity 2: H/L Hip add sm. ball 2 x 10 5\" hold  Therapeutic Exercise Activity 3: H/L hip abd. blue band 2 x 10 5\" hold  Therapeutic Exercise Activity 4: Quad set with SLR x 10 ea. R/L  Therapeutic Exercise Activity 5: TA x10 3\"  Therapeutic Exercise Activity 6: Bridge x10 3\"  Therapeutic Exercise Activity 7: Seated blow out the candle x10  Therapeutic Exercise Activity 8: Seated Hip Toss and Golf Swing 4# MB x10 R/L  Therapeutic Exercise Activity 9: Standing March x10 R/L  Therapeutic Exercise Activity 10: Standing Hip ABD x10  Therapeutic Exercise Activity 11: Standing Hip Extension x10  Therapeutic Exercise Activity 12: Standing Rows Blue x20  Therapeutic Exercise Activity 13: STS with OH press 2# MB x10  Therapeutic Exercise Activity 14: Pallof press double Blue x10 R/L         Manual Therapy  Manual " Therapy Performed: Yes  Manual Therapy Activity 1: DTM/TPR R Gluteus minimus and Piriformis to decrease mm tension                   EDUCATION:   Individual(s) Educated: Patient  Education Provided: DTM/TPR treatmet  Risk and Benefits Discussed with Patient/Caregiver/Other: Yes   Patient/Caregiver Demonstrated Understanding: Yes   Patient Response to Education: Patient/Caregiver verbalized understanding of information    Assessment: Noted increased mm tension and tenderness with palpation to the R Gluteus minimus and piriformis which improved with manual techniques performed today. Pt requires cuing to decrease pace with most TE's, with fair follow through demonstrated.   PT Assessment  PT Assessment Results: Decreased strength, Decreased range of motion, Decreased endurance, Impaired balance, Decreased mobility  Rehab Prognosis: Good    Plan: Continue to progress current POC as tolerated to facilitate ability to perform functional activities.   OP PT Plan  PT Plan: Skilled PT  PT Frequency: 2 times per week  Duration: 5 weeks  Onset Date: 06/21/24  Certification Period Start Date: 07/08/24  Certification Period End Date: 08/05/24  Number of Treatments Authorized: 5 of 10    Goals:  Active       PT Problem       Patient will be independent with HEP.        Start:  07/08/24    Expected End:  07/22/24            Patient will demonstrate an increase of at least 25% in all directions to improve her ability to perform FA's.       Start:  07/08/24    Expected End:  08/12/24            Patient will report no >/= 2/10 pain with standing up to 10 minutes to cook and clean at home without difficulty.       Start:  07/08/24    Expected End:  08/12/24            Patient will score </= 6% on modified YESSENIA to improve her ability to perform self care and FA's.       Start:  07/08/24    Expected End:  08/12/24            Patient will demonstrate >/= 5/5 with B LE MMT to improve her ability to stand and ambulate in the community.        Start:  07/08/24    Expected End:  08/12/24                 Yannick Merlos, PTA

## 2024-07-30 NOTE — TELEPHONE ENCOUNTER
----- Message from Jason Hoang sent at 7/30/2024 12:52 PM EDT -----  Breath test is negative for both  hydrogen and methane.  That means possibility of SIBO(small intestinal bacterial overgrowth) unlikely  ----- Message -----  From: Corine Khalil MA  Sent: 7/30/2024  11:54 AM EDT  To: David Vaz MA; Jason Hoang MD    Patient called asking if the results were back and reviewed from her Hydrogen Breath Test 7/24.

## 2024-08-02 ENCOUNTER — TREATMENT (OUTPATIENT)
Dept: PHYSICAL THERAPY | Facility: HOSPITAL | Age: 64
End: 2024-08-02
Payer: COMMERCIAL

## 2024-08-02 DIAGNOSIS — M54.50 ACUTE RIGHT-SIDED LOW BACK PAIN WITHOUT SCIATICA: ICD-10-CM

## 2024-08-02 PROCEDURE — 97140 MANUAL THERAPY 1/> REGIONS: CPT | Mod: GP,CQ

## 2024-08-02 PROCEDURE — 97110 THERAPEUTIC EXERCISES: CPT | Mod: GP,CQ

## 2024-08-02 ASSESSMENT — PAIN - FUNCTIONAL ASSESSMENT: PAIN_FUNCTIONAL_ASSESSMENT: 0-10

## 2024-08-02 ASSESSMENT — PAIN SCALES - GENERAL: PAINLEVEL_OUTOF10: 2

## 2024-08-02 NOTE — PROGRESS NOTES
"  Physical Therapy Treatment    Patient Name: Farida Traylor  MRN: [id number]  Today's Date: 8/2/2024  Time Calculation  Start Time: 1413  Stop Time: 1455  Time Calculation (min): 42 min        PT Therapeutic Procedures Time Entry  Manual Therapy Time Entry: 9  Therapeutic Exercise Time Entry: 33                Current Problem  1. Acute right-sided low back pain without sciatica  Follow Up In Physical Therapy          General  Reason for Referral: LBP  Referred By: Jelena Romo DO  Past Medical History Relevant to Rehab: Pulmonary HTN (PAH), chronic respiratory failure, obesity    Subjective   Current Condition:   Better  Patient reports her back is sore today. States she had to push her scooter after it got stuck.     Performing HEP?: Yes    Precautions  Precautions  Medical Precautions: Cardiac precautions, Fall precautions, Oxygen therapy device and L/min  Pain  Pain Assessment: 0-10  0-10 (Numeric) Pain Score: 2  Pain Type: Chronic pain  Pain Location: Back    Objective       Treatments:    Therapeutic Exercise  Therapeutic Exercise Performed: Yes  Therapeutic Exercise Activity 1: Nu Step Level 4 x5'  Therapeutic Exercise Activity 2: H/L Hip add sm. ball 2 x 10 5\" hold  Therapeutic Exercise Activity 3: H/L hip abd. blue band 2 x 10 5\" hold  Therapeutic Exercise Activity 5: TA with SB x10  Therapeutic Exercise Activity 6: Bridge x10 3\"  Therapeutic Exercise Activity 8: Seated Hip Toss and Golf Swing 4# MB x10 R/L  Therapeutic Exercise Activity 9: Standing March x10 R/L  Therapeutic Exercise Activity 10: Standing Hip ABD x10  Therapeutic Exercise Activity 11: Standing Hip Extension x10  Therapeutic Exercise Activity 12: Standing Rows Blue x20  Therapeutic Exercise Activity 13: STS with OH press 2# MB x10  Therapeutic Exercise Activity 14: Pallof press double Blue x10 R/L  Therapeutic Exercise Activity 15: B Shoulder Extension Blue x15         Manual Therapy  Manual Therapy Performed: Yes  Manual Therapy " Activity 1: DTM/TPR R Gluteus minimus, piriformis and R lumbar paraspinals to decrease mm tension                   EDUCATION:   Individual(s) Educated: Patient  Education Provided: Use of MHP at home to help control pain.   Risk and Benefits Discussed with Patient/Caregiver/Other: Yes   Patient/Caregiver Demonstrated Understanding: Yes   Patient Response to Education: Patient/Caregiver verbalized understanding of information    Assessment: Pt required minimal cuing for technique and pace with fair follow through. Pt demonstrated good tolerance with all interventions completed today.  Pt noted decreased pain in the R LB after manual techniques completed today.     PT Assessment  PT Assessment Results: Decreased strength, Decreased range of motion, Decreased endurance, Impaired balance, Decreased mobility  Rehab Prognosis: Good    Plan: Continue to progress current POC as tolerated to facilitate ability to perform functional activities.   OP PT Plan  PT Plan: Skilled PT  PT Frequency: 2 times per week  Duration: 5 weeks  Onset Date: 06/21/24  Certification Period Start Date: 07/08/24  Certification Period End Date: 08/05/24  Number of Treatments Authorized: 6 of 10    Goals:  Active       PT Problem       Patient will be independent with HEP.        Start:  07/08/24    Expected End:  07/22/24            Patient will demonstrate an increase of at least 25% in all directions to improve her ability to perform FA's.       Start:  07/08/24    Expected End:  08/12/24            Patient will report no >/= 2/10 pain with standing up to 10 minutes to cook and clean at home without difficulty.       Start:  07/08/24    Expected End:  08/12/24            Patient will score </= 6% on modified YESSENIA to improve her ability to perform self care and FA's.       Start:  07/08/24    Expected End:  08/12/24            Patient will demonstrate >/= 5/5 with B LE MMT to improve her ability to stand and ambulate in the community.        Start:  07/08/24    Expected End:  08/12/24                 Yannick Merlos, PTA

## 2024-08-08 ENCOUNTER — APPOINTMENT (OUTPATIENT)
Dept: PHYSICAL THERAPY | Facility: HOSPITAL | Age: 64
End: 2024-08-08
Payer: COMMERCIAL

## 2024-08-13 ENCOUNTER — APPOINTMENT (OUTPATIENT)
Dept: PRIMARY CARE | Facility: CLINIC | Age: 64
End: 2024-08-13
Payer: COMMERCIAL

## 2024-08-14 ENCOUNTER — TREATMENT (OUTPATIENT)
Dept: PHYSICAL THERAPY | Facility: HOSPITAL | Age: 64
End: 2024-08-14
Payer: COMMERCIAL

## 2024-08-14 DIAGNOSIS — M54.50 ACUTE RIGHT-SIDED LOW BACK PAIN WITHOUT SCIATICA: ICD-10-CM

## 2024-08-14 PROCEDURE — 97110 THERAPEUTIC EXERCISES: CPT | Mod: GP,CQ

## 2024-08-14 ASSESSMENT — PAIN - FUNCTIONAL ASSESSMENT: PAIN_FUNCTIONAL_ASSESSMENT: 0-10

## 2024-08-14 ASSESSMENT — PAIN SCALES - GENERAL: PAINLEVEL_OUTOF10: 0 - NO PAIN

## 2024-08-14 NOTE — PROGRESS NOTES
"  Physical Therapy Treatment    Patient Name: Farida Traylor  MRN: [id number]  Today's Date: 8/14/2024  Time Calculation  Start Time: 1259  Stop Time: 1338  Time Calculation (min): 39 min        PT Therapeutic Procedures Time Entry  Therapeutic Exercise Time Entry: 39                Current Problem  1. Acute right-sided low back pain without sciatica  Follow Up In Physical Therapy          General  Reason for Referral: LBP  Referred By: Jelena Romo DO  Past Medical History Relevant to Rehab: Pulmonary HTN (PAH), chronic respiratory failure, obesity    Subjective   Current Condition:   Same  Patient reports no LBP today, however her L shoulder is hurting today.     Performing HEP?: Yes    Precautions  Precautions  Medical Precautions: Cardiac precautions, Fall precautions, Oxygen therapy device and L/min  Pain  Pain Assessment: 0-10  0-10 (Numeric) Pain Score: 0 - No pain    Objective           Treatments:    Therapeutic Exercise  Therapeutic Exercise Performed: Yes  Therapeutic Exercise Activity 1: Nu Step Level 4 x5'  Therapeutic Exercise Activity 2: Standing Chops 2# MB x10 R/L  Therapeutic Exercise Activity 3: H/L Hip ABD Black x20  Therapeutic Exercise Activity 4: Quad set with SLR x 10 ea. R/L  Therapeutic Exercise Activity 5: PPT with B shoulder flexion x10 2# MB  Therapeutic Exercise Activity 6: Bridge x10 3\"  Therapeutic Exercise Activity 7: Standing Blow out the candle with SB x10  Therapeutic Exercise Activity 8: LTR on SB x10 R/L  Therapeutic Exercise Activity 9: Standing March x10 R/L  Therapeutic Exercise Activity 10: Standing Hip ABD x10  Therapeutic Exercise Activity 11: Standing Hip Extension x10  Therapeutic Exercise Activity 12: Standing Rows Blue x20  Therapeutic Exercise Activity 13: STS with OH press 2# MB x10  Therapeutic Exercise Activity 14: Pallof press double Blue x10 R/L  Therapeutic Exercise Activity 15: B Shoulder Extension Blue x15                             EDUCATION: "   Individual(s) Educated: Patient  Education Provided: HEP  Risk and Benefits Discussed with Patient/Caregiver/Other: Yes   Patient/Caregiver Demonstrated Understanding: Yes   Patient Response to Education: Patient/Caregiver verbalized understanding of information    Assessment: Pt required seated rest period during standing exercises d/t pain in the LB, which diminished after. Pt has tendency to perform exercises with increased pace, requires cuing t decrease with fair follow through demonstrated. Pt able to complete session without c/o increased symptoms in the LB.   PT Assessment  PT Assessment Results: Decreased strength, Decreased range of motion, Decreased endurance, Impaired balance, Decreased mobility  Rehab Prognosis: Good    Plan: Continue to progress current POC as tolerated to facilitate ability to perform functional activities.   OP PT Plan  PT Plan: Skilled PT  PT Frequency: 2 times per week  Duration: 5 weeks  Onset Date: 06/21/24  Certification Period Start Date: 07/08/24  Certification Period End Date: 08/05/24  Number of Treatments Authorized: 7 of 10  Rehab Potential: Good    Goals:  Active       PT Problem       Patient will be independent with HEP.        Start:  07/08/24    Expected End:  07/22/24            Patient will demonstrate an increase of at least 25% in all directions to improve her ability to perform FA's.       Start:  07/08/24    Expected End:  08/12/24            Patient will report no >/= 2/10 pain with standing up to 10 minutes to cook and clean at home without difficulty.       Start:  07/08/24    Expected End:  08/12/24            Patient will score </= 6% on modified YESSENIA to improve her ability to perform self care and FA's.       Start:  07/08/24    Expected End:  08/12/24            Patient will demonstrate >/= 5/5 with B LE MMT to improve her ability to stand and ambulate in the community.       Start:  07/08/24    Expected End:  08/12/24                 Yannick Merlos  PTA

## 2024-08-16 ENCOUNTER — OFFICE VISIT (OUTPATIENT)
Dept: PRIMARY CARE | Facility: CLINIC | Age: 64
End: 2024-08-16
Payer: COMMERCIAL

## 2024-08-16 VITALS
HEART RATE: 70 BPM | SYSTOLIC BLOOD PRESSURE: 132 MMHG | DIASTOLIC BLOOD PRESSURE: 62 MMHG | OXYGEN SATURATION: 94 % | WEIGHT: 289 LBS | BODY MASS INDEX: 52.86 KG/M2 | TEMPERATURE: 96 F

## 2024-08-16 DIAGNOSIS — E61.1 IRON DEFICIENCY: ICD-10-CM

## 2024-08-16 DIAGNOSIS — I10 BENIGN ESSENTIAL HYPERTENSION: Primary | ICD-10-CM

## 2024-08-16 DIAGNOSIS — D50.8 OTHER IRON DEFICIENCY ANEMIA: ICD-10-CM

## 2024-08-16 DIAGNOSIS — E55.9 VITAMIN D DEFICIENCY: ICD-10-CM

## 2024-08-16 LAB
ALBUMIN SERPL BCP-MCNC: 4.3 G/DL (ref 3.4–5)
ALP SERPL-CCNC: 69 U/L (ref 33–136)
ALT SERPL W P-5'-P-CCNC: 13 U/L (ref 7–45)
ANION GAP SERPL CALC-SCNC: 15 MMOL/L (ref 10–20)
AST SERPL W P-5'-P-CCNC: 14 U/L (ref 9–39)
BASOPHILS # BLD AUTO: 0.03 X10*3/UL (ref 0–0.1)
BASOPHILS NFR BLD AUTO: 0.6 %
BILIRUB SERPL-MCNC: 0.4 MG/DL (ref 0–1.2)
BUN SERPL-MCNC: 10 MG/DL (ref 6–23)
CALCIUM SERPL-MCNC: 9.2 MG/DL (ref 8.6–10.3)
CHLORIDE SERPL-SCNC: 103 MMOL/L (ref 98–107)
CHOLEST SERPL-MCNC: 177 MG/DL (ref 0–199)
CHOLESTEROL/HDL RATIO: 3.2
CO2 SERPL-SCNC: 29 MMOL/L (ref 21–32)
CREAT SERPL-MCNC: 0.89 MG/DL (ref 0.5–1.05)
EGFRCR SERPLBLD CKD-EPI 2021: 72 ML/MIN/1.73M*2
EOSINOPHIL # BLD AUTO: 0.17 X10*3/UL (ref 0–0.7)
EOSINOPHIL NFR BLD AUTO: 3.5 %
ERYTHROCYTE [DISTWIDTH] IN BLOOD BY AUTOMATED COUNT: 15.4 % (ref 11.5–14.5)
GLUCOSE SERPL-MCNC: 81 MG/DL (ref 74–99)
HCT VFR BLD AUTO: 46.1 % (ref 36–46)
HDLC SERPL-MCNC: 55.9 MG/DL
HGB BLD-MCNC: 14.2 G/DL (ref 12–16)
IMM GRANULOCYTES # BLD AUTO: 0.02 X10*3/UL (ref 0–0.7)
IMM GRANULOCYTES NFR BLD AUTO: 0.4 % (ref 0–0.9)
IRON SATN MFR SERPL: 30 % (ref 25–45)
IRON SERPL-MCNC: 98 UG/DL (ref 35–150)
LDLC SERPL CALC-MCNC: 94 MG/DL
LYMPHOCYTES # BLD AUTO: 1.15 X10*3/UL (ref 1.2–4.8)
LYMPHOCYTES NFR BLD AUTO: 24 %
MCH RBC QN AUTO: 28.5 PG (ref 26–34)
MCHC RBC AUTO-ENTMCNC: 30.8 G/DL (ref 32–36)
MCV RBC AUTO: 93 FL (ref 80–100)
MONOCYTES # BLD AUTO: 0.3 X10*3/UL (ref 0.1–1)
MONOCYTES NFR BLD AUTO: 6.3 %
NEUTROPHILS # BLD AUTO: 3.13 X10*3/UL (ref 1.2–7.7)
NEUTROPHILS NFR BLD AUTO: 65.2 %
NON HDL CHOLESTEROL: 121 MG/DL (ref 0–149)
NRBC BLD-RTO: 0 /100 WBCS (ref 0–0)
PLATELET # BLD AUTO: 217 X10*3/UL (ref 150–450)
POTASSIUM SERPL-SCNC: 4.3 MMOL/L (ref 3.5–5.3)
PROT SERPL-MCNC: 6.3 G/DL (ref 6.4–8.2)
RBC # BLD AUTO: 4.98 X10*6/UL (ref 4–5.2)
SODIUM SERPL-SCNC: 143 MMOL/L (ref 136–145)
TIBC SERPL-MCNC: 332 UG/DL (ref 240–445)
TRIGL SERPL-MCNC: 134 MG/DL (ref 0–149)
UIBC SERPL-MCNC: 234 UG/DL (ref 110–370)
VLDL: 27 MG/DL (ref 0–40)
WBC # BLD AUTO: 4.8 X10*3/UL (ref 4.4–11.3)

## 2024-08-16 PROCEDURE — 80061 LIPID PANEL: CPT

## 2024-08-16 PROCEDURE — 85025 COMPLETE CBC W/AUTO DIFF WBC: CPT

## 2024-08-16 PROCEDURE — 99214 OFFICE O/P EST MOD 30 MIN: CPT | Performed by: FAMILY MEDICINE

## 2024-08-16 PROCEDURE — 83540 ASSAY OF IRON: CPT

## 2024-08-16 PROCEDURE — 3078F DIAST BP <80 MM HG: CPT | Performed by: FAMILY MEDICINE

## 2024-08-16 PROCEDURE — 3075F SYST BP GE 130 - 139MM HG: CPT | Performed by: FAMILY MEDICINE

## 2024-08-16 PROCEDURE — 1159F MED LIST DOCD IN RCRD: CPT | Performed by: FAMILY MEDICINE

## 2024-08-16 PROCEDURE — 83550 IRON BINDING TEST: CPT

## 2024-08-16 PROCEDURE — 80053 COMPREHEN METABOLIC PANEL: CPT

## 2024-08-16 PROCEDURE — 1157F ADVNC CARE PLAN IN RCRD: CPT | Performed by: FAMILY MEDICINE

## 2024-08-16 NOTE — LETTER
September 12, 2024     Farida Traylor  [address]  [facility] [address]      Dear Ms. Traylor:    Below are the results from your recent visit:    Resulted Orders   Comprehensive Metabolic Panel   Result Value Ref Range    Glucose 81 74 - 99 mg/dL    Sodium 143 136 - 145 mmol/L    Potassium 4.3 3.5 - 5.3 mmol/L    Chloride 103 98 - 107 mmol/L    Bicarbonate 29 21 - 32 mmol/L    Anion Gap 15 10 - 20 mmol/L    Urea Nitrogen 10 6 - 23 mg/dL    Creatinine 0.89 0.50 - 1.05 mg/dL    eGFR 72 >60 mL/min/1.73m*2      Comment:      Calculations of estimated GFR are performed using the 2021 CKD-EPI Study Refit equation without the race variable for the IDMS-Traceable creatinine methods.  https://jasn.asnjournals.org/content/early/2021/09/22/ASN.2184684662    Calcium 9.2 8.6 - 10.3 mg/dL    Albumin 4.3 3.4 - 5.0 g/dL    Alkaline Phosphatase 69 33 - 136 U/L    Total Protein 6.3 (L) 6.4 - 8.2 g/dL    AST 14 9 - 39 U/L    Bilirubin, Total 0.4 0.0 - 1.2 mg/dL    ALT 13 7 - 45 U/L      Comment:      Patients treated with Sulfasalazine may generate falsely decreased results for ALT.   Lipid Panel   Result Value Ref Range    Cholesterol 177 0 - 199 mg/dL      Comment:            Age      Desirable   Borderline High   High     0-19 Y     0 - 169       170 - 199     >/= 200    20-24 Y     0 - 189       190 - 224     >/= 225         >24 Y     0 - 199       200 - 239     >/= 240   **All ranges are based on fasting samples. Specific   therapeutic targets will vary based on patient-specific   cardiac risk.    Pediatric guidelines reference:Pediatrics 2011, 128(S5).Adult guidelines reference: NCEP ATPIII Guidelines,BEATRIZ 2001, 258:2486-97    Venipuncture immediately after or during the administration of Metamizole may lead to falsely low results. Testing should be performed immediately prior to Metamizole dosing.    HDL-Cholesterol 55.9 mg/dL      Comment:        Age       Very Low   Low     Normal    High    0-19 Y    < 35      < 40      40-45     ----  20-24 Y    ----     < 40      >45      ----        >24 Y      ----     < 40     40-60      >60      Cholesterol/HDL Ratio 3.2       Comment:        Ref Values  Desirable  < 3.4  High Risk  > 5.0    LDL Calculated 94 <=99 mg/dL      Comment:                                  Near   Borderline      AGE      Desirable  Optimal    High     High     Very High     0-19 Y     0 - 109     ---    110-129   >/= 130     ----    20-24 Y     0 - 119     ---    120-159   >/= 160     ----      >24 Y     0 -  99   100-129  130-159   160-189     >/=190      VLDL 27 0 - 40 mg/dL    Triglycerides 134 0 - 149 mg/dL      Comment:         Age         Desirable   Borderline High   High     Very High   0 D-90 D    19 - 174         ----         ----        ----  91 D- 9 Y     0 -  74        75 -  99     >/= 100      ----    10-19 Y     0 -  89        90 - 129     >/= 130      ----    20-24 Y     0 - 114       115 - 149     >/= 150      ----         >24 Y     0 - 149       150 - 199    200- 499    >/= 500    Venipuncture immediately after or during the administration of Metamizole may lead to falsely low results. Testing should be performed immediately prior to Metamizole dosing.    Non HDL Cholesterol 121 0 - 149 mg/dL      Comment:            Age       Desirable   Borderline High   High     Very High     0-19 Y     0 - 119       120 - 144     >/= 145    >/= 160    20-24 Y     0 - 149       150 - 189     >/= 190      ----         >24 Y    30 mg/dL above LDL Cholesterol goal     CBC and Auto Differential   Result Value Ref Range    WBC 4.8 4.4 - 11.3 x10*3/uL    nRBC 0.0 0.0 - 0.0 /100 WBCs    RBC 4.98 4.00 - 5.20 x10*6/uL    Hemoglobin 14.2 12.0 - 16.0 g/dL    Hematocrit 46.1 (H) 36.0 - 46.0 %    MCV 93 80 - 100 fL    MCH 28.5 26.0 - 34.0 pg    MCHC 30.8 (L) 32.0 - 36.0 g/dL    RDW 15.4 (H) 11.5 - 14.5 %    Platelets 217 150 - 450 x10*3/uL    Neutrophils % 65.2 40.0 - 80.0 %    Immature Granulocytes %, Automated 0.4 0.0 -  0.9 %      Comment:      Immature Granulocyte Count (IG) includes promyelocytes, myelocytes and metamyelocytes but does not include bands. Percent differential counts (%) should be interpreted in the context of the absolute cell counts (cells/UL).    Lymphocytes % 24.0 13.0 - 44.0 %    Monocytes % 6.3 2.0 - 10.0 %    Eosinophils % 3.5 0.0 - 6.0 %    Basophils % 0.6 0.0 - 2.0 %    Neutrophils Absolute 3.13 1.20 - 7.70 x10*3/uL      Comment:      Percent differential counts (%) should be interpreted in the context of the absolute cell counts (cells/uL).    Immature Granulocytes Absolute, Automated 0.02 0.00 - 0.70 x10*3/uL    Lymphocytes Absolute 1.15 (L) 1.20 - 4.80 x10*3/uL    Monocytes Absolute 0.30 0.10 - 1.00 x10*3/uL    Eosinophils Absolute 0.17 0.00 - 0.70 x10*3/uL    Basophils Absolute 0.03 0.00 - 0.10 x10*3/uL   Iron and TIBC   Result Value Ref Range    Iron 98 35 - 150 ug/dL    UIBC 234 110 - 370 ug/dL    TIBC 332 240 - 445 ug/dL    % Saturation 30 25 - 45 %     LIPID PANEL IS GOOD   REST ARE COMPATIBLE WITH HER DIAGNOSES   FOLLOWING WITH GI AND HER PULMONOLOGY   PROTEIN IS A LITTLE LOW, MORE QUALITY PROTEIN WITH MEAT CHICKEN AND FISH   NUT BUTTERS EGGS     If you have any questions or concerns, please don't hesitate to call.         Sincerely,        Jelena Romo, DO

## 2024-08-16 NOTE — PROGRESS NOTES
Subjective   Patient ID: Farida Traylor is a 64 y.o. female who presents for Follow-up (6 month routine).    HPI BLOOD PRESSURE IS WELL CONTROLLED /FEELS BETTER   ASTHMA IS STABLE   LABS ARE DISCUSSED FROM PREVIOUS AND THEY WILL BE RECHECKED TODAY    Review of Systems   Constitutional:  Negative for activity change, appetite change, fever and unexpected weight change.   HENT:  Negative for congestion, ear pain, postnasal drip, rhinorrhea, sinus pressure and sore throat.    Eyes:  Negative for discharge, itching and visual disturbance.   Respiratory:  Negative for cough, shortness of breath and wheezing.    Cardiovascular:  Negative for chest pain, palpitations and leg swelling.   Gastrointestinal:  Negative for abdominal pain, blood in stool, constipation, diarrhea, nausea and vomiting.   Endocrine: Negative for cold intolerance, heat intolerance and polyuria.   Genitourinary:  Negative for dysuria, flank pain and hematuria.   Musculoskeletal:  Negative for arthralgias, gait problem, joint swelling and myalgias.   Skin:  Negative for rash.   Allergic/Immunologic: Negative for environmental allergies and food allergies.   Neurological:  Negative for dizziness, syncope, weakness, light-headedness, numbness and headaches.   Hematological:         ANEMIA    Psychiatric/Behavioral:  Negative for dysphoric mood and sleep disturbance. The patient is not nervous/anxious.        Objective   /62   Pulse 70   Temp 35.6 °C (96 °F)   Wt 131 kg (289 lb)   SpO2 94%   BMI 52.86 kg/m²     Physical Exam  Vitals and nursing note reviewed.   Constitutional:       Appearance: Normal appearance. She is obese.   HENT:      Head: Normocephalic.   Cardiovascular:      Rate and Rhythm: Normal rate and regular rhythm.      Pulses: Normal pulses.      Heart sounds: Normal heart sounds. No murmur heard.     No friction rub. No gallop.   Pulmonary:      Effort: Pulmonary effort is normal. No respiratory distress.      Breath  sounds: Normal breath sounds. No wheezing.   Abdominal:      General: There is no distension.      Palpations: Abdomen is soft.      Tenderness: There is no abdominal tenderness.   Musculoskeletal:         General: No deformity. Normal range of motion.   Skin:     General: Skin is warm and dry.      Capillary Refill: Capillary refill takes less than 2 seconds.   Neurological:      General: No focal deficit present.      Mental Status: She is alert and oriented to person, place, and time.   Psychiatric:         Mood and Affect: Mood normal.         Assessment/Plan   Problem List Items Addressed This Visit             ICD-10-CM    Benign essential hypertension - Primary I10    Relevant Orders    Comprehensive Metabolic Panel (Completed)    Lipid Panel (Completed)    Iron deficiency anemia D50.9    Relevant Orders    Comprehensive Metabolic Panel (Completed)    Lipid Panel (Completed)    Vitamin D deficiency E55.9    Relevant Orders    CBC and Auto Differential (Completed)    Iron deficiency E61.1    Relevant Orders    CBC and Auto Differential (Completed)    Iron and TIBC (Completed)

## 2024-08-19 PROBLEM — E61.1 IRON DEFICIENCY: Status: ACTIVE | Noted: 2024-08-19

## 2024-08-19 ASSESSMENT — ENCOUNTER SYMPTOMS
RHINORRHEA: 0
ABDOMINAL PAIN: 0
SLEEP DISTURBANCE: 0
LIGHT-HEADEDNESS: 0
DYSPHORIC MOOD: 0
SHORTNESS OF BREATH: 0
COUGH: 0
PALPITATIONS: 0
NERVOUS/ANXIOUS: 0
WHEEZING: 0
WEAKNESS: 0
EYE ITCHING: 0
NAUSEA: 0
ARTHRALGIAS: 0
FEVER: 0
BLOOD IN STOOL: 0
APPETITE CHANGE: 0
EYE DISCHARGE: 0
DIZZINESS: 0
DYSURIA: 0
MYALGIAS: 0
ACTIVITY CHANGE: 0
FLANK PAIN: 0
HEMATURIA: 0
VOMITING: 0
CONSTIPATION: 0
DIARRHEA: 0
UNEXPECTED WEIGHT CHANGE: 0
SORE THROAT: 0
JOINT SWELLING: 0
NUMBNESS: 0
HEADACHES: 0
SINUS PRESSURE: 0

## 2024-08-20 ENCOUNTER — APPOINTMENT (OUTPATIENT)
Dept: PHYSICAL THERAPY | Facility: HOSPITAL | Age: 64
End: 2024-08-20
Payer: COMMERCIAL

## 2024-08-21 ENCOUNTER — APPOINTMENT (OUTPATIENT)
Dept: PODIATRY | Facility: CLINIC | Age: 64
End: 2024-08-21
Payer: COMMERCIAL

## 2024-08-21 DIAGNOSIS — I83.11 VARICOSE VEINS OF BOTH LOWER EXTREMITIES WITH INFLAMMATION: ICD-10-CM

## 2024-08-21 DIAGNOSIS — M79.672 FOOT PAIN, BILATERAL: Primary | ICD-10-CM

## 2024-08-21 DIAGNOSIS — I83.12 VARICOSE VEINS OF BOTH LOWER EXTREMITIES WITH INFLAMMATION: ICD-10-CM

## 2024-08-21 DIAGNOSIS — B35.3 TINEA PEDIS OF BOTH FEET: ICD-10-CM

## 2024-08-21 DIAGNOSIS — L60.1 ONYCHOLYSIS: ICD-10-CM

## 2024-08-21 DIAGNOSIS — M79.671 FOOT PAIN, BILATERAL: Primary | ICD-10-CM

## 2024-08-21 PROCEDURE — 99212 OFFICE O/P EST SF 10 MIN: CPT | Performed by: PODIATRIST

## 2024-08-21 NOTE — PROGRESS NOTES
This is a 63 y.o. 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    Acquired plantar porokeratosis    Acute upper respiratory infection, unspecified 01/14/2020    URI, acute    Allergic rhinitis due to animal (cat) (dog) hair and dander 01/02/2020    Allergic rhinitis due to dogs    Allergic rhinitis due to pollen 01/02/2020    Allergic rhinitis due to pollen    Allergic rhinitis due to pollen 02/26/2018    Seasonal allergic rhinitis due to pollen    Allergy status to unspecified drugs, medicaments and biological substances 06/30/2015    History of allergy    Anemia, unspecified 07/23/2018    Normocytic anemia    Anxiety disorder due to known physiological condition 06/05/2013    Anxiety disorder due to medical condition    Anxiety disorder, unspecified 01/28/2016    Anxiety    Asymptomatic varicose veins of bilateral lower extremities 07/16/2019    Varicose veins of legs    Atypical squamous cells of undetermined significance on cytologic smear of cervix (ASC-US) 06/26/2013    Pap smear abnormality of cervix with ASCUS favoring benign    Candidiasis, unspecified 12/10/2019    Yeast infection    Cellulitis of left finger 07/31/2018    Paronychia of finger of left hand    Changes in skin texture 03/09/2021    Cracked skin    Contusion of left lesser toe(s) with damage to nail, initial encounter 02/22/2018    Subungual contusion of toenail of left foot    Contusion of right hand, sequela 08/04/2020    Traumatic ecchymosis of hand, right, sequela    Corns and callosities 05/25/2021    Callus of foot    Disorder of pigmentation, unspecified  09/28/2016    Atypical pigmented skin lesion    Disorder of the skin and subcutaneous tissue, unspecified 08/27/2020    Lesion of subcutaneous tissue    Dorsalgia, unspecified 09/23/2021    Acute back pain    Dorsalgia, unspecified 01/29/2019    Costovertebral angle tenderness    Encounter for general adult medical examination without abnormal findings 06/08/2020    Medicare annual wellness visit, subsequent    Encounter for gynecological examination (general) (routine) without abnormal findings 12/14/2021    Encounter for gynecological examination    Encounter for gynecological examination (general) (routine) without abnormal findings 12/11/2020    Encounter for gynecological examination    Encounter for other screening for malignant neoplasm of breast     Breast cancer screening    Encounter for screening for malignant neoplasm of cervix     Encounter for screening for malignant neoplasm of cervix    Encounter for screening for malignant neoplasm of cervix 11/04/2014    Screening for malignant neoplasm of cervix    Encounter for screening for malignant neoplasm of cervix     Cervical cancer screening    Hepatomegaly, not elsewhere classified 04/21/2021    Liver mass, right lobe    History of falling 12/28/2018    Status post fall    Inappropriate diet and eating habits 05/13/2021    Inappropriate diet and eating habits    Irregular menstruation, unspecified     Irregular periods/menstrual cycles    Localized edema 07/29/2015    Pedal edema    Localized swelling, mass and lump, left lower limb 09/11/2020    Lump of left thigh    Localized swelling, mass and lump, left lower limb 09/24/2020    Mass of left thigh    Localized swelling, mass and lump, unspecified lower limb 09/24/2020    Mass of thigh    Melanocytic nevi, unspecified 09/10/2019    Numerous skin moles    Multiple births, unspecified, all liveborn     Multiple births, all liveborn    Other allergic rhinitis 01/02/2020    Allergic rhinitis due to dust  mite    Other allergic rhinitis 01/02/2020    Allergic rhinitis due to mold    Other conditions influencing health status 06/26/2013    Uterine Rupture    Other conditions influencing health status     Normal colonoscopy    Other conditions influencing health status 02/05/2019    History of burning on urination    Other conditions influencing health status 12/14/2021    History of cough    Other conditions influencing health status 02/14/2019    History of dyspareunia    Other conditions influencing health status 01/14/2020    History of cough    Other conditions influencing health status 06/05/2013    Leiomyomatous Degeneration Of The Uterus    Other conditions influencing health status 06/05/2013    Viremia    Other hypertrophic disorders of the skin 07/29/2015    Skin tag    Other shoulder lesions, right shoulder 11/11/2019    Tendinitis of right rotator cuff    Other specified disorders of bone, thigh 09/09/2020    Pain of left femur    Other specified noninflammatory disorders of vagina 03/12/2019    Vaginal dryness    Other specified postprocedural states 05/04/2017    History of arthroscopic knee surgery    Other specified soft tissue disorders 10/08/2020    Soft tissue mass    Other specified soft tissue disorders 08/04/2020    Swelling of right hand    Pain in left thigh 08/27/2020    Pain of left thigh    Pain in right foot 03/09/2021    Right foot pain    Pain in right hip 12/28/2018    Right hip pain    Pain in right knee 03/12/2021    Bilateral knee pain    Pain in right knee 03/19/2021    Right knee pain    Pain in right leg 05/25/2021    Bilateral pain of leg and foot    Pain in right leg 01/12/2022    Bilateral leg and foot pain    Pain in unspecified joint     Joint pain    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 02/22/2016    History of tetralogy of Fallot    Personal history of contraception     Personal history of contraceptive use    Personal  history of diseases of the skin and subcutaneous tissue 12/22/2020    History of ingrown nail    Personal history of malignant neoplasm of other parts of uterus     History of malignant neoplasm of endometrium    Personal history of other (healed) physical injury and trauma 03/01/2017    History of sprain of ankle    Personal history of other benign neoplasm 09/10/2019    History of other benign neoplasm    Personal history of other benign neoplasm 05/06/2014    History of uterine leiomyoma    Personal history of other benign neoplasm 12/11/2020    History of uterine leiomyoma    Personal history of other diseases of the circulatory system 04/14/2021    History of atrial fibrillation    Personal history of other diseases of the circulatory system 04/14/2021    History of atrial fibrillation    Personal history of other diseases of the circulatory system 04/14/2021    History of atrial fibrillation    Personal history of other diseases of the circulatory system 02/01/2017    History of hypertension    Personal history of other diseases of the circulatory system 04/14/2021    History of hypotension    Personal history of other diseases of the digestive system 03/24/2021    History of gastroesophageal reflux (GERD)    Personal history of other diseases of the female genital tract 12/28/2016    History of postmenopausal bleeding    Personal history of other diseases of the female genital tract 05/19/2022    History of postmenopausal bleeding    Personal history of other diseases of the female genital tract     History of vaginal discharge    Personal history of other diseases of the female genital tract     Vaginal delivery    Personal history of other diseases of the musculoskeletal system and connective tissue     Personal history of arthritis    Personal history of other diseases of the musculoskeletal system and connective tissue 11/18/2019    History of muscle spasm    Personal history of other diseases of the  respiratory system 05/27/2021    History of asthma    Personal history of other diseases of the respiratory system 04/12/2019    History of acute bronchitis    Personal history of other diseases of the respiratory system 01/04/2020    History of bronchitis    Personal history of other endocrine, nutritional and metabolic disease 02/01/2017    History of hyperlipidemia    Personal history of other endocrine, nutritional and metabolic disease 04/18/2016    History of obesity    Personal history of other endocrine, nutritional and metabolic disease 05/26/2020    History of thyroid nodule    Personal history of other infectious and parasitic diseases     History of varicella    Personal history of other medical treatment 12/11/2020    History of screening mammography    Personal history of other medical treatment 12/14/2021    History of screening mammography    Personal history of other medical treatment     History of mammogram    Personal history of other specified conditions 08/21/2019    History of gross hematuria    Personal history of other specified conditions 02/02/2017    History of weight gain    Personal history of other specified conditions 12/07/2020    History of chest pain    Personal history of other specified conditions 04/14/2021    History of palpitations    Personal history of other specified conditions 12/05/2014    History of vertigo    Personal history of other specified conditions 08/25/2021    History of exertional chest pain    Personal history of other specified conditions     History of shortness of breath    Personal history of other specified conditions     H/O heartburn    Personal history of other specified conditions 01/12/2018    History of urinary frequency    Personal history of transient ischemic attack (TIA), and 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 12/30/2015    History of TIA (transient ischemic attack)    Personal history of urinary (tract) infections 09/02/2021     History of recurrent cystitis    Personal history of urinary (tract) infections 05/23/2019    History of cystitis    Personal history of urinary (tract) infections 09/02/2021    History of recurrent urinary tract infection    Personal history of urinary calculi 09/12/2019    History of renal calculi    Polyphagia 05/13/2021    Polyphagia    Primary central sleep apnea 10/21/2017    Central sleep apnea    Pure hypercholesterolemia, unspecified     High cholesterol    Residual hemorrhoidal skin tags 07/06/2017    External hemorrhoid    Slurred speech 01/02/2015    Slurred speech    Tinea pedis 05/25/2021    Tinea pedis of right foot    Unspecified abdominal pain 04/13/2021    Abdominal pain, acute    Unspecified injury of right wrist, hand and finger(s), sequela 08/04/2020    Hand trauma, right, sequela    Unspecified internal derangement of unspecified knee 03/01/2017    Internal derangement of knee    Unspecified symptoms and signs involving the genitourinary system 12/16/2021    UTI symptoms    Unspecified symptoms and signs involving the genitourinary system 02/05/2019    UTI symptoms    Unspecified symptoms and signs involving the genitourinary system 09/03/2020    UTI symptoms    Urinary tract infection, site not specified 01/17/2020    Acute urinary tract infection    Urinary tract infection, site not specified 01/10/2022    Acute UTI    Xerosis cutis 09/10/2019    Dry skin dermatitis       Medications and Allergies have been reviewed.    Review Of Systems:  GENERAL: No weight loss, malaise or fevers.  HEENT: Negative for frequent or significant headaches,   RESPIRATORY: Negative for cough, wheezing or shortness of breath.  CARDIOVASCULAR: Negative for chest pain, leg swelling or palpitations.    Examination of Both Lower Extremities:   Objective:   Vasc: DP and PT pulses are palpable bilateral 2/4.  CFT is less than 3 seconds bilateral.  Skin temperature is warm to cool proximal to distal bilateral.  No hair  growth noted. Varicosities noted    Neuro:Gross sensation intact bl    Derm: Nails 1-5 bilateral are intact.  R hallux not attached to base. Debris noted HPK and fissure to medial hallux IPJ.   No SOI noted.     Ortho: Muscle strength is 5/5 for all pedal groups tested.       1. Varicose veins of both lower extremities with inflammation        2. Foot pain, bilateral        3. Tinea pedis of both feet        4. Onychomycosis        5. Callus          Patient educated on proper  foot care.  Debrided R hallux  Keep trimmed and filed down  Discussed oral and topical AF. Deferred treatment at this time  Debrided hpk tissue  Keep filed down  Fu prn  Patient was in agreement to this plan. All questions answered.      Brionna Douglas DPM  [phone]  [facility]  Fax: [phone]

## 2024-08-22 ENCOUNTER — TELEPHONE (OUTPATIENT)
Dept: PRIMARY CARE | Facility: CLINIC | Age: 64
End: 2024-08-22

## 2024-08-22 ENCOUNTER — HOSPITAL ENCOUNTER (OUTPATIENT)
Dept: RADIOLOGY | Facility: CLINIC | Age: 64
Discharge: HOME | End: 2024-08-22
Payer: COMMERCIAL

## 2024-08-22 DIAGNOSIS — R42 DIZZINESS AND GIDDINESS: ICD-10-CM

## 2024-08-22 DIAGNOSIS — R41.3 OTHER AMNESIA: ICD-10-CM

## 2024-08-22 DIAGNOSIS — R93.0 ABNORMAL FINDINGS ON DIAGNOSTIC IMAGING OF SKULL AND HEAD, NOT ELSEWHERE CLASSIFIED: ICD-10-CM

## 2024-08-22 DIAGNOSIS — Z86.73 PERSONAL HISTORY OF TRANSIENT ISCHEMIC ATTACK (TIA), AND CEREBRAL INFARCTION WITHOUT RESIDUAL DEFICITS: ICD-10-CM

## 2024-08-22 PROCEDURE — 70553 MRI BRAIN STEM W/O & W/DYE: CPT

## 2024-08-22 PROCEDURE — 2550000001 HC RX 255 CONTRASTS: Performed by: NURSE PRACTITIONER

## 2024-08-22 PROCEDURE — A9575 INJ GADOTERATE MEGLUMI 0.1ML: HCPCS | Performed by: NURSE PRACTITIONER

## 2024-08-22 RX ORDER — GADOTERATE MEGLUMINE 376.9 MG/ML
20 INJECTION INTRAVENOUS
Status: COMPLETED | OUTPATIENT
Start: 2024-08-22 | End: 2024-08-22

## 2024-08-23 ENCOUNTER — APPOINTMENT (OUTPATIENT)
Dept: PHYSICAL THERAPY | Facility: HOSPITAL | Age: 64
End: 2024-08-23
Payer: COMMERCIAL

## 2024-08-26 ENCOUNTER — OFFICE VISIT (OUTPATIENT)
Dept: PULMONOLOGY | Facility: CLINIC | Age: 64
End: 2024-08-26
Payer: COMMERCIAL

## 2024-08-26 ENCOUNTER — DOCUMENTATION (OUTPATIENT)
Dept: PHYSICAL THERAPY | Facility: HOSPITAL | Age: 64
End: 2024-08-26
Payer: COMMERCIAL

## 2024-08-26 VITALS
HEART RATE: 67 BPM | RESPIRATION RATE: 16 BRPM | OXYGEN SATURATION: 90 % | SYSTOLIC BLOOD PRESSURE: 113 MMHG | WEIGHT: 291 LBS | BODY MASS INDEX: 53.22 KG/M2 | DIASTOLIC BLOOD PRESSURE: 70 MMHG

## 2024-08-26 DIAGNOSIS — J96.11 CHRONIC RESPIRATORY FAILURE WITH HYPOXIA (MULTI): Primary | ICD-10-CM

## 2024-08-26 DIAGNOSIS — J45.909 ASTHMA, UNSPECIFIED ASTHMA SEVERITY, UNSPECIFIED WHETHER COMPLICATED, UNSPECIFIED WHETHER PERSISTENT (HHS-HCC): ICD-10-CM

## 2024-08-26 DIAGNOSIS — I27.0 IDIOPATHIC PAH (PULMONARY ARTERIAL HYPERTENSION) (MULTI): ICD-10-CM

## 2024-08-26 DIAGNOSIS — G47.33 OBSTRUCTIVE SLEEP APNEA SYNDROME: ICD-10-CM

## 2024-08-26 PROCEDURE — 3074F SYST BP LT 130 MM HG: CPT | Performed by: INTERNAL MEDICINE

## 2024-08-26 PROCEDURE — 1157F ADVNC CARE PLAN IN RCRD: CPT | Performed by: INTERNAL MEDICINE

## 2024-08-26 PROCEDURE — 1036F TOBACCO NON-USER: CPT | Performed by: INTERNAL MEDICINE

## 2024-08-26 PROCEDURE — 1126F AMNT PAIN NOTED NONE PRSNT: CPT | Performed by: INTERNAL MEDICINE

## 2024-08-26 PROCEDURE — 99215 OFFICE O/P EST HI 40 MIN: CPT | Performed by: INTERNAL MEDICINE

## 2024-08-26 PROCEDURE — 1159F MED LIST DOCD IN RCRD: CPT | Performed by: INTERNAL MEDICINE

## 2024-08-26 PROCEDURE — 3078F DIAST BP <80 MM HG: CPT | Performed by: INTERNAL MEDICINE

## 2024-08-26 PROCEDURE — 1160F RVW MEDS BY RX/DR IN RCRD: CPT | Performed by: INTERNAL MEDICINE

## 2024-08-26 ASSESSMENT — PAIN SCALES - GENERAL: PAINLEVEL: 0-NO PAIN

## 2024-08-26 ASSESSMENT — ENCOUNTER SYMPTOMS
DEPRESSION: 0
NEUROLOGICAL NEGATIVE: 1
SHORTNESS OF BREATH: 1
CARDIOVASCULAR NEGATIVE: 1
FEVER: 0
PSYCHIATRIC NEGATIVE: 1
COUGH: 0
CHILLS: 0
GASTROINTESTINAL NEGATIVE: 1

## 2024-08-26 ASSESSMENT — COLUMBIA-SUICIDE SEVERITY RATING SCALE - C-SSRS
6. HAVE YOU EVER DONE ANYTHING, STARTED TO DO ANYTHING, OR PREPARED TO DO ANYTHING TO END YOUR LIFE?: NO
2. HAVE YOU ACTUALLY HAD ANY THOUGHTS OF KILLING YOURSELF?: NO
1. IN THE PAST MONTH, HAVE YOU WISHED YOU WERE DEAD OR WISHED YOU COULD GO TO SLEEP AND NOT WAKE UP?: NO

## 2024-08-26 NOTE — PROGRESS NOTES
Subjective   Patient ID: Farida Traylor is a 64 y.o. female who presents for Lung Eval.  h/o PAH, complex sleep apnea and asthma here for follow up visit. Pt currently on 3-4L. Occasinoal episodes of dyspnea.  No fevers, chills, cough.   No palpitations, presyncopal symptoms, LE edema. ET is a few hundred feet.        Review of Systems   Constitutional:  Negative for chills and fever.   Respiratory:  Positive for shortness of breath. Negative for cough.    Cardiovascular: Negative.    Gastrointestinal: Negative.    Neurological: Negative.    Psychiatric/Behavioral: Negative.     All other systems reviewed and are negative.      Objective   Physical Exam  Vitals reviewed.   Constitutional:       Appearance: She is obese. She is ill-appearing.   HENT:      Head: Normocephalic and atraumatic.   Eyes:      Extraocular Movements: Extraocular movements intact.   Cardiovascular:      Rate and Rhythm: Normal rate and regular rhythm.      Heart sounds: Normal heart sounds.   Pulmonary:      Effort: Pulmonary effort is normal.      Breath sounds: Normal breath sounds.   Abdominal:      Palpations: Abdomen is soft.      Tenderness: There is no abdominal tenderness.   Musculoskeletal:      Cervical back: Normal range of motion.   Skin:     General: Skin is warm.   Neurological:      General: No focal deficit present.      Mental Status: She is alert and oriented to person, place, and time. Mental status is at baseline.   Psychiatric:         Mood and Affect: Mood normal.         Behavior: Behavior normal.         Assessment/Plan   Problem List Items Addressed This Visit       Idiopathic PAH (pulmonary arterial hypertension) (Multi)     Pulmonary arterial hypertension - WHO Functional Class II/III. Idiopathic. Cont macitentan and riociguat. Letitia started 7/2021. Lasix as needed when weight increases. Repeat TTE     RHC: 1/2024 mPAP 31 PAOP 10 PVR 3.1 CO 6.7  11/2022 mPAP 31, PAOP 11, PVR 2.6 CO 7.7 4/2021 mPAP 35, PAOP 2, PVR  4.5 CO 7.4 10/2019 mPAP 31   RVSP: 1/2023 mildly enlarged RV 1/2022 54.5 10/2020 62.1 11/2018 43.6  6MWT: 3/2024 240m 4/2023 180m 1/2022 150m 6/2021 231.7m 1/2021 140m 11/2019 250m          Relevant Orders    Transthoracic Echo (TTE) Complete    Chronic respiratory failure with hypoxia (Multi) - Primary     Cont 2L at night w/ ASV. Cont 3-4 L O2 during day         Obstructive sleep apnea syndrome     Complex sleep apnea - Cont ASV.  Pt wears 100% of nights for 4h57m on avg w/ AHI 0.9.         Asthma (HHS-HCC)     NL PFT 3/2024. controlled on Advair, singulair and proair. Cont Flonase.           Perioperative pulmonary risk assessment - There are no absolute contraindications for proceeding with endoscopy.  Patient is at high risk for perioperative pulmonary complications based on PAH, MARLON, asthma, and chronic hypoxic resp failure.  To further reduce patient's risk for postoperative pulmonary complications, DVT prophylaxis, incentive spirometry, and early ambulation is advised.      RTC in 4 months    Time Spent  Prep time on day of patient encounter: 10 minutes  Time spent directly with patient, family or caregiver: 20 minutes  Additional Time Spent on Patient Care Activities: 0 minutes  Documentation Time: 10 minutes  Other Time Spent: 0 minutes  Total: 40 minutes        Richy Raman MD 08/26/24 5:28 PM

## 2024-08-26 NOTE — PROGRESS NOTES
"Physical Therapy    Discharge Summary    Name: Farida Tralyor  MRN: [id number]  : 1960  Date: 24    Discharge Summary: PT    Discharge Information: Date of discharge 2024, Date of last visit 2024, Date of evaluation 2024, Number of attended visits 7, Referred by Dr. Romo, and Referred for M54.50    Therapy Summary: Patient presents to clinic insidious onset of R sided low back starting over 3 weeks ago. The patient denies radicular symptoms. It is localized to the R side of her low back and \"feel likes a painful knot\". \"I can only stand or walk a short time and then have to sit. It does go away once I sit.\"     Discharge Status: Patient partially met PT goals. She had no back pain at time of last session and demonstrating improved endurance.    Rehab Discharge Reason: Patient requested due to no reason given. Patient cancelled her sessions over the phone.   "

## 2024-08-26 NOTE — ASSESSMENT & PLAN NOTE
Pulmonary arterial hypertension - WHO Functional Class II/III. Idiopathic. Cont macitentan and riociguat. Letitia started 7/2021. Lasix as needed when weight increases. Repeat TTE     RHC: 1/2024 mPAP 31 PAOP 10 PVR 3.1 CO 6.7  11/2022 mPAP 31, PAOP 11, PVR 2.6 CO 7.7 4/2021 mPAP 35, PAOP 2, PVR 4.5 CO 7.4 10/2019 mPAP 31   RVSP: 1/2023 mildly enlarged RV 1/2022 54.5 10/2020 62.1 11/2018 43.6  6MWT: 3/2024 240m 4/2023 180m 1/2022 150m 6/2021 231.7m 1/2021 140m 11/2019 250m

## 2024-08-26 NOTE — LETTER
August 26, 2024     Mary Kate Leon MD  [address] [facility]  Specialty [facility] [address]    Patient: Farida Traylor   YOB: 1960   Date of Visit: 8/26/2024       Dear Dr. Mary Kate Leon MD:    Thank you for referring Farida Traylor to me for evaluation. Below are my notes for this consultation.  If you have questions, please do not hesitate to call me. I look forward to following your patient along with you.       Sincerely,     Richy Raman MD      CC: No Recipients  ______________________________________________________________________________________    Subjective  Patient ID: Farida Traylor is a 64 y.o. female who presents for Lung Eval.  h/o PAH, complex sleep apnea and asthma here for follow up visit. Pt currently on 3-4L. Occasinoal episodes of dyspnea.  No fevers, chills, cough.   No palpitations, presyncopal symptoms, LE edema. ET is a few hundred feet.        Review of Systems   Constitutional:  Negative for chills and fever.   Respiratory:  Positive for shortness of breath. Negative for cough.    Cardiovascular: Negative.    Gastrointestinal: Negative.    Neurological: Negative.    Psychiatric/Behavioral: Negative.     All other systems reviewed and are negative.      Objective  Physical Exam  Vitals reviewed.   Constitutional:       Appearance: She is obese. She is ill-appearing.   HENT:      Head: Normocephalic and atraumatic.   Eyes:      Extraocular Movements: Extraocular movements intact.   Cardiovascular:      Rate and Rhythm: Normal rate and regular rhythm.      Heart sounds: Normal heart sounds.   Pulmonary:      Effort: Pulmonary effort is normal.      Breath sounds: Normal breath sounds.   Abdominal:      Palpations: Abdomen is soft.      Tenderness: There is no abdominal tenderness.   Musculoskeletal:      Cervical back: Normal range of motion.   Skin:     General: Skin is warm.   Neurological:      General: No focal deficit present.      Mental Status:  She is alert and oriented to person, place, and time. Mental status is at baseline.   Psychiatric:         Mood and Affect: Mood normal.         Behavior: Behavior normal.         Assessment/Plan  Problem List Items Addressed This Visit       Idiopathic PAH (pulmonary arterial hypertension) (Multi)     Pulmonary arterial hypertension - WHO Functional Class II/III. Idiopathic. Cont macitentan and riociguat. Letitia started 7/2021. Lasix as needed when weight increases. Repeat TTE     RHC: 1/2024 mPAP 31 PAOP 10 PVR 3.1 CO 6.7  11/2022 mPAP 31, PAOP 11, PVR 2.6 CO 7.7 4/2021 mPAP 35, PAOP 2, PVR 4.5 CO 7.4 10/2019 mPAP 31   RVSP: 1/2023 mildly enlarged RV 1/2022 54.5 10/2020 62.1 11/2018 43.6  6MWT: 3/2024 240m 4/2023 180m 1/2022 150m 6/2021 231.7m 1/2021 140m 11/2019 250m          Relevant Orders    Transthoracic Echo (TTE) Complete    Chronic respiratory failure with hypoxia (Multi) - Primary     Cont 2L at night w/ ASV. Cont 3-4 L O2 during day         Obstructive sleep apnea syndrome     Complex sleep apnea - Cont ASV.  Pt wears 100% of nights for 4h57m on avg w/ AHI 0.9.         Asthma (HHS-HCC)     NL PFT 3/2024. controlled on Advair, singulair and proair. Cont Flonase.           Perioperative pulmonary risk assessment - There are no absolute contraindications for proceeding with endoscopy.  Patient is at high risk for perioperative pulmonary complications based on PAH, MARLON, asthma, and chronic hypoxic resp failure.  To further reduce patient's risk for postoperative pulmonary complications, DVT prophylaxis, incentive spirometry, and early ambulation is advised.      RTC in 4 months    Time Spent  Prep time on day of patient encounter: 10 minutes  Time spent directly with patient, family or caregiver: 20 minutes  Additional Time Spent on Patient Care Activities: 0 minutes  Documentation Time: 10 minutes  Other Time Spent: 0 minutes  Total: 40 minutes        Richy Raman MD 08/26/24 5:28 PM

## 2024-08-27 DIAGNOSIS — R13.10 DYSPHAGIA, UNSPECIFIED TYPE: Primary | ICD-10-CM

## 2024-08-27 DIAGNOSIS — R10.13 DYSPEPSIA: ICD-10-CM

## 2024-08-27 DIAGNOSIS — I27.0 IDIOPATHIC PAH (PULMONARY ARTERIAL HYPERTENSION) (MULTI): ICD-10-CM

## 2024-08-27 RX ORDER — RIOCIGUAT 2.5 MG/1
2.5 TABLET, FILM COATED ORAL 3 TIMES DAILY
Qty: 30 TABLET | Refills: 11 | Status: SHIPPED | OUTPATIENT
Start: 2024-08-27

## 2024-08-27 NOTE — PROGRESS NOTES
Pulmonary note reviewed prior to her scheduled colonoscopy. Will plan to add on EGD as well given persistent symptoms of dyspepsia, dysphagia, negative SIBO breath test and UGIS. Patient is agreeable to plan.

## 2024-09-04 ENCOUNTER — ANESTHESIA EVENT (OUTPATIENT)
Dept: OPERATING ROOM | Facility: HOSPITAL | Age: 64
End: 2024-09-04
Payer: COMMERCIAL

## 2024-09-05 ENCOUNTER — ANESTHESIA (OUTPATIENT)
Dept: OPERATING ROOM | Facility: HOSPITAL | Age: 64
End: 2024-09-05
Payer: COMMERCIAL

## 2024-09-05 ENCOUNTER — HOSPITAL ENCOUNTER (OUTPATIENT)
Dept: OPERATING ROOM | Facility: HOSPITAL | Age: 64
Setting detail: OUTPATIENT SURGERY
Discharge: HOME | End: 2024-09-05
Payer: COMMERCIAL

## 2024-09-05 VITALS
TEMPERATURE: 97.7 F | HEART RATE: 74 BPM | SYSTOLIC BLOOD PRESSURE: 139 MMHG | OXYGEN SATURATION: 97 % | RESPIRATION RATE: 15 BRPM | DIASTOLIC BLOOD PRESSURE: 72 MMHG

## 2024-09-05 DIAGNOSIS — D12.6 TUBULAR ADENOMA OF COLON: ICD-10-CM

## 2024-09-05 DIAGNOSIS — R13.10 DYSPHAGIA, UNSPECIFIED TYPE: ICD-10-CM

## 2024-09-05 DIAGNOSIS — R10.13 DYSPEPSIA: ICD-10-CM

## 2024-09-05 PROCEDURE — 3600000002 HC OR TIME - INITIAL BASE CHARGE - PROCEDURE LEVEL TWO: Performed by: ANESTHESIOLOGY

## 2024-09-05 PROCEDURE — 2500000005 HC RX 250 GENERAL PHARMACY W/O HCPCS: Performed by: ANESTHESIOLOGY

## 2024-09-05 PROCEDURE — 2500000001 HC RX 250 WO HCPCS SELF ADMINISTERED DRUGS (ALT 637 FOR MEDICARE OP): Performed by: NURSE ANESTHETIST, CERTIFIED REGISTERED

## 2024-09-05 PROCEDURE — 3600000007 HC OR TIME - EACH INCREMENTAL 1 MINUTE - PROCEDURE LEVEL TWO: Performed by: ANESTHESIOLOGY

## 2024-09-05 PROCEDURE — 45380 COLONOSCOPY AND BIOPSY: CPT | Performed by: STUDENT IN AN ORGANIZED HEALTH CARE EDUCATION/TRAINING PROGRAM

## 2024-09-05 PROCEDURE — 3700000001 HC GENERAL ANESTHESIA TIME - INITIAL BASE CHARGE: Performed by: ANESTHESIOLOGY

## 2024-09-05 PROCEDURE — 2500000005 HC RX 250 GENERAL PHARMACY W/O HCPCS: Performed by: NURSE ANESTHETIST, CERTIFIED REGISTERED

## 2024-09-05 PROCEDURE — 3700000002 HC GENERAL ANESTHESIA TIME - EACH INCREMENTAL 1 MINUTE: Performed by: ANESTHESIOLOGY

## 2024-09-05 PROCEDURE — 7100000009 HC PHASE TWO TIME - INITIAL BASE CHARGE: Performed by: ANESTHESIOLOGY

## 2024-09-05 PROCEDURE — 2500000004 HC RX 250 GENERAL PHARMACY W/ HCPCS (ALT 636 FOR OP/ED): Performed by: NURSE ANESTHETIST, CERTIFIED REGISTERED

## 2024-09-05 PROCEDURE — 43239 EGD BIOPSY SINGLE/MULTIPLE: CPT | Performed by: STUDENT IN AN ORGANIZED HEALTH CARE EDUCATION/TRAINING PROGRAM

## 2024-09-05 PROCEDURE — 2500000004 HC RX 250 GENERAL PHARMACY W/ HCPCS (ALT 636 FOR OP/ED): Performed by: ANESTHESIOLOGY

## 2024-09-05 PROCEDURE — 7100000010 HC PHASE TWO TIME - EACH INCREMENTAL 1 MINUTE: Performed by: ANESTHESIOLOGY

## 2024-09-05 PROCEDURE — 2720000007 HC OR 272 NO HCPCS: Performed by: ANESTHESIOLOGY

## 2024-09-05 RX ORDER — PROPOFOL 10 MG/ML
INJECTION, EMULSION INTRAVENOUS CONTINUOUS PRN
Status: DISCONTINUED | OUTPATIENT
Start: 2024-09-05 | End: 2024-09-05

## 2024-09-05 RX ORDER — FERROUS SULFATE 325(65) MG
65 TABLET, DELAYED RELEASE (ENTERIC COATED) ORAL
COMMUNITY

## 2024-09-05 RX ORDER — ASPIRIN 81 MG/1
81 TABLET ORAL DAILY
COMMUNITY

## 2024-09-05 RX ORDER — SODIUM CHLORIDE, SODIUM LACTATE, POTASSIUM CHLORIDE, CALCIUM CHLORIDE 600; 310; 30; 20 MG/100ML; MG/100ML; MG/100ML; MG/100ML
100 INJECTION, SOLUTION INTRAVENOUS CONTINUOUS
Status: DISCONTINUED | OUTPATIENT
Start: 2024-09-05 | End: 2024-09-06 | Stop reason: HOSPADM

## 2024-09-05 RX ORDER — ONDANSETRON HYDROCHLORIDE 2 MG/ML
4 INJECTION, SOLUTION INTRAVENOUS ONCE AS NEEDED
Status: DISCONTINUED | OUTPATIENT
Start: 2024-09-05 | End: 2024-09-06 | Stop reason: HOSPADM

## 2024-09-05 RX ORDER — LIDOCAINE HYDROCHLORIDE 20 MG/ML
SOLUTION OROPHARYNGEAL AS NEEDED
Status: DISCONTINUED | OUTPATIENT
Start: 2024-09-05 | End: 2024-09-05

## 2024-09-05 RX ORDER — LANOLIN ALCOHOL/MO/W.PET/CERES
1000 CREAM (GRAM) TOPICAL DAILY
COMMUNITY

## 2024-09-05 RX ORDER — DROPERIDOL 2.5 MG/ML
0.62 INJECTION, SOLUTION INTRAMUSCULAR; INTRAVENOUS ONCE AS NEEDED
Status: DISCONTINUED | OUTPATIENT
Start: 2024-09-05 | End: 2024-09-06 | Stop reason: HOSPADM

## 2024-09-05 RX ORDER — SODIUM CHLORIDE, SODIUM LACTATE, POTASSIUM CHLORIDE, CALCIUM CHLORIDE 600; 310; 30; 20 MG/100ML; MG/100ML; MG/100ML; MG/100ML
100 INJECTION, SOLUTION INTRAVENOUS CONTINUOUS
Status: DISCONTINUED | OUTPATIENT
Start: 2024-09-05 | End: 2024-09-05

## 2024-09-05 RX ORDER — LIDOCAINE HCL/PF 100 MG/5ML
SYRINGE (ML) INTRAVENOUS AS NEEDED
Status: DISCONTINUED | OUTPATIENT
Start: 2024-09-05 | End: 2024-09-05

## 2024-09-05 RX ORDER — SODIUM CHLORIDE, SODIUM LACTATE, POTASSIUM CHLORIDE, CALCIUM CHLORIDE 600; 310; 30; 20 MG/100ML; MG/100ML; MG/100ML; MG/100ML
20 INJECTION, SOLUTION INTRAVENOUS CONTINUOUS
Status: DISCONTINUED | OUTPATIENT
Start: 2024-09-05 | End: 2024-09-06 | Stop reason: HOSPADM

## 2024-09-05 SDOH — HEALTH STABILITY: MENTAL HEALTH: CURRENT SMOKER: 0

## 2024-09-05 ASSESSMENT — ENCOUNTER SYMPTOMS
JOINT SWELLING: 0
SHORTNESS OF BREATH: 0
TROUBLE SWALLOWING: 0
SLEEP DISTURBANCE: 0
CONSTIPATION: 0
FEVER: 0
HEADACHES: 0
CHILLS: 0
UNEXPECTED WEIGHT CHANGE: 0
DIFFICULTY URINATING: 0
VOMITING: 0
COUGH: 0
NAUSEA: 0
COLOR CHANGE: 0
DIARRHEA: 0
SPEECH DIFFICULTY: 0
DIZZINESS: 0
WHEEZING: 0
ARTHRALGIAS: 0
CONFUSION: 0
ABDOMINAL DISTENTION: 0
LIGHT-HEADEDNESS: 0
ABDOMINAL PAIN: 0

## 2024-09-05 ASSESSMENT — COLUMBIA-SUICIDE SEVERITY RATING SCALE - C-SSRS
1. IN THE PAST MONTH, HAVE YOU WISHED YOU WERE DEAD OR WISHED YOU COULD GO TO SLEEP AND NOT WAKE UP?: NO
6. HAVE YOU EVER DONE ANYTHING, STARTED TO DO ANYTHING, OR PREPARED TO DO ANYTHING TO END YOUR LIFE?: NO
2. HAVE YOU ACTUALLY HAD ANY THOUGHTS OF KILLING YOURSELF?: NO

## 2024-09-05 ASSESSMENT — PAIN - FUNCTIONAL ASSESSMENT
PAIN_FUNCTIONAL_ASSESSMENT: 0-10

## 2024-09-05 ASSESSMENT — PAIN SCALES - GENERAL
PAINLEVEL_OUTOF10: 0 - NO PAIN

## 2024-09-05 NOTE — DISCHARGE INSTRUCTIONS
Patient Instructions after a Colonoscopy      The anesthetics, sedatives or narcotics which were given to you today will be acting in your body for the next 24 hours, so you might feel a little sleepy or groggy.  This feeling should slowly wear off. Carefully read and follow the instructions.     You received sedation today:  - Do not drive or operate any machinery or power tools of any kind.   - No alcoholic beverages today, not even beer or wine.  - Do not make any important decisions or sign any legal documents.  - No over the counter medications that contain alcohol or that may cause drowsiness.  - Do not make any important decisions or sign any legal documents.  - Make sure you have someone with you for first 24 hours.    While it is common to experience mild to moderate abdominal distention, gas, or belching after your procedure, if any of these symptoms occur following discharge from the GI Lab or within one week of having your procedure, call the [facility] to be advised whether a visit to your nearest Urgent Care or Emergency Department is indicated.  Take this paper with you if you go.     - If you develop an allergic reaction to the medications that were given during your procedure such as difficulty breathing, rash, hives, severe nausea, vomiting or lightheadedness.  - If you experience chest pain, shortness of breath, severe abdominal pain, fevers and chills.  -If you develop signs and symptoms of bleeding such as blood in your spit, if your stools turn black, tarry, or bloody  - If you have not urinated within 8 hours following your procedure.  - If your IV site becomes painful, red, inflamed, or looks infected.    If you received a biopsy/polypectomy/sphincterotomy the following instructions apply below:    __ Do not use Aspirin containing products, non-steroidal medications or anti-coagulants for one week following your procedure. (Examples of these types of medications are: Advil,  Arthrotec, Aleve, Coumadin, Ecotrin, Heparin, Ibuprofen, Indocin, Motrin, Naprosyn, Nuprin, Plavix, Vioxx, and Voltarin, or their generic forms.  This list is not all-inclusive.  Check with your physician or pharmacist before resuming medications.)   __ Eat a soft diet today.  Avoid foods that are poorly digested for the next 24 hours.  These foods would include: nuts, beans, lettuce, red meats, and fried foods. Start with liquids and advance your diet as tolerated, gradually work up to eating solids.   __ Do not have a Barium Study or Enema for one week.    Your physician recommends the additional following instructions:    -You have a contact number available for emergencies. The signs and symptoms of potential delayed complications were discussed with you. You may return to normal activities tomorrow.  -Resume your previous diet.  -Continue your present medications.   -We are waiting for your pathology results.  -Your physician has recommended a repeat colonoscopy (date to be determined after pending pathology results are reviewed) for surveillance based on pathology results.  -The findings and recommendations have been discussed with you.  -The findings and recommendations were discussed with your family.  - Please see Medication Reconciliation Form for new medication/medications prescribed.       If you experience any problems or have any questions following discharge from the GI Lab, please call:        Nurse Signature                                                                        Date___________________                                                                            Patient/Responsible Party Signature                                        Date___________________

## 2024-09-05 NOTE — H&P
History Of Present Illness  Farida Traylor is a 64 y.o. female presenting for polyp surveillance colonoscopy and upper endoscopy for dysphagia and dyspepsia with negative non invasive work-up thus far, negative SIBO breath test and UGIS.   Last colonoscopy 2018 w one tubular adenoma, report and path reviewed.    Pulmonary note reviewed    Past Medical History  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    Acquired plantar porokeratosis    Acute upper respiratory infection, unspecified 01/14/2020    URI, acute    Allergic rhinitis due to animal (cat) (dog) hair and dander 01/02/2020    Allergic rhinitis due to dogs    Allergic rhinitis due to pollen 01/02/2020    Allergic rhinitis due to pollen    Allergic rhinitis due to pollen 02/26/2018    Seasonal allergic rhinitis due to pollen    Allergy status to unspecified drugs, medicaments and biological substances 06/30/2015    History of allergy    Anemia, unspecified 07/23/2018    Normocytic anemia    Anxiety disorder due to known physiological condition 06/05/2013    Anxiety disorder due to medical condition    Anxiety disorder, unspecified 01/28/2016    Anxiety    Asymptomatic varicose veins of bilateral lower extremities 07/16/2019    Varicose veins of legs    Atypical squamous cells of undetermined significance on cytologic smear of cervix (ASC-US) 06/26/2013    Pap smear abnormality of cervix with ASCUS favoring benign    Candidiasis, unspecified 12/10/2019    Yeast infection    Cellulitis of left finger 07/31/2018    Paronychia of finger of left hand    Changes in skin texture 03/09/2021    Cracked skin    Contusion of left lesser toe(s) with damage to nail, initial encounter 02/22/2018    Subungual contusion of toenail of left foot    Contusion of right hand, sequela 08/04/2020    Traumatic ecchymosis of hand, right, sequela    Corns and callosities 05/25/2021    Callus of foot    Disorder of  pigmentation, unspecified 09/28/2016    Atypical pigmented skin lesion    Disorder of the skin and subcutaneous tissue, unspecified 08/27/2020    Lesion of subcutaneous tissue    Dorsalgia, unspecified 09/23/2021    Acute back pain    Dorsalgia, unspecified 01/29/2019    Costovertebral angle tenderness    Encounter for general adult medical examination without abnormal findings 06/08/2020    Medicare annual wellness visit, subsequent    Encounter for gynecological examination (general) (routine) without abnormal findings 12/14/2021    Encounter for gynecological examination    Encounter for gynecological examination (general) (routine) without abnormal findings 12/11/2020    Encounter for gynecological examination    Encounter for other screening for malignant neoplasm of breast     Breast cancer screening    Encounter for screening for malignant neoplasm of cervix     Encounter for screening for malignant neoplasm of cervix    Encounter for screening for malignant neoplasm of cervix 11/04/2014    Screening for malignant neoplasm of cervix    Encounter for screening for malignant neoplasm of cervix     Cervical cancer screening    Hepatomegaly, not elsewhere classified 04/21/2021    Liver mass, right lobe    History of falling 12/28/2018    Status post fall    Inappropriate diet and eating habits 05/13/2021    Inappropriate diet and eating habits    Irregular menstruation, unspecified     Irregular periods/menstrual cycles    Localized edema 07/29/2015    Pedal edema    Localized swelling, mass and lump, left lower limb 09/11/2020    Lump of left thigh    Localized swelling, mass and lump, left lower limb 09/24/2020    Mass of left thigh    Localized swelling, mass and lump, unspecified lower limb 09/24/2020    Mass of thigh    Melanocytic nevi, unspecified 09/10/2019    Numerous skin moles    Multiple births, unspecified, all liveborn ([facility])     Multiple births, all liveborn    Other allergic rhinitis 01/02/2020     Allergic rhinitis due to dust mite    Other allergic rhinitis 01/02/2020    Allergic rhinitis due to mold    Other conditions influencing health status 06/26/2013    Uterine Rupture    Other conditions influencing health status     Normal colonoscopy    Other conditions influencing health status 02/05/2019    History of burning on urination    Other conditions influencing health status 12/14/2021    History of cough    Other conditions influencing health status 02/14/2019    History of dyspareunia    Other conditions influencing health status 01/14/2020    History of cough    Other conditions influencing health status 06/05/2013    Leiomyomatous Degeneration Of The Uterus    Other conditions influencing health status 06/05/2013    Viremia    Other hypertrophic disorders of the skin 07/29/2015    Skin tag    Other shoulder lesions, right shoulder 11/11/2019    Tendinitis of right rotator cuff    Other specified disorders of bone, thigh 09/09/2020    Pain of left femur    Other specified noninflammatory disorders of vagina 03/12/2019    Vaginal dryness    Other specified postprocedural states 05/04/2017    History of arthroscopic knee surgery    Other specified soft tissue disorders 10/08/2020    Soft tissue mass    Other specified soft tissue disorders 08/04/2020    Swelling of right hand    Pain in left thigh 08/27/2020    Pain of left thigh    Pain in right foot 03/09/2021    Right foot pain    Pain in right hip 12/28/2018    Right hip pain    Pain in right knee 03/12/2021    Bilateral knee pain    Pain in right knee 03/19/2021    Right knee pain    Pain in right leg 05/25/2021    Bilateral pain of leg and foot    Pain in right leg 01/12/2022    Bilateral leg and foot pain    Pain in unspecified joint     Joint pain    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 02/22/2016    History of tetralogy of Fallot    Personal history of contraception     Personal history of  contraceptive use    Personal history of diseases of the skin and subcutaneous tissue 12/22/2020    History of ingrown nail    Personal history of malignant neoplasm of other parts of uterus     History of malignant neoplasm of endometrium    Personal history of other (healed) physical injury and trauma 03/01/2017    History of sprain of ankle    Personal history of other benign neoplasm 09/10/2019    History of other benign neoplasm    Personal history of other benign neoplasm 05/06/2014    History of uterine leiomyoma    Personal history of other benign neoplasm 12/11/2020    History of uterine leiomyoma    Personal history of other diseases of the circulatory system 04/14/2021    History of atrial fibrillation    Personal history of other diseases of the circulatory system 04/14/2021    History of atrial fibrillation    Personal history of other diseases of the circulatory system 04/14/2021    History of atrial fibrillation    Personal history of other diseases of the circulatory system 02/01/2017    History of hypertension    Personal history of other diseases of the circulatory system 04/14/2021    History of hypotension    Personal history of other diseases of the digestive system 03/24/2021    History of gastroesophageal reflux (GERD)    Personal history of other diseases of the female genital tract 12/28/2016    History of postmenopausal bleeding    Personal history of other diseases of the female genital tract 05/19/2022    History of postmenopausal bleeding    Personal history of other diseases of the female genital tract     History of vaginal discharge    Personal history of other diseases of the female genital tract     Vaginal delivery    Personal history of other diseases of the musculoskeletal system and connective tissue     Personal history of arthritis    Personal history of other diseases of the musculoskeletal system and connective tissue 11/18/2019    History of muscle spasm    Personal history  of other diseases of the respiratory system 05/27/2021    History of asthma    Personal history of other diseases of the respiratory system 04/12/2019    History of acute bronchitis    Personal history of other diseases of the respiratory system 01/04/2020    History of bronchitis    Personal history of other endocrine, nutritional and metabolic disease 02/01/2017    History of hyperlipidemia    Personal history of other endocrine, nutritional and metabolic disease 04/18/2016    History of obesity    Personal history of other endocrine, nutritional and metabolic disease 05/26/2020    History of thyroid nodule    Personal history of other infectious and parasitic diseases     History of varicella    Personal history of other medical treatment 12/11/2020    History of screening mammography    Personal history of other medical treatment 12/14/2021    History of screening mammography    Personal history of other medical treatment     History of mammogram    Personal history of other specified conditions 08/21/2019    History of gross hematuria    Personal history of other specified conditions 02/02/2017    History of weight gain    Personal history of other specified conditions 12/07/2020    History of chest pain    Personal history of other specified conditions 04/14/2021    History of palpitations    Personal history of other specified conditions 12/05/2014    History of vertigo    Personal history of other specified conditions 08/25/2021    History of exertional chest pain    Personal history of other specified conditions     History of shortness of breath    Personal history of other specified conditions     H/O heartburn    Personal history of other specified conditions 01/12/2018    History of urinary frequency    Personal history of transient ischemic attack (TIA), and 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 12/30/2015    History of TIA (transient ischemic attack)    Personal history of urinary (tract)  infections 09/02/2021    History of recurrent cystitis    Personal history of urinary (tract) infections 05/23/2019    History of cystitis    Personal history of urinary (tract) infections 09/02/2021    History of recurrent urinary tract infection    Personal history of urinary calculi 09/12/2019    History of renal calculi    Polyphagia 05/13/2021    Polyphagia    Primary central sleep apnea 10/21/2017    Central sleep apnea    Pulmonary hypertension (Multi)     Pure hypercholesterolemia, unspecified     High cholesterol    Residual hemorrhoidal skin tags 07/06/2017    External hemorrhoid    Sleep apnea     Slurred speech 01/02/2015    Slurred speech    Tinea pedis 05/25/2021    Tinea pedis of right foot    Unspecified abdominal pain 04/13/2021    Abdominal pain, acute    Unspecified injury of right wrist, hand and finger(s), sequela 08/04/2020    Hand trauma, right, sequela    Unspecified internal derangement of unspecified knee 03/01/2017    Internal derangement of knee    Unspecified symptoms and signs involving the genitourinary system 12/16/2021    UTI symptoms    Unspecified symptoms and signs involving the genitourinary system 02/05/2019    UTI symptoms    Unspecified symptoms and signs involving the genitourinary system 09/03/2020    UTI symptoms    Urinary tract infection, site not specified 01/17/2020    Acute urinary tract infection    Urinary tract infection, site not specified 01/10/2022    Acute UTI    Xerosis cutis 09/10/2019    Dry skin dermatitis     Surgical History  Past Surgical History:   Procedure Laterality Date    CARDIAC CATHETERIZATION Right 01/04/2024    Procedure: Right Heart Cath;  Surgeon: Richy Raman MD;  Location: [facility] Cardiac Cath Lab;  Service: Cardiovascular;  Laterality: Right;    CARDIAC CATHETERIZATION      KNEE SURGERY  05/04/2017    Knee Surgery    MOUTH SURGERY  11/04/2014    Oral Surgery Tooth Extraction    MR HEAD ANGIO WO IV CONTRAST  02/09/2016    MR HEAD ANGIO WO IV  CONTRAST [facility]    OTHER SURGICAL HISTORY  07/31/2013    Wrist Carpectomy    OTHER SURGICAL HISTORY  09/10/2019    [place] tooth extraction    OTHER SURGICAL HISTORY  11/30/2018    Complete colonoscopy    OTHER SURGICAL HISTORY  06/08/2020    Thyroid biopsy     Social History  She reports that she has never smoked. She has never used smokeless tobacco. She reports current alcohol use. She reports that she does not use drugs.    Family History  Family History   Problem Relation Name Age of Onset    Hypertension Mother      Breast cancer Mother      Other (cardiac disorder) Mother      Cardiomyopathy Mother      Diabetes Mother      Other (chronic kidney disease) Mother      Stroke Brother      Brain Aneurysm Brother          Allergies  Allergies   Allergen Reactions    Grass Pollen Other    Other Unknown    Pollen Extracts Unknown    Tree And Shrub Pollen Other     Review of Systems   Constitutional:  Negative for chills, fever and unexpected weight change.   HENT:  Negative for congestion and trouble swallowing.    Respiratory:  Negative for cough, shortness of breath and wheezing.    Cardiovascular:  Negative for chest pain.   Gastrointestinal:  Negative for abdominal distention, abdominal pain, constipation, diarrhea, nausea and vomiting.   Genitourinary:  Negative for difficulty urinating.   Musculoskeletal:  Negative for arthralgias and joint swelling.   Skin:  Negative for color change.   Neurological:  Negative for dizziness, speech difficulty, light-headedness and headaches.   Psychiatric/Behavioral:  Negative for confusion and sleep disturbance.         Physical Exam  Constitutional:       General: She is awake.      Appearance: Normal appearance.   HENT:      Head: Normocephalic and atraumatic.      Nose: Nose normal.      Mouth/Throat:      Mouth: Mucous membranes are moist.   Eyes:      Pupils: Pupils are equal, round, and reactive to light.   Neck:      Thyroid: No thyroid mass.       Trachea: Phonation normal.   Cardiovascular:      Rate and Rhythm: Normal rate and regular rhythm.   Pulmonary:      Effort: Pulmonary effort is normal. No respiratory distress.      Breath sounds: Normal air entry. No decreased breath sounds.   Abdominal:      General: Bowel sounds are normal. There is no distension.      Palpations: Abdomen is soft.      Tenderness: There is no abdominal tenderness.   Musculoskeletal:      Cervical back: Neck supple.      Right lower leg: No edema.      Left lower leg: No edema.   Skin:     General: Skin is warm.      Capillary Refill: Capillary refill takes less than 2 seconds.   Neurological:      General: No focal deficit present.      Mental Status: She is alert and oriented to person, place, and time. Mental status is at baseline.      Cranial Nerves: Cranial nerves 2-12 are intact.      Motor: Motor function is intact.   Psychiatric:         Attention and Perception: Attention and perception normal.         Mood and Affect: Mood normal.         Speech: Speech normal.         Behavior: Behavior normal.          Last Recorded Vitals  Blood pressure (!) 147/43, pulse 71, temperature 36 °C (96.8 °F), resp. rate 20, SpO2 96%.    Assessment/Plan   Dysphagia/Dyspepsia/Hx of TA  -proceed with EGD/COLON     Mary Kate Leon MD

## 2024-09-05 NOTE — ANESTHESIA POSTPROCEDURE EVALUATION
Patient: Farida Traylor    Procedure Summary       Date: 09/05/24 Room / Location: [facility] OR    Anesthesia Start: 0843 Anesthesia Stop: 0943    Procedures:       COLONOSCOPY      EGD Diagnosis:       Tubular adenoma of colon      Dysphagia, unspecified type      Dyspepsia    Scheduled Providers: Mary Kate Leon MD; Morro Zapata MD Responsible Provider: Morro Zapata MD    Anesthesia Type: MAC ASA Status: 4            Anesthesia Type: MAC    Vitals Value Taken Time   /66 09/05/24 0938   Temp 36.5 °C (97.7 °F) 09/05/24 0938   Pulse 74 09/05/24 0945   Resp 15 09/05/24 0945   SpO2 97 % 09/05/24 0945       Anesthesia Post Evaluation    Patient location during evaluation: PACU  Patient participation: complete - patient participated  Level of consciousness: awake  Pain management: adequate  Multimodal analgesia pain management approach  Airway patency: patent  Two or more strategies used to mitigate risk of obstructive sleep apnea  Cardiovascular status: acceptable  Respiratory status: acceptable  Hydration status: acceptable  Postoperative Nausea and Vomiting: none        No notable events documented.

## 2024-09-05 NOTE — ANESTHESIA PREPROCEDURE EVALUATION
Patient: Farida Traylor    Procedure Information       Date/Time: 09/05/24 0810    Scheduled providers: Mary Kate Leon MD; Morro Zapata MD    Procedures:       COLONOSCOPY      EGD    Location: [facility] OR          Vitals:    09/05/24 0710   BP: (!) 147/43   Pulse: 71   Resp: 20   Temp: 36 °C (96.8 °F)   SpO2: 96%       Past Surgical History:   Procedure Laterality Date    CARDIAC CATHETERIZATION Right 01/04/2024    Procedure: Right Heart Cath;  Surgeon: Richy Raman MD;  Location: [facility] Cardiac Cath Lab;  Service: Cardiovascular;  Laterality: Right;    CARDIAC CATHETERIZATION      KNEE SURGERY  05/04/2017    Knee Surgery    MOUTH SURGERY  11/04/2014    Oral Surgery Tooth Extraction    MR HEAD ANGIO WO IV CONTRAST  02/09/2016    MR HEAD ANGIO WO IV CONTRAST [facility]    OTHER SURGICAL HISTORY  07/31/2013    Wrist Carpectomy    OTHER SURGICAL HISTORY  09/10/2019    [place] tooth extraction    OTHER SURGICAL HISTORY  11/30/2018    Complete colonoscopy    OTHER SURGICAL HISTORY  06/08/2020    Thyroid biopsy     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    Acquired plantar porokeratosis    Acute upper respiratory infection, unspecified 01/14/2020    URI, acute    Allergic rhinitis due to animal (cat) (dog) hair and dander 01/02/2020    Allergic rhinitis due to dogs    Allergic rhinitis due to pollen 01/02/2020    Allergic rhinitis due to pollen    Allergic rhinitis due to pollen 02/26/2018    Seasonal allergic rhinitis due to pollen    Allergy status to unspecified drugs, medicaments and biological substances 06/30/2015    History of allergy    Anemia, unspecified 07/23/2018    Normocytic anemia    Anxiety disorder due to known physiological condition 06/05/2013    Anxiety disorder due to medical condition    Anxiety disorder, unspecified 01/28/2016    Anxiety    Asymptomatic varicose veins of bilateral lower  extremities 07/16/2019    Varicose veins of legs    Atypical squamous cells of undetermined significance on cytologic smear of cervix (ASC-US) 06/26/2013    Pap smear abnormality of cervix with ASCUS favoring benign    Candidiasis, unspecified 12/10/2019    Yeast infection    Cellulitis of left finger 07/31/2018    Paronychia of finger of left hand    Changes in skin texture 03/09/2021    Cracked skin    Contusion of left lesser toe(s) with damage to nail, initial encounter 02/22/2018    Subungual contusion of toenail of left foot    Contusion of right hand, sequela 08/04/2020    Traumatic ecchymosis of hand, right, sequela    Corns and callosities 05/25/2021    Callus of foot    Disorder of pigmentation, unspecified 09/28/2016    Atypical pigmented skin lesion    Disorder of the skin and subcutaneous tissue, unspecified 08/27/2020    Lesion of subcutaneous tissue    Dorsalgia, unspecified 09/23/2021    Acute back pain    Dorsalgia, unspecified 01/29/2019    Costovertebral angle tenderness    Encounter for general adult medical examination without abnormal findings 06/08/2020    Medicare annual wellness visit, subsequent    Encounter for gynecological examination (general) (routine) without abnormal findings 12/14/2021    Encounter for gynecological examination    Encounter for gynecological examination (general) (routine) without abnormal findings 12/11/2020    Encounter for gynecological examination    Encounter for other screening for malignant neoplasm of breast     Breast cancer screening    Encounter for screening for malignant neoplasm of cervix     Encounter for screening for malignant neoplasm of cervix    Encounter for screening for malignant neoplasm of cervix 11/04/2014    Screening for malignant neoplasm of cervix    Encounter for screening for malignant neoplasm of cervix     Cervical cancer screening    Hepatomegaly, not elsewhere classified 04/21/2021    Liver mass, right lobe    History of falling  12/28/2018    Status post fall    Inappropriate diet and eating habits 05/13/2021    Inappropriate diet and eating habits    Irregular menstruation, unspecified     Irregular periods/menstrual cycles    Localized edema 07/29/2015    Pedal edema    Localized swelling, mass and lump, left lower limb 09/11/2020    Lump of left thigh    Localized swelling, mass and lump, left lower limb 09/24/2020    Mass of left thigh    Localized swelling, mass and lump, unspecified lower limb 09/24/2020    Mass of thigh    Melanocytic nevi, unspecified 09/10/2019    Numerous skin moles    Multiple births, unspecified, all liveborn ([facility])     Multiple births, all liveborn    Other allergic rhinitis 01/02/2020    Allergic rhinitis due to dust mite    Other allergic rhinitis 01/02/2020    Allergic rhinitis due to mold    Other conditions influencing health status 06/26/2013    Uterine Rupture    Other conditions influencing health status     Normal colonoscopy    Other conditions influencing health status 02/05/2019    History of burning on urination    Other conditions influencing health status 12/14/2021    History of cough    Other conditions influencing health status 02/14/2019    History of dyspareunia    Other conditions influencing health status 01/14/2020    History of cough    Other conditions influencing health status 06/05/2013    Leiomyomatous Degeneration Of The Uterus    Other conditions influencing health status 06/05/2013    Viremia    Other hypertrophic disorders of the skin 07/29/2015    Skin tag    Other shoulder lesions, right shoulder 11/11/2019    Tendinitis of right rotator cuff    Other specified disorders of bone, thigh 09/09/2020    Pain of left femur    Other specified noninflammatory disorders of vagina 03/12/2019    Vaginal dryness    Other specified postprocedural states 05/04/2017    History of arthroscopic knee surgery    Other specified soft tissue disorders 10/08/2020    Soft tissue mass    Other  specified soft tissue disorders 08/04/2020    Swelling of right hand    Pain in left thigh 08/27/2020    Pain of left thigh    Pain in right foot 03/09/2021    Right foot pain    Pain in right hip 12/28/2018    Right hip pain    Pain in right knee 03/12/2021    Bilateral knee pain    Pain in right knee 03/19/2021    Right knee pain    Pain in right leg 05/25/2021    Bilateral pain of leg and foot    Pain in right leg 01/12/2022    Bilateral leg and foot pain    Pain in unspecified joint     Joint pain    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 02/22/2016    History of tetralogy of Fallot    Personal history of contraception     Personal history of contraceptive use    Personal history of diseases of the skin and subcutaneous tissue 12/22/2020    History of ingrown nail    Personal history of malignant neoplasm of other parts of uterus     History of malignant neoplasm of endometrium    Personal history of other (healed) physical injury and trauma 03/01/2017    History of sprain of ankle    Personal history of other benign neoplasm 09/10/2019    History of other benign neoplasm    Personal history of other benign neoplasm 05/06/2014    History of uterine leiomyoma    Personal history of other benign neoplasm 12/11/2020    History of uterine leiomyoma    Personal history of other diseases of the circulatory system 04/14/2021    History of atrial fibrillation    Personal history of other diseases of the circulatory system 04/14/2021    History of atrial fibrillation    Personal history of other diseases of the circulatory system 04/14/2021    History of atrial fibrillation    Personal history of other diseases of the circulatory system 02/01/2017    History of hypertension    Personal history of other diseases of the circulatory system 04/14/2021    History of hypotension    Personal history of other diseases of the digestive system 03/24/2021    History of  gastroesophageal reflux (GERD)    Personal history of other diseases of the female genital tract 12/28/2016    History of postmenopausal bleeding    Personal history of other diseases of the female genital tract 05/19/2022    History of postmenopausal bleeding    Personal history of other diseases of the female genital tract     History of vaginal discharge    Personal history of other diseases of the female genital tract     Vaginal delivery    Personal history of other diseases of the musculoskeletal system and connective tissue     Personal history of arthritis    Personal history of other diseases of the musculoskeletal system and connective tissue 11/18/2019    History of muscle spasm    Personal history of other diseases of the respiratory system 05/27/2021    History of asthma    Personal history of other diseases of the respiratory system 04/12/2019    History of acute bronchitis    Personal history of other diseases of the respiratory system 01/04/2020    History of bronchitis    Personal history of other endocrine, nutritional and metabolic disease 02/01/2017    History of hyperlipidemia    Personal history of other endocrine, nutritional and metabolic disease 04/18/2016    History of obesity    Personal history of other endocrine, nutritional and metabolic disease 05/26/2020    History of thyroid nodule    Personal history of other infectious and parasitic diseases     History of varicella    Personal history of other medical treatment 12/11/2020    History of screening mammography    Personal history of other medical treatment 12/14/2021    History of screening mammography    Personal history of other medical treatment     History of mammogram    Personal history of other specified conditions 08/21/2019    History of gross hematuria    Personal history of other specified conditions 02/02/2017    History of weight gain    Personal history of other specified conditions 12/07/2020    History of chest pain     Personal history of other specified conditions 04/14/2021    History of palpitations    Personal history of other specified conditions 12/05/2014    History of vertigo    Personal history of other specified conditions 08/25/2021    History of exertional chest pain    Personal history of other specified conditions     History of shortness of breath    Personal history of other specified conditions     H/O heartburn    Personal history of other specified conditions 01/12/2018    History of urinary frequency    Personal history of transient ischemic attack (TIA), and 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 12/30/2015    History of TIA (transient ischemic attack)    Personal history of urinary (tract) infections 09/02/2021    History of recurrent cystitis    Personal history of urinary (tract) infections 05/23/2019    History of cystitis    Personal history of urinary (tract) infections 09/02/2021    History of recurrent urinary tract infection    Personal history of urinary calculi 09/12/2019    History of renal calculi    Polyphagia 05/13/2021    Polyphagia    Primary central sleep apnea 10/21/2017    Central sleep apnea    Pulmonary hypertension (Multi)     Pure hypercholesterolemia, unspecified     High cholesterol    Residual hemorrhoidal skin tags 07/06/2017    External hemorrhoid    Sleep apnea     Slurred speech 01/02/2015    Slurred speech    Tinea pedis 05/25/2021    Tinea pedis of right foot    Unspecified abdominal pain 04/13/2021    Abdominal pain, acute    Unspecified injury of right wrist, hand and finger(s), sequela 08/04/2020    Hand trauma, right, sequela    Unspecified internal derangement of unspecified knee 03/01/2017    Internal derangement of knee    Unspecified symptoms and signs involving the genitourinary system 12/16/2021    UTI symptoms    Unspecified symptoms and signs involving the genitourinary system 02/05/2019    UTI symptoms    Unspecified symptoms and signs involving the  genitourinary system 09/03/2020    UTI symptoms    Urinary tract infection, site not specified 01/17/2020    Acute urinary tract infection    Urinary tract infection, site not specified 01/10/2022    Acute UTI    Xerosis cutis 09/10/2019    Dry skin dermatitis       Current Outpatient Medications:     Adempas 2.5 mg tablet, Take 1 tablet (2.5 mg) by mouth 3 times a day., Disp: 30 tablet, Rfl: 11    aspirin 81 mg EC tablet, Take 1 tablet (81 mg) by mouth once daily., Disp: , Rfl:     buPROPion SR (Wellbutrin SR) 150 mg 12 hr tablet, TAKE 1 TABLET BY MOUTH EVERY MORNING AND AFTERNOON, Disp: , Rfl:     busPIRone (Buspar) 30 mg tablet, Take 1 tablet (30 mg) by mouth 2 times a day., Disp: , Rfl:     calcium carbonate (CALCIUM 600 ORAL), Take 2 tablets by mouth once daily., Disp: , Rfl:     cyanocobalamin (Vitamin B-12) 1,000 mcg tablet, Take 1 tablet (1,000 mcg) by mouth once daily., Disp: , Rfl:     ferrous sulfate 325 (65 Fe) MG EC tablet, Take 65 mg by mouth 3 times daily (morning, midday, late afternoon). Do not crush, chew, or split., Disp: , Rfl:     fluticasone propion-salmeteroL (Advair Diskus) 250-50 mcg/dose diskus inhaler, Inhale 1 puff 2 times a day., Disp: , Rfl:     hydrOXYzine pamoate (Vistaril) 50 mg capsule, TAKE 3 CAPSULES BY MOUTH EVERY NIGHT AT BEDTIME AND TAKE 1 CAPSULE BY MOUTH EVERY MORNING, Disp: , Rfl:     lubiprostone (Amitiza) 8 mcg capsule, Take 1 capsule (8 mcg) by mouth 2 times a day with meals., Disp: 180 capsule, Rfl: 3    memantine (Namenda) 10 mg tablet, Take 1 tablet (10 mg) by mouth 2 times a day., Disp: , Rfl:     methenamine hippurate (Hiprex) 1 gram tablet, TAKE 1 TABLET BY MOUTH TWICE DAILY, Disp: 60 tablet, Rfl: 10    montelukast (Singulair) 10 mg tablet, TAKE 1 TABLET BY MOUTH ONCE DAILY *EMERGENCY REFILL*, Disp: 30 tablet, Rfl: 10    omeprazole (PriLOSEC) 40 mg DR capsule, Take 1 capsule (40 mg) by mouth once daily in the morning. Take before meals., Disp: 90 capsule, Rfl: 1     Opsumit 10 mg tablet tablet, TAKE 1 TABLET BY MOUTH DAILY. DO NOT HANDLE IF PREGNANT. DO NOT SPLIT, CRUSH, OR CHEW. REVIEW MEDICATION GUIDE., Disp: 30 tablet, Rfl: 11    sertraline (Zoloft) 100 mg tablet, Take 2 tablets (200 mg) by mouth once daily., Disp: , Rfl:     simvastatin (Zocor) 80 mg tablet, Take 1 tablet (80 mg) by mouth once daily at bedtime., Disp: 90 tablet, Rfl: 1    trospium (Sanctura) 20 mg tablet, Take 1 tablet (20 mg) by mouth 2 times a day., Disp: 60 tablet, Rfl: 10    albuterol 90 mcg/actuation inhaler, Inhale 1-2 puffs every 4 hours if needed for wheezing. Every 4 to 6 hours as needed, Disp: 1 g, Rfl: 11    aspirin 325 mg tablet, Take 1 tablet (325 mg) by mouth once daily., Disp: , Rfl:     blood pressure monitor (Blood Pressure Kit) kit, 1 kit if needed (take BP as needed)., Disp: 1 kit, Rfl: 0    cyclobenzaprine (Flexeril) 10 mg tablet, Take 1 tablet (10 mg) by mouth 3 times a day as needed for muscle spasms., Disp: 60 tablet, Rfl: 0    hydrALAZINE (Apresoline) 50 mg tablet, Take 1 tablet (50 mg) by mouth 2 times a day. 1 tablet in the AM and 3 tablets in the PM., Disp: , Rfl:     Current Facility-Administered Medications:     lactated Ringer's infusion, 20 mL/hr, intravenous, Continuous, Morro Zapata MD, Last Rate: 20 mL/hr at 09/05/24 0748, 20 mL/hr at 09/05/24 0748  Prior to Admission medications    Medication Sig Start Date End Date Taking? Authorizing Provider   Adempas 2.5 mg tablet Take 1 tablet (2.5 mg) by mouth 3 times a day. 8/27/24  Yes Richy Raman MD   aspirin 81 mg EC tablet Take 1 tablet (81 mg) by mouth once daily.   Yes Historical Provider, MD   buPROPion SR (Wellbutrin SR) 150 mg 12 hr tablet TAKE 1 TABLET BY MOUTH EVERY MORNING AND AFTERNOON 1/16/24  Yes Historical Provider, MD   busPIRone (Buspar) 30 mg tablet Take 1 tablet (30 mg) by mouth 2 times a day. 11/8/21  Yes Historical Provider, MD   calcium carbonate (CALCIUM 600 ORAL) Take 2 tablets by mouth once daily.    Yes Historical Provider, MD   cyanocobalamin (Vitamin B-12) 1,000 mcg tablet Take 1 tablet (1,000 mcg) by mouth once daily.   Yes Historical Provider, MD   ferrous sulfate 325 (65 Fe) MG EC tablet Take 65 mg by mouth 3 times daily (morning, midday, late afternoon). Do not crush, chew, or split.   Yes Historical Provider, MD   fluticasone propion-salmeteroL (Advair Diskus) 250-50 mcg/dose diskus inhaler Inhale 1 puff 2 times a day. 9/26/22  Yes Historical Provider, MD   hydrOXYzine pamoate (Vistaril) 50 mg capsule TAKE 3 CAPSULES BY MOUTH EVERY NIGHT AT BEDTIME AND TAKE 1 CAPSULE BY MOUTH EVERY MORNING 1/16/24  Yes Historical Provider, MD   lubiprostone (Amitiza) 8 mcg capsule Take 1 capsule (8 mcg) by mouth 2 times a day with meals. 2/22/24 2/21/25 Yes ANNETTE Tamayo-CNP   memantine (Namenda) 10 mg tablet Take 1 tablet (10 mg) by mouth 2 times a day.   Yes Historical Provider, MD   methenamine hippurate (Hiprex) 1 gram tablet TAKE 1 TABLET BY MOUTH TWICE DAILY 7/16/24  Yes Sanford Arredondo MD   montelukast (Singulair) 10 mg tablet TAKE 1 TABLET BY MOUTH ONCE DAILY *EMERGENCY REFILL* 2/22/24  Yes Jelena Romo DO   omeprazole (PriLOSEC) 40 mg DR capsule Take 1 capsule (40 mg) by mouth once daily in the morning. Take before meals. 6/11/24 6/11/25 Yes Jason Hoang MD   Opsumit 10 mg tablet tablet TAKE 1 TABLET BY MOUTH DAILY. DO NOT HANDLE IF PREGNANT. DO NOT SPLIT, CRUSH, OR CHEW. REVIEW MEDICATION GUIDE. 5/28/24  Yes Richy Raman MD   sertraline (Zoloft) 100 mg tablet Take 2 tablets (200 mg) by mouth once daily. 1/25/16  Yes Historical Provider, MD   simvastatin (Zocor) 80 mg tablet Take 1 tablet (80 mg) by mouth once daily at bedtime. 4/2/24 4/2/25 Yes Jelena Romo DO   trospium (Sanctura) 20 mg tablet Take 1 tablet (20 mg) by mouth 2 times a day. 6/20/24  Yes Sanford Arredondo MD   albuterol 90 mcg/actuation inhaler Inhale 1-2 puffs every 4 hours if needed for wheezing. Every 4 to 6 hours as  needed 10/12/23   Richy Raman MD   aspirin 325 mg tablet Take 1 tablet (325 mg) by mouth once daily. 5/6/15   Historical Provider, MD   blood pressure monitor (Blood Pressure Kit) kit 1 kit if needed (take BP as needed). 10/24/23   Hortencia Sotomayor, APRN-CNP   cyclobenzaprine (Flexeril) 10 mg tablet Take 1 tablet (10 mg) by mouth 3 times a day as needed for muscle spasms. 6/21/24 8/16/24  Jelena Romo DO   hydrALAZINE (Apresoline) 50 mg tablet Take 1 tablet (50 mg) by mouth 2 times a day. 1 tablet in the AM and 3 tablets in the PM.    Historical Provider, MD     Allergies   Allergen Reactions    Grass Pollen Other    Other Unknown    Pollen Extracts Unknown    Tree And Shrub Pollen Other     Social History     Tobacco Use    Smoking status: Never    Smokeless tobacco: Never   Substance Use Topics    Alcohol use: Yes     Comment: Occasional         Chemistry    Lab Results   Component Value Date/Time     08/16/2024 1047    K 4.3 08/16/2024 1047     08/16/2024 1047    CO2 29 08/16/2024 1047    BUN 10 08/16/2024 1047    CREATININE 0.89 08/16/2024 1047    Lab Results   Component Value Date/Time    CALCIUM 9.2 08/16/2024 1047    ALKPHOS 69 08/16/2024 1047    AST 14 08/16/2024 1047    ALT 13 08/16/2024 1047    BILITOT 0.4 08/16/2024 1047          Lab Results   Component Value Date/Time    WBC 4.8 08/16/2024 1047    HGB 14.2 08/16/2024 1047    HCT 46.1 (H) 08/16/2024 1047     08/16/2024 1047     Lab Results   Component Value Date/Time    PROTIME 11.5 10/06/2022 1356    INR 1.0 10/06/2022 1356     Encounter Date: 04/12/24   ECG 12 lead (Clinic Performed)   Result Value    Ventricular Rate 67    Atrial Rate 67    TX Interval 202    QRS Duration 116    QT Interval 446    QTC Calculation(Bazett) 471    P Axis 60    R Axis 33    T Axis 83    QRS Count 11    Q Onset 206    P Onset 105    P Offset 156    T Offset 429    QTC Fredericia 463    Narrative    Normal sinus rhythm  Cannot rule out  Anterior infarct , age undetermined  Abnormal ECG  When compared with ECG of 08-MAR-2023 13:46,  Previous ECG has undetermined rhythm, needs review  Confirmed by Jose Faulkner ([id number]) on 4/13/2024 9:58:47 PM     No results found for this or any previous visit from the past 1095 days.    Relevant Problems   Cardiac   (+) Benign essential hypertension   (+) HLD (hyperlipidemia)      Pulmonary   (+) Asthma ([facility])   (+) Idiopathic PAH (pulmonary arterial hypertension) (Multi)   (+) Obstructive sleep apnea syndrome      Neuro   (+) Carotid artery occlusion   (+) Cerebrovascular accident (Multi)   (+) Current moderate episode of major depressive disorder without prior episode (Multi)   (+) Dementia, vascular, with depression (Multi)   (+) Generalized anxiety disorder   (+) Recurrent major depressive disorder, in partial remission ([facility])   (+) Transient ischemic attack      GI   (+) Dysphagia   (+) GERD without esophagitis      Endocrine   (+) Class 3 severe obesity due to excess calories with body mass index (BMI) of 50.0 to 59.9 in adult (Multi)   (+) Obesity, morbid (more than 100 lbs over ideal weight or BMI > 40) (Multi)      Hematology   (+) Iron deficiency anemia      Musculoskeletal   (+) DJD (degenerative joint disease), lumbar   (+) Lumbar canal stenosis   (+) Lumbar spondylosis   (+) Primary osteoarthritis of both knees      ID   (+) COVID-19   (+) Yeast infection       Clinical information reviewed:   Tobacco  Allergies  Meds   Med Hx  Surg Hx   Fam Hx  Soc Hx        NPO Detail:  NPO/Void Status  Date of Last Liquid: 09/05/24  Time of Last Liquid: 0000  Date of Last Solid: 09/03/24  Last Intake Type: GI prep         Physical Exam    Airway  Mallampati: II     Cardiovascular - normal exam     Dental    Pulmonary    Abdominal            Anesthesia Plan    History of general anesthesia?: yes  History of complications of general anesthesia?: no    ASA 4     MAC     The patient is not a current  smoker.  Patient was not previously instructed to abstain from smoking on day of procedure.  Patient did not smoke on day of procedure.  Education provided regarding risk of obstructive sleep apnea.  intravenous induction   Anesthetic plan and risks discussed with patient.    Plan discussed with CRNA.

## 2024-09-09 DIAGNOSIS — R30.0 DYSURIA: ICD-10-CM

## 2024-09-11 LAB
LABORATORY COMMENT REPORT: NORMAL
PATH REPORT.FINAL DX SPEC: NORMAL
PATH REPORT.GROSS SPEC: NORMAL
PATH REPORT.RELEVANT HX SPEC: NORMAL
PATH REPORT.TOTAL CANCER: NORMAL

## 2024-09-11 NOTE — PROGRESS NOTES
Virtual or Telephone Consent    An interactive audio and video telecommunication system which permits real time communications between the patient (at the originating site) and provider (at the distant site) was utilized to provide this telehealth service.   Verbal consent was requested and obtained from Farida Traylor on this date, 09/12/24 for a telehealth visit.     HISTORY OF PRESENT ILLNESS:  Farida Traylor is a 64 y.o. female who presents today for a virtual follow up visit.  Reports she started to leak again.  Ready to formal Botox last was several months ago.         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), Acute upper respiratory infection, unspecified (01/14/2020), Allergic rhinitis due to animal (cat) (dog) hair and dander (01/02/2020), Allergic rhinitis due to pollen (01/02/2020), Allergic rhinitis due to pollen (02/26/2018), Allergy status to unspecified drugs, medicaments and biological substances (06/30/2015), Anemia, unspecified (07/23/2018), Anxiety disorder due to known physiological condition (06/05/2013), Anxiety disorder, unspecified (01/28/2016), Asymptomatic varicose veins of bilateral lower extremities (07/16/2019), Atypical squamous cells of undetermined significance on cytologic smear of cervix (ASC-US) (06/26/2013), Candidiasis, unspecified (12/10/2019), Cellulitis of left finger (07/31/2018), Changes in skin texture (03/09/2021), Contusion of left lesser toe(s) with damage to nail, initial encounter (02/22/2018), Contusion of right hand, sequela (08/04/2020), Corns and callosities (05/25/2021), Disorder of pigmentation, unspecified (09/28/2016), Disorder of the skin and subcutaneous tissue, unspecified (08/27/2020), Dorsalgia, unspecified (09/23/2021), Dorsalgia, unspecified (01/29/2019), Encounter for general adult medical examination without abnormal findings (06/08/2020), Encounter for  gynecological examination (general) (routine) without abnormal findings (12/14/2021), Encounter for gynecological examination (general) (routine) without abnormal findings (12/11/2020), Encounter for other screening for malignant neoplasm of breast, Encounter for screening for malignant neoplasm of cervix, Encounter for screening for malignant neoplasm of cervix (11/04/2014), Encounter for screening for malignant neoplasm of cervix, Hepatomegaly, not elsewhere classified (04/21/2021), History of falling (12/28/2018), Inappropriate diet and eating habits (05/13/2021), Irregular menstruation, unspecified, Localized edema (07/29/2015), Localized swelling, mass and lump, left lower limb (09/11/2020), Localized swelling, mass and lump, left lower limb (09/24/2020), Localized swelling, mass and lump, unspecified lower limb (09/24/2020), Melanocytic nevi, unspecified (09/10/2019), Multiple births, unspecified, all liveborn ([facility]), Other allergic rhinitis (01/02/2020), Other allergic rhinitis (01/02/2020), Other conditions influencing health status (06/26/2013), Other conditions influencing health status, Other conditions influencing health status (02/05/2019), Other conditions influencing health status (12/14/2021), Other conditions influencing health status (02/14/2019), Other conditions influencing health status (01/14/2020), Other conditions influencing health status (06/05/2013), Other conditions influencing health status (06/05/2013), Other hypertrophic disorders of the skin (07/29/2015), Other shoulder lesions, right shoulder (11/11/2019), Other specified disorders of bone, thigh (09/09/2020), Other specified noninflammatory disorders of vagina (03/12/2019), Other specified postprocedural states (05/04/2017), Other specified soft tissue disorders (10/08/2020), Other specified soft tissue disorders (08/04/2020), Pain in left thigh (08/27/2020), Pain in right foot (03/09/2021), Pain in right hip (12/28/2018), Pain  in right knee (03/12/2021), Pain in right knee (03/19/2021), Pain in right leg (05/25/2021), Pain in right leg (01/12/2022), Pain in unspecified joint, 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 (02/22/2016), Personal history of contraception, Personal history of diseases of the skin and subcutaneous tissue (12/22/2020), Personal history of malignant neoplasm of other parts of uterus, Personal history of other (healed) physical injury and trauma (03/01/2017), Personal history of other benign neoplasm (09/10/2019), Personal history of other benign neoplasm (05/06/2014), Personal history of other benign neoplasm (12/11/2020), Personal history of other diseases of the circulatory system (04/14/2021), Personal history of other diseases of the circulatory system (04/14/2021), Personal history of other diseases of the circulatory system (04/14/2021), Personal history of other diseases of the circulatory system (02/01/2017), Personal history of other diseases of the circulatory system (04/14/2021), Personal history of other diseases of the digestive system (03/24/2021), Personal history of other diseases of the female genital tract (12/28/2016), Personal history of other diseases of the female genital tract (05/19/2022), Personal history of other diseases of the female genital tract, Personal history of other diseases of the female genital tract, Personal history of other diseases of the musculoskeletal system and connective tissue, Personal history of other diseases of the musculoskeletal system and connective tissue (11/18/2019), Personal history of other diseases of the respiratory system (05/27/2021), Personal history of other diseases of the respiratory system (04/12/2019), Personal history of other diseases of the respiratory system (01/04/2020), Personal history of other endocrine, nutritional and metabolic disease (02/01/2017), Personal history of other endocrine,  nutritional and metabolic disease (04/18/2016), Personal history of other endocrine, nutritional and metabolic disease (05/26/2020), Personal history of other infectious and parasitic diseases, Personal history of other medical treatment (12/11/2020), Personal history of other medical treatment (12/14/2021), Personal history of other medical treatment, Personal history of other specified conditions (08/21/2019), Personal history of other specified conditions (02/02/2017), Personal history of other specified conditions (12/07/2020), Personal history of other specified conditions (04/14/2021), Personal history of other specified conditions (12/05/2014), Personal history of other specified conditions (08/25/2021), Personal history of other specified conditions, Personal history of other specified conditions, Personal history of other specified conditions (01/12/2018), Personal history of transient ischemic attack (TIA), and 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 (12/30/2015), Personal history of urinary (tract) infections (09/02/2021), Personal history of urinary (tract) infections (05/23/2019), Personal history of urinary (tract) infections (09/02/2021), Personal history of urinary calculi (09/12/2019), Polyphagia (05/13/2021), Primary central sleep apnea (10/21/2017), Pulmonary hypertension (Multi), Pure hypercholesterolemia, unspecified, Residual hemorrhoidal skin tags (07/06/2017), Sleep apnea, Slurred speech (01/02/2015), Tinea pedis (05/25/2021), Unspecified abdominal pain (04/13/2021), Unspecified injury of right wrist, hand and finger(s), sequela (08/04/2020), Unspecified internal derangement of unspecified knee (03/01/2017), Unspecified symptoms and signs involving the genitourinary system (12/16/2021), Unspecified symptoms and signs involving the genitourinary system (02/05/2019), Unspecified symptoms and signs involving the genitourinary system (09/03/2020), Urinary tract infection, site not specified  (01/17/2020), Urinary tract infection, site not specified (01/10/2022), and Xerosis cutis (09/10/2019).    Surgical History  She has a past surgical history that includes Other surgical history (07/31/2013); Other surgical history (09/10/2019); Other surgical history (11/30/2018); Mouth surgery (11/04/2014); Knee surgery (05/04/2017); Other surgical history (06/08/2020); MR angio head wo IV contrast (02/09/2016); Cardiac catheterization (Right, 01/04/2024); and Cardiac catheterization.     Social History  She reports that she has never smoked. She has never used smokeless tobacco. She reports current alcohol use. She reports that she does not use drugs.    Family History  Family History   Problem Relation Name Age of Onset    Hypertension Mother      Breast cancer Mother      Other (cardiac disorder) Mother      Cardiomyopathy Mother      Diabetes Mother      Other (chronic kidney disease) Mother      Stroke Brother      Brain Aneurysm Brother          Allergies  Grass pollen, Other, Pollen extracts, and Tree and shrub pollen      A comprehensive 10+ review of systems was negative except for: see hpi                  Assessment:  63-year-old with recurrent UTIs, genitourinary syndrome menopause, and urinary urge incontinence, presenting for a virtual follow-up visit.     Alyce/dysuria:   continue estradiol   Patient is taking methenamine     RYAN:  Failed myrbetriq   Patient is currently taking trospium     s/p Botox, 5/26/22, 5/25/2023, 2/8/24, 100 units, sx returning plan on another injection       All questions and concerns were answered and addressed.  The patient expressed understanding and agrees with the plan.     Sanford Arredondo MD    Scribe Attestation  By signing my name below, I, Zina Fuentes, Scribfrederick   attest that this documentation has been prepared under the direction and in the presence of Sanford Arredondo MD.

## 2024-09-12 ENCOUNTER — TELEPHONE (OUTPATIENT)
Dept: PRIMARY CARE | Facility: CLINIC | Age: 64
End: 2024-09-12

## 2024-09-12 ENCOUNTER — APPOINTMENT (OUTPATIENT)
Dept: UROLOGY | Facility: CLINIC | Age: 64
End: 2024-09-12
Payer: COMMERCIAL

## 2024-09-12 DIAGNOSIS — E78.2 MODERATE MIXED HYPERLIPIDEMIA NOT REQUIRING STATIN THERAPY: ICD-10-CM

## 2024-09-12 DIAGNOSIS — J45.909 ASTHMA, UNSPECIFIED ASTHMA SEVERITY, UNSPECIFIED WHETHER COMPLICATED, UNSPECIFIED WHETHER PERSISTENT (HHS-HCC): Primary | ICD-10-CM

## 2024-09-12 DIAGNOSIS — J96.11 CHRONIC RESPIRATORY FAILURE WITH HYPOXIA (MULTI): ICD-10-CM

## 2024-09-12 DIAGNOSIS — N32.81 OAB (OVERACTIVE BLADDER): Primary | ICD-10-CM

## 2024-09-12 PROCEDURE — 1157F ADVNC CARE PLAN IN RCRD: CPT | Performed by: STUDENT IN AN ORGANIZED HEALTH CARE EDUCATION/TRAINING PROGRAM

## 2024-09-12 PROCEDURE — 99214 OFFICE O/P EST MOD 30 MIN: CPT | Performed by: STUDENT IN AN ORGANIZED HEALTH CARE EDUCATION/TRAINING PROGRAM

## 2024-09-12 NOTE — TELEPHONE ENCOUNTER
----- Message from Jelena Demetrius sent at 9/12/2024 11:09 AM EDT -----  LIPID PANEL IS GOOD  REST ARE COMPATIBLE WITH HER DIAGNOSES   FOLLOWING WITH GI AND HER PULMONOLOGY   PROTEIN IS A LITTLE LOW, MORE QUALITY PROTEIN WITH MEAT CHICKEN AND FISH   NUT BUTTERS EGGS

## 2024-09-13 RX ORDER — SIMVASTATIN 80 MG/1
80 TABLET, FILM COATED ORAL NIGHTLY
Qty: 30 TABLET | Refills: 10 | Status: SHIPPED | OUTPATIENT
Start: 2024-09-13

## 2024-09-17 RX ORDER — ALBUTEROL SULFATE 90 UG/1
INHALANT RESPIRATORY (INHALATION)
Qty: 6.7 G | Refills: 10 | Status: SHIPPED | OUTPATIENT
Start: 2024-09-17

## 2024-09-17 RX ORDER — FLUTICASONE PROPIONATE AND SALMETEROL 250; 50 UG/1; UG/1
POWDER RESPIRATORY (INHALATION)
Qty: 60 EACH | Refills: 11 | Status: SHIPPED | OUTPATIENT
Start: 2024-09-17 | End: 2025-09-17

## 2024-09-18 RX ORDER — SIMVASTATIN 80 MG/1
80 TABLET, FILM COATED ORAL NIGHTLY
Qty: 30 TABLET | Refills: 10 | OUTPATIENT
Start: 2024-09-18

## 2024-09-27 ENCOUNTER — APPOINTMENT (OUTPATIENT)
Dept: GASTROENTEROLOGY | Facility: CLINIC | Age: 64
End: 2024-09-27
Payer: COMMERCIAL

## 2024-09-27 VITALS — DIASTOLIC BLOOD PRESSURE: 74 MMHG | SYSTOLIC BLOOD PRESSURE: 145 MMHG | HEART RATE: 69 BPM

## 2024-09-27 DIAGNOSIS — K59.04 CHRONIC IDIOPATHIC CONSTIPATION: ICD-10-CM

## 2024-09-27 DIAGNOSIS — K29.40 ATROPHIC GASTRITIS WITHOUT HEMORRHAGE: ICD-10-CM

## 2024-09-27 DIAGNOSIS — R14.0 BLOATING: ICD-10-CM

## 2024-09-27 DIAGNOSIS — R13.10 DYSPHAGIA, UNSPECIFIED TYPE: Primary | ICD-10-CM

## 2024-09-27 PROCEDURE — 3078F DIAST BP <80 MM HG: CPT | Performed by: STUDENT IN AN ORGANIZED HEALTH CARE EDUCATION/TRAINING PROGRAM

## 2024-09-27 PROCEDURE — 99213 OFFICE O/P EST LOW 20 MIN: CPT | Performed by: STUDENT IN AN ORGANIZED HEALTH CARE EDUCATION/TRAINING PROGRAM

## 2024-09-27 PROCEDURE — 1159F MED LIST DOCD IN RCRD: CPT | Performed by: STUDENT IN AN ORGANIZED HEALTH CARE EDUCATION/TRAINING PROGRAM

## 2024-09-27 PROCEDURE — 3077F SYST BP >= 140 MM HG: CPT | Performed by: STUDENT IN AN ORGANIZED HEALTH CARE EDUCATION/TRAINING PROGRAM

## 2024-09-27 PROCEDURE — 1160F RVW MEDS BY RX/DR IN RCRD: CPT | Performed by: STUDENT IN AN ORGANIZED HEALTH CARE EDUCATION/TRAINING PROGRAM

## 2024-09-27 PROCEDURE — 1157F ADVNC CARE PLAN IN RCRD: CPT | Performed by: STUDENT IN AN ORGANIZED HEALTH CARE EDUCATION/TRAINING PROGRAM

## 2024-09-27 PROCEDURE — 1036F TOBACCO NON-USER: CPT | Performed by: STUDENT IN AN ORGANIZED HEALTH CARE EDUCATION/TRAINING PROGRAM

## 2024-09-27 ASSESSMENT — ENCOUNTER SYMPTOMS
ABDOMINAL DISTENTION: 1
ABDOMINAL PAIN: 1
WHEEZING: 0
FEVER: 0
CHILLS: 0
VOMITING: 0
SPEECH DIFFICULTY: 0
ARTHRALGIAS: 0
JOINT SWELLING: 0
HEADACHES: 0
DIARRHEA: 0
NAUSEA: 0
CONFUSION: 0
DIZZINESS: 0
CONSTIPATION: 0
COUGH: 0
LIGHT-HEADEDNESS: 0
SHORTNESS OF BREATH: 0
COLOR CHANGE: 0
TROUBLE SWALLOWING: 0
UNEXPECTED WEIGHT CHANGE: 0
SLEEP DISTURBANCE: 0
DIFFICULTY URINATING: 0

## 2024-09-27 NOTE — ASSESSMENT & PLAN NOTE
Chronic intermittent dysphagia, esophagram w mild esophageal dysmotility. EGD normal including bx   - discussed further w.up with manometry  - patient declines this testing at this time which is reasonable given medical co-morbids  - ensure proper mastication  - c/w PPI

## 2024-09-27 NOTE — PROGRESS NOTES
Chief Complaint:  Chief Complaint   Patient presents with    Follow-up       Prior patient of Mikayla Maguire NP, following for CIC and GERD. On Amitiza 8mg BID.   Medical co-morbids of morbid obesity, idiopathic PAH, sleep apnea, asthma on 3 L oxygen, following with pulmonology.     Here for follow-up after EGD/colon done for dyspepsia and adenoma surveillance    EGD with gastritis but normal bx   Colon w HP polyp    Symptoms largely unchanged. Main c/o bloating   Usually has bloating and gas all the time. Eats fast   Worse after eating and end of day.  Does not wake up feeling bloated     BM daily, bristol 3/4.    Feels she has an upset stomach when she is stressed or anxious  Denies epigastric pain or melena, weight loss, change in appetite or early satiety  Reports intermittent dysphagia, prior esophogram done with noted mild esophageal dysmotility.     EGD/Colon 9/2024  EGD  Impression  · No obvious cause to explain dysphagia.  · The upper third of the esophagus, middle third of the esophagus, lower third of the esophagus and Z-line appeared normal. Performed random biopsy to rule out eosinophilic esophagitis.  · Moderate erythematous, nodular mucosa in the antrum; performed cold forceps biopsy  · Performed forceps biopsies to rule out H. pylori  · The cardia, fundus of the stomach and body of the stomach appeared normal.  · The duodenal bulb, 1st part of the duodenum and 2nd part of the duodenum appeared normal.  COLON  Findings  Multiple small and large, scattered diverticula of mild severity in the ascending colon, transverse colon, descending colon and sigmoid colon  One 2 mm polyp in the sigmoid colon; performed cold forceps biopsy  There was scattered fibrous material throughout the colon which limited exam  External medium hemorrhoids observed during digital rectal exam and retroflexion; no bleeding was identified    Recommendation  Await pathology results  Follow up with me in clinic, due: 10/5/2024  Repeat  colonoscopy in 5 years, due: 9/4/2029  Repeat colonoscopy for polyp surveillance with extended prep and emphasis on low residue diet in 5 years based on overall health given age >65    A. STOMACH, ANTRUM, BIOPSY:   Chronic inactive gastritis  No Helicobacter pylori organisms identified on H&E stain     B. ESOPHAGUS, MID, BIOPSY:   Squamous mucosa with no significant pathologic abnormality     C. COLON, SIGMOID POLYP, BIOPSY:   Hyperplastic polyp    Last colon 2018 with 1 TA in cecum.               Prior EGD/Colon    Review of Systems   Constitutional:  Negative for chills, fever and unexpected weight change.   HENT:  Negative for congestion and trouble swallowing.    Respiratory:  Negative for cough, shortness of breath and wheezing.    Cardiovascular:  Negative for chest pain.   Gastrointestinal:  Positive for abdominal distention and abdominal pain. Negative for constipation, diarrhea, nausea and vomiting.   Genitourinary:  Negative for difficulty urinating.   Musculoskeletal:  Negative for arthralgias and joint swelling.   Skin:  Negative for color change.   Neurological:  Negative for dizziness, speech difficulty, light-headedness and headaches.   Psychiatric/Behavioral:  Negative for confusion and sleep disturbance.      Family History   Problem Relation Name Age of Onset    Hypertension Mother      Breast cancer Mother      Other (cardiac disorder) Mother      Cardiomyopathy Mother      Diabetes Mother      Other (chronic kidney disease) Mother      Alcohol abuse Father joel serna     Stroke Brother      Brain Aneurysm Brother         Medications    Current Outpatient Medications:     Adempas 2.5 mg tablet, Take 1 tablet (2.5 mg) by mouth 3 times a day., Disp: 30 tablet, Rfl: 11    albuterol 90 mcg/actuation inhaler, INHALE 1 TO 2 PUFFS BY MOUTH EVERY 4 TO 6 HOURS AS NEEDED, Disp: 6.7 g, Rfl: 10    aspirin 325 mg tablet, Take 1 tablet (325 mg) by mouth once daily., Disp: , Rfl:     blood pressure monitor  (Blood Pressure Kit) kit, 1 kit if needed (take BP as needed)., Disp: 1 kit, Rfl: 0    buPROPion SR (Wellbutrin SR) 150 mg 12 hr tablet, TAKE 1 TABLET BY MOUTH EVERY MORNING AND AFTERNOON, Disp: , Rfl:     busPIRone (Buspar) 30 mg tablet, Take 1 tablet (30 mg) by mouth 2 times a day., Disp: , Rfl:     calcium carbonate (CALCIUM 600 ORAL), Take 2 tablets by mouth once daily., Disp: , Rfl:     cyanocobalamin (Vitamin B-12) 1,000 mcg tablet, Take 1 tablet (1,000 mcg) by mouth once daily., Disp: , Rfl:     ferrous sulfate 325 (65 Fe) MG EC tablet, Take 65 mg by mouth 3 times daily (morning, midday, late afternoon). Do not crush, chew, or split., Disp: , Rfl:     fluticasone propion-salmeteroL (Advair Diskus) 250-50 mcg/dose diskus inhaler, INHALE ONE (1) PUFF BY MOUTH TWICE DAILY. RINSE MOUTH OUT AFTER EACH USE., Disp: 60 each, Rfl: 11    hydrALAZINE (Apresoline) 50 mg tablet, Take 1 tablet (50 mg) by mouth 2 times a day. 1 tablet in the AM and 3 tablets in the PM., Disp: , Rfl:     hydrOXYzine pamoate (Vistaril) 50 mg capsule, TAKE 3 CAPSULES BY MOUTH EVERY NIGHT AT BEDTIME AND TAKE 1 CAPSULE BY MOUTH EVERY MORNING, Disp: , Rfl:     lubiprostone (Amitiza) 8 mcg capsule, Take 1 capsule (8 mcg) by mouth 2 times a day with meals., Disp: 180 capsule, Rfl: 3    memantine (Namenda) 10 mg tablet, Take 1 tablet (10 mg) by mouth 2 times a day., Disp: , Rfl:     montelukast (Singulair) 10 mg tablet, TAKE 1 TABLET BY MOUTH ONCE DAILY *EMERGENCY REFILL*, Disp: 30 tablet, Rfl: 10    omeprazole (PriLOSEC) 40 mg DR capsule, Take 1 capsule (40 mg) by mouth once daily in the morning. Take before meals., Disp: 90 capsule, Rfl: 1    Opsumit 10 mg tablet tablet, TAKE 1 TABLET BY MOUTH DAILY. DO NOT HANDLE IF PREGNANT. DO NOT SPLIT, CRUSH, OR CHEW. REVIEW MEDICATION GUIDE., Disp: 30 tablet, Rfl: 11    sertraline (Zoloft) 100 mg tablet, Take 2 tablets (200 mg) by mouth once daily., Disp: , Rfl:     simvastatin (Zocor) 80 mg tablet, TAKE 1  "TABLET BY MOUTH ONCE DAILY AT BEDTIME, Disp: 30 tablet, Rfl: 10    trospium (Sanctura) 20 mg tablet, Take 1 tablet (20 mg) by mouth 2 times a day., Disp: 60 tablet, Rfl: 10    aspirin 81 mg EC tablet, Take 1 tablet (81 mg) by mouth once daily., Disp: , Rfl:     cyclobenzaprine (Flexeril) 10 mg tablet, Take 1 tablet (10 mg) by mouth 3 times a day as needed for muscle spasms., Disp: 60 tablet, Rfl: 0    methenamine hippurate (Hiprex) 1 gram tablet, TAKE 1 TABLET BY MOUTH TWICE DAILY, Disp: 60 tablet, Rfl: 10    Vitals  /74   Pulse 69     Physical Exam  Constitutional:       General: She is awake.      Appearance: Normal appearance. She is obese.      Comments: On supplemental O2   HENT:      Head: Normocephalic and atraumatic.      Nose: Nose normal.      Mouth/Throat:      Mouth: Mucous membranes are moist.   Eyes:      Pupils: Pupils are equal, round, and reactive to light.   Neck:      Thyroid: No thyroid mass.      Trachea: Phonation normal.   Cardiovascular:      Rate and Rhythm: Normal rate and regular rhythm.      Heart sounds: Normal heart sounds. No murmur heard.     No gallop.   Pulmonary:      Effort: Pulmonary effort is normal. No respiratory distress.      Breath sounds: Normal air entry. No decreased breath sounds.   Musculoskeletal:      Cervical back: Neck supple.      Right lower leg: No edema.      Left lower leg: No edema.   Skin:     General: Skin is warm.   Neurological:      General: No focal deficit present.      Mental Status: She is alert and oriented to person, place, and time. Mental status is at baseline.      Cranial Nerves: Cranial nerves 2-12 are intact.      Motor: Motor function is intact.   Psychiatric:         Attention and Perception: Attention and perception normal.         Mood and Affect: Mood normal.         Speech: Speech normal.         Behavior: Behavior normal.           Labs:  No results found for: \"AFP\"No results found for: \"ASMAB\", \"MITOAB\"No results found for: " "\"FRANCISCO\"No results found for: \"ASMAB\", \"MITOAB\"  Lab Results   Component Value Date    WXPFYHRA84 1,680 (H) 10/05/2023   No results found for: \"HEPCAB\"No results found for: \"HEPATOT\", \"HEPAIGM\", \"HEPBCIGM\", \"HEPBCAB\", \"HBEAG\", \"HEPCAB\"No results found for: \"HIV1X2\"  Lab Results   Component Value Date    IRON 98 08/16/2024    TIBC 332 08/16/2024    FERRITIN 88 02/13/2023     Lab Results   Component Value Date    INR 1.0 10/06/2022    INR 1.0 08/11/2021    INR 1.0 11/07/2019    PROTIME 11.5 10/06/2022    PROTIME 11.6 08/11/2021    PROTIME 10.8 11/07/2019     Lab Results   Component Value Date    TSH 1.24 12/15/2022       Radiology  Esophagogastroduodenoscopy (EGD)  Addendum: Impression   No obvious cause to explain dysphagia.   The upper third of the esophagus, middle third of the esophagus, lower  third of the esophagus and Z-line appeared normal. Performed random biopsy  to rule out eosinophilic esophagitis.   Moderate erythematous, nodular mucosa in the antrum; performed cold  forceps biopsy   Performed forceps biopsies to rule out H. pylori   The cardia, fundus of the stomach and body of the stomach appeared  normal.   The duodenal bulb, 1st part of the duodenum and 2nd part of the duodenum  appeared normal.     Findings   The upper third of the esophagus, middle third of the esophagus, lower  third of the esophagus and Z-line appeared normal. Z-line is 40 cm from  the incisors. Performed random biopsy using biopsy forceps to rule out  eosinophilic esophagitis.   Moderate, patchy erythematous and nodular mucosa in the antrum; no  bleeding was identified; performed cold forceps biopsy   Performed forceps biopsies to rule out H. pylori   The cardia, fundus of the stomach and body of the stomach appeared  normal.   The duodenal bulb, 1st part of the duodenum and 2nd part of the duodenum  appeared normal.     Recommendation   Await pathology results    Continue Omeprazole   Avoid NSAIDs   Proceed with colonoscopy to " immediately follow           Indication   Dyspepsia, Dysphagia, unspecified type     Staff   Staff Role    Mary Kate Leon MD Proceduralist      Medications   See Anesthesia Record.      Preprocedure   A history and physical has been performed, and patient medication  allergies have been reviewed. The patient's tolerance of previous  anesthesia has been reviewed. The risks and benefits of the procedure and  the sedation options and risks were discussed with the patient. All  questions were answered and informed consent obtained.     Details of the Procedure   The patient underwent monitored anesthesia care, which was administered by  an anesthesia professional. The patient's blood pressure, ECG, ETCO2,  heart rate, level of consciousness, oxygen and respirations were monitored  throughout the procedure. The scope was introduced through the mouth and  advanced to the second part of the duodenum. Retroflexion was performed in  the cardia. Prior to the procedure, the patient's H. Pylori status was  unknown. The patient experienced no blood loss. The procedure was not  difficult. The patient tolerated the procedure well. There were no  apparent adverse events.      Events   Procedure Events    Event Event Time    ENDO SCOPE IN TIME 9/5/2024  8:53 AM    ENDO SCOPE OUT TIME 9/5/2024  8:58 AM      Specimens   ID Type Source Tests Collected by Time    1 : ANTRUM AND BODY BIOPSY Tissue STOMACH ANTRUM BIOPSY SURGICAL PATHOLOGY  EXAM Mary Kate Leon MD 9/5/2024 0855    2 : RULE OUT EOE Tissue ESOPHAGUS MID BIOPSY SURGICAL PATHOLOGY EXAM  Mary Kate Leon MD 9/5/2024 0857      Procedure Location   [facility] [place]   [address] [place] [id number] [phone]     Referring Provider   Mary Kate Leon MD     Procedure Provider   Mary Kate Leon MD     Impression   No obvious cause to explain dysphagia.   The upper third of the esophagus, middle third of the  esophagus, lower  third of the esophagus and Z-line appeared normal. Performed random biopsy  to rule out eosinophilic esophagitis.   Moderate erythematous, nodular mucosa in the antrum; performed cold  forceps biopsy   Performed forceps biopsies to rule out H. pylori   The cardia, fundus of the stomach and body of the stomach appeared  normal.   The duodenal bulb, 1st part of the duodenum and 2nd part of the duodenum  appeared normal.     Findings   The upper third of the esophagus, middle third of the esophagus, lower  third of the esophagus and Z-line appeared normal. Z-line is 40 cm from  the incisors. Performed random biopsy using biopsy forceps to rule out  eosinophilic esophagitis.   Moderate, patchy erythematous and nodular mucosa in the antrum; no  bleeding was identified; performed cold forceps biopsy   Performed forceps biopsies to rule out H. pylori   The cardia, fundus of the stomach and body of the stomach appeared  normal.   The duodenal bulb, 1st part of the duodenum and 2nd part of the duodenum  appeared normal.     Recommendation   Await pathology results    Proceed with colonoscopy to immediately follow           Indication   Dyspepsia, Dysphagia, unspecified type     Staff   Staff Role    Mary Kate Leon MD Proceduralist      Medications   See Anesthesia Record.      Preprocedure   A history and physical has been performed, and patient medication  allergies have been reviewed. The patient's tolerance of previous  anesthesia has been reviewed. The risks and benefits of the procedure and  the sedation options and risks were discussed with the patient. All  questions were answered and informed consent obtained.     Details of the Procedure   The patient underwent monitored anesthesia care, which was administered by  an anesthesia professional. The patient's blood pressure, ECG, ETCO2,  heart rate, level of consciousness, oxygen and respirations were monitored  throughout the procedure. The  scope was introduced through the mouth and  advanced to the second part of the duodenum. Retroflexion was performed in  the cardia. Prior to the procedure, the patient's H. Pylori status was  unknown. The patient experienced no blood loss. The procedure was not  difficult. The patient tolerated the procedure well. There were no  apparent adverse events.      Events   Procedure Events    Event Event Time    ENDO SCOPE IN TIME 9/5/2024  8:53 AM    ENDO SCOPE OUT TIME 9/5/2024  8:58 AM      Specimens   ID Type Source Tests Collected by Time    1 : ANTRUM AND BODY BIOPSY Tissue STOMACH ANTRUM BIOPSY SURGICAL PATHOLOGY  EXAM Mary Kate Leon MD 9/5/2024 0855    2 : RULE OUT EOE Tissue ESOPHAGUS MID BIOPSY SURGICAL PATHOLOGY EXAM  Mary Kate Leon MD 9/5/2024 0857      Procedure Location   [facility] [place]   [address] [place] [address]   [phone]     Referring Provider   Mary Kate Leon MD     Procedure Provider   Mary Kate Leon MD  Narrative: Table formatting from the original result was not included.  Impression  The upper third of the esophagus, middle third of the esophagus, lower   third of the esophagus and Z-line appeared normal. Performed random biopsy   to rule out eosinophilic esophagitis.  Moderate erythematous, nodular mucosa in the antrum; performed cold   forceps biopsy  Performed forceps biopsies to rule out H. pylori  The cardia, fundus of the stomach and body of the stomach appeared normal.  The duodenal bulb, 1st part of the duodenum and 2nd part of the duodenum   appeared normal.    Findings  The upper third of the esophagus, middle third of the esophagus, lower   third of the esophagus and Z-line appeared normal. Z-line is 40 cm from   the incisors. Performed random biopsy using biopsy forceps to rule out   eosinophilic esophagitis.  Moderate, patchy erythematous and nodular mucosa in the antrum; no   bleeding was identified; performed  cold forceps biopsy  Performed forceps biopsies to rule out H. pylori  The cardia, fundus of the stomach and body of the stomach appeared normal.  The duodenal bulb, 1st part of the duodenum and 2nd part of the duodenum   appeared normal.    Recommendation  Await pathology results   Proceed with colonoscopy to immediately follow         Indication  Dyspepsia, Dysphagia, unspecified type    Staff  Staff Role   Mary Kate Leon MD Proceduralist     Medications  See Anesthesia Record.     Preprocedure  A history and physical has been performed, and patient medication   allergies have been reviewed. The patient's tolerance of previous   anesthesia has been reviewed. The risks and benefits of the procedure and   the sedation options and risks were discussed with the patient. All   questions were answered and informed consent obtained.    Details of the Procedure  The patient underwent monitored anesthesia care, which was administered by   an anesthesia professional. The patient's blood pressure, ECG, ETCO2,   heart rate, level of consciousness, oxygen and respirations were monitored   throughout the procedure. The scope was introduced through the mouth and   advanced to the second part of the duodenum. Retroflexion was performed in   the cardia. Prior to the procedure, the patient's H. Pylori status was   unknown. The patient experienced no blood loss. The procedure was not   difficult. The patient tolerated the procedure well. There were no   apparent adverse events.     Events  Procedure Events   Event Event Time   ENDO SCOPE IN TIME 9/5/2024  8:53 AM   ENDO SCOPE OUT TIME 9/5/2024  8:58 AM     Specimens  ID Type Source Tests Collected by Time   1 : ANTRUM AND BODY BIOPSY Tissue STOMACH ANTRUM BIOPSY SURGICAL PATHOLOGY   EXAM Mary Kate Leon MD 9/5/2024 0855   2 : RULE OUT EOE Tissue ESOPHAGUS MID BIOPSY SURGICAL PATHOLOGY EXAM   Mary Kate Leon MD 9/5/2024 0857     Procedure Location  [facility]  [facility] [place]  [address] [place] [address]  [phone]    Referring Provider  Mary Kate Leon MD    Procedure Provider  Mary Kate Leon MD  Colonoscopy Screening; High Risk Patient; Hx of tubular adenoma  Table formatting from the original result was not included.  Impression  Scattered diverticulosis of mild severity in the ascending colon,   transverse colon, descending colon and sigmoid colon  Subcentimeter polyp in the sigmoid colon was removed with cold forceps   biopsy  There was scattered fibrous material throughout the colon which limited   exam  Medium hemorrhoids    Findings  Multiple small and large, scattered diverticula of mild severity in the   ascending colon, transverse colon, descending colon and sigmoid colon  One 2 mm polyp in the sigmoid colon; performed cold forceps biopsy  There was scattered fibrous material throughout the colon which limited   exam  External medium hemorrhoids observed during digital rectal exam and   retroflexion; no bleeding was identified       Recommendation  Await pathology results   Follow up with me in clinic, due: 10/5/2024   Repeat colonoscopy in 5 years, due: 9/4/2029   Repeat colonoscopy for polyp surveillance with extended prep and emphasis   on low residue diet in 5 years based on overall health given age >65       Indication  Tubular adenoma of colon    Staff  Staff Role   Mary Kate Leon MD Proceduralist     Medications  See Anesthesia Record.     Preprocedure  A history and physical has been performed, and patient medication   allergies have been reviewed. The patient's tolerance of previous   anesthesia has been reviewed. The risks and benefits of the procedure and   the sedation options and risks were discussed with the patient. All   questions were answered and informed consent obtained.    Details of the Procedure  The patient underwent monitored anesthesia care, which was administered by   an anesthesia  professional. The patient's blood pressure, ECG, ETCO2,   heart rate, level of consciousness, oxygen and respirations were monitored   throughout the procedure. A digital rectal exam was performed. A perianal   exam was performed. The scope was introduced through the anus and advanced   to the cecum. Retroflexion was performed in the rectum. The quality of   bowel preparation was evaluated using the [place] Bowel Preparation Scale   with scores of: right colon = 2, transverse colon = 3, left colon = 2. The   total BBPS score was 7. Bowel prep was adequate. The patient experienced   no blood loss. The procedure was not difficult. The patient tolerated the   procedure well. There were no apparent adverse events.     Events  Procedure Events   Event Event Time   ENDO SCOPE IN TIME 9/5/2024  8:53 AM   ENDO SCOPE OUT TIME 9/5/2024  8:58 AM   ENDO SCOPE IN TIME 9/5/2024  9:05 AM   ENDO CECUM REACHED 9/5/2024  9:14 AM   ENDO SCOPE OUT TIME 9/5/2024  9:32 AM     Specimens  ID Type Source Tests Collected by Time   1 : ANTRUM AND BODY BIOPSY Tissue STOMACH ANTRUM BIOPSY SURGICAL PATHOLOGY   EXAM Mary Kate Leon MD 9/5/2024 0855   2 : RULE OUT EOE Tissue ESOPHAGUS MID BIOPSY SURGICAL PATHOLOGY EXAM   Mary Kate Leon MD 9/5/2024 0857   3 : SIGMOID POLPY Tissue COLON - SIGMOID POLYP SURGICAL PATHOLOGY EXAM   Mary Kate Leon MD 9/5/2024 0931     Procedure Location  [facility] [place]  [address]   [place] [address] [phone]    Referring Provider  Mary Kate Leon MD    Procedure Provider  Mary Kate Leon MD      A/P   There are no diagnoses linked to this encounter.   Problem List Items Addressed This Visit             ICD-10-CM    Chronic idiopathic constipation K59.04     - c.w amitiza 8mg BID  - Miralax prn  - water intake and physical activity as tolerated         Dysphagia - Primary R13.10     Chronic intermittent dysphagia, esophagram w mild esophageal  dysmotility. EGD normal including bx   - discussed further w.up with manometry  - patient declines this testing at this time which is reasonable given medical co-morbids  - ensure proper mastication  - c/w PPI         Relevant Orders    Follow Up In Gastroenterology    Bloating R14.0     Bothersome gas and bloating, worse after meals and at night. SIBO test negative. Discussed mechanisms of gas and bloating including the viscerosomatic reflex, diet, constipation and FODMAPs  - work on slowing down eating   - limit sodas (patient reports this helps her burp)  -limit artifical sugars  - treat constipation   - discussed and provided handout on low FODMAP diet  - gas x PRN           Atrophic gastritis without hemorrhage K29.40     C/W omeprazole  Limit NSAIDs

## 2024-09-27 NOTE — PATIENT INSTRUCTIONS
Continue taking Amitiza 8mg twice a day    Continue omeprazole 40mg once a day, take 30 min before 1st meal    Slow down when eating  Reduce use of artifical sugars  Reduce carbonated beverages, this can cause more bloating     Try the LOW FODMAP diet --> look for food on the HIGH FODMAP list that you eat everyday, cut these foods out or reduce your intake, see if this improves your bloating. If it does help the bloating  continue to eat these foods in limited quantities or eat the low fodmap food alternatives    USE GAS-X as needed for gas

## 2024-09-27 NOTE — ASSESSMENT & PLAN NOTE
Bothersome gas and bloating, worse after meals and at night. SIBO test negative. Discussed mechanisms of gas and bloating including the viscerosomatic reflex, diet, constipation and FODMAPs  - work on slowing down eating   - limit sodas (patient reports this helps her burp)  -limit artifical sugars  - treat constipation   - discussed and provided handout on low FODMAP diet  - gas x PRN

## 2024-10-11 ENCOUNTER — OFFICE VISIT (OUTPATIENT)
Dept: CARDIOLOGY | Facility: HOSPITAL | Age: 64
End: 2024-10-11
Payer: COMMERCIAL

## 2024-10-11 VITALS
WEIGHT: 291.45 LBS | BODY MASS INDEX: 53.31 KG/M2 | SYSTOLIC BLOOD PRESSURE: 102 MMHG | HEART RATE: 76 BPM | DIASTOLIC BLOOD PRESSURE: 62 MMHG | OXYGEN SATURATION: 92 %

## 2024-10-11 DIAGNOSIS — I10 HYPERTENSION, UNSPECIFIED TYPE: Primary | ICD-10-CM

## 2024-10-11 DIAGNOSIS — E78.5 HYPERLIPIDEMIA, UNSPECIFIED HYPERLIPIDEMIA TYPE: ICD-10-CM

## 2024-10-11 PROCEDURE — 1157F ADVNC CARE PLAN IN RCRD: CPT | Performed by: NURSE PRACTITIONER

## 2024-10-11 PROCEDURE — 1159F MED LIST DOCD IN RCRD: CPT | Performed by: NURSE PRACTITIONER

## 2024-10-11 PROCEDURE — 99213 OFFICE O/P EST LOW 20 MIN: CPT | Performed by: NURSE PRACTITIONER

## 2024-10-11 PROCEDURE — 3078F DIAST BP <80 MM HG: CPT | Performed by: NURSE PRACTITIONER

## 2024-10-11 PROCEDURE — 1036F TOBACCO NON-USER: CPT | Performed by: NURSE PRACTITIONER

## 2024-10-11 PROCEDURE — 3074F SYST BP LT 130 MM HG: CPT | Performed by: NURSE PRACTITIONER

## 2024-10-11 ASSESSMENT — ENCOUNTER SYMPTOMS
GASTROINTESTINAL NEGATIVE: 1
ENDOCRINE NEGATIVE: 1
DIZZINESS: 1
ALLERGIC/IMMUNOLOGIC NEGATIVE: 1
DYSPNEA ON EXERTION: 1
HEMATOLOGIC/LYMPHATIC NEGATIVE: 1
SHORTNESS OF BREATH: 1
PSYCHIATRIC NEGATIVE: 1
MYALGIAS: 1
EYES NEGATIVE: 1

## 2024-10-11 NOTE — PROGRESS NOTES
Referred by Dr. Jessica shepard. provider found for Follow-up (6 mth.)     History Of Present Illness:    Farida Traylor is a very pleasant 64 year old female with a history of PAH, MARLON and HTN, she is here for a follow up visit. The patient is seen in collaboration with Dr. Faulkner. Mrs. Garza breathing ok, currently wearing oxygen all the time. Complains of knee pain. Denies chest pain or heart palpitations. She is scheduled for an echocardiogram.     Review of Systems   HENT: Negative.     Eyes: Negative.    Cardiovascular:  Positive for dyspnea on exertion.   Respiratory:  Positive for shortness of breath.    Endocrine: Negative.    Hematologic/Lymphatic: Negative.    Skin: Negative.    Musculoskeletal:  Positive for muscle weakness and myalgias.   Gastrointestinal: Negative.    Neurological:  Positive for dizziness.   Psychiatric/Behavioral: Negative.     Allergic/Immunologic: Negative.       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), Acute upper respiratory infection, unspecified (01/14/2020), Allergic rhinitis due to animal (cat) (dog) hair and dander (01/02/2020), Allergic rhinitis due to pollen (01/02/2020), Allergic rhinitis due to pollen (02/26/2018), Allergy status to unspecified drugs, medicaments and biological substances (06/30/2015), Anemia, unspecified (07/23/2018), Anxiety disorder due to known physiological condition (06/05/2013), Anxiety disorder, unspecified (01/28/2016), Asymptomatic varicose veins of bilateral lower extremities (07/16/2019), Atypical squamous cells of undetermined significance on cytologic smear of cervix (ASC-US) (06/26/2013), Candidiasis, unspecified (12/10/2019), Cellulitis of left finger (07/31/2018), Changes in skin texture (03/09/2021), Contusion of left lesser toe(s) with damage to nail, initial encounter (02/22/2018), Contusion of right hand, sequela (08/04/2020), Corns and callosities  (05/25/2021), Disorder of pigmentation, unspecified (09/28/2016), Disorder of the skin and subcutaneous tissue, unspecified (08/27/2020), Dorsalgia, unspecified (09/23/2021), Dorsalgia, unspecified (01/29/2019), Encounter for general adult medical examination without abnormal findings (06/08/2020), Encounter for gynecological examination (general) (routine) without abnormal findings (12/14/2021), Encounter for gynecological examination (general) (routine) without abnormal findings (12/11/2020), Encounter for other screening for malignant neoplasm of breast, Encounter for screening for malignant neoplasm of cervix, Encounter for screening for malignant neoplasm of cervix (11/04/2014), Encounter for screening for malignant neoplasm of cervix, Hepatomegaly, not elsewhere classified (04/21/2021), History of falling (12/28/2018), Inappropriate diet and eating habits (05/13/2021), Irregular menstruation, unspecified, Localized edema (07/29/2015), Localized swelling, mass and lump, left lower limb (09/11/2020), Localized swelling, mass and lump, left lower limb (09/24/2020), Localized swelling, mass and lump, unspecified lower limb (09/24/2020), Melanocytic nevi, unspecified (09/10/2019), Multiple births, unspecified, all liveborn ([facility]), Other allergic rhinitis (01/02/2020), Other allergic rhinitis (01/02/2020), Other conditions influencing health status (06/26/2013), Other conditions influencing health status, Other conditions influencing health status (02/05/2019), Other conditions influencing health status (12/14/2021), Other conditions influencing health status (02/14/2019), Other conditions influencing health status (01/14/2020), Other conditions influencing health status (06/05/2013), Other conditions influencing health status (06/05/2013), Other hypertrophic disorders of the skin (07/29/2015), Other shoulder lesions, right shoulder (11/11/2019), Other specified disorders of bone, thigh (09/09/2020), Other  specified noninflammatory disorders of vagina (03/12/2019), Other specified postprocedural states (05/04/2017), Other specified soft tissue disorders (10/08/2020), Other specified soft tissue disorders (08/04/2020), Pain in left thigh (08/27/2020), Pain in right foot (03/09/2021), Pain in right hip (12/28/2018), Pain in right knee (03/12/2021), Pain in right knee (03/19/2021), Pain in right leg (05/25/2021), Pain in right leg (01/12/2022), Pain in unspecified joint, 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 (02/22/2016), Personal history of contraception, Personal history of diseases of the skin and subcutaneous tissue (12/22/2020), Personal history of malignant neoplasm of other parts of uterus, Personal history of other (healed) physical injury and trauma (03/01/2017), Personal history of other benign neoplasm (09/10/2019), Personal history of other benign neoplasm (05/06/2014), Personal history of other benign neoplasm (12/11/2020), Personal history of other diseases of the circulatory system (04/14/2021), Personal history of other diseases of the circulatory system (04/14/2021), Personal history of other diseases of the circulatory system (04/14/2021), Personal history of other diseases of the circulatory system (02/01/2017), Personal history of other diseases of the circulatory system (04/14/2021), Personal history of other diseases of the digestive system (03/24/2021), Personal history of other diseases of the female genital tract (12/28/2016), Personal history of other diseases of the female genital tract (05/19/2022), Personal history of other diseases of the female genital tract, Personal history of other diseases of the female genital tract, Personal history of other diseases of the musculoskeletal system and connective tissue, Personal history of other diseases of the musculoskeletal system and connective tissue (11/18/2019), Personal history of other  diseases of the respiratory system (05/27/2021), Personal history of other diseases of the respiratory system (04/12/2019), Personal history of other diseases of the respiratory system (01/04/2020), Personal history of other endocrine, nutritional and metabolic disease (02/01/2017), Personal history of other endocrine, nutritional and metabolic disease (04/18/2016), Personal history of other endocrine, nutritional and metabolic disease (05/26/2020), Personal history of other infectious and parasitic diseases, Personal history of other medical treatment (12/11/2020), Personal history of other medical treatment (12/14/2021), Personal history of other medical treatment, Personal history of other specified conditions (08/21/2019), Personal history of other specified conditions (02/02/2017), Personal history of other specified conditions (12/07/2020), Personal history of other specified conditions (04/14/2021), Personal history of other specified conditions (12/05/2014), Personal history of other specified conditions (08/25/2021), Personal history of other specified conditions, Personal history of other specified conditions, Personal history of other specified conditions (01/12/2018), Personal history of transient ischemic attack (TIA), and 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 (12/30/2015), Personal history of urinary (tract) infections (09/02/2021), Personal history of urinary (tract) infections (05/23/2019), Personal history of urinary (tract) infections (09/02/2021), Personal history of urinary calculi (09/12/2019), Polyphagia (05/13/2021), Primary central sleep apnea (10/21/2017), Pulmonary hypertension (Multi), Pure hypercholesterolemia, unspecified, Residual hemorrhoidal skin tags (07/06/2017), Sleep apnea, Slurred speech (01/02/2015), Tinea pedis (05/25/2021), Unspecified abdominal pain (04/13/2021), Unspecified injury of right wrist, hand and finger(s), sequela (08/04/2020), Unspecified internal  derangement of unspecified knee (03/01/2017), Unspecified symptoms and signs involving the genitourinary system (12/16/2021), Unspecified symptoms and signs involving the genitourinary system (02/05/2019), Unspecified symptoms and signs involving the genitourinary system (09/03/2020), Urinary tract infection, site not specified (01/17/2020), Urinary tract infection, site not specified (01/10/2022), and Xerosis cutis (09/10/2019).    Past Surgical History:  She has a past surgical history that includes Other surgical history (07/31/2013); Other surgical history (09/10/2019); Other surgical history (11/30/2018); Mouth surgery (11/04/2014); Knee surgery (05/04/2017); Other surgical history (06/08/2020); MR angio head wo IV contrast (02/09/2016); Cardiac catheterization (Right, 01/04/2024); and Cardiac catheterization.      Social History:  She reports that she has never smoked. She has never used smokeless tobacco. She reports that she does not currently use alcohol. She reports that she does not use drugs.    Family History:  Family History   Problem Relation Name Age of Onset    Hypertension Mother      Breast cancer Mother      Other (cardiac disorder) Mother      Cardiomyopathy Mother      Diabetes Mother      Other (chronic kidney disease) Mother      Alcohol abuse Father joel serna     Stroke Brother      Brain Aneurysm Brother          Allergies:  Grass pollen, Other, Pollen extracts, and Tree and shrub pollen    Outpatient Medications:  Current Outpatient Medications   Medication Instructions    Adempas 2.5 mg, oral, 3 times daily    albuterol 90 mcg/actuation inhaler INHALE 1 TO 2 PUFFS BY MOUTH EVERY 4 TO 6 HOURS AS NEEDED    aspirin 325 mg tablet 1 tablet, oral, Daily    aspirin 81 mg, oral, Daily    blood pressure monitor (Blood Pressure Kit) kit 1 kit, miscellaneous, As needed    buPROPion SR (Wellbutrin SR) 150 mg 12 hr tablet TAKE 1 TABLET BY MOUTH EVERY MORNING AND AFTERNOON    busPIRone (Buspar)  30 mg tablet 1 tablet, oral, 2 times daily    calcium carbonate (CALCIUM 600 ORAL) 2 tablets, oral, Daily    cyanocobalamin (VITAMIN B-12) 1,000 mcg, oral, Daily    cyclobenzaprine (FLEXERIL) 10 mg, oral, 3 times daily PRN    ferrous sulfate 65 mg, oral, 3 times daily (morning, midday, late afternoon), Do not crush, chew, or split.    fluticasone propion-salmeteroL (Advair Diskus) 250-50 mcg/dose diskus inhaler INHALE ONE (1) PUFF BY MOUTH TWICE DAILY. RINSE MOUTH OUT AFTER EACH USE.    hydrALAZINE (APRESOLINE) 50 mg, oral, 2 times daily, 1 tablet in the AM and 3 tablets in the PM.    hydrOXYzine pamoate (Vistaril) 50 mg capsule TAKE 3 CAPSULES BY MOUTH EVERY NIGHT AT BEDTIME AND TAKE 1 CAPSULE BY MOUTH EVERY MORNING    lubiprostone (AMITIZA) 8 mcg, oral, 2 times daily (morning and late afternoon)    memantine (NAMENDA) 10 mg, oral, 2 times daily    methenamine hippurate (HIPREX) 1 g, oral, 2 times daily    montelukast (SINGULAIR) 10 mg, oral, Daily, *EMERGENCY REFILL*    omeprazole (PRILOSEC) 40 mg, oral, Daily before breakfast    Opsumit 10 mg tablet tablet TAKE 1 TABLET BY MOUTH DAILY. DO NOT HANDLE IF PREGNANT. DO NOT SPLIT, CRUSH, OR CHEW. REVIEW MEDICATION GUIDE.    sertraline (Zoloft) 100 mg tablet 2 tablets, oral, Daily    simvastatin (ZOCOR) 80 mg, oral, Nightly    trospium (SANCTURA) 20 mg, oral, 2 times daily        Last Recorded Vitals:  Vitals:    10/11/24 1121   Weight: 132 kg (291 lb 7.2 oz)       Physical Exam:  Physical Exam  Vitals reviewed.   HENT:      Head: Normocephalic.      Nose: Nose normal.   Eyes:      Pupils: Pupils are equal, round, and reactive to light.   Cardiovascular:      Rate and Rhythm: Normal rate and regular rhythm with a 2/6 CARLITO   Pulmonary:      Effort: Pulmonary effort is normal.      Breath sounds: diminished in the bases   Abdominal:      General: Abdomen is flat.      Palpations: Abdomen is soft.   Musculoskeletal:         General: Normal range of motion.      Cervical  back: Normal range of motion.   Skin:     General: Skin is warm and dry.   Neurological:      General: No focal deficit present.      Mental Status: He is alert and oriented to person, place, and time.   Psychiatric:         Mood and Affect: Mood normal.            Last Labs:  CBC -  Lab Results   Component Value Date    WBC 4.8 08/16/2024    HGB 14.2 08/16/2024    HCT 46.1 (H) 08/16/2024    MCV 93 08/16/2024     08/16/2024       CMP -  Lab Results   Component Value Date    CALCIUM 9.2 08/16/2024    PHOS 3.9 02/13/2023    PROT 6.3 (L) 08/16/2024    ALBUMIN 4.3 08/16/2024    AST 14 08/16/2024    ALT 13 08/16/2024    ALKPHOS 69 08/16/2024    BILITOT 0.4 08/16/2024       LIPID PANEL -   Lab Results   Component Value Date    CHOL 177 08/16/2024    TRIG 134 08/16/2024    HDL 55.9 08/16/2024    CHHDL 3.2 08/16/2024    LDLF 94 12/15/2022    VLDL 27 08/16/2024    NHDL 121 08/16/2024       RENAL FUNCTION PANEL -   Lab Results   Component Value Date    GLUCOSE 81 08/16/2024     08/16/2024    K 4.3 08/16/2024     08/16/2024    CO2 29 08/16/2024    ANIONGAP 15 08/16/2024    BUN 10 08/16/2024    CREATININE 0.89 08/16/2024    CALCIUM 9.2 08/16/2024    PHOS 3.9 02/13/2023    ALBUMIN 4.3 08/16/2024        Lab Results   Component Value Date    BNP 37 03/08/2023    HGBA1C 5.5 12/15/2022       Last Cardiology Tests:  ECG:    Echo:  Echocardiogram 1/18/2023  1. Left ventricular systolic function is normal with a 60-65% estimated ejection fraction.  2. Technically difficult study precludes definitive valvular assessment, albeit no gross abnormalities evident.  3. There is no evidence of a patent foramen ovale.    Echocardiogram 10/7/2020   1. The left ventricular systolic function is hyperdynamic with a 65-70%  estimated ejection fraction.  2. Moderately enlarged right ventricle.  3. There is mild to moderate tricuspid regurgitation.  4. Moderately elevated pulmonary artery pressure, RVSP 62 mmHg.  Echo 11/2018:   1.  Suboptimal image quality due to body habitus.   2. The left ventricular systolic function is normal with a 60-65% estimated  ejection fraction.   3. RV and RA appear mildly dilated.   4. Mildly elevated RVSP.   Ejection Fractions  LVEF 60-65%    Cath:  Right heart cath 10/11/2022  1. Normal cardiac output.  2. MPAP 31, PAOP 11, PVR 2.6, CO 7.7.  3. Pulmonary arterial hypertension.      Right heart cath 4/12/2021  1. Normal cardiac output.  2. RA 15 mmHg, RV 35/15, PA 44/26 (35 mmHg), PAOP 2 mmHg.  3. Thermo CO 7.4 l/min, CI 3.4 l/min/m2.  4. PVR 4.5.  5. Pulmonary arterial hypertension.   Stress Test:  Stress test 5/2019:  1. Normal myocardial perfusion imaging in response to pharmacologic  stress, no evidence of ischemia or prior infarct.  2. Well-maintained left ventricular function, estimated to be greater  than 65%.  Cardiac Imaging:  Zio monitor 6/3/2019   sinus rhythm first degree AV block   12 SVT   max heart rate 182 bpm   min heart rate 42 bpm   average heart rate 60 bpm     Assessment/Plan   Very pleasant 64 year-old female with PAH, asthma, sleep apnea, and HTN, she continues to do well from a cardiac standpoint. Breathing has been stable. She currently is seeing pulmonary for her pulmonary hypertension. Blood pressure and heart rate are well controlled today.     Plan   -continue Hydralazine  -continue Aspirin and Simvastatin   -follow up in six months   -call with any questions         Hortencia Ferraro, APRN-CNP

## 2024-11-01 ENCOUNTER — HOSPITAL ENCOUNTER (EMERGENCY)
Facility: HOSPITAL | Age: 64
Discharge: HOME | End: 2024-11-01
Attending: STUDENT IN AN ORGANIZED HEALTH CARE EDUCATION/TRAINING PROGRAM
Payer: COMMERCIAL

## 2024-11-01 ENCOUNTER — APPOINTMENT (OUTPATIENT)
Dept: CARDIOLOGY | Facility: HOSPITAL | Age: 64
End: 2024-11-01
Payer: COMMERCIAL

## 2024-11-01 ENCOUNTER — APPOINTMENT (OUTPATIENT)
Dept: RADIOLOGY | Facility: HOSPITAL | Age: 64
End: 2024-11-01
Payer: COMMERCIAL

## 2024-11-01 VITALS
OXYGEN SATURATION: 95 % | SYSTOLIC BLOOD PRESSURE: 158 MMHG | TEMPERATURE: 97.7 F | DIASTOLIC BLOOD PRESSURE: 76 MMHG | HEART RATE: 63 BPM | RESPIRATION RATE: 21 BRPM | WEIGHT: 290 LBS | HEIGHT: 62 IN | BODY MASS INDEX: 53.37 KG/M2

## 2024-11-01 DIAGNOSIS — R42 DIZZINESS: Primary | ICD-10-CM

## 2024-11-01 DIAGNOSIS — R42 ORTHOSTATIC DIZZINESS: ICD-10-CM

## 2024-11-01 LAB
ANION GAP SERPL CALC-SCNC: 14 MMOL/L (ref 10–20)
BASOPHILS # BLD AUTO: 0.02 X10*3/UL (ref 0–0.1)
BASOPHILS NFR BLD AUTO: 0.4 %
BNP SERPL-MCNC: 61 PG/ML (ref 0–99)
BUN SERPL-MCNC: 12 MG/DL (ref 6–23)
CALCIUM SERPL-MCNC: 9.2 MG/DL (ref 8.6–10.3)
CARDIAC TROPONIN I PNL SERPL HS: 6 NG/L (ref 0–13)
CARDIAC TROPONIN I PNL SERPL HS: 6 NG/L (ref 0–13)
CHLORIDE SERPL-SCNC: 97 MMOL/L (ref 98–107)
CO2 SERPL-SCNC: 29 MMOL/L (ref 21–32)
CREAT SERPL-MCNC: 0.74 MG/DL (ref 0.5–1.05)
EGFRCR SERPLBLD CKD-EPI 2021: 89 ML/MIN/1.73M*2
EOSINOPHIL # BLD AUTO: 0.25 X10*3/UL (ref 0–0.7)
EOSINOPHIL NFR BLD AUTO: 4.5 %
ERYTHROCYTE [DISTWIDTH] IN BLOOD BY AUTOMATED COUNT: 14 % (ref 11.5–14.5)
FLUAV RNA RESP QL NAA+PROBE: NOT DETECTED
FLUBV RNA RESP QL NAA+PROBE: NOT DETECTED
GLUCOSE SERPL-MCNC: 97 MG/DL (ref 74–99)
HCT VFR BLD AUTO: 43 % (ref 36–46)
HGB BLD-MCNC: 13.8 G/DL (ref 12–16)
IMM GRANULOCYTES # BLD AUTO: 0.03 X10*3/UL (ref 0–0.7)
IMM GRANULOCYTES NFR BLD AUTO: 0.5 % (ref 0–0.9)
LYMPHOCYTES # BLD AUTO: 1.64 X10*3/UL (ref 1.2–4.8)
LYMPHOCYTES NFR BLD AUTO: 29.2 %
MAGNESIUM SERPL-MCNC: 1.76 MG/DL (ref 1.6–2.4)
MCH RBC QN AUTO: 28.6 PG (ref 26–34)
MCHC RBC AUTO-ENTMCNC: 32.1 G/DL (ref 32–36)
MCV RBC AUTO: 89 FL (ref 80–100)
MONOCYTES # BLD AUTO: 0.37 X10*3/UL (ref 0.1–1)
MONOCYTES NFR BLD AUTO: 6.6 %
NEUTROPHILS # BLD AUTO: 3.3 X10*3/UL (ref 1.2–7.7)
NEUTROPHILS NFR BLD AUTO: 58.8 %
NRBC BLD-RTO: 0 /100 WBCS (ref 0–0)
PLATELET # BLD AUTO: 194 X10*3/UL (ref 150–450)
POTASSIUM SERPL-SCNC: 4 MMOL/L (ref 3.5–5.3)
RBC # BLD AUTO: 4.82 X10*6/UL (ref 4–5.2)
SARS-COV-2 RNA RESP QL NAA+PROBE: NOT DETECTED
SODIUM SERPL-SCNC: 136 MMOL/L (ref 136–145)
WBC # BLD AUTO: 5.6 X10*3/UL (ref 4.4–11.3)

## 2024-11-01 PROCEDURE — 70450 CT HEAD/BRAIN W/O DYE: CPT | Performed by: STUDENT IN AN ORGANIZED HEALTH CARE EDUCATION/TRAINING PROGRAM

## 2024-11-01 PROCEDURE — 93005 ELECTROCARDIOGRAM TRACING: CPT

## 2024-11-01 PROCEDURE — 83735 ASSAY OF MAGNESIUM: CPT | Performed by: STUDENT IN AN ORGANIZED HEALTH CARE EDUCATION/TRAINING PROGRAM

## 2024-11-01 PROCEDURE — 36415 COLL VENOUS BLD VENIPUNCTURE: CPT | Performed by: STUDENT IN AN ORGANIZED HEALTH CARE EDUCATION/TRAINING PROGRAM

## 2024-11-01 PROCEDURE — 84484 ASSAY OF TROPONIN QUANT: CPT | Performed by: STUDENT IN AN ORGANIZED HEALTH CARE EDUCATION/TRAINING PROGRAM

## 2024-11-01 PROCEDURE — 70450 CT HEAD/BRAIN W/O DYE: CPT

## 2024-11-01 PROCEDURE — 85025 COMPLETE CBC W/AUTO DIFF WBC: CPT | Performed by: STUDENT IN AN ORGANIZED HEALTH CARE EDUCATION/TRAINING PROGRAM

## 2024-11-01 PROCEDURE — 99285 EMERGENCY DEPT VISIT HI MDM: CPT | Mod: 25

## 2024-11-01 PROCEDURE — 83880 ASSAY OF NATRIURETIC PEPTIDE: CPT | Performed by: STUDENT IN AN ORGANIZED HEALTH CARE EDUCATION/TRAINING PROGRAM

## 2024-11-01 PROCEDURE — 87636 SARSCOV2 & INF A&B AMP PRB: CPT | Performed by: STUDENT IN AN ORGANIZED HEALTH CARE EDUCATION/TRAINING PROGRAM

## 2024-11-01 PROCEDURE — 82374 ASSAY BLOOD CARBON DIOXIDE: CPT | Performed by: STUDENT IN AN ORGANIZED HEALTH CARE EDUCATION/TRAINING PROGRAM

## 2024-11-01 ASSESSMENT — LIFESTYLE VARIABLES
EVER HAD A DRINK FIRST THING IN THE MORNING TO STEADY YOUR NERVES TO GET RID OF A HANGOVER: NO
HAVE PEOPLE ANNOYED YOU BY CRITICIZING YOUR DRINKING: NO
EVER FELT BAD OR GUILTY ABOUT YOUR DRINKING: NO
HAVE YOU EVER FELT YOU SHOULD CUT DOWN ON YOUR DRINKING: NO
TOTAL SCORE: 0

## 2024-11-05 LAB
ATRIAL RATE: 66 BPM
P AXIS: 43 DEGREES
P OFFSET: 152 MS
P ONSET: 99 MS
PR INTERVAL: 212 MS
Q ONSET: 205 MS
QRS COUNT: 11 BEATS
QRS DURATION: 118 MS
QT INTERVAL: 460 MS
QTC CALCULATION(BAZETT): 482 MS
QTC FREDERICIA: 475 MS
R AXIS: 121 DEGREES
T AXIS: 63 DEGREES
T OFFSET: 435 MS
VENTRICULAR RATE: 66 BPM

## 2024-11-06 ENCOUNTER — OFFICE VISIT (OUTPATIENT)
Dept: PRIMARY CARE | Facility: CLINIC | Age: 64
End: 2024-11-06
Payer: COMMERCIAL

## 2024-11-06 VITALS
BODY MASS INDEX: 53.22 KG/M2 | WEIGHT: 291 LBS | TEMPERATURE: 99 F | HEART RATE: 86 BPM | DIASTOLIC BLOOD PRESSURE: 72 MMHG | OXYGEN SATURATION: 87 % | SYSTOLIC BLOOD PRESSURE: 120 MMHG

## 2024-11-06 DIAGNOSIS — R30.0 DYSURIA: ICD-10-CM

## 2024-11-06 LAB
POC APPEARANCE, URINE: CLEAR
POC BILIRUBIN, URINE: NEGATIVE
POC BLOOD, URINE: ABNORMAL
POC COLOR, URINE: ABNORMAL
POC GLUCOSE, URINE: NEGATIVE MG/DL
POC KETONES, URINE: NEGATIVE MG/DL
POC LEUKOCYTES, URINE: ABNORMAL
POC NITRITE,URINE: NEGATIVE
POC PH, URINE: 7 PH
POC PROTEIN, URINE: NEGATIVE MG/DL
POC SPECIFIC GRAVITY, URINE: 1.01
POC UROBILINOGEN, URINE: 0.2 EU/DL

## 2024-11-06 PROCEDURE — 81003 URINALYSIS AUTO W/O SCOPE: CPT | Performed by: FAMILY MEDICINE

## 2024-11-06 PROCEDURE — 1157F ADVNC CARE PLAN IN RCRD: CPT | Performed by: FAMILY MEDICINE

## 2024-11-06 PROCEDURE — 3078F DIAST BP <80 MM HG: CPT | Performed by: FAMILY MEDICINE

## 2024-11-06 PROCEDURE — 99213 OFFICE O/P EST LOW 20 MIN: CPT | Performed by: FAMILY MEDICINE

## 2024-11-06 PROCEDURE — 87086 URINE CULTURE/COLONY COUNT: CPT

## 2024-11-06 PROCEDURE — 3074F SYST BP LT 130 MM HG: CPT | Performed by: FAMILY MEDICINE

## 2024-11-06 PROCEDURE — 1159F MED LIST DOCD IN RCRD: CPT | Performed by: FAMILY MEDICINE

## 2024-11-06 NOTE — PATIENT INSTRUCTIONS
TAKING AZO BUT STILL HAVE ISSUES   MORE WATER   CULTURE SENT OUT AND THE OFFICE WILL CALL AND SEND ANTIBIOTIC   FOLLOW WITH THE UROLOGIST

## 2024-11-06 NOTE — LETTER
November 15, 2024     Farida Traylor  [address]  [facility] [address]      Dear Ms. Traylor:    Below are the results from your recent visit:    Resulted Orders   POCT UA Automated manually resulted   Result Value Ref Range    POC Color, Urine Light-Yellow Straw, Yellow, Light-Yellow    POC Appearance, Urine Clear Clear    POC Glucose, Urine NEGATIVE NEGATIVE mg/dl    POC Bilirubin, Urine NEGATIVE NEGATIVE    POC Ketones, Urine NEGATIVE NEGATIVE mg/dl    POC Specific Gravity, Urine 1.015 1.005 - 1.035    POC Blood, Urine TRACE-Intact (A) NEGATIVE    POC PH, Urine 7.0 No Reference Range Established PH    POC Protein, Urine NEGATIVE NEGATIVE, 30 (1+) mg/dl    POC Urobilinogen, Urine 0.2 0.2, 1.0 EU/DL    Poc Nitrite, Urine NEGATIVE NEGATIVE    POC Leukocytes, Urine TRACE (A) NEGATIVE   Urine Culture   Result Value Ref Range    Urine Culture No significant growth      No bacterial growth   If you have any questions or concerns, please don't hesitate to call.       Sincerely,        Jelena Romo, DO

## 2024-11-06 NOTE — PROGRESS NOTES
Subjective   Patient ID: Farida Traylor is a 64 y.o. female who presents for Hospital Follow-up (ER-GEAUGA- DIZZINESS- DIAGNOSED WITH DEHYDRATION. ) and dysuria.    HPI NO UA WAS NOT CHECKED   CARDIACS WERE ALRIGHT IN THE ER   UA HERE TODAY IS ABN AND URINE SENT FOR C&S       Review of Systems   Constitutional:  Negative for activity change, appetite change, fever and unexpected weight change.   HENT:  Negative for congestion, ear pain, postnasal drip, rhinorrhea, sinus pressure and sore throat.    Eyes:  Negative for discharge, itching and visual disturbance.   Respiratory:  Negative for cough, shortness of breath and wheezing.    Cardiovascular:  Negative for chest pain, palpitations and leg swelling.   Gastrointestinal:  Negative for abdominal pain, blood in stool, constipation, diarrhea, nausea and vomiting.   Endocrine: Negative for cold intolerance, heat intolerance and polyuria.   Genitourinary:  Positive for dysuria. Negative for flank pain and hematuria.   Musculoskeletal:  Negative for arthralgias, gait problem, joint swelling and myalgias.   Skin:  Negative for rash.   Allergic/Immunologic: Negative for environmental allergies and food allergies.   Neurological:  Positive for dizziness. Negative for syncope, weakness, light-headedness, numbness and headaches.   Psychiatric/Behavioral:  Negative for dysphoric mood and sleep disturbance. The patient is not nervous/anxious.        Objective   /72   Pulse 86   Temp 37.2 °C (99 °F)   Wt 132 kg (291 lb)   SpO2 (!) 87%   BMI 53.22 kg/m²     Physical Exam  Vitals and nursing note reviewed.   Constitutional:       Appearance: Normal appearance.   HENT:      Head: Normocephalic.      Mouth/Throat:      Mouth: Mucous membranes are dry.   Cardiovascular:      Rate and Rhythm: Normal rate and regular rhythm.      Pulses: Normal pulses.      Heart sounds: Normal heart sounds. No murmur heard.     No friction rub. No gallop.   Pulmonary:      Effort:  Pulmonary effort is normal. No respiratory distress.      Breath sounds: Normal breath sounds. No wheezing.   Abdominal:      General: Bowel sounds are normal. There is no distension.      Palpations: Abdomen is soft.      Tenderness: There is no abdominal tenderness.   Musculoskeletal:         General: No deformity. Normal range of motion.   Skin:     General: Skin is warm and dry.      Capillary Refill: Capillary refill takes less than 2 seconds.   Neurological:      General: No focal deficit present.      Mental Status: She is alert and oriented to person, place, and time.   Psychiatric:         Mood and Affect: Mood normal.         Assessment/Plan   Problem List Items Addressed This Visit             ICD-10-CM    Dysuria R30.0    Relevant Orders    POCT UA Automated manually resulted (Completed)    Urine Culture (Completed)

## 2024-11-08 LAB — BACTERIA UR CULT: NORMAL

## 2024-11-15 ENCOUNTER — TELEPHONE (OUTPATIENT)
Dept: PRIMARY CARE | Facility: CLINIC | Age: 64
End: 2024-11-15
Payer: COMMERCIAL

## 2024-11-18 ENCOUNTER — APPOINTMENT (OUTPATIENT)
Dept: CARDIOLOGY | Facility: HOSPITAL | Age: 64
End: 2024-11-18
Payer: COMMERCIAL

## 2024-11-19 ASSESSMENT — ENCOUNTER SYMPTOMS
ABDOMINAL PAIN: 0
JOINT SWELLING: 0
FLANK PAIN: 0
WEAKNESS: 0
DIARRHEA: 0
COUGH: 0
SORE THROAT: 0
MYALGIAS: 0
RHINORRHEA: 0
NERVOUS/ANXIOUS: 0
APPETITE CHANGE: 0
NAUSEA: 0
ARTHRALGIAS: 0
FEVER: 0
VOMITING: 0
SINUS PRESSURE: 0
DIZZINESS: 1
PALPITATIONS: 0
LIGHT-HEADEDNESS: 0
SHORTNESS OF BREATH: 0
BLOOD IN STOOL: 0
CONSTIPATION: 0
WHEEZING: 0
UNEXPECTED WEIGHT CHANGE: 0
EYE ITCHING: 0
EYE DISCHARGE: 0
NUMBNESS: 0
ACTIVITY CHANGE: 0
HEMATURIA: 0
SLEEP DISTURBANCE: 0
DYSURIA: 1
HEADACHES: 0
DYSPHORIC MOOD: 0

## 2024-11-21 ENCOUNTER — LAB (OUTPATIENT)
Dept: LAB | Facility: LAB | Age: 64
End: 2024-11-21
Payer: COMMERCIAL

## 2024-11-21 DIAGNOSIS — R30.0 DYSURIA: ICD-10-CM

## 2024-11-21 PROCEDURE — 87086 URINE CULTURE/COLONY COUNT: CPT

## 2024-11-23 LAB — BACTERIA UR CULT: NO GROWTH

## 2024-12-02 DIAGNOSIS — I27.0 IDIOPATHIC PAH (PULMONARY ARTERIAL HYPERTENSION) (MULTI): ICD-10-CM

## 2024-12-02 RX ORDER — RIOCIGUAT 2.5 MG/1
2.5 TABLET, FILM COATED ORAL 3 TIMES DAILY
Qty: 90 TABLET | Refills: 3 | Status: SHIPPED | OUTPATIENT
Start: 2024-12-02

## 2024-12-05 ENCOUNTER — APPOINTMENT (OUTPATIENT)
Dept: CARDIOLOGY | Facility: HOSPITAL | Age: 64
End: 2024-12-05
Payer: COMMERCIAL

## 2024-12-11 DIAGNOSIS — J96.11 CHRONIC RESPIRATORY FAILURE WITH HYPOXIA (MULTI): ICD-10-CM

## 2024-12-11 DIAGNOSIS — K21.9 GERD WITHOUT ESOPHAGITIS: ICD-10-CM

## 2024-12-12 ENCOUNTER — APPOINTMENT (OUTPATIENT)
Dept: UROLOGY | Facility: CLINIC | Age: 64
End: 2024-12-12
Payer: COMMERCIAL

## 2024-12-12 DIAGNOSIS — N32.81 OAB (OVERACTIVE BLADDER): ICD-10-CM

## 2024-12-12 DIAGNOSIS — R30.0 DYSURIA: ICD-10-CM

## 2024-12-12 LAB
POC APPEARANCE, URINE: CLEAR
POC BILIRUBIN, URINE: NEGATIVE
POC BLOOD, URINE: ABNORMAL
POC COLOR, URINE: YELLOW
POC GLUCOSE, URINE: NEGATIVE MG/DL
POC KETONES, URINE: NEGATIVE MG/DL
POC LEUKOCYTES, URINE: ABNORMAL
POC NITRITE,URINE: NEGATIVE
POC PH, URINE: 8 PH
POC PROTEIN, URINE: ABNORMAL MG/DL
POC SPECIFIC GRAVITY, URINE: 1.02
POC UROBILINOGEN, URINE: 0.2 EU/DL

## 2024-12-12 PROCEDURE — 52287 CYSTOSCOPY CHEMODENERVATION: CPT | Performed by: STUDENT IN AN ORGANIZED HEALTH CARE EDUCATION/TRAINING PROGRAM

## 2024-12-12 PROCEDURE — 87086 URINE CULTURE/COLONY COUNT: CPT

## 2024-12-12 RX ORDER — SODIUM BICARBONATE 1 MEQ/ML
10 SYRINGE (ML) INTRAVENOUS ONCE
Status: COMPLETED | OUTPATIENT
Start: 2024-12-12 | End: 2024-12-12

## 2024-12-12 RX ORDER — OMEPRAZOLE 40 MG/1
40 CAPSULE, DELAYED RELEASE ORAL
Qty: 90 CAPSULE | Refills: 1 | Status: SHIPPED | OUTPATIENT
Start: 2024-12-12 | End: 2025-12-12

## 2024-12-12 RX ORDER — LIDOCAINE HYDROCHLORIDE 10 MG/ML
50 INJECTION, SOLUTION INFILTRATION; PERINEURAL ONCE
Status: COMPLETED | OUTPATIENT
Start: 2024-12-12 | End: 2024-12-12

## 2024-12-12 RX ORDER — NITROFURANTOIN 25; 75 MG/1; MG/1
100 CAPSULE ORAL 2 TIMES DAILY
Qty: 10 CAPSULE | Refills: 0 | Status: SHIPPED | OUTPATIENT
Start: 2024-12-12 | End: 2024-12-17

## 2024-12-12 NOTE — PROGRESS NOTES
HISTORY OF PRESENT ILLNESS:  Farida Traylor is a 64 y.o. female who presents today for a botox injection. She is concerned about a possible bladder infection that has been ongoing for a few months.          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), Acute upper respiratory infection, unspecified (01/14/2020), Allergic rhinitis due to animal (cat) (dog) hair and dander (01/02/2020), Allergic rhinitis due to pollen (01/02/2020), Allergic rhinitis due to pollen (02/26/2018), Allergy status to unspecified drugs, medicaments and biological substances (06/30/2015), Anemia, unspecified (07/23/2018), Anxiety disorder due to known physiological condition (06/05/2013), Anxiety disorder, unspecified (01/28/2016), Asymptomatic varicose veins of bilateral lower extremities (07/16/2019), Atypical squamous cells of undetermined significance on cytologic smear of cervix (ASC-US) (06/26/2013), Candidiasis, unspecified (12/10/2019), Cellulitis of left finger (07/31/2018), Changes in skin texture (03/09/2021), Contusion of left lesser toe(s) with damage to nail, initial encounter (02/22/2018), Contusion of right hand, sequela (08/04/2020), Corns and callosities (05/25/2021), Disorder of pigmentation, unspecified (09/28/2016), Disorder of the skin and subcutaneous tissue, unspecified (08/27/2020), Dorsalgia, unspecified (09/23/2021), Dorsalgia, unspecified (01/29/2019), Encounter for general adult medical examination without abnormal findings (06/08/2020), Encounter for gynecological examination (general) (routine) without abnormal findings (12/14/2021), Encounter for gynecological examination (general) (routine) without abnormal findings (12/11/2020), Encounter for other screening for malignant neoplasm of breast, Encounter for screening for malignant neoplasm of cervix, Encounter for screening for malignant neoplasm of cervix (11/04/2014), Encounter  for screening for malignant neoplasm of cervix, Hepatomegaly, not elsewhere classified (04/21/2021), History of falling (12/28/2018), Inappropriate diet and eating habits (05/13/2021), Irregular menstruation, unspecified, Localized edema (07/29/2015), Localized swelling, mass and lump, left lower limb (09/11/2020), Localized swelling, mass and lump, left lower limb (09/24/2020), Localized swelling, mass and lump, unspecified lower limb (09/24/2020), Melanocytic nevi, unspecified (09/10/2019), Multiple births, unspecified, all liveborn ([facility] [place]), Other allergic rhinitis (01/02/2020), Other allergic rhinitis (01/02/2020), Other conditions influencing health status (06/26/2013), Other conditions influencing health status, Other conditions influencing health status (02/05/2019), Other conditions influencing health status (12/14/2021), Other conditions influencing health status (02/14/2019), Other conditions influencing health status (01/14/2020), Other conditions influencing health status (06/05/2013), Other conditions influencing health status (06/05/2013), Other hypertrophic disorders of the skin (07/29/2015), Other shoulder lesions, right shoulder (11/11/2019), Other specified disorders of bone, thigh (09/09/2020), Other specified noninflammatory disorders of vagina (03/12/2019), Other specified postprocedural states (05/04/2017), Other specified soft tissue disorders (10/08/2020), Other specified soft tissue disorders (08/04/2020), Pain in left thigh (08/27/2020), Pain in right foot (03/09/2021), Pain in right hip (12/28/2018), Pain in right knee (03/12/2021), Pain in right knee (03/19/2021), Pain in right leg (05/25/2021), Pain in right leg (01/12/2022), Pain in unspecified joint, 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 (02/22/2016), Personal history of contraception, Personal history of diseases of the skin and subcutaneous tissue (12/22/2020), Personal history of  malignant neoplasm of other parts of uterus, Personal history of other (healed) physical injury and trauma (03/01/2017), Personal history of other benign neoplasm (09/10/2019), Personal history of other benign neoplasm (05/06/2014), Personal history of other benign neoplasm (12/11/2020), Personal history of other diseases of the circulatory system (04/14/2021), Personal history of other diseases of the circulatory system (04/14/2021), Personal history of other diseases of the circulatory system (04/14/2021), Personal history of other diseases of the circulatory system (02/01/2017), Personal history of other diseases of the circulatory system (04/14/2021), Personal history of other diseases of the digestive system (03/24/2021), Personal history of other diseases of the female genital tract (12/28/2016), Personal history of other diseases of the female genital tract (05/19/2022), Personal history of other diseases of the female genital tract, Personal history of other diseases of the female genital tract, Personal history of other diseases of the musculoskeletal system and connective tissue, Personal history of other diseases of the musculoskeletal system and connective tissue (11/18/2019), Personal history of other diseases of the respiratory system (05/27/2021), Personal history of other diseases of the respiratory system (04/12/2019), Personal history of other diseases of the respiratory system (01/04/2020), Personal history of other endocrine, nutritional and metabolic disease (02/01/2017), Personal history of other endocrine, nutritional and metabolic disease (04/18/2016), Personal history of other endocrine, nutritional and metabolic disease (05/26/2020), Personal history of other infectious and parasitic diseases, Personal history of other medical treatment (12/11/2020), Personal history of other medical treatment (12/14/2021), Personal history of other medical treatment, Personal history of other specified  conditions (08/21/2019), Personal history of other specified conditions (02/02/2017), Personal history of other specified conditions (12/07/2020), Personal history of other specified conditions (04/14/2021), Personal history of other specified conditions (12/05/2014), Personal history of other specified conditions (08/25/2021), Personal history of other specified conditions, Personal history of other specified conditions, Personal history of other specified conditions (01/12/2018), Personal history of transient ischemic attack (TIA), and 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 (12/30/2015), Personal history of urinary (tract) infections (09/02/2021), Personal history of urinary (tract) infections (05/23/2019), Personal history of urinary (tract) infections (09/02/2021), Personal history of urinary calculi (09/12/2019), Polyphagia (05/13/2021), Primary central sleep apnea (10/21/2017), Pulmonary hypertension (Multi), Pure hypercholesterolemia, unspecified, Residual hemorrhoidal skin tags (07/06/2017), Sleep apnea, Slurred speech (01/02/2015), Tinea pedis (05/25/2021), Unspecified abdominal pain (04/13/2021), Unspecified injury of right wrist, hand and finger(s), sequela (08/04/2020), Unspecified internal derangement of unspecified knee (03/01/2017), Unspecified symptoms and signs involving the genitourinary system (12/16/2021), Unspecified symptoms and signs involving the genitourinary system (02/05/2019), Unspecified symptoms and signs involving the genitourinary system (09/03/2020), Urinary tract infection, site not specified (01/17/2020), Urinary tract infection, site not specified (01/10/2022), and Xerosis cutis (09/10/2019).    Surgical History  She has a past surgical history that includes Other surgical history (07/31/2013); Other surgical history (09/10/2019); Other surgical history (11/30/2018); Mouth surgery (11/04/2014); Knee surgery (05/04/2017); Other surgical history (06/08/2020); MR angio head wo  "IV contrast (02/09/2016); Cardiac catheterization (Right, 01/04/2024); and Cardiac catheterization.     Social History  She reports that she has never smoked. She has never used smokeless tobacco. She reports that she does not currently use alcohol. She reports that she does not use drugs.    Family History  Family History   Problem Relation Name Age of Onset    Hypertension Mother      Breast cancer Mother      Other (cardiac disorder) Mother      Cardiomyopathy Mother      Diabetes Mother      Other (chronic kidney disease) Mother      Alcohol abuse Father joel serna     Stroke Brother      Brain Aneurysm Brother          Allergies  Grass pollen, Other, Pollen extracts, and Tree and shrub pollen      A comprehensive 10+ review of systems was negative except for: see hpi                          PHYSICAL EXAMINATION:  BP Readings from Last 3 Encounters:   11/06/24 120/72   11/01/24 158/76   10/11/24 102/62      Wt Readings from Last 3 Encounters:   11/06/24 132 kg (291 lb)   11/01/24 132 kg (290 lb)   10/11/24 132 kg (291 lb 7.2 oz)      BMI:   Estimated body mass index is 53.22 kg/m² as calculated from the following:    Height as of 11/1/24: 1.575 m (5' 2\").    Weight as of 11/6/24: 132 kg (291 lb).  BSA:   Estimated body surface area is 2.4 meters squared as calculated from the following:    Height as of 11/1/24: 1.575 m (5' 2\").    Weight as of 11/6/24: 132 kg (291 lb).  HEENT: Normocephalic, atraumatic, PER EOMI, nonicteric, trachea normal, thyroid normal, oropharynx normal.  CARDIAC: regular rate & rhythm, S1 & S2 normal.  No heaves, thrills, gallops or murmurs.  LUNGS: Clear to auscultation, no spinal or CV tenderness.  EXTREMITIES: No evidence of cyanosis, clubbing or edema.       Botox Injection    Farida came today for Botox injection.  I reviewed with her the risks and benefits including ptosis, infection, and bleeding. She has no contraindications. She is on no antibiotics and has no neuromuscular " disorders.  Pregnancy is not an issue.     She tolerated the procedure well.  The patient  will return to see me again in three to four months, earlier if there are any problems.     Farida understands the side effects and risks, as well as the necessity for continued treatment to maintain improvement.  Additional therapy may be necessary. Call if there are any problems. See diagram for injection sites and dosages. 100 units were used at a concentration of 10 units per 0.1 mL.         Assessment:  63-year-old with recurrent UTIs, genitourinary syndrome menopause, and urinary urge incontinence, presenting for a virtual follow-up visit.     Alyce/dysuria:   -continue estradiol   -Patient is taking methenamine  -urine culture ordered        RYAN:  -Failed myrbetriq   -Patient is currently taking trospium  -s/p Botox, 5/26/22, 5/25/2023, 2/8/24, 12/12/24 ,100 units,   -Rx abx      Follow up in 4 to 6 weeks       All questions and concerns were answered and addressed.  The patient expressed understanding and agrees with the plan.     Sanford Arredondo MD    Scribe Attestation  By signing my name below, I, Zina Fuentes, Scribe   attest that this documentation has been prepared under the direction and in the presence of Sanford Arredondo MD.

## 2024-12-14 LAB — BACTERIA UR CULT: NORMAL

## 2024-12-16 RX ORDER — MONTELUKAST SODIUM 10 MG/1
10 TABLET ORAL DAILY
Qty: 30 TABLET | Refills: 10 | Status: SHIPPED | OUTPATIENT
Start: 2024-12-16

## 2024-12-26 ENCOUNTER — HOSPITAL ENCOUNTER (OUTPATIENT)
Dept: CARDIOLOGY | Facility: HOSPITAL | Age: 64
Discharge: HOME | End: 2024-12-26
Payer: COMMERCIAL

## 2024-12-26 DIAGNOSIS — I27.0 IDIOPATHIC PAH (PULMONARY ARTERIAL HYPERTENSION) (MULTI): ICD-10-CM

## 2024-12-26 PROCEDURE — 2500000004 HC RX 250 GENERAL PHARMACY W/ HCPCS (ALT 636 FOR OP/ED): Performed by: STUDENT IN AN ORGANIZED HEALTH CARE EDUCATION/TRAINING PROGRAM

## 2024-12-26 PROCEDURE — C8929 TTE W OR WO FOL WCON,DOPPLER: HCPCS

## 2024-12-29 ENCOUNTER — HOSPITAL ENCOUNTER (OUTPATIENT)
Facility: HOSPITAL | Age: 64
Setting detail: OBSERVATION
Discharge: ANOTHER HOSPITAL | End: 2024-12-29
Attending: EMERGENCY MEDICINE | Admitting: INTERNAL MEDICINE
Payer: COMMERCIAL

## 2024-12-29 ENCOUNTER — APPOINTMENT (OUTPATIENT)
Dept: RADIOLOGY | Facility: HOSPITAL | Age: 64
End: 2024-12-29
Payer: COMMERCIAL

## 2024-12-29 ENCOUNTER — APPOINTMENT (OUTPATIENT)
Dept: CARDIOLOGY | Facility: HOSPITAL | Age: 64
End: 2024-12-29
Payer: COMMERCIAL

## 2024-12-29 VITALS
BODY MASS INDEX: 55.32 KG/M2 | HEIGHT: 61 IN | TEMPERATURE: 98.2 F | DIASTOLIC BLOOD PRESSURE: 58 MMHG | WEIGHT: 293 LBS | HEART RATE: 65 BPM | OXYGEN SATURATION: 97 % | SYSTOLIC BLOOD PRESSURE: 116 MMHG | RESPIRATION RATE: 14 BRPM

## 2024-12-29 DIAGNOSIS — E66.01 CLASS 3 SEVERE OBESITY DUE TO EXCESS CALORIES WITH SERIOUS COMORBIDITY AND BODY MASS INDEX (BMI) OF 50.0 TO 59.9 IN ADULT: ICD-10-CM

## 2024-12-29 DIAGNOSIS — R14.0 BLOATING: ICD-10-CM

## 2024-12-29 DIAGNOSIS — M79.662 PAIN OF LEFT CALF: ICD-10-CM

## 2024-12-29 DIAGNOSIS — E66.813 CLASS 3 SEVERE OBESITY DUE TO EXCESS CALORIES WITH SERIOUS COMORBIDITY AND BODY MASS INDEX (BMI) OF 50.0 TO 59.9 IN ADULT: ICD-10-CM

## 2024-12-29 DIAGNOSIS — N20.0 KIDNEY STONE: Primary | ICD-10-CM

## 2024-12-29 LAB
ALBUMIN SERPL BCP-MCNC: 4 G/DL (ref 3.4–5)
ALP SERPL-CCNC: 56 U/L (ref 33–136)
ALT SERPL W P-5'-P-CCNC: 12 U/L (ref 7–45)
ANION GAP SERPL CALC-SCNC: 14 MMOL/L (ref 10–20)
APPEARANCE UR: ABNORMAL
AST SERPL W P-5'-P-CCNC: 12 U/L (ref 9–39)
BACTERIA #/AREA URNS AUTO: ABNORMAL /HPF
BASOPHILS # BLD AUTO: 0.02 X10*3/UL (ref 0–0.1)
BASOPHILS NFR BLD AUTO: 0.3 %
BILIRUB SERPL-MCNC: 0.3 MG/DL (ref 0–1.2)
BILIRUB UR STRIP.AUTO-MCNC: NEGATIVE MG/DL
BNP SERPL-MCNC: 141 PG/ML (ref 0–99)
BUN SERPL-MCNC: 13 MG/DL (ref 6–23)
CALCIUM SERPL-MCNC: 8.8 MG/DL (ref 8.6–10.3)
CARDIAC TROPONIN I PNL SERPL HS: 10 NG/L (ref 0–13)
CARDIAC TROPONIN I PNL SERPL HS: 8 NG/L (ref 0–13)
CARDIAC TROPONIN I PNL SERPL HS: 9 NG/L (ref 0–13)
CHLORIDE SERPL-SCNC: 104 MMOL/L (ref 98–107)
CO2 SERPL-SCNC: 25 MMOL/L (ref 21–32)
COLOR UR: ABNORMAL
CREAT SERPL-MCNC: 0.98 MG/DL (ref 0.5–1.05)
EGFRCR SERPLBLD CKD-EPI 2021: 64 ML/MIN/1.73M*2
EOSINOPHIL # BLD AUTO: 0.29 X10*3/UL (ref 0–0.7)
EOSINOPHIL NFR BLD AUTO: 4.7 %
ERYTHROCYTE [DISTWIDTH] IN BLOOD BY AUTOMATED COUNT: 15 % (ref 11.5–14.5)
GLUCOSE SERPL-MCNC: 122 MG/DL (ref 74–99)
GLUCOSE UR STRIP.AUTO-MCNC: NORMAL MG/DL
HCT VFR BLD AUTO: 42.1 % (ref 36–46)
HGB BLD-MCNC: 13.8 G/DL (ref 12–16)
HOLD SPECIMEN: NORMAL
IMM GRANULOCYTES # BLD AUTO: 0.03 X10*3/UL (ref 0–0.7)
IMM GRANULOCYTES NFR BLD AUTO: 0.5 % (ref 0–0.9)
INR PPP: 1 (ref 0.9–1.1)
KETONES UR STRIP.AUTO-MCNC: NEGATIVE MG/DL
LEUKOCYTE ESTERASE UR QL STRIP.AUTO: ABNORMAL
LYMPHOCYTES # BLD AUTO: 1.45 X10*3/UL (ref 1.2–4.8)
LYMPHOCYTES NFR BLD AUTO: 23.3 %
MAGNESIUM SERPL-MCNC: 1.71 MG/DL (ref 1.6–2.4)
MCH RBC QN AUTO: 29.4 PG (ref 26–34)
MCHC RBC AUTO-ENTMCNC: 32.8 G/DL (ref 32–36)
MCV RBC AUTO: 90 FL (ref 80–100)
MONOCYTES # BLD AUTO: 0.43 X10*3/UL (ref 0.1–1)
MONOCYTES NFR BLD AUTO: 6.9 %
MUCOUS THREADS #/AREA URNS AUTO: ABNORMAL /LPF
NEUTROPHILS # BLD AUTO: 4 X10*3/UL (ref 1.2–7.7)
NEUTROPHILS NFR BLD AUTO: 64.3 %
NITRITE UR QL STRIP.AUTO: NEGATIVE
NRBC BLD-RTO: 0 /100 WBCS (ref 0–0)
PH UR STRIP.AUTO: 5.5 [PH]
PLATELET # BLD AUTO: 214 X10*3/UL (ref 150–450)
POTASSIUM SERPL-SCNC: 3.6 MMOL/L (ref 3.5–5.3)
PROT SERPL-MCNC: 6.7 G/DL (ref 6.4–8.2)
PROT UR STRIP.AUTO-MCNC: ABNORMAL MG/DL
PROTHROMBIN TIME: 11.7 SECONDS (ref 9.8–12.8)
RBC # BLD AUTO: 4.69 X10*6/UL (ref 4–5.2)
RBC # UR STRIP.AUTO: ABNORMAL /UL
RBC #/AREA URNS AUTO: >20 /HPF
SODIUM SERPL-SCNC: 139 MMOL/L (ref 136–145)
SP GR UR STRIP.AUTO: 1.02
SQUAMOUS #/AREA URNS AUTO: ABNORMAL /HPF
UROBILINOGEN UR STRIP.AUTO-MCNC: NORMAL MG/DL
WBC # BLD AUTO: 6.2 X10*3/UL (ref 4.4–11.3)
WBC #/AREA URNS AUTO: ABNORMAL /HPF

## 2024-12-29 PROCEDURE — 96374 THER/PROPH/DIAG INJ IV PUSH: CPT | Mod: 59

## 2024-12-29 PROCEDURE — 94664 DEMO&/EVAL PT USE INHALER: CPT

## 2024-12-29 PROCEDURE — 85610 PROTHROMBIN TIME: CPT | Performed by: NURSE PRACTITIONER

## 2024-12-29 PROCEDURE — G0378 HOSPITAL OBSERVATION PER HR: HCPCS

## 2024-12-29 PROCEDURE — 99223 1ST HOSP IP/OBS HIGH 75: CPT | Performed by: NURSE PRACTITIONER

## 2024-12-29 PROCEDURE — 93005 ELECTROCARDIOGRAM TRACING: CPT

## 2024-12-29 PROCEDURE — 2500000001 HC RX 250 WO HCPCS SELF ADMINISTERED DRUGS (ALT 637 FOR MEDICARE OP): Performed by: INTERNAL MEDICINE

## 2024-12-29 PROCEDURE — 2500000004 HC RX 250 GENERAL PHARMACY W/ HCPCS (ALT 636 FOR OP/ED): Performed by: NURSE PRACTITIONER

## 2024-12-29 PROCEDURE — 80053 COMPREHEN METABOLIC PANEL: CPT | Performed by: EMERGENCY MEDICINE

## 2024-12-29 PROCEDURE — 96361 HYDRATE IV INFUSION ADD-ON: CPT

## 2024-12-29 PROCEDURE — 94640 AIRWAY INHALATION TREATMENT: CPT

## 2024-12-29 PROCEDURE — 83735 ASSAY OF MAGNESIUM: CPT | Performed by: EMERGENCY MEDICINE

## 2024-12-29 PROCEDURE — 74176 CT ABD & PELVIS W/O CONTRAST: CPT

## 2024-12-29 PROCEDURE — 74176 CT ABD & PELVIS W/O CONTRAST: CPT | Performed by: STUDENT IN AN ORGANIZED HEALTH CARE EDUCATION/TRAINING PROGRAM

## 2024-12-29 PROCEDURE — 5A09357 ASSISTANCE WITH RESPIRATORY VENTILATION, LESS THAN 24 CONSECUTIVE HOURS, CONTINUOUS POSITIVE AIRWAY PRESSURE: ICD-10-PCS | Performed by: NURSE PRACTITIONER

## 2024-12-29 PROCEDURE — 2500000002 HC RX 250 W HCPCS SELF ADMINISTERED DRUGS (ALT 637 FOR MEDICARE OP, ALT 636 FOR OP/ED): Performed by: NURSE PRACTITIONER

## 2024-12-29 PROCEDURE — 99222 1ST HOSP IP/OBS MODERATE 55: CPT | Performed by: SURGERY

## 2024-12-29 PROCEDURE — 2500000005 HC RX 250 GENERAL PHARMACY W/O HCPCS: Performed by: INTERNAL MEDICINE

## 2024-12-29 PROCEDURE — 2500000004 HC RX 250 GENERAL PHARMACY W/ HCPCS (ALT 636 FOR OP/ED): Performed by: EMERGENCY MEDICINE

## 2024-12-29 PROCEDURE — 85025 COMPLETE CBC W/AUTO DIFF WBC: CPT | Performed by: EMERGENCY MEDICINE

## 2024-12-29 PROCEDURE — 99238 HOSP IP/OBS DSCHRG MGMT 30/<: CPT | Performed by: INTERNAL MEDICINE

## 2024-12-29 PROCEDURE — 94660 CPAP INITIATION&MGMT: CPT

## 2024-12-29 PROCEDURE — 99285 EMERGENCY DEPT VISIT HI MDM: CPT | Mod: 25 | Performed by: EMERGENCY MEDICINE

## 2024-12-29 PROCEDURE — 2500000005 HC RX 250 GENERAL PHARMACY W/O HCPCS: Performed by: NURSE PRACTITIONER

## 2024-12-29 PROCEDURE — 96372 THER/PROPH/DIAG INJ SC/IM: CPT | Performed by: NURSE PRACTITIONER

## 2024-12-29 PROCEDURE — 94760 N-INVAS EAR/PLS OXIMETRY 1: CPT

## 2024-12-29 PROCEDURE — 83880 ASSAY OF NATRIURETIC PEPTIDE: CPT | Performed by: NURSE PRACTITIONER

## 2024-12-29 PROCEDURE — 81003 URINALYSIS AUTO W/O SCOPE: CPT | Performed by: EMERGENCY MEDICINE

## 2024-12-29 PROCEDURE — 9420000001 HC RT PATIENT EDUCATION 5 MIN

## 2024-12-29 PROCEDURE — 84484 ASSAY OF TROPONIN QUANT: CPT | Performed by: NURSE PRACTITIONER

## 2024-12-29 PROCEDURE — 2500000002 HC RX 250 W HCPCS SELF ADMINISTERED DRUGS (ALT 637 FOR MEDICARE OP, ALT 636 FOR OP/ED): Performed by: INTERNAL MEDICINE

## 2024-12-29 PROCEDURE — 36415 COLL VENOUS BLD VENIPUNCTURE: CPT | Performed by: NURSE PRACTITIONER

## 2024-12-29 PROCEDURE — 2500000001 HC RX 250 WO HCPCS SELF ADMINISTERED DRUGS (ALT 637 FOR MEDICARE OP): Performed by: NURSE PRACTITIONER

## 2024-12-29 PROCEDURE — 36415 COLL VENOUS BLD VENIPUNCTURE: CPT | Performed by: EMERGENCY MEDICINE

## 2024-12-29 PROCEDURE — 87086 URINE CULTURE/COLONY COUNT: CPT | Mod: GEALAB | Performed by: EMERGENCY MEDICINE

## 2024-12-29 RX ORDER — ACETAMINOPHEN 160 MG/5ML
650 SOLUTION ORAL EVERY 4 HOURS PRN
Status: DISCONTINUED | OUTPATIENT
Start: 2024-12-29 | End: 2024-12-31 | Stop reason: HOSPADM

## 2024-12-29 RX ORDER — ENOXAPARIN SODIUM 100 MG/ML
60 INJECTION SUBCUTANEOUS EVERY 12 HOURS SCHEDULED
Start: 2024-12-29

## 2024-12-29 RX ORDER — BUPROPION HYDROCHLORIDE 150 MG/1
150 TABLET, EXTENDED RELEASE ORAL 2 TIMES DAILY
Status: DISCONTINUED | OUTPATIENT
Start: 2024-12-29 | End: 2024-12-31 | Stop reason: HOSPADM

## 2024-12-29 RX ORDER — SIMVASTATIN 20 MG/1
40 TABLET, FILM COATED ORAL NIGHTLY
Status: DISCONTINUED | OUTPATIENT
Start: 2024-12-29 | End: 2024-12-31 | Stop reason: HOSPADM

## 2024-12-29 RX ORDER — TAMSULOSIN HYDROCHLORIDE 0.4 MG/1
0.4 CAPSULE ORAL DAILY
Status: DISCONTINUED | OUTPATIENT
Start: 2024-12-29 | End: 2024-12-31 | Stop reason: HOSPADM

## 2024-12-29 RX ORDER — LANOLIN ALCOHOL/MO/W.PET/CERES
1000 CREAM (GRAM) TOPICAL DAILY
Status: DISCONTINUED | OUTPATIENT
Start: 2024-12-29 | End: 2024-12-31 | Stop reason: HOSPADM

## 2024-12-29 RX ORDER — POTASSIUM CHLORIDE 20 MEQ/1
40 TABLET, EXTENDED RELEASE ORAL ONCE
Status: COMPLETED | OUTPATIENT
Start: 2024-12-29 | End: 2024-12-29

## 2024-12-29 RX ORDER — ALBUTEROL SULFATE 0.83 MG/ML
2.5 SOLUTION RESPIRATORY (INHALATION) EVERY 4 HOURS PRN
Status: DISCONTINUED | OUTPATIENT
Start: 2024-12-29 | End: 2024-12-31 | Stop reason: HOSPADM

## 2024-12-29 RX ORDER — ACETAMINOPHEN 650 MG/1
650 SUPPOSITORY RECTAL EVERY 4 HOURS PRN
Status: DISCONTINUED | OUTPATIENT
Start: 2024-12-29 | End: 2024-12-31 | Stop reason: HOSPADM

## 2024-12-29 RX ORDER — OXYCODONE HYDROCHLORIDE 5 MG/1
5 TABLET ORAL EVERY 4 HOURS PRN
Status: DISCONTINUED | OUTPATIENT
Start: 2024-12-29 | End: 2024-12-31 | Stop reason: HOSPADM

## 2024-12-29 RX ORDER — TAMSULOSIN HYDROCHLORIDE 0.4 MG/1
0.4 CAPSULE ORAL DAILY
Start: 2024-12-30

## 2024-12-29 RX ORDER — SERTRALINE HYDROCHLORIDE 50 MG/1
200 TABLET, FILM COATED ORAL DAILY
Status: DISCONTINUED | OUTPATIENT
Start: 2024-12-29 | End: 2024-12-31 | Stop reason: HOSPADM

## 2024-12-29 RX ORDER — MONTELUKAST SODIUM 10 MG/1
10 TABLET ORAL DAILY
Status: DISCONTINUED | OUTPATIENT
Start: 2024-12-29 | End: 2024-12-31 | Stop reason: HOSPADM

## 2024-12-29 RX ORDER — ALBUTEROL SULFATE 90 UG/1
2 INHALANT RESPIRATORY (INHALATION) EVERY 4 HOURS PRN
Status: DISCONTINUED | OUTPATIENT
Start: 2024-12-29 | End: 2024-12-29

## 2024-12-29 RX ORDER — PANTOPRAZOLE SODIUM 40 MG/1
40 TABLET, DELAYED RELEASE ORAL
Status: DISCONTINUED | OUTPATIENT
Start: 2024-12-30 | End: 2024-12-31 | Stop reason: HOSPADM

## 2024-12-29 RX ORDER — TALC
3 POWDER (GRAM) TOPICAL NIGHTLY PRN
Status: DISCONTINUED | OUTPATIENT
Start: 2024-12-29 | End: 2024-12-31 | Stop reason: HOSPADM

## 2024-12-29 RX ORDER — SODIUM CHLORIDE 9 MG/ML
100 INJECTION, SOLUTION INTRAVENOUS CONTINUOUS
Status: DISCONTINUED | OUTPATIENT
Start: 2024-12-29 | End: 2024-12-29

## 2024-12-29 RX ORDER — DOCUSATE SODIUM 100 MG/1
100 CAPSULE, LIQUID FILLED ORAL 2 TIMES DAILY
Start: 2024-12-29

## 2024-12-29 RX ORDER — CYCLOBENZAPRINE HCL 10 MG
10 TABLET ORAL 3 TIMES DAILY PRN
Status: DISCONTINUED | OUTPATIENT
Start: 2024-12-29 | End: 2024-12-31 | Stop reason: HOSPADM

## 2024-12-29 RX ORDER — SODIUM CHLORIDE 9 MG/ML
100 INJECTION, SOLUTION INTRAVENOUS CONTINUOUS
Status: DISCONTINUED | OUTPATIENT
Start: 2024-12-29 | End: 2024-12-30

## 2024-12-29 RX ORDER — KETOROLAC TROMETHAMINE 15 MG/ML
15 INJECTION, SOLUTION INTRAMUSCULAR; INTRAVENOUS EVERY 6 HOURS SCHEDULED
Start: 2024-12-29 | End: 2025-01-03

## 2024-12-29 RX ORDER — NYSTATIN 100000 [USP'U]/G
1 POWDER TOPICAL 2 TIMES DAILY
Status: DISCONTINUED | OUTPATIENT
Start: 2024-12-29 | End: 2024-12-31 | Stop reason: HOSPADM

## 2024-12-29 RX ORDER — ALUMINUM HYDROXIDE, MAGNESIUM HYDROXIDE, AND SIMETHICONE 1200; 120; 1200 MG/30ML; MG/30ML; MG/30ML
10 SUSPENSION ORAL 4 TIMES DAILY PRN
Status: DISCONTINUED | OUTPATIENT
Start: 2024-12-29 | End: 2024-12-31 | Stop reason: HOSPADM

## 2024-12-29 RX ORDER — ONDANSETRON HYDROCHLORIDE 2 MG/ML
4 INJECTION, SOLUTION INTRAVENOUS EVERY 8 HOURS PRN
Status: DISCONTINUED | OUTPATIENT
Start: 2024-12-29 | End: 2024-12-31 | Stop reason: HOSPADM

## 2024-12-29 RX ORDER — BUDESONIDE 0.5 MG/2ML
0.5 INHALANT ORAL
Status: DISCONTINUED | OUTPATIENT
Start: 2024-12-29 | End: 2024-12-31 | Stop reason: HOSPADM

## 2024-12-29 RX ORDER — FLUTICASONE FUROATE AND VILANTEROL 200; 25 UG/1; UG/1
1 POWDER RESPIRATORY (INHALATION)
Status: DISCONTINUED | OUTPATIENT
Start: 2024-12-29 | End: 2024-12-29

## 2024-12-29 RX ORDER — BUSPIRONE HYDROCHLORIDE 15 MG/1
30 TABLET ORAL 2 TIMES DAILY
Status: DISCONTINUED | OUTPATIENT
Start: 2024-12-29 | End: 2024-12-31 | Stop reason: HOSPADM

## 2024-12-29 RX ORDER — KETOROLAC TROMETHAMINE 15 MG/ML
15 INJECTION, SOLUTION INTRAMUSCULAR; INTRAVENOUS EVERY 6 HOURS SCHEDULED
Status: DISCONTINUED | OUTPATIENT
Start: 2024-12-29 | End: 2024-12-31 | Stop reason: HOSPADM

## 2024-12-29 RX ORDER — FORMOTEROL FUMARATE DIHYDRATE 20 UG/2ML
20 SOLUTION RESPIRATORY (INHALATION)
Status: DISCONTINUED | OUTPATIENT
Start: 2024-12-29 | End: 2024-12-31 | Stop reason: HOSPADM

## 2024-12-29 RX ORDER — DOCUSATE SODIUM 100 MG/1
100 CAPSULE, LIQUID FILLED ORAL 2 TIMES DAILY
Status: DISCONTINUED | OUTPATIENT
Start: 2024-12-29 | End: 2024-12-31 | Stop reason: HOSPADM

## 2024-12-29 RX ORDER — KETOROLAC TROMETHAMINE 15 MG/ML
15 INJECTION, SOLUTION INTRAMUSCULAR; INTRAVENOUS ONCE
Status: COMPLETED | OUTPATIENT
Start: 2024-12-29 | End: 2024-12-29

## 2024-12-29 RX ORDER — HYDRALAZINE HYDROCHLORIDE 50 MG/1
50 TABLET, FILM COATED ORAL 2 TIMES DAILY
Status: DISCONTINUED | OUTPATIENT
Start: 2024-12-29 | End: 2024-12-31 | Stop reason: HOSPADM

## 2024-12-29 RX ORDER — LANOLIN ALCOHOL/MO/W.PET/CERES
400 CREAM (GRAM) TOPICAL DAILY
Status: DISCONTINUED | OUTPATIENT
Start: 2024-12-29 | End: 2024-12-30

## 2024-12-29 RX ORDER — ONDANSETRON 4 MG/1
4 TABLET, FILM COATED ORAL EVERY 8 HOURS PRN
Status: DISCONTINUED | OUTPATIENT
Start: 2024-12-29 | End: 2024-12-31 | Stop reason: HOSPADM

## 2024-12-29 RX ORDER — CEFTRIAXONE 1 G/50ML
1 INJECTION, SOLUTION INTRAVENOUS EVERY 24 HOURS
Status: DISCONTINUED | OUTPATIENT
Start: 2024-12-29 | End: 2024-12-29

## 2024-12-29 RX ORDER — NAPROXEN SODIUM 220 MG/1
324 TABLET, FILM COATED ORAL ONCE
Status: COMPLETED | OUTPATIENT
Start: 2024-12-29 | End: 2024-12-29

## 2024-12-29 RX ORDER — LANOLIN ALCOHOL/MO/W.PET/CERES
400 CREAM (GRAM) TOPICAL DAILY
Qty: 2 TABLET | Refills: 0 | Status: SHIPPED | OUTPATIENT
Start: 2024-12-30 | End: 2025-01-02 | Stop reason: HOSPADM

## 2024-12-29 RX ORDER — ASPIRIN 325 MG
325 TABLET ORAL DAILY
Status: DISCONTINUED | OUTPATIENT
Start: 2024-12-30 | End: 2024-12-31 | Stop reason: HOSPADM

## 2024-12-29 RX ORDER — ENOXAPARIN SODIUM 100 MG/ML
60 INJECTION SUBCUTANEOUS EVERY 12 HOURS SCHEDULED
Status: DISCONTINUED | OUTPATIENT
Start: 2024-12-29 | End: 2024-12-31 | Stop reason: HOSPADM

## 2024-12-29 RX ORDER — LUBIPROSTONE 8 UG/1
8 CAPSULE ORAL
Status: DISCONTINUED | OUTPATIENT
Start: 2024-12-29 | End: 2024-12-31 | Stop reason: HOSPADM

## 2024-12-29 RX ORDER — MEMANTINE HYDROCHLORIDE 10 MG/1
10 TABLET ORAL 2 TIMES DAILY
Status: DISCONTINUED | OUTPATIENT
Start: 2024-12-29 | End: 2024-12-31 | Stop reason: HOSPADM

## 2024-12-29 RX ORDER — ACETAMINOPHEN 325 MG/1
650 TABLET ORAL EVERY 4 HOURS PRN
Status: DISCONTINUED | OUTPATIENT
Start: 2024-12-29 | End: 2024-12-31 | Stop reason: HOSPADM

## 2024-12-29 RX ADMIN — Medication 3 MG: at 20:59

## 2024-12-29 RX ADMIN — PIPERACILLIN SODIUM AND TAZOBACTAM SODIUM 3.38 G: 3; .375 INJECTION, SOLUTION INTRAVENOUS at 08:44

## 2024-12-29 RX ADMIN — KETOROLAC TROMETHAMINE 15 MG: 15 INJECTION, SOLUTION INTRAMUSCULAR; INTRAVENOUS at 20:58

## 2024-12-29 RX ADMIN — FORMOTEROL FUMARATE DIHYDRATE 20 MCG: 20 SOLUTION RESPIRATORY (INHALATION) at 20:02

## 2024-12-29 RX ADMIN — ALUMINUM HYDROXIDE, MAGNESIUM HYDROXIDE, AND SIMETHICONE 10 ML: 200; 200; 20 SUSPENSION ORAL at 15:24

## 2024-12-29 RX ADMIN — KETOROLAC TROMETHAMINE 15 MG: 15 INJECTION, SOLUTION INTRAMUSCULAR; INTRAVENOUS at 12:03

## 2024-12-29 RX ADMIN — RIOCIGUAT 2.5 MG: 2.5 TABLET, FILM COATED ORAL at 07:57

## 2024-12-29 RX ADMIN — KETOROLAC TROMETHAMINE 15 MG: 15 INJECTION, SOLUTION INTRAMUSCULAR; INTRAVENOUS at 07:56

## 2024-12-29 RX ADMIN — MEMANTINE 10 MG: 10 TABLET ORAL at 14:37

## 2024-12-29 RX ADMIN — MEMANTINE 10 MG: 10 TABLET ORAL at 20:58

## 2024-12-29 RX ADMIN — KETOROLAC TROMETHAMINE 15 MG: 15 INJECTION, SOLUTION INTRAMUSCULAR; INTRAVENOUS at 01:50

## 2024-12-29 RX ADMIN — TAMSULOSIN HYDROCHLORIDE 0.4 MG: 0.4 CAPSULE ORAL at 07:10

## 2024-12-29 RX ADMIN — NYSTATIN 1 APPLICATION: 100000 POWDER TOPICAL at 09:30

## 2024-12-29 RX ADMIN — BUPROPION HYDROCHLORIDE 150 MG: 150 TABLET, FILM COATED, EXTENDED RELEASE ORAL at 14:36

## 2024-12-29 RX ADMIN — POTASSIUM CHLORIDE 40 MEQ: 1500 TABLET, EXTENDED RELEASE ORAL at 07:10

## 2024-12-29 RX ADMIN — SIMVASTATIN 40 MG: 20 TABLET, FILM COATED ORAL at 20:57

## 2024-12-29 RX ADMIN — Medication 1000 MCG: at 14:37

## 2024-12-29 RX ADMIN — BUSPIRONE HYDROCHLORIDE 30 MG: 15 TABLET ORAL at 20:58

## 2024-12-29 RX ADMIN — HYDRALAZINE HYDROCHLORIDE 50 MG: 50 TABLET ORAL at 20:58

## 2024-12-29 RX ADMIN — MONTELUKAST 10 MG: 10 TABLET, FILM COATED ORAL at 14:37

## 2024-12-29 RX ADMIN — SODIUM CHLORIDE 1000 ML: 9 INJECTION, SOLUTION INTRAVENOUS at 03:48

## 2024-12-29 RX ADMIN — BUSPIRONE HYDROCHLORIDE 30 MG: 15 TABLET ORAL at 14:36

## 2024-12-29 RX ADMIN — MACITENTAN 10 MG: 10 TABLET, FILM COATED ORAL at 07:57

## 2024-12-29 RX ADMIN — SODIUM CHLORIDE 100 ML/HR: 9 INJECTION, SOLUTION INTRAVENOUS at 07:11

## 2024-12-29 RX ADMIN — ONDANSETRON HYDROCHLORIDE 4 MG: 4 TABLET, FILM COATED ORAL at 15:49

## 2024-12-29 RX ADMIN — Medication 400 MG: at 09:30

## 2024-12-29 RX ADMIN — PIPERACILLIN SODIUM AND TAZOBACTAM SODIUM 3.38 G: 3; .375 INJECTION, SOLUTION INTRAVENOUS at 15:10

## 2024-12-29 RX ADMIN — BUDESONIDE 0.5 MG: 0.5 INHALANT RESPIRATORY (INHALATION) at 20:01

## 2024-12-29 RX ADMIN — RIOCIGUAT 2.5 MG: 2.5 TABLET, FILM COATED ORAL at 20:47

## 2024-12-29 RX ADMIN — SODIUM CHLORIDE 100 ML/HR: 9 INJECTION, SOLUTION INTRAVENOUS at 17:11

## 2024-12-29 RX ADMIN — ASPIRIN 81 MG 324 MG: 81 TABLET ORAL at 07:34

## 2024-12-29 RX ADMIN — DOCUSATE SODIUM 100 MG: 100 CAPSULE, LIQUID FILLED ORAL at 20:58

## 2024-12-29 RX ADMIN — LUBIPROSTONE 8 MCG: 8 CAPSULE, GELATIN COATED ORAL at 17:10

## 2024-12-29 RX ADMIN — BUPROPION HYDROCHLORIDE 150 MG: 150 TABLET, FILM COATED, EXTENDED RELEASE ORAL at 20:46

## 2024-12-29 RX ADMIN — PIPERACILLIN SODIUM AND TAZOBACTAM SODIUM 3.38 G: 3; .375 INJECTION, SOLUTION INTRAVENOUS at 21:54

## 2024-12-29 RX ADMIN — SERTRALINE HYDROCHLORIDE 200 MG: 50 TABLET ORAL at 14:37

## 2024-12-29 RX ADMIN — RIOCIGUAT 2.5 MG: 2.5 TABLET, FILM COATED ORAL at 14:38

## 2024-12-29 RX ADMIN — Medication 4 L/MIN: at 20:01

## 2024-12-29 RX ADMIN — ENOXAPARIN SODIUM 60 MG: 60 INJECTION SUBCUTANEOUS at 20:58

## 2024-12-29 RX ADMIN — NYSTATIN 1 APPLICATION: 100000 POWDER TOPICAL at 21:00

## 2024-12-29 SDOH — SOCIAL STABILITY: SOCIAL INSECURITY: DOES ANYONE TRY TO KEEP YOU FROM HAVING/CONTACTING OTHER FRIENDS OR DOING THINGS OUTSIDE YOUR HOME?: NO

## 2024-12-29 SDOH — HEALTH STABILITY: MENTAL HEALTH: HOW OFTEN DO YOU HAVE SIX OR MORE DRINKS ON ONE OCCASION?: NEVER

## 2024-12-29 SDOH — SOCIAL STABILITY: SOCIAL INSECURITY: WITHIN THE LAST YEAR, HAVE YOU BEEN HUMILIATED OR EMOTIONALLY ABUSED IN OTHER WAYS BY YOUR PARTNER OR EX-PARTNER?: NO

## 2024-12-29 SDOH — SOCIAL STABILITY: SOCIAL INSECURITY: ARE THERE ANY APPARENT SIGNS OF INJURIES/BEHAVIORS THAT COULD BE RELATED TO ABUSE/NEGLECT?: NO

## 2024-12-29 SDOH — HEALTH STABILITY: MENTAL HEALTH: HOW OFTEN DO YOU HAVE A DRINK CONTAINING ALCOHOL?: NEVER

## 2024-12-29 SDOH — ECONOMIC STABILITY: HOUSING INSECURITY: IN THE LAST 12 MONTHS, WAS THERE A TIME WHEN YOU WERE NOT ABLE TO PAY THE MORTGAGE OR RENT ON TIME?: NO

## 2024-12-29 SDOH — ECONOMIC STABILITY: INCOME INSECURITY: IN THE PAST 12 MONTHS HAS THE ELECTRIC, GAS, OIL, OR WATER COMPANY THREATENED TO SHUT OFF SERVICES IN YOUR HOME?: NO

## 2024-12-29 SDOH — SOCIAL STABILITY: SOCIAL INSECURITY: ABUSE: ADULT

## 2024-12-29 SDOH — SOCIAL STABILITY: SOCIAL INSECURITY: WITHIN THE LAST YEAR, HAVE YOU BEEN AFRAID OF YOUR PARTNER OR EX-PARTNER?: NO

## 2024-12-29 SDOH — SOCIAL STABILITY: SOCIAL INSECURITY: HAVE YOU HAD ANY THOUGHTS OF HARMING ANYONE ELSE?: NO

## 2024-12-29 SDOH — ECONOMIC STABILITY: HOUSING INSECURITY: AT ANY TIME IN THE PAST 12 MONTHS, WERE YOU HOMELESS OR LIVING IN A SHELTER (INCLUDING NOW)?: NO

## 2024-12-29 SDOH — SOCIAL STABILITY: SOCIAL INSECURITY
WITHIN THE LAST YEAR, HAVE YOU BEEN RAPED OR FORCED TO HAVE ANY KIND OF SEXUAL ACTIVITY BY YOUR PARTNER OR EX-PARTNER?: NO

## 2024-12-29 SDOH — SOCIAL STABILITY: SOCIAL INSECURITY
WITHIN THE LAST YEAR, HAVE YOU BEEN KICKED, HIT, SLAPPED, OR OTHERWISE PHYSICALLY HURT BY YOUR PARTNER OR EX-PARTNER?: NO

## 2024-12-29 SDOH — SOCIAL STABILITY: SOCIAL INSECURITY: HAVE YOU HAD THOUGHTS OF HARMING ANYONE ELSE?: NO

## 2024-12-29 SDOH — SOCIAL STABILITY: SOCIAL INSECURITY: WERE YOU ABLE TO COMPLETE ALL THE BEHAVIORAL HEALTH SCREENINGS?: YES

## 2024-12-29 SDOH — ECONOMIC STABILITY: FOOD INSECURITY: WITHIN THE PAST 12 MONTHS, THE FOOD YOU BOUGHT JUST DIDN'T LAST AND YOU DIDN'T HAVE MONEY TO GET MORE.: NEVER TRUE

## 2024-12-29 SDOH — ECONOMIC STABILITY: TRANSPORTATION INSECURITY: IN THE PAST 12 MONTHS, HAS LACK OF TRANSPORTATION KEPT YOU FROM MEDICAL APPOINTMENTS OR FROM GETTING MEDICATIONS?: NO

## 2024-12-29 SDOH — SOCIAL STABILITY: SOCIAL INSECURITY: DO YOU FEEL UNSAFE GOING BACK TO THE PLACE WHERE YOU ARE LIVING?: NO

## 2024-12-29 SDOH — SOCIAL STABILITY: SOCIAL INSECURITY: DO YOU FEEL ANYONE HAS EXPLOITED OR TAKEN ADVANTAGE OF YOU FINANCIALLY OR OF YOUR PERSONAL PROPERTY?: NO

## 2024-12-29 SDOH — ECONOMIC STABILITY: HOUSING INSECURITY: IN THE PAST 12 MONTHS, HOW MANY TIMES HAVE YOU MOVED WHERE YOU WERE LIVING?: 1

## 2024-12-29 SDOH — ECONOMIC STABILITY: FOOD INSECURITY: WITHIN THE PAST 12 MONTHS, YOU WORRIED THAT YOUR FOOD WOULD RUN OUT BEFORE YOU GOT THE MONEY TO BUY MORE.: NEVER TRUE

## 2024-12-29 SDOH — HEALTH STABILITY: MENTAL HEALTH: HOW MANY DRINKS CONTAINING ALCOHOL DO YOU HAVE ON A TYPICAL DAY WHEN YOU ARE DRINKING?: PATIENT DOES NOT DRINK

## 2024-12-29 SDOH — ECONOMIC STABILITY: FOOD INSECURITY: HOW HARD IS IT FOR YOU TO PAY FOR THE VERY BASICS LIKE FOOD, HOUSING, MEDICAL CARE, AND HEATING?: NOT HARD AT ALL

## 2024-12-29 SDOH — SOCIAL STABILITY: SOCIAL INSECURITY: ARE YOU OR HAVE YOU BEEN THREATENED OR ABUSED PHYSICALLY, EMOTIONALLY, OR SEXUALLY BY ANYONE?: NO

## 2024-12-29 SDOH — SOCIAL STABILITY: SOCIAL INSECURITY: HAS ANYONE EVER THREATENED TO HURT YOUR FAMILY OR YOUR PETS?: NO

## 2024-12-29 ASSESSMENT — COGNITIVE AND FUNCTIONAL STATUS - GENERAL
MOBILITY SCORE: 24
MOBILITY SCORE: 24
PATIENT BASELINE BEDBOUND: NO
DAILY ACTIVITIY SCORE: 24
DAILY ACTIVITIY SCORE: 24

## 2024-12-29 ASSESSMENT — PAIN SCALES - GENERAL
PAINLEVEL_OUTOF10: 2
PAINLEVEL_OUTOF10: 5 - MODERATE PAIN
PAINLEVEL_OUTOF10: 3
PAINLEVEL_OUTOF10: 0 - NO PAIN
PAINLEVEL_OUTOF10: 2

## 2024-12-29 ASSESSMENT — ENCOUNTER SYMPTOMS
PALPITATIONS: 0
DYSURIA: 1
POLYDIPSIA: 0
FEVER: 0
HEMATURIA: 0
ARTHRALGIAS: 0
AGITATION: 0
DIZZINESS: 0
COUGH: 0
DYSURIA: 0
SHORTNESS OF BREATH: 0
SNORING: 1
HEADACHES: 0
VOMITING: 0
EYE REDNESS: 0
EYE ITCHING: 0
NAUSEA: 1
FLANK PAIN: 1
MYALGIAS: 0
DIFFICULTY URINATING: 0
CHILLS: 0
CONFUSION: 0
SORE THROAT: 0
BACK PAIN: 1
ABDOMINAL PAIN: 1
COLOR CHANGE: 0

## 2024-12-29 ASSESSMENT — ACTIVITIES OF DAILY LIVING (ADL)
HEARING - RIGHT EAR: FUNCTIONAL
LACK_OF_TRANSPORTATION: NO
JUDGMENT_ADEQUATE_SAFELY_COMPLETE_DAILY_ACTIVITIES: YES
HEARING - LEFT EAR: FUNCTIONAL
FEEDING YOURSELF: INDEPENDENT
ADEQUATE_TO_COMPLETE_ADL: YES
DRESSING YOURSELF: INDEPENDENT
WALKS IN HOME: INDEPENDENT
LACK_OF_TRANSPORTATION: NO
WALKS IN HOME: INDEPENDENT
HEARING - LEFT EAR: FUNCTIONAL
PATIENT'S MEMORY ADEQUATE TO SAFELY COMPLETE DAILY ACTIVITIES?: YES
PATIENT'S MEMORY ADEQUATE TO SAFELY COMPLETE DAILY ACTIVITIES?: YES
FEEDING YOURSELF: INDEPENDENT
BATHING: INDEPENDENT
TOILETING: INDEPENDENT
GROOMING: INDEPENDENT
ADEQUATE_TO_COMPLETE_ADL: YES
JUDGMENT_ADEQUATE_SAFELY_COMPLETE_DAILY_ACTIVITIES: YES
BATHING: INDEPENDENT
TOILETING: INDEPENDENT
ASSISTIVE_DEVICE: CANE;EYEGLASSES
ASSISTIVE_DEVICE: CANE;EYEGLASSES
HEARING - RIGHT EAR: FUNCTIONAL

## 2024-12-29 ASSESSMENT — LIFESTYLE VARIABLES
AUDIT-C TOTAL SCORE: 0
AUDIT-C TOTAL SCORE: 0
SKIP TO QUESTIONS 9-10: 1
HOW OFTEN DO YOU HAVE 6 OR MORE DRINKS ON ONE OCCASION: NEVER
HOW OFTEN DO YOU HAVE A DRINK CONTAINING ALCOHOL: NEVER
AUDIT-C TOTAL SCORE: 0
HOW MANY STANDARD DRINKS CONTAINING ALCOHOL DO YOU HAVE ON A TYPICAL DAY: PATIENT DOES NOT DRINK
SKIP TO QUESTIONS 9-10: 1

## 2024-12-29 ASSESSMENT — PAIN DESCRIPTION - LOCATION
LOCATION: BACK
LOCATION: BACK

## 2024-12-29 ASSESSMENT — PATIENT HEALTH QUESTIONNAIRE - PHQ9
SUM OF ALL RESPONSES TO PHQ9 QUESTIONS 1 & 2: 0
2. FEELING DOWN, DEPRESSED OR HOPELESS: NOT AT ALL
1. LITTLE INTEREST OR PLEASURE IN DOING THINGS: NOT AT ALL

## 2024-12-29 ASSESSMENT — PAIN - FUNCTIONAL ASSESSMENT
PAIN_FUNCTIONAL_ASSESSMENT: 0-10

## 2024-12-29 ASSESSMENT — PAIN DESCRIPTION - DESCRIPTORS: DESCRIPTORS: THROBBING

## 2024-12-29 NOTE — PROGRESS NOTES
12/29/24 1436   Discharge Planning   Living Arrangements Alone   Support Systems Children;Family members;Friends/neighbors   Assistance Needed Patient is A&O X3, on O2 at 3LPM via [place] provided by [facility] at baseline, wears a CPAP at , is independent with ADLs and uses no DME at home (has a cane she will use for long distances outside of the home).   Type of Residence Private residence   Number of Stairs to Enter Residence 2   Number of Stairs Within Residence 0  (mobile home)   Do you have animals or pets at home? No   Who is requesting discharge planning? Provider   Home or Post Acute Services None   Expected Discharge Disposition Home   Does the patient need discharge transport arranged? No   Financial Resource Strain   How hard is it for you to pay for the very basics like food, housing, medical care, and heating? Not hard   Housing Stability   In the last 12 months, was there a time when you were not able to pay the mortgage or rent on time? N   At any time in the past 12 months, were you homeless or living in a shelter (including now)? N   Transportation Needs   In the past 12 months, has lack of transportation kept you from medical appointments or from getting medications? no   In the past 12 months, has lack of transportation kept you from meetings, work, or from getting things needed for daily living? No   Stroke Family Assessment   Stroke Family Assessment Needed No   Intensity of Service   Intensity of Service 0-30 min

## 2024-12-29 NOTE — ED PROVIDER NOTES
"HPI   Chief Complaint   Patient presents with    Back Pain     Right lower back pain. Pt also states she has painful \"throbbing\" after urination       64-year-old female from home by EMS for chief complaint of right lower back pain and states that it throbs after she urinates.  She did have a history of UTI couple months ago.  She also has a history of kidney stones.  Very difficult to obtain a history from the patient she is just not a good historian.  Denies any fevers or chills.  No urgency or frequency.  She has no abdominal pain.    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    Acquired plantar porokeratosis    Acute upper respiratory infection, unspecified 01/14/2020    URI, acute    Allergic rhinitis due to animal (cat) (dog) hair and dander 01/02/2020    Allergic rhinitis due to dogs    Allergic rhinitis due to pollen 01/02/2020    Allergic rhinitis due to pollen    Allergic rhinitis due to pollen 02/26/2018    Seasonal allergic rhinitis due to pollen    Allergy status to unspecified drugs, medicaments and biological substances 06/30/2015    History of allergy    Anemia, unspecified 07/23/2018    Normocytic anemia    Anxiety disorder due to known physiological condition 06/05/2013    Anxiety disorder due to medical condition    Anxiety disorder, unspecified 01/28/2016    Anxiety    Asymptomatic varicose veins of bilateral lower extremities 07/16/2019    Varicose veins of legs    Atypical squamous cells of undetermined significance on cytologic smear of cervix (ASC-US) 06/26/2013    Pap smear abnormality of cervix with ASCUS favoring benign    Candidiasis, unspecified 12/10/2019    Yeast infection    Cellulitis of left finger 07/31/2018    Paronychia of finger of left hand    Changes in skin texture 03/09/2021    Cracked skin    Contusion of left lesser toe(s) with damage to nail, " initial encounter 02/22/2018    Subungual contusion of toenail of left foot    Contusion of right hand, sequela 08/04/2020    Traumatic ecchymosis of hand, right, sequela    Corns and callosities 05/25/2021    Callus of foot    Disorder of pigmentation, unspecified 09/28/2016    Atypical pigmented skin lesion    Disorder of the skin and subcutaneous tissue, unspecified 08/27/2020    Lesion of subcutaneous tissue    Dorsalgia, unspecified 09/23/2021    Acute back pain    Dorsalgia, unspecified 01/29/2019    Costovertebral angle tenderness    Encounter for general adult medical examination without abnormal findings 06/08/2020    Medicare annual wellness visit, subsequent    Encounter for gynecological examination (general) (routine) without abnormal findings 12/14/2021    Encounter for gynecological examination    Encounter for gynecological examination (general) (routine) without abnormal findings 12/11/2020    Encounter for gynecological examination    Encounter for other screening for malignant neoplasm of breast     Breast cancer screening    Encounter for screening for malignant neoplasm of cervix     Encounter for screening for malignant neoplasm of cervix    Encounter for screening for malignant neoplasm of cervix 11/04/2014    Screening for malignant neoplasm of cervix    Encounter for screening for malignant neoplasm of cervix     Cervical cancer screening    Hepatomegaly, not elsewhere classified 04/21/2021    Liver mass, right lobe    History of falling 12/28/2018    Status post fall    Inappropriate diet and eating habits 05/13/2021    Inappropriate diet and eating habits    Irregular menstruation, unspecified     Irregular periods/menstrual cycles    Localized edema 07/29/2015    Pedal edema    Localized swelling, mass and lump, left lower limb 09/11/2020    Lump of left thigh    Localized swelling, mass and lump, left lower limb 09/24/2020    Mass of left thigh    Localized swelling, mass and lump,  unspecified lower limb 09/24/2020    Mass of thigh    Melanocytic nevi, unspecified 09/10/2019    Numerous skin moles    Multiple births, unspecified, all liveborn ([facility])     Multiple births, all liveborn    Other allergic rhinitis 01/02/2020    Allergic rhinitis due to dust mite    Other allergic rhinitis 01/02/2020    Allergic rhinitis due to mold    Other conditions influencing health status 06/26/2013    Uterine Rupture    Other conditions influencing health status     Normal colonoscopy    Other conditions influencing health status 02/05/2019    History of burning on urination    Other conditions influencing health status 12/14/2021    History of cough    Other conditions influencing health status 02/14/2019    History of dyspareunia    Other conditions influencing health status 01/14/2020    History of cough    Other conditions influencing health status 06/05/2013    Leiomyomatous Degeneration Of The Uterus    Other conditions influencing health status 06/05/2013    Viremia    Other hypertrophic disorders of the skin 07/29/2015    Skin tag    Other shoulder lesions, right shoulder 11/11/2019    Tendinitis of right rotator cuff    Other specified disorders of bone, thigh 09/09/2020    Pain of left femur    Other specified noninflammatory disorders of vagina 03/12/2019    Vaginal dryness    Other specified postprocedural states 05/04/2017    History of arthroscopic knee surgery    Other specified soft tissue disorders 10/08/2020    Soft tissue mass    Other specified soft tissue disorders 08/04/2020    Swelling of right hand    Pain in left thigh 08/27/2020    Pain of left thigh    Pain in right foot 03/09/2021    Right foot pain    Pain in right hip 12/28/2018    Right hip pain    Pain in right knee 03/12/2021    Bilateral knee pain    Pain in right knee 03/19/2021    Right knee pain    Pain in right leg 05/25/2021    Bilateral pain of leg and foot    Pain in right leg 01/12/2022    Bilateral leg and foot  pain    Pain in unspecified joint     Joint pain    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 02/22/2016    History of tetralogy of Fallot    Personal history of contraception     Personal history of contraceptive use    Personal history of diseases of the skin and subcutaneous tissue 12/22/2020    History of ingrown nail    Personal history of malignant neoplasm of other parts of uterus     History of malignant neoplasm of endometrium    Personal history of other (healed) physical injury and trauma 03/01/2017    History of sprain of ankle    Personal history of other benign neoplasm 09/10/2019    History of other benign neoplasm    Personal history of other benign neoplasm 05/06/2014    History of uterine leiomyoma    Personal history of other benign neoplasm 12/11/2020    History of uterine leiomyoma    Personal history of other diseases of the circulatory system 04/14/2021    History of atrial fibrillation    Personal history of other diseases of the circulatory system 04/14/2021    History of atrial fibrillation    Personal history of other diseases of the circulatory system 04/14/2021    History of atrial fibrillation    Personal history of other diseases of the circulatory system 02/01/2017    History of hypertension    Personal history of other diseases of the circulatory system 04/14/2021    History of hypotension    Personal history of other diseases of the digestive system 03/24/2021    History of gastroesophageal reflux (GERD)    Personal history of other diseases of the female genital tract 12/28/2016    History of postmenopausal bleeding    Personal history of other diseases of the female genital tract 05/19/2022    History of postmenopausal bleeding    Personal history of other diseases of the female genital tract     History of vaginal discharge    Personal history of other diseases of the female genital tract     Vaginal delivery    Personal history of  other diseases of the musculoskeletal system and connective tissue     Personal history of arthritis    Personal history of other diseases of the musculoskeletal system and connective tissue 11/18/2019    History of muscle spasm    Personal history of other diseases of the respiratory system 05/27/2021    History of asthma    Personal history of other diseases of the respiratory system 04/12/2019    History of acute bronchitis    Personal history of other diseases of the respiratory system 01/04/2020    History of bronchitis    Personal history of other endocrine, nutritional and metabolic disease 02/01/2017    History of hyperlipidemia    Personal history of other endocrine, nutritional and metabolic disease 04/18/2016    History of obesity    Personal history of other endocrine, nutritional and metabolic disease 05/26/2020    History of thyroid nodule    Personal history of other infectious and parasitic diseases     History of varicella    Personal history of other medical treatment 12/11/2020    History of screening mammography    Personal history of other medical treatment 12/14/2021    History of screening mammography    Personal history of other medical treatment     History of mammogram    Personal history of other specified conditions 08/21/2019    History of gross hematuria    Personal history of other specified conditions 02/02/2017    History of weight gain    Personal history of other specified conditions 12/07/2020    History of chest pain    Personal history of other specified conditions 04/14/2021    History of palpitations    Personal history of other specified conditions 12/05/2014    History of vertigo    Personal history of other specified conditions 08/25/2021    History of exertional chest pain    Personal history of other specified conditions     History of shortness of breath    Personal history of other specified conditions     H/O heartburn    Personal history of other specified conditions  01/12/2018    History of urinary frequency    Personal history of transient ischemic attack (TIA), and 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 12/30/2015    History of TIA (transient ischemic attack)    Personal history of urinary (tract) infections 09/02/2021    History of recurrent cystitis    Personal history of urinary (tract) infections 05/23/2019    History of cystitis    Personal history of urinary (tract) infections 09/02/2021    History of recurrent urinary tract infection    Personal history of urinary calculi 09/12/2019    History of renal calculi    Polyphagia 05/13/2021    Polyphagia    Primary central sleep apnea 10/21/2017    Central sleep apnea    Pulmonary hypertension (Multi)     Pure hypercholesterolemia, unspecified     High cholesterol    Residual hemorrhoidal skin tags 07/06/2017    External hemorrhoid    Sleep apnea     Slurred speech 01/02/2015    Slurred speech    Tinea pedis 05/25/2021    Tinea pedis of right foot    Unspecified abdominal pain 04/13/2021    Abdominal pain, acute    Unspecified injury of right wrist, hand and finger(s), sequela 08/04/2020    Hand trauma, right, sequela    Unspecified internal derangement of unspecified knee 03/01/2017    Internal derangement of knee    Unspecified symptoms and signs involving the genitourinary system 12/16/2021    UTI symptoms    Unspecified symptoms and signs involving the genitourinary system 02/05/2019    UTI symptoms    Unspecified symptoms and signs involving the genitourinary system 09/03/2020    UTI symptoms    Urinary tract infection, site not specified 01/17/2020    Acute urinary tract infection    Urinary tract infection, site not specified 01/10/2022    Acute UTI    Xerosis cutis 09/10/2019    Dry skin dermatitis     Past Surgical History:   Procedure Laterality Date    CARDIAC CATHETERIZATION Right 01/04/2024    Procedure: Right Heart Cath;  Surgeon: Richy Raman MD;  Location: [facility] Cardiac Cath Lab;  Service:  Cardiovascular;  Laterality: Right;    CARDIAC CATHETERIZATION      KNEE SURGERY  05/04/2017    Knee Surgery    MOUTH SURGERY  11/04/2014    Oral Surgery Tooth Extraction    MR HEAD ANGIO WO IV CONTRAST  02/09/2016    MR HEAD ANGIO WO IV CONTRAST [facility]    OTHER SURGICAL HISTORY  07/31/2013    Wrist Carpectomy    OTHER SURGICAL HISTORY  09/10/2019    [place] extraction    OTHER SURGICAL HISTORY  11/30/2018    Complete colonoscopy    OTHER SURGICAL HISTORY  06/08/2020    Thyroid biopsy     Family History   Problem Relation Name Age of Onset    Hypertension Mother      Breast cancer Mother      Other (cardiac disorder) Mother      Cardiomyopathy Mother      Diabetes Mother      Other (chronic kidney disease) Mother      Alcohol abuse Father joel serna     Stroke Brother      Brain Aneurysm Brother       Social History     Tobacco Use    Smoking status: Never    Smokeless tobacco: Never   Vaping Use    Vaping status: Never Used   Substance Use Topics    Alcohol use: Not Currently     Comment: Occasional    Drug use: Never       Physical Exam   ED Triage Vitals [12/29/24 0122]   Temperature Heart Rate Respirations BP   36.4 °C (97.5 °F) 71 (!) 22 (!) 150/109      Pulse Ox Temp src Heart Rate Source Patient Position   94 % -- -- --      BP Location FiO2 (%)     -- --       Physical Exam  Vitals and nursing note reviewed.   Constitutional:       Appearance: She is obese.   HENT:      Head: Normocephalic and atraumatic.      Nose: Nose normal.      Mouth/Throat:      Mouth: Mucous membranes are moist.   Eyes:      Extraocular Movements: Extraocular movements intact.      Pupils: Pupils are equal, round, and reactive to light.   Cardiovascular:      Rate and Rhythm: Normal rate and regular rhythm.   Pulmonary:      Effort: Pulmonary effort is normal.      Breath sounds: Normal breath sounds.   Abdominal:      General: Abdomen is flat.      Palpations: Abdomen is soft.   Musculoskeletal:          General: Normal range of motion.      Cervical back: Normal range of motion.   Skin:     General: Skin is warm and dry.      Capillary Refill: Capillary refill takes less than 2 seconds.   Neurological:      General: No focal deficit present.      Mental Status: She is alert.   Psychiatric:         Mood and Affect: Mood normal.           ED Course & MDM   Diagnoses as of 01/08/25 0621   Kidney stone                 No data recorded                                 Medical Decision Making  Medical Decision Making: Patient was worked up found to have a urinary tract infection on lab work and urinalysis, CT abdomen pelvis shows moderate right hydroureteronephrosis with an 8 mm calculus in the right ureter.  At this time she was given IV fluids with Toradol.  Spoke with urology and we will hospitalize for further management and treatment at this time.  Patient agrees with the plan of care.  Differential includes UTI sepsis infected stone  Consider renal ultrasound  [unfilled]     Sujatha Quintero D.O.  Emergency Medicine          Procedure  Procedures     Sujatha Quintero,   01/08/25 0621

## 2024-12-29 NOTE — CARE PLAN
The patient's goals for the shift include      The clinical goals for the shift include Pt will remain HDS and have pain controlled this shift.    Over the shift, the patient did make progress toward the following goals. Awaiting transfer to main [place]. Monitored and medicated as ordered this shift.

## 2024-12-29 NOTE — ASSESSMENT & PLAN NOTE
-With Obstruction & Hydroureteronephrosis    Pre-op clearance; she says she cannot get full anesthesia, but can do twilight anesthesia  RCRI score 0, EKG unchanged except has prolonged QT, no new ST changes, METs score <4. Added BNP and troponin-f/U  Flomax  IV fluids/NPO  Pain control; OARRS; toradol, oxycodone and tylenol    ?UTI with Hematuria  Zosyn IV  Urine culture-f/U    Prolonged QT  Will supplement potassium  Recheck later today on repeat EKG-f/U    Pulmonary HTN/MARLON  Resume home meds-Opsumit and Adempas

## 2024-12-29 NOTE — H&P
History Of Present Illness  Farida Traylor is a 64 y.o. female with a pertinent hx of pulmonary HTN (on 3 L NC chronically)/cor pulmonale, MARLON, asthma, past kidney stones & chronic UTIs who presented to [facility] ER with complaint of acute right lower back pain that she describes as moderate and achy that began yesterday along with continuous bladder pain that was previously intermittent and chronic. She also has nausea. She was concerned that she might have pulled a muscle in her back or had a UTI.  She denied urinary frequency, urgency, fever, chills, difficulty urinating, hematuria or vomiting.  Pertinent workup in ER included: CT of the abdomen pelvis that showed moderate right hydroureteronephrosis with an obstructing 8 mm stone in the distal right ureter and a nonobstructing right posterior intrarenal calculus.  No leukocytosis or left shift, UA showed 500 leuks, 21-50 white blood cells, +1 bacteria, negative nitrates and urine culture in process.  Kidney function within normal limits.    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), Acute upper respiratory infection, unspecified (01/14/2020), Allergic rhinitis due to animal (cat) (dog) hair and dander (01/02/2020), Allergic rhinitis due to pollen (01/02/2020), Allergic rhinitis due to pollen (02/26/2018), Allergy status to unspecified drugs, medicaments and biological substances (06/30/2015), Anemia, unspecified (07/23/2018), Anxiety disorder due to known physiological condition (06/05/2013), Anxiety disorder, unspecified (01/28/2016), Asymptomatic varicose veins of bilateral lower extremities (07/16/2019), Atypical squamous cells of undetermined significance on cytologic smear of cervix (ASC-US) (06/26/2013), Candidiasis, unspecified (12/10/2019), Cellulitis of left finger (07/31/2018), Changes in skin texture (03/09/2021), Contusion of left lesser toe(s) with damage to nail,  initial encounter (02/22/2018), Contusion of right hand, sequela (08/04/2020), Corns and callosities (05/25/2021), Disorder of pigmentation, unspecified (09/28/2016), Disorder of the skin and subcutaneous tissue, unspecified (08/27/2020), Dorsalgia, unspecified (09/23/2021), Dorsalgia, unspecified (01/29/2019), Encounter for general adult medical examination without abnormal findings (06/08/2020), Encounter for gynecological examination (general) (routine) without abnormal findings (12/14/2021), Encounter for gynecological examination (general) (routine) without abnormal findings (12/11/2020), Encounter for other screening for malignant neoplasm of breast, Encounter for screening for malignant neoplasm of cervix, Encounter for screening for malignant neoplasm of cervix (11/04/2014), Encounter for screening for malignant neoplasm of cervix, Hepatomegaly, not elsewhere classified (04/21/2021), History of falling (12/28/2018), Inappropriate diet and eating habits (05/13/2021), Irregular menstruation, unspecified, Localized edema (07/29/2015), Localized swelling, mass and lump, left lower limb (09/11/2020), Localized swelling, mass and lump, left lower limb (09/24/2020), Localized swelling, mass and lump, unspecified lower limb (09/24/2020), Melanocytic nevi, unspecified (09/10/2019), Multiple births, unspecified, all liveborn ([facility]), Other allergic rhinitis (01/02/2020), Other allergic rhinitis (01/02/2020), Other conditions influencing health status (06/26/2013), Other conditions influencing health status, Other conditions influencing health status (02/05/2019), Other conditions influencing health status (12/14/2021), Other conditions influencing health status (02/14/2019), Other conditions influencing health status (01/14/2020), Other conditions influencing health status (06/05/2013), Other conditions influencing health status (06/05/2013), Other hypertrophic disorders of the skin (07/29/2015), Other shoulder  lesions, right shoulder (11/11/2019), Other specified disorders of bone, thigh (09/09/2020), Other specified noninflammatory disorders of vagina (03/12/2019), Other specified postprocedural states (05/04/2017), Other specified soft tissue disorders (10/08/2020), Other specified soft tissue disorders (08/04/2020), Pain in left thigh (08/27/2020), Pain in right foot (03/09/2021), Pain in right hip (12/28/2018), Pain in right knee (03/12/2021), Pain in right knee (03/19/2021), Pain in right leg (05/25/2021), Pain in right leg (01/12/2022), Pain in unspecified joint, 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 (02/22/2016), Personal history of contraception, Personal history of diseases of the skin and subcutaneous tissue (12/22/2020), Personal history of malignant neoplasm of other parts of uterus, Personal history of other (healed) physical injury and trauma (03/01/2017), Personal history of other benign neoplasm (09/10/2019), Personal history of other benign neoplasm (05/06/2014), Personal history of other benign neoplasm (12/11/2020), Personal history of other diseases of the circulatory system (04/14/2021), Personal history of other diseases of the circulatory system (04/14/2021), Personal history of other diseases of the circulatory system (04/14/2021), Personal history of other diseases of the circulatory system (02/01/2017), Personal history of other diseases of the circulatory system (04/14/2021), Personal history of other diseases of the digestive system (03/24/2021), Personal history of other diseases of the female genital tract (12/28/2016), Personal history of other diseases of the female genital tract (05/19/2022), Personal history of other diseases of the female genital tract, Personal history of other diseases of the female genital tract, Personal history of other diseases of the musculoskeletal system and connective tissue, Personal history of other diseases  of the musculoskeletal system and connective tissue (11/18/2019), Personal history of other diseases of the respiratory system (05/27/2021), Personal history of other diseases of the respiratory system (04/12/2019), Personal history of other diseases of the respiratory system (01/04/2020), Personal history of other endocrine, nutritional and metabolic disease (02/01/2017), Personal history of other endocrine, nutritional and metabolic disease (04/18/2016), Personal history of other endocrine, nutritional and metabolic disease (05/26/2020), Personal history of other infectious and parasitic diseases, Personal history of other medical treatment (12/11/2020), Personal history of other medical treatment (12/14/2021), Personal history of other medical treatment, Personal history of other specified conditions (08/21/2019), Personal history of other specified conditions (02/02/2017), Personal history of other specified conditions (12/07/2020), Personal history of other specified conditions (04/14/2021), Personal history of other specified conditions (12/05/2014), Personal history of other specified conditions (08/25/2021), Personal history of other specified conditions, Personal history of other specified conditions, Personal history of other specified conditions (01/12/2018), Personal history of transient ischemic attack (TIA), and 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 (12/30/2015), Personal history of urinary (tract) infections (09/02/2021), Personal history of urinary (tract) infections (05/23/2019), Personal history of urinary (tract) infections (09/02/2021), Personal history of urinary calculi (09/12/2019), Polyphagia (05/13/2021), Primary central sleep apnea (10/21/2017), Pulmonary hypertension (Multi), Pure hypercholesterolemia, unspecified, Residual hemorrhoidal skin tags (07/06/2017), Sleep apnea, Slurred speech (01/02/2015), Tinea pedis (05/25/2021), Unspecified abdominal pain (04/13/2021), Unspecified  injury of right wrist, hand and finger(s), sequela (08/04/2020), Unspecified internal derangement of unspecified knee (03/01/2017), Unspecified symptoms and signs involving the genitourinary system (12/16/2021), Unspecified symptoms and signs involving the genitourinary system (02/05/2019), Unspecified symptoms and signs involving the genitourinary system (09/03/2020), Urinary tract infection, site not specified (01/17/2020), Urinary tract infection, site not specified (01/10/2022), and Xerosis cutis (09/10/2019).    Surgical History  She has a past surgical history that includes Other surgical history (07/31/2013); Other surgical history (09/10/2019); Other surgical history (11/30/2018); Mouth surgery (11/04/2014); Knee surgery (05/04/2017); Other surgical history (06/08/2020); MR angio head wo IV contrast (02/09/2016); Cardiac catheterization (Right, 01/04/2024); and Cardiac catheterization.     Social History  She reports that she has never smoked. She has never used smokeless tobacco. She reports that she does not currently use alcohol. She reports that she does not use drugs.    Family History  Family History   Problem Relation Name Age of Onset    Hypertension Mother      Breast cancer Mother      Other (cardiac disorder) Mother      Cardiomyopathy Mother      Diabetes Mother      Other (chronic kidney disease) Mother      Alcohol abuse Father joel serna     Stroke Brother      Brain Aneurysm Brother          Allergies  Grass pollen, Other, Pollen extracts, and Tree and shrub pollen    Review of Systems   Constitutional:  Negative for chills and fever.   HENT:  Negative for sneezing and sore throat.    Eyes:  Negative for redness and itching.   Respiratory:  Negative for cough and shortness of breath.    Cardiovascular:  Negative for chest pain and palpitations.   Gastrointestinal:  Positive for nausea. Negative for vomiting.   Endocrine: Negative for polydipsia and polyuria.   Genitourinary:  Negative  for difficulty urinating, dysuria, hematuria and urgency.        + bladder pain   Musculoskeletal:  Positive for back pain. Negative for arthralgias and myalgias.        RL   Skin:  Positive for rash. Negative for color change and pallor.   Neurological:  Negative for dizziness and headaches.   Psychiatric/Behavioral:  Negative for agitation, behavioral problems and confusion.       Physical Exam  Vitals reviewed.   Constitutional:       General: She is not in acute distress.     Appearance: She is obese. She is ill-appearing. She is not toxic-appearing or diaphoretic.      Comments: +chronic ill appearing   HENT:      Head: Normocephalic and atraumatic.      Mouth/Throat:      Mouth: Mucous membranes are moist.      Pharynx: Oropharynx is clear.   Eyes:      Extraocular Movements: Extraocular movements intact.      Conjunctiva/sclera: Conjunctivae normal.   Cardiovascular:      Rate and Rhythm: Normal rate and regular rhythm.      Pulses: Normal pulses.      Heart sounds: Murmur heard.   Abdominal:      General: Bowel sounds are normal. There is no distension.      Palpations: Abdomen is soft.      Tenderness: There is no abdominal tenderness. There is no guarding.   Neurological:      Mental Status: She is alert.         Last Recorded Vitals  BP (!) 150/109   Pulse 71   Temp 36.4 °C (97.5 °F)   Resp (!) 22   Wt 132 kg (290 lb)   SpO2 (!) 92%     Relevant Results  Scheduled medications  docusate sodium, 100 mg, oral, BID  enoxaparin, 60 mg, subcutaneous, q12h MARK  ketorolac, 15 mg, intravenous, q6h MARK  macitentan, 10 mg, oral, Daily  nystatin, 1 Application, Topical, BID  potassium chloride CR, 40 mEq, oral, Once  riociguat, 2.5 mg, oral, TID  tamsulosin, 0.4 mg, oral, Daily      Continuous medications  sodium chloride 0.9%, 100 mL/hr      PRN medications  PRN medications: acetaminophen **OR** acetaminophen **OR** acetaminophen, melatonin, ondansetron **OR** ondansetron, oxyCODONE    Results for orders placed  or performed during the hospital encounter of 12/29/24 (from the past 24 hours)   CBC and Auto Differential   Result Value Ref Range    WBC 6.2 4.4 - 11.3 x10*3/uL    nRBC 0.0 0.0 - 0.0 /100 WBCs    RBC 4.69 4.00 - 5.20 x10*6/uL    Hemoglobin 13.8 12.0 - 16.0 g/dL    Hematocrit 42.1 36.0 - 46.0 %    MCV 90 80 - 100 fL    MCH 29.4 26.0 - 34.0 pg    MCHC 32.8 32.0 - 36.0 g/dL    RDW 15.0 (H) 11.5 - 14.5 %    Platelets 214 150 - 450 x10*3/uL    Neutrophils % 64.3 40.0 - 80.0 %    Immature Granulocytes %, Automated 0.5 0.0 - 0.9 %    Lymphocytes % 23.3 13.0 - 44.0 %    Monocytes % 6.9 2.0 - 10.0 %    Eosinophils % 4.7 0.0 - 6.0 %    Basophils % 0.3 0.0 - 2.0 %    Neutrophils Absolute 4.00 1.20 - 7.70 x10*3/uL    Immature Granulocytes Absolute, Automated 0.03 0.00 - 0.70 x10*3/uL    Lymphocytes Absolute 1.45 1.20 - 4.80 x10*3/uL    Monocytes Absolute 0.43 0.10 - 1.00 x10*3/uL    Eosinophils Absolute 0.29 0.00 - 0.70 x10*3/uL    Basophils Absolute 0.02 0.00 - 0.10 x10*3/uL   Magnesium   Result Value Ref Range    Magnesium 1.71 1.60 - 2.40 mg/dL   Comprehensive metabolic panel   Result Value Ref Range    Glucose 122 (H) 74 - 99 mg/dL    Sodium 139 136 - 145 mmol/L    Potassium 3.6 3.5 - 5.3 mmol/L    Chloride 104 98 - 107 mmol/L    Bicarbonate 25 21 - 32 mmol/L    Anion Gap 14 10 - 20 mmol/L    Urea Nitrogen 13 6 - 23 mg/dL    Creatinine 0.98 0.50 - 1.05 mg/dL    eGFR 64 >60 mL/min/1.73m*2    Calcium 8.8 8.6 - 10.3 mg/dL    Albumin 4.0 3.4 - 5.0 g/dL    Alkaline Phosphatase 56 33 - 136 U/L    Total Protein 6.7 6.4 - 8.2 g/dL    AST 12 9 - 39 U/L    Bilirubin, Total 0.3 0.0 - 1.2 mg/dL    ALT 12 7 - 45 U/L   Urinalysis with Reflex Culture and Microscopic   Result Value Ref Range    Color, Urine Dark-Orange (N) Light-Yellow, Yellow, Dark-Yellow    Appearance, Urine Turbid (N) Clear    Specific Gravity, Urine 1.019 1.005 - 1.035    pH, Urine 5.5 5.0, 5.5, 6.0, 6.5, 7.0, 7.5, 8.0    Protein, Urine 30 (1+) (A) NEGATIVE, 10  (TRACE), 20 (TRACE) mg/dL    Glucose, Urine Normal Normal mg/dL    Blood, Urine OVER (3+) (A) NEGATIVE    Ketones, Urine NEGATIVE NEGATIVE mg/dL    Bilirubin, Urine NEGATIVE NEGATIVE    Urobilinogen, Urine Normal Normal mg/dL    Nitrite, Urine NEGATIVE NEGATIVE    Leukocyte Esterase, Urine 500 Parker/µL (A) NEGATIVE   Microscopic Only, Urine   Result Value Ref Range    WBC, Urine 21-50 (A) 1-5, NONE /HPF    RBC, Urine >20 (A) NONE, 1-2, 3-5 /HPF    Squamous Epithelial Cells, Urine 1-9 (SPARSE) Reference range not established. /HPF    Bacteria, Urine 1+ (A) NONE SEEN /HPF    Mucus, Urine 1+ Reference range not established. /LPF     *Note: Due to a large number of results and/or encounters for the requested time period, some results have not been displayed. A complete set of results can be found in Results Review.     CT abdomen pelvis wo IV contrast    Result Date: 12/29/2024  Interpreted By:  Jonathan Guevara, STUDY: CT ABDOMEN PELVIS WO IV CONTRAST;  12/29/2024 2:01 am   INDICATION: Signs/Symptoms:flank pain.   COMPARISON: CT urogram 09/03/2019.   ACCESSION NUMBER(S): [id number]   ORDERING CLINICIAN: MYAH CERON   TECHNIQUE: CT of the abdomen and pelvis was performed. Contiguous axial images were obtained through the abdomen and pelvis. Coronal and sagittal reconstructions at 3 mm slice thickness were performed.  No intravenous contrast was administered.   FINDINGS: Please note that the evaluation of vessels, lymph nodes and organs is limited without intravenous contrast.   LOWER CHEST: There is no acute abnormality in the lower chest. The heart is normal in size without evidence of pericardial effusion. No pleural effusion is present. There is calcified granuloma in the right middle lobe.   ABDOMEN:   LIVER: The liver is enlarged and normal in contour. There is a 7.7 x 7.0 cm hypodense lesion in the posterior right hepatic lobe slightly increased from 2019 and previously characterized as benign  hemangioma. No suspicious hepatic lesions on noncontrast CT. There is also 1.9 cm hypodense lesion in the upper posterior right hepatic lobe, also corresponding to a hemangioma on previous contrast enhanced CT, also slightly increased from 2019.   BILE DUCTS: The intrahepatic and extrahepatic ducts are not dilated.   GALLBLADDER: The gallbladder is nondistended and without evidence of radiopaque stones.   PANCREAS: The pancreas is not enlarged.   SPLEEN: Within normal limits.   ADRENAL GLANDS: Within normal limits.   KIDNEYS AND URETERS: There is unchanged right posterior midpole 1.0 cm calculus. There is extrarenal pelvis on the right and there is interval development of moderate right hydroureteronephrosis due to an obstructing 8 mm calculus within the distal right ureter. No hydronephrosis or nephrolithiasis on the left. 2.6 cm simple left midpole renal cyst new from 2019.   PELVIS:   BLADDER: The urinary bladder is incompletely distended, limiting assessment.   REPRODUCTIVE ORGANS: There is bulky fibroid uterus with anterior uterine fibroid measuring approximately 7.2 x 3.7 cm similar to prior.   BOWEL: The stomach is incompletely distended, limiting evaluation for focal wall thickening. There is no bowel wall dilatation or obstruction. The appendix is normal. There is moderate amount of stool throughout the colon. Mild sigmoid diverticulosis without diverticulitis   VESSELS: The abdominal aorta is normal in caliber.   PERITONEUM/RETROPERITONEUM/LYMPH NODES: There is no ascites or intraperitoneal free air. There is no lymphadenopathy by CT criteria.   ABDOMINAL WALL: Small fat containing ventral abdominal wall hernia.   BONES: No suspicious osseous lesions are identified. Vertebral bodies are intact. There is osteopenia.       1.  Moderate right hydroureteronephrosis due to an obstructing 8 mm calculus within the distal right ureter. Additionally nonobstructing right posterior intrarenal calculus. 2. Additional  chronic findings including uterine fibroid and right posterior hepatic lobe benign hemangiomas measuring up to 7.7 x 7.0 cm, slightly increased since 2019.     Signed by: Jonathan Guevara 12/29/2024 2:21 AM Dictation workstation:   [id number]     Assessment/Plan   Assessment & Plan  Kidney stone  -With Obstruction & Hydroureteronephrosis    Pre-op clearance; she says she cannot get full anesthesia, but can do twilight anesthesia  RCRI score 0, EKG unchanged except has prolonged QT, no new ST changes, METs score <4. Added BNP and troponin-f/U  Flomax  IV fluids/NPO  Pain control; OARRS; toradol, oxycodone and tylenol    ?UTI with Hematuria  Zosyn IV  Urine culture-f/U    Prolonged QT  Will supplement potassium  Recheck later today on repeat EKG-f/U    Pulmonary HTN/MARLON  Resume home meds-Opsumit and Adempas  Follows with [facility]  CPAP    MAD   Nystatin to folds/groin    Obesity  Wt loss encouraged    Incidental CT Findings  Additional chronic findings including uterine fibroid and right posterior hepatic lobe benign hemangiomas measuring up to 7.7 x 7.0 cm, slightly increased since 2019; f/U with PCP outpt regarding for surveillance    Full Code    DVT Px  SCDs, Lovenox    Gifty Landrum, APRN-CNP  60 min spent interviewing and assessing patient, placing orders, updating care plan.  Home meds need verified and added to MAR. Pt does not know medications.

## 2024-12-29 NOTE — CONSULTS
Reason For Consult  Ureteral stone    History Of Present Illness  Farida Traylor is a 64 y.o. female presenting with acute right flank pain.  A CT done reveals an obstructing 8 mm right distal ureteral stone.  She reports having stones in the past, never required any intervention.  She has no voiding issues, no fevers at home.  Her medical history is notable for MARLON, pulmonary HTN, BMI 55.         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), Acute upper respiratory infection, unspecified (01/14/2020), Allergic rhinitis due to animal (cat) (dog) hair and dander (01/02/2020), Allergic rhinitis due to pollen (01/02/2020), Allergic rhinitis due to pollen (02/26/2018), Allergy status to unspecified drugs, medicaments and biological substances (06/30/2015), Anemia, unspecified (07/23/2018), Anxiety disorder due to known physiological condition (06/05/2013), Anxiety disorder, unspecified (01/28/2016), Asymptomatic varicose veins of bilateral lower extremities (07/16/2019), Atypical squamous cells of undetermined significance on cytologic smear of cervix (ASC-US) (06/26/2013), Candidiasis, unspecified (12/10/2019), Cellulitis of left finger (07/31/2018), Changes in skin texture (03/09/2021), Contusion of left lesser toe(s) with damage to nail, initial encounter (02/22/2018), Contusion of right hand, sequela (08/04/2020), Corns and callosities (05/25/2021), Disorder of pigmentation, unspecified (09/28/2016), Disorder of the skin and subcutaneous tissue, unspecified (08/27/2020), Dorsalgia, unspecified (09/23/2021), Dorsalgia, unspecified (01/29/2019), Encounter for general adult medical examination without abnormal findings (06/08/2020), Encounter for gynecological examination (general) (routine) without abnormal findings (12/14/2021), Encounter for gynecological examination (general) (routine) without abnormal findings (12/11/2020),  Encounter for other screening for malignant neoplasm of breast, Encounter for screening for malignant neoplasm of cervix, Encounter for screening for malignant neoplasm of cervix (11/04/2014), Encounter for screening for malignant neoplasm of cervix, Hepatomegaly, not elsewhere classified (04/21/2021), History of falling (12/28/2018), Inappropriate diet and eating habits (05/13/2021), Irregular menstruation, unspecified, Localized edema (07/29/2015), Localized swelling, mass and lump, left lower limb (09/11/2020), Localized swelling, mass and lump, left lower limb (09/24/2020), Localized swelling, mass and lump, unspecified lower limb (09/24/2020), Melanocytic nevi, unspecified (09/10/2019), Multiple births, unspecified, all liveborn ([facility]), Other allergic rhinitis (01/02/2020), Other allergic rhinitis (01/02/2020), Other conditions influencing health status (06/26/2013), Other conditions influencing health status, Other conditions influencing health status (02/05/2019), Other conditions influencing health status (12/14/2021), Other conditions influencing health status (02/14/2019), Other conditions influencing health status (01/14/2020), Other conditions influencing health status (06/05/2013), Other conditions influencing health status (06/05/2013), Other hypertrophic disorders of the skin (07/29/2015), Other shoulder lesions, right shoulder (11/11/2019), Other specified disorders of bone, thigh (09/09/2020), Other specified noninflammatory disorders of vagina (03/12/2019), Other specified postprocedural states (05/04/2017), Other specified soft tissue disorders (10/08/2020), Other specified soft tissue disorders (08/04/2020), Pain in left thigh (08/27/2020), Pain in right foot (03/09/2021), Pain in right hip (12/28/2018), Pain in right knee (03/12/2021), Pain in right knee (03/19/2021), Pain in right leg (05/25/2021), Pain in right leg (01/12/2022), Pain in unspecified joint, 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), Personal history of contraception, Personal history of diseases of the skin and subcutaneous tissue (12/22/2020), Personal history of malignant neoplasm of other parts of uterus, Personal history of other (healed) physical injury and trauma (03/01/2017), Personal history of other benign neoplasm (09/10/2019), Personal history of other benign neoplasm (05/06/2014), Personal history of other benign neoplasm (12/11/2020), Personal history of other diseases of the circulatory system (04/14/2021), Personal history of other diseases of the circulatory system (04/14/2021), Personal history of other diseases of the circulatory system (04/14/2021), Personal history of other diseases of the circulatory system (02/01/2017), Personal history of other diseases of the circulatory system (04/14/2021), Personal history of other diseases of the digestive system (03/24/2021), Personal history of other diseases of the female genital tract (12/28/2016), Personal history of other diseases of the female genital tract (05/19/2022), Personal history of other diseases of the female genital tract, Personal history of other diseases of the female genital tract, Personal history of other diseases of the musculoskeletal system and connective tissue, Personal history of other diseases of the musculoskeletal system and connective tissue (11/18/2019), Personal history of other diseases of the respiratory system (05/27/2021), Personal history of other diseases of the respiratory system (04/12/2019), Personal history of other diseases of the respiratory system (01/04/2020), Personal history of other endocrine, nutritional and metabolic disease (02/01/2017), Personal history of other endocrine, nutritional and metabolic disease (04/18/2016), Personal history of other endocrine, nutritional and metabolic disease (05/26/2020), Personal history of other infectious and parasitic  diseases, Personal history of other medical treatment (12/11/2020), Personal history of other medical treatment (12/14/2021), Personal history of other medical treatment, Personal history of other specified conditions (08/21/2019), Personal history of other specified conditions (02/02/2017), Personal history of other specified conditions (12/07/2020), Personal history of other specified conditions (04/14/2021), Personal history of other specified conditions (12/05/2014), Personal history of other specified conditions (08/25/2021), Personal history of other specified conditions, Personal history of other specified conditions, Personal history of other specified conditions (01/12/2018), Personal history of transient ischemic attack (TIA), and 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 (12/30/2015), Personal history of urinary (tract) infections (09/02/2021), Personal history of urinary (tract) infections (05/23/2019), Personal history of urinary (tract) infections (09/02/2021), Personal history of urinary calculi (09/12/2019), Polyphagia (05/13/2021), Primary central sleep apnea (10/21/2017), Pulmonary hypertension (Multi), Pure hypercholesterolemia, unspecified, Residual hemorrhoidal skin tags (07/06/2017), Sleep apnea, Slurred speech (01/02/2015), Tinea pedis (05/25/2021), Unspecified abdominal pain (04/13/2021), Unspecified injury of right wrist, hand and finger(s), sequela (08/04/2020), Unspecified internal derangement of unspecified knee (03/01/2017), Unspecified symptoms and signs involving the genitourinary system (12/16/2021), Unspecified symptoms and signs involving the genitourinary system (02/05/2019), Unspecified symptoms and signs involving the genitourinary system (09/03/2020), Urinary tract infection, site not specified (01/17/2020), Urinary tract infection, site not specified (01/10/2022), and Xerosis cutis (09/10/2019).    Surgical History  She has a past surgical history that includes Other  "surgical history (07/31/2013); Other surgical history (09/10/2019); Other surgical history (11/30/2018); Mouth surgery (11/04/2014); Knee surgery (05/04/2017); Other surgical history (06/08/2020); MR angio head wo IV contrast (02/09/2016); Cardiac catheterization (Right, 01/04/2024); and Cardiac catheterization.     Social History  She reports that she has never smoked. She has never used smokeless tobacco. She reports that she does not currently use alcohol. She reports that she does not use drugs.    Family History  Family History   Problem Relation Name Age of Onset    Hypertension Mother      Breast cancer Mother      Other (cardiac disorder) Mother      Cardiomyopathy Mother      Diabetes Mother      Other (chronic kidney disease) Mother      Alcohol abuse Father joel serna     Stroke Brother      Brain Aneurysm Brother          Allergies  Grass pollen, Other, Pollen extracts, and Tree and shrub pollen    Review of Systems  Review of Systems   Constitutional: Negative for chills and fever.   Cardiovascular:  Positive for chest pain.   Respiratory:  Positive for snoring.    Gastrointestinal:  Positive for abdominal pain and nausea. Negative for vomiting.   Genitourinary:  Positive for dysuria and flank pain.          Physical Exam  Awake, alert, no distress  Breathing comfortably  Abdomen obese, soft, ND, NT  Ext warm, well perfused       Last Recorded Vitals  Blood pressure 143/78, pulse 58, temperature 36.4 °C (97.5 °F), temperature source Temporal, resp. rate 20, height 1.554 m (5' 1.2\"), weight 135 kg (297 lb 6.4 oz), SpO2 94%.    Relevant Results      Results for orders placed or performed during the hospital encounter of 12/29/24 (from the past 24 hours)   CBC and Auto Differential   Result Value Ref Range    WBC 6.2 4.4 - 11.3 x10*3/uL    nRBC 0.0 0.0 - 0.0 /100 WBCs    RBC 4.69 4.00 - 5.20 x10*6/uL    Hemoglobin 13.8 12.0 - 16.0 g/dL    Hematocrit 42.1 36.0 - 46.0 %    MCV 90 80 - 100 fL    MCH 29.4 " 26.0 - 34.0 pg    MCHC 32.8 32.0 - 36.0 g/dL    RDW 15.0 (H) 11.5 - 14.5 %    Platelets 214 150 - 450 x10*3/uL    Neutrophils % 64.3 40.0 - 80.0 %    Immature Granulocytes %, Automated 0.5 0.0 - 0.9 %    Lymphocytes % 23.3 13.0 - 44.0 %    Monocytes % 6.9 2.0 - 10.0 %    Eosinophils % 4.7 0.0 - 6.0 %    Basophils % 0.3 0.0 - 2.0 %    Neutrophils Absolute 4.00 1.20 - 7.70 x10*3/uL    Immature Granulocytes Absolute, Automated 0.03 0.00 - 0.70 x10*3/uL    Lymphocytes Absolute 1.45 1.20 - 4.80 x10*3/uL    Monocytes Absolute 0.43 0.10 - 1.00 x10*3/uL    Eosinophils Absolute 0.29 0.00 - 0.70 x10*3/uL    Basophils Absolute 0.02 0.00 - 0.10 x10*3/uL   Magnesium   Result Value Ref Range    Magnesium 1.71 1.60 - 2.40 mg/dL   Comprehensive metabolic panel   Result Value Ref Range    Glucose 122 (H) 74 - 99 mg/dL    Sodium 139 136 - 145 mmol/L    Potassium 3.6 3.5 - 5.3 mmol/L    Chloride 104 98 - 107 mmol/L    Bicarbonate 25 21 - 32 mmol/L    Anion Gap 14 10 - 20 mmol/L    Urea Nitrogen 13 6 - 23 mg/dL    Creatinine 0.98 0.50 - 1.05 mg/dL    eGFR 64 >60 mL/min/1.73m*2    Calcium 8.8 8.6 - 10.3 mg/dL    Albumin 4.0 3.4 - 5.0 g/dL    Alkaline Phosphatase 56 33 - 136 U/L    Total Protein 6.7 6.4 - 8.2 g/dL    AST 12 9 - 39 U/L    Bilirubin, Total 0.3 0.0 - 1.2 mg/dL    ALT 12 7 - 45 U/L   Troponin I, High Sensitivity   Result Value Ref Range    Troponin I, High Sensitivity 10 0 - 13 ng/L   B-type natriuretic peptide   Result Value Ref Range     (H) 0 - 99 pg/mL   Urinalysis with Reflex Culture and Microscopic   Result Value Ref Range    Color, Urine Dark-Orange (N) Light-Yellow, Yellow, Dark-Yellow    Appearance, Urine Turbid (N) Clear    Specific Gravity, Urine 1.019 1.005 - 1.035    pH, Urine 5.5 5.0, 5.5, 6.0, 6.5, 7.0, 7.5, 8.0    Protein, Urine 30 (1+) (A) NEGATIVE, 10 (TRACE), 20 (TRACE) mg/dL    Glucose, Urine Normal Normal mg/dL    Blood, Urine OVER (3+) (A) NEGATIVE    Ketones, Urine NEGATIVE NEGATIVE mg/dL     Bilirubin, Urine NEGATIVE NEGATIVE    Urobilinogen, Urine Normal Normal mg/dL    Nitrite, Urine NEGATIVE NEGATIVE    Leukocyte Esterase, Urine 500 Parker/µL (A) NEGATIVE   Microscopic Only, Urine   Result Value Ref Range    WBC, Urine 21-50 (A) 1-5, NONE /HPF    RBC, Urine >20 (A) NONE, 1-2, 3-5 /HPF    Squamous Epithelial Cells, Urine 1-9 (SPARSE) Reference range not established. /HPF    Bacteria, Urine 1+ (A) NONE SEEN /HPF    Mucus, Urine 1+ Reference range not established. /LPF   Protime-INR   Result Value Ref Range    Protime 11.7 9.8 - 12.8 seconds    INR 1.0 0.9 - 1.1   Troponin I, High Sensitivity   Result Value Ref Range    Troponin I, High Sensitivity 9 0 - 13 ng/L     *Note: Due to a large number of results and/or encounters for the requested time period, some results have not been displayed. A complete set of results can be found in Results Review.          Assessment/Plan     Symptomatic right ureteral calculus.       CT reviewed.  She likely will require operative intervention.  However, given her history of MARLON, pulmonary HTN, and BMI 55, she is at high risk of having ruel-operative events.  She also had an episode of chest pain this morning.  I recommend transfer to a tertiary care center.          I spent 60 minutes in the professional and overall care of this patient.      Kanika Navarro MD

## 2024-12-29 NOTE — DISCHARGE SUMMARY
Discharge Diagnosis  Kidney stone    Issues Requiring Follow-Up  Hydronephrosis/nephrolithiasis    Discharge Meds     Medication List      START taking these medications     docusate sodium 100 mg capsule; Commonly known as: Colace; Take 1   capsule (100 mg) by mouth 2 times a day.   enoxaparin 60 mg/0.6 mL syringe; Commonly known as: Lovenox; Inject 0.6   mL (60 mg) under the skin every 12 hours.   ketorolac 15 mg/mL injection; Commonly known as: Toradol; Infuse 1 mL   (15 mg) into a venous catheter every 6 hours for 19 doses.   magnesium oxide 400 mg (241.3 mg magnesium) tablet; Commonly known as:   Mag-Ox; Take 1 tablet (400 mg) by mouth once daily for 2 doses.; Start   taking on: December 30, 2024   piperacillin-tazobactam 3.375 gram/50 mL IV; Commonly known as: Zosyn;   Infuse 50 mL (3.375 g) over 0.5 hours into a venous catheter every 6   hours.   tamsulosin 0.4 mg 24 hr capsule; Commonly known as: Flomax; Take 1   capsule (0.4 mg) by mouth once daily.; Start taking on: December 30, 2024     CONTINUE taking these medications     Adempas 2.5 mg tablet; Generic drug: riociguat; TAKE 1 TABLET BY MOUTH 3   TIMES A DAY   albuterol 90 mcg/actuation inhaler; INHALE 1 TO 2 PUFFS BY MOUTH EVERY 4   TO 6 HOURS AS NEEDED   aspirin 325 mg tablet   blood pressure monitor kit; Commonly known as: Blood Pressure Kit; 1 kit   if needed (take BP as needed).   buPROPion  mg 12 hr tablet; Commonly known as: Wellbutrin SR   busPIRone 30 mg tablet; Commonly known as: Buspar   CALCIUM 600 ORAL   cyclobenzaprine 10 mg tablet; Commonly known as: Flexeril; Take 1 tablet   (10 mg) by mouth 3 times a day as needed for muscle spasms.   ferrous sulfate 325 (65 Fe) MG EC tablet   fluticasone propion-salmeteroL 250-50 mcg/dose diskus inhaler; Commonly   known as: Advair Diskus; INHALE ONE (1) PUFF BY MOUTH TWICE DAILY. RINSE   MOUTH OUT AFTER EACH USE.   hydrALAZINE 50 mg tablet; Commonly known as: Apresoline   hydrOXYzine pamoate 50  mg capsule; Commonly known as: Vistaril   lubiprostone 8 mcg capsule; Commonly known as: Amitiza; Take 1 capsule   (8 mcg) by mouth 2 times a day with meals.   memantine 10 mg tablet; Commonly known as: Namenda   methenamine hippurate 1 gram tablet; Commonly known as: Hiprex; TAKE 1   TABLET BY MOUTH TWICE DAILY   montelukast 10 mg tablet; Commonly known as: Singulair; TAKE 1 TABLET BY   MOUTH ONCE DAILY   omeprazole 40 mg DR capsule; Commonly known as: PriLOSEC; Take 1 capsule   (40 mg) by mouth once daily in the morning. Take before meals.   Opsumit 10 mg tablet tablet; Generic drug: macitentan; TAKE 1 TABLET BY   MOUTH DAILY. DO NOT HANDLE IF PREGNANT. DO NOT SPLIT, CRUSH, OR CHEW.   REVIEW MEDICATION GUIDE.   sertraline 100 mg tablet; Commonly known as: Zoloft   simvastatin 80 mg tablet; Commonly known as: Zocor; TAKE 1 TABLET BY   MOUTH ONCE DAILY AT BEDTIME   trospium 20 mg tablet; Commonly known as: Sanctura; Take 1 tablet (20   mg) by mouth 2 times a day.   Vitamin B-12 1,000 mcg tablet; Generic drug: cyanocobalamin       Test Results Pending At Discharge  Pending Labs       Order Current Status    Extra Urine Gray Tube In process    Urinalysis with Reflex Culture and Microscopic In process    Urine Culture In process            Hospital Course   Patient was admitted here with obstruction and hydronephrosis, of right ureteral system secondary to an obstructing stone. Due to the patients weight we are unable to perform her surgery here, in addition she also has severe pulmonary hypertension. This will make sedation complicated as well. She appears to have a uti as well, is on zosyn. Her other pre-morbid meds have been continued. She has been accepted to [facility] [place], by Dr Darnell, we are awaiting bed placement.     Pertinent Physical Exam At Time of Discharge  See admit note    Outpatient Follow-Up  Future Appointments   Date Time Provider Department Center   1/6/2025  2:20 PM Richy Raman MD [id number] [facility]    1/21/2025  9:00 AM GEA CT 1 GEACT [facility]   1/30/2025  2:20 PM Sanford Arredondo MD [id number] [facility]   2/17/2025 10:30 AM DO KATRIN [id number] [facility]   2/19/2025  1:00 PM Brionna Douglas DPBRIAN [id number] [facility]   3/28/2025  9:30 AM Mary Kate Leon MD [id number] [facility]   4/11/2025 11:20 AM Hortencia Sotomayor, APRN-CNP [id number] [facility]         Elisabeth Aden MD

## 2024-12-29 NOTE — SIGNIFICANT EVENT
Patient told the nurse she has been having chest pain for hours and did not want to notify staff previously.  When nurse practitioner came to assess the patient she said the chest pain had resolved already and was asking about eating.  EKG reviewed and unremarkable and unchanged from previous.    Constitutional:       General: She is not in acute distress.     Appearance: She is obese. She is ill-appearing. She is not toxic-appearing or diaphoretic.      Comments: +chronic ill appearing   HENT:      Head: Normocephalic and atraumatic.      Mouth/Throat:      Mouth: Mucous membranes are moist.      Pharynx: Oropharynx is clear.   Eyes:      Extraocular Movements: Extraocular movements intact.      Conjunctiva/sclera: Conjunctivae normal.   Cardiovascular:      Rate and Rhythm: Normal rate and regular rhythm.      Pulses: Normal pulses.      Heart sounds: Murmur heard.   Abdominal:      General: Bowel sounds are normal. There is no distension.      Palpations: Abdomen is soft.      Tenderness: There is no abdominal tenderness. There is no guarding.   Neurological:      Mental Status: She is alert.      Chest Pain-nurse to give medications for pulmonary hypertension now.  Patient to get 324 aspirin.  Troponin in process and second troponin time for 10 AM.  EKG reviewed.  Patient to be placed on telemetry.

## 2024-12-29 NOTE — SIGNIFICANT EVENT
[place] [facility] [place] order placed.

## 2024-12-30 LAB
ABO GROUP (TYPE) IN BLOOD: NORMAL
ANTIBODY SCREEN: NORMAL
ATRIAL RATE: 59 BPM
ATRIAL RATE: 61 BPM
BACTERIA UR CULT: NORMAL
P AXIS: -11 DEGREES
P AXIS: -12 DEGREES
P OFFSET: 138 MS
P OFFSET: 152 MS
P ONSET: 100 MS
P ONSET: 127 MS
PR INTERVAL: 188 MS
PR INTERVAL: 206 MS
Q ONSET: 203 MS
Q ONSET: 221 MS
QRS COUNT: 10 BEATS
QRS COUNT: 10 BEATS
QRS DURATION: 100 MS
QRS DURATION: 126 MS
QT INTERVAL: 498 MS
QT INTERVAL: 504 MS
QTC CALCULATION(BAZETT): 498 MS
QTC CALCULATION(BAZETT): 501 MS
QTC FREDERICIA: 500 MS
QTC FREDERICIA: 501 MS
R AXIS: -13 DEGREES
R AXIS: -20 DEGREES
RH FACTOR (ANTIGEN D): NORMAL
T AXIS: -34 DEGREES
T AXIS: -39 DEGREES
T OFFSET: 452 MS
T OFFSET: 473 MS
VENTRICULAR RATE: 59 BPM
VENTRICULAR RATE: 61 BPM

## 2024-12-30 PROCEDURE — 2500000004 HC RX 250 GENERAL PHARMACY W/ HCPCS (ALT 636 FOR OP/ED): Performed by: NURSE PRACTITIONER

## 2024-12-30 PROCEDURE — 36415 COLL VENOUS BLD VENIPUNCTURE: CPT | Performed by: NURSE PRACTITIONER

## 2024-12-30 PROCEDURE — 96372 THER/PROPH/DIAG INJ SC/IM: CPT | Performed by: NURSE PRACTITIONER

## 2024-12-30 PROCEDURE — 94760 N-INVAS EAR/PLS OXIMETRY 1: CPT

## 2024-12-30 PROCEDURE — 94640 AIRWAY INHALATION TREATMENT: CPT

## 2024-12-30 PROCEDURE — 1200000002 HC GENERAL ROOM WITH TELEMETRY DAILY

## 2024-12-30 PROCEDURE — 2500000001 HC RX 250 WO HCPCS SELF ADMINISTERED DRUGS (ALT 637 FOR MEDICARE OP): Performed by: NURSE PRACTITIONER

## 2024-12-30 PROCEDURE — 5A0935A ASSISTANCE WITH RESPIRATORY VENTILATION, LESS THAN 24 CONSECUTIVE HOURS, HIGH NASAL FLOW/VELOCITY: ICD-10-PCS | Performed by: NURSE PRACTITIONER

## 2024-12-30 PROCEDURE — 2500000002 HC RX 250 W HCPCS SELF ADMINISTERED DRUGS (ALT 637 FOR MEDICARE OP, ALT 636 FOR OP/ED): Performed by: INTERNAL MEDICINE

## 2024-12-30 PROCEDURE — 2500000004 HC RX 250 GENERAL PHARMACY W/ HCPCS (ALT 636 FOR OP/ED): Performed by: INTERNAL MEDICINE

## 2024-12-30 PROCEDURE — 86901 BLOOD TYPING SEROLOGIC RH(D): CPT | Performed by: NURSE PRACTITIONER

## 2024-12-30 PROCEDURE — 99232 SBSQ HOSP IP/OBS MODERATE 35: CPT | Performed by: INTERNAL MEDICINE

## 2024-12-30 PROCEDURE — 2500000001 HC RX 250 WO HCPCS SELF ADMINISTERED DRUGS (ALT 637 FOR MEDICARE OP): Performed by: INTERNAL MEDICINE

## 2024-12-30 PROCEDURE — 94660 CPAP INITIATION&MGMT: CPT

## 2024-12-30 PROCEDURE — 2500000005 HC RX 250 GENERAL PHARMACY W/O HCPCS: Performed by: INTERNAL MEDICINE

## 2024-12-30 RX ORDER — CEFTRIAXONE 1 G/50ML
1 INJECTION, SOLUTION INTRAVENOUS EVERY 24 HOURS
Status: DISCONTINUED | OUTPATIENT
Start: 2024-12-30 | End: 2024-12-31 | Stop reason: HOSPADM

## 2024-12-30 RX ADMIN — Medication 1000 MCG: at 09:46

## 2024-12-30 RX ADMIN — DOCUSATE SODIUM 100 MG: 100 CAPSULE, LIQUID FILLED ORAL at 09:47

## 2024-12-30 RX ADMIN — BUDESONIDE 0.5 MG: 0.5 INHALANT RESPIRATORY (INHALATION) at 08:39

## 2024-12-30 RX ADMIN — MEMANTINE 10 MG: 10 TABLET ORAL at 20:24

## 2024-12-30 RX ADMIN — MACITENTAN 10 MG: 10 TABLET, FILM COATED ORAL at 09:44

## 2024-12-30 RX ADMIN — BUSPIRONE HYDROCHLORIDE 30 MG: 15 TABLET ORAL at 09:45

## 2024-12-30 RX ADMIN — LUBIPROSTONE 8 MCG: 8 CAPSULE, GELATIN COATED ORAL at 09:46

## 2024-12-30 RX ADMIN — DOCUSATE SODIUM 100 MG: 100 CAPSULE, LIQUID FILLED ORAL at 20:24

## 2024-12-30 RX ADMIN — KETOROLAC TROMETHAMINE 15 MG: 15 INJECTION, SOLUTION INTRAMUSCULAR; INTRAVENOUS at 06:21

## 2024-12-30 RX ADMIN — KETOROLAC TROMETHAMINE 15 MG: 15 INJECTION, SOLUTION INTRAMUSCULAR; INTRAVENOUS at 12:11

## 2024-12-30 RX ADMIN — KETOROLAC TROMETHAMINE 15 MG: 15 INJECTION, SOLUTION INTRAMUSCULAR; INTRAVENOUS at 00:56

## 2024-12-30 RX ADMIN — BUDESONIDE 0.5 MG: 0.5 INHALANT RESPIRATORY (INHALATION) at 19:53

## 2024-12-30 RX ADMIN — HYDRALAZINE HYDROCHLORIDE 50 MG: 50 TABLET ORAL at 09:46

## 2024-12-30 RX ADMIN — MONTELUKAST 10 MG: 10 TABLET, FILM COATED ORAL at 09:46

## 2024-12-30 RX ADMIN — Medication 400 MG: at 09:45

## 2024-12-30 RX ADMIN — SIMVASTATIN 40 MG: 20 TABLET, FILM COATED ORAL at 20:24

## 2024-12-30 RX ADMIN — BUPROPION HYDROCHLORIDE 150 MG: 150 TABLET, FILM COATED, EXTENDED RELEASE ORAL at 09:47

## 2024-12-30 RX ADMIN — LUBIPROSTONE 8 MCG: 8 CAPSULE, GELATIN COATED ORAL at 17:51

## 2024-12-30 RX ADMIN — RIOCIGUAT 2.5 MG: 2.5 TABLET, FILM COATED ORAL at 15:09

## 2024-12-30 RX ADMIN — Medication 4 L/MIN: at 19:53

## 2024-12-30 RX ADMIN — KETOROLAC TROMETHAMINE 15 MG: 15 INJECTION, SOLUTION INTRAMUSCULAR; INTRAVENOUS at 17:50

## 2024-12-30 RX ADMIN — KETOROLAC TROMETHAMINE 15 MG: 15 INJECTION, SOLUTION INTRAMUSCULAR; INTRAVENOUS at 23:30

## 2024-12-30 RX ADMIN — ASPIRIN 325 MG: 325 TABLET ORAL at 09:46

## 2024-12-30 RX ADMIN — MEMANTINE 10 MG: 10 TABLET ORAL at 09:46

## 2024-12-30 RX ADMIN — PIPERACILLIN SODIUM AND TAZOBACTAM SODIUM 3.38 G: 3; .375 INJECTION, SOLUTION INTRAVENOUS at 09:55

## 2024-12-30 RX ADMIN — FORMOTEROL FUMARATE DIHYDRATE 20 MCG: 20 SOLUTION RESPIRATORY (INHALATION) at 19:53

## 2024-12-30 RX ADMIN — NYSTATIN 1 APPLICATION: 100000 POWDER TOPICAL at 20:24

## 2024-12-30 RX ADMIN — BUPROPION HYDROCHLORIDE 150 MG: 150 TABLET, FILM COATED, EXTENDED RELEASE ORAL at 20:24

## 2024-12-30 RX ADMIN — BUSPIRONE HYDROCHLORIDE 30 MG: 15 TABLET ORAL at 20:24

## 2024-12-30 RX ADMIN — PANTOPRAZOLE SODIUM 40 MG: 40 TABLET, DELAYED RELEASE ORAL at 06:21

## 2024-12-30 RX ADMIN — ENOXAPARIN SODIUM 60 MG: 60 INJECTION SUBCUTANEOUS at 20:24

## 2024-12-30 RX ADMIN — HYDRALAZINE HYDROCHLORIDE 50 MG: 50 TABLET ORAL at 20:24

## 2024-12-30 RX ADMIN — NYSTATIN 1 APPLICATION: 100000 POWDER TOPICAL at 09:45

## 2024-12-30 RX ADMIN — Medication 4 L/MIN: at 08:39

## 2024-12-30 RX ADMIN — RIOCIGUAT 2.5 MG: 2.5 TABLET, FILM COATED ORAL at 09:44

## 2024-12-30 RX ADMIN — ENOXAPARIN SODIUM 60 MG: 60 INJECTION SUBCUTANEOUS at 09:45

## 2024-12-30 RX ADMIN — RIOCIGUAT 2.5 MG: 2.5 TABLET, FILM COATED ORAL at 20:49

## 2024-12-30 RX ADMIN — PIPERACILLIN SODIUM AND TAZOBACTAM SODIUM 3.38 G: 3; .375 INJECTION, SOLUTION INTRAVENOUS at 04:40

## 2024-12-30 RX ADMIN — CEFTRIAXONE SODIUM 1 G: 1 INJECTION, SOLUTION INTRAVENOUS at 12:58

## 2024-12-30 RX ADMIN — TAMSULOSIN HYDROCHLORIDE 0.4 MG: 0.4 CAPSULE ORAL at 09:46

## 2024-12-30 RX ADMIN — SERTRALINE HYDROCHLORIDE 200 MG: 50 TABLET ORAL at 09:45

## 2024-12-30 RX ADMIN — FORMOTEROL FUMARATE DIHYDRATE 20 MCG: 20 SOLUTION RESPIRATORY (INHALATION) at 08:39

## 2024-12-30 ASSESSMENT — COGNITIVE AND FUNCTIONAL STATUS - GENERAL
CLIMB 3 TO 5 STEPS WITH RAILING: A LITTLE
CLIMB 3 TO 5 STEPS WITH RAILING: A LITTLE
STANDING UP FROM CHAIR USING ARMS: A LITTLE
MOBILITY SCORE: 21
STANDING UP FROM CHAIR USING ARMS: A LITTLE
DAILY ACTIVITIY SCORE: 24
WALKING IN HOSPITAL ROOM: A LITTLE
DAILY ACTIVITIY SCORE: 24
MOBILITY SCORE: 21
WALKING IN HOSPITAL ROOM: A LITTLE

## 2024-12-30 ASSESSMENT — PAIN SCALES - GENERAL
PAINLEVEL_OUTOF10: 1
PAINLEVEL_OUTOF10: 0 - NO PAIN
PAINLEVEL_OUTOF10: 2
PAINLEVEL_OUTOF10: 3
PAINLEVEL_OUTOF10: 1

## 2024-12-30 NOTE — PROGRESS NOTES
12/30/24 0907   Discharge Planning   Living Arrangements Alone   Support Systems Children;Family members;Friends/neighbors   Assistance Needed Patient is A&OX3, on O2 at 3LPM via NC provided by [facility] at baseline, wears a CPAP at , is independent with ADLs and uses no DME at home (has a cane she will use for long distances outside of the home).   Type of Residence Private residence   Number of Stairs to Enter Residence 2   Number of Stairs Within Residence 0   Do you have animals or pets at home? No   Who is requesting discharge planning? Provider   Home or Post Acute Services None   Expected Discharge Disposition Home  (Home, No needs)   Does the patient need discharge transport arranged? No   Intensity of Service   Intensity of Service 0-30 min

## 2024-12-30 NOTE — CARE PLAN
The patient's goals for the shift include  be transferred downtown.    The clinical goals for the shift include Pt will wear CPap while asleep during the shift    Over the shift, the patient did not make progress toward the following goals. Barriers to progression include kidney stones. Recommendations to address these barriers include procedure.

## 2024-12-30 NOTE — PROGRESS NOTES
Patient: Farida Traylor  Room/bed: 242/242-A  Admitted on: 12/29/2024    Age: 64 y.o.   Gender: female  Code Status:  Full Code   Admitting Dx: Kidney stone [N20.0]    MRN: [id number]  PCP: Jelena Romo,        Subjective   Has pain in right flank,  but meds are helping when she needs it. No new symptoms    Objective    Physical Exam  Constitutional:       Appearance: She is obese.   HENT:      Head: Normocephalic.      Nose: Nose normal.      Mouth/Throat:      Mouth: Mucous membranes are dry.   Eyes:      Extraocular Movements: Extraocular movements intact.      Pupils: Pupils are equal, round, and reactive to light.      Comments: Glasses in place   Cardiovascular:      Pulses: Normal pulses.      Comments: Regular, 3-4/6 holosystolic murmur  Pulmonary:      Effort: Pulmonary effort is normal.      Breath sounds: Normal breath sounds.   Abdominal:      General: Bowel sounds are normal.      Palpations: Abdomen is soft.   Musculoskeletal:      Comments: No edema. Mild varicosities   Skin:     General: Skin is warm.   Neurological:      Mental Status: She is alert and oriented to person, place, and time. Mental status is at baseline.   Psychiatric:         Mood and Affect: Mood normal.         Behavior: Behavior normal.        Temp:  [35.7 °C (96.3 °F)-36.8 °C (98.2 °F)] 35.7 °C (96.3 °F)  Heart Rate:  [55-65] 61  Resp:  [14-25] 17  BP: (111-119)/(50-86) 118/63  FiO2 (%):  [40 %] 40 %    Vitals:    12/30/24 0420   Weight: 132 kg (291 lb 11.2 oz)     FiO2 (%):  [40 %] 40 %     I/Os    Intake/Output Summary (Last 24 hours) at 12/30/2024 1133  Last data filed at 12/30/2024 0924  Gross per 24 hour   Intake 2536.67 ml   Output 1175 ml   Net 1361.67 ml       Labs:   Results from last 72 hours   Lab Units 12/29/24  0129   SODIUM mmol/L 139   POTASSIUM mmol/L 3.6   CHLORIDE mmol/L 104   CO2 mmol/L 25   BUN mg/dL 13   CREATININE mg/dL 0.98   GLUCOSE mg/dL 122*   CALCIUM mg/dL 8.8   ANION GAP mmol/L 14   EGFR  "mL/min/1.73m*2 64      Results from last 72 hours   Lab Units 12/29/24  0129   WBC AUTO x10*3/uL 6.2   HEMOGLOBIN g/dL 13.8   HEMATOCRIT % 42.1   PLATELETS AUTO x10*3/uL 214   NEUTROS PCT AUTO % 64.3   LYMPHS PCT AUTO % 23.3   MONOS PCT AUTO % 6.9   EOS PCT AUTO % 4.7      Lab Results   Component Value Date    CALCIUM 8.8 12/29/2024    PHOS 3.9 02/13/2023      No results found for: \"CRP\"   [unfilled]     Micro/ID:   No results found for the last 90 days.                   No lab exists for component: \"AGALPCRNB\"   .ID  Lab Results   Component Value Date    URINECULTURE  12/29/2024     Clinically insignificant growth based on current clinical standards.       Images:  ECG 12 lead  Normal sinus rhythm  Nonspecific intraventricular block  Cannot rule out Anterior infarct (cited on or before 12-APR-2024)  Abnormal ECG  When compared with ECG of 01-NOV-2024 19:00,  QRS axis Shifted left  T wave inversion now evident in Inferior leads  ECG 12 lead  Sinus bradycardia  Low voltage QRS  Cannot rule out Anterior infarct (cited on or before 12-APR-2024)  Abnormal ECG  When compared with ECG of 01-NOV-2024 19:00,  Right bundle branch block is no longer Present       Meds    Scheduled medications  aspirin, 325 mg, oral, Daily  budesonide, 0.5 mg, nebulization, BID  buPROPion SR, 150 mg, oral, BID  busPIRone, 30 mg, oral, BID  cyanocobalamin, 1,000 mcg, oral, Daily  docusate sodium, 100 mg, oral, BID  enoxaparin, 60 mg, subcutaneous, q12h MARK  formoterol, 20 mcg, nebulization, BID  hydrALAZINE, 50 mg, oral, BID  ketorolac, 15 mg, intravenous, q6h MARK  lubiprostone, 8 mcg, oral, BID  macitentan, 10 mg, oral, Daily  magnesium oxide, 400 mg, oral, Daily  memantine, 10 mg, oral, BID  montelukast, 10 mg, oral, Daily  nystatin, 1 Application, Topical, BID  oxygen, , inhalation, Continuous - Inhalation  pantoprazole, 40 mg, oral, Daily before breakfast  piperacillin-tazobactam, 3.375 g, intravenous, q6h  riociguat, 2.5 mg, oral, " TID  sertraline, 200 mg, oral, Daily  simvastatin, 40 mg, oral, Nightly  tamsulosin, 0.4 mg, oral, Daily      Continuous medications     PRN medications  PRN medications: acetaminophen **OR** acetaminophen **OR** acetaminophen, albuterol, alum-mag hydroxide-simeth, cyclobenzaprine, melatonin, ondansetron **OR** ondansetron, oxyCODONE     Assessment and Plan    Farida Traylor is a 64 y.o. female admitted with right obstructive nephrolithiasis, patient of [facility]  Acute right ureteral obstructive uropathy. Urology here evaluated and does feel she is too high risk to perform surgery at our facility. Awaiting transfer. She has been accepted Dr Darnell. Continue flomax  Probable uti. Culture not helpful, but plan on continuing antibiotics pending a procedure  Morbid obesity  Pulmonary hypertension, continue meds for this. Also has asthma, and MARLON. Chronic respiratory failure, stable, she is on 3 liters  Dementia, mild. She is on namenda, this has been continued.  DVT prophylaxis  Transfer when bed available      Elisabeth Aden MD

## 2024-12-30 NOTE — SIGNIFICANT EVENT
Patient educated on ordered bronchodilator therapy and SVN device for delivery. All questions answered appropriately

## 2024-12-30 NOTE — PROGRESS NOTES
12/30/24 1327   Discharge Planning   Expected Discharge Disposition Short Term A  (Awaiting transfer to [facility])

## 2024-12-31 ENCOUNTER — APPOINTMENT (OUTPATIENT)
Dept: PULMONOLOGY | Facility: CLINIC | Age: 64
End: 2024-12-31
Payer: COMMERCIAL

## 2024-12-31 ENCOUNTER — HOSPITAL ENCOUNTER (INPATIENT)
Facility: HOSPITAL | Age: 64
LOS: 2 days | Discharge: HOME | End: 2025-01-02
Attending: INTERNAL MEDICINE | Admitting: INTERNAL MEDICINE
Payer: COMMERCIAL

## 2024-12-31 VITALS
SYSTOLIC BLOOD PRESSURE: 122 MMHG | WEIGHT: 291.7 LBS | BODY MASS INDEX: 55.07 KG/M2 | OXYGEN SATURATION: 94 % | HEART RATE: 67 BPM | RESPIRATION RATE: 20 BRPM | HEIGHT: 61 IN | DIASTOLIC BLOOD PRESSURE: 58 MMHG | TEMPERATURE: 97.4 F

## 2024-12-31 DIAGNOSIS — N20.0 KIDNEY STONE: Primary | ICD-10-CM

## 2024-12-31 DIAGNOSIS — I10 BENIGN ESSENTIAL HYPERTENSION: ICD-10-CM

## 2024-12-31 DIAGNOSIS — I27.0 IDIOPATHIC PAH (PULMONARY ARTERIAL HYPERTENSION) (MULTI): ICD-10-CM

## 2024-12-31 LAB
ABO GROUP (TYPE) IN BLOOD: NORMAL
ALBUMIN SERPL BCP-MCNC: 3.7 G/DL (ref 3.4–5)
ALP SERPL-CCNC: 51 U/L (ref 33–136)
ALT SERPL W P-5'-P-CCNC: 9 U/L (ref 7–45)
ANION GAP SERPL CALC-SCNC: 14 MMOL/L (ref 10–20)
ANTIBODY SCREEN: NORMAL
AORTIC VALVE MEAN GRADIENT: 9 MMHG
AORTIC VALVE PEAK VELOCITY: 2.13 M/S
APPEARANCE UR: CLEAR
APTT PPP: 38 SECONDS (ref 27–38)
AST SERPL W P-5'-P-CCNC: 12 U/L (ref 9–39)
AV PEAK GRADIENT: 18 MMHG
AVA (PEAK VEL): 2.26 CM2
AVA (VTI): 2.42 CM2
BASOPHILS # BLD AUTO: 0.02 X10*3/UL (ref 0–0.1)
BASOPHILS NFR BLD AUTO: 0.4 %
BILIRUB SERPL-MCNC: 0.4 MG/DL (ref 0–1.2)
BILIRUB UR STRIP.AUTO-MCNC: NEGATIVE MG/DL
BUN SERPL-MCNC: 15 MG/DL (ref 6–23)
CALCIUM SERPL-MCNC: 8.6 MG/DL (ref 8.6–10.6)
CHLORIDE SERPL-SCNC: 107 MMOL/L (ref 98–107)
CO2 SERPL-SCNC: 23 MMOL/L (ref 21–32)
COLOR UR: YELLOW
CREAT SERPL-MCNC: 0.81 MG/DL (ref 0.5–1.05)
EGFRCR SERPLBLD CKD-EPI 2021: 80 ML/MIN/1.73M*2
EJECTION FRACTION APICAL 4 CHAMBER: 66.6
EJECTION FRACTION: 65 %
EOSINOPHIL # BLD AUTO: 0.33 X10*3/UL (ref 0–0.7)
EOSINOPHIL NFR BLD AUTO: 6 %
ERYTHROCYTE [DISTWIDTH] IN BLOOD BY AUTOMATED COUNT: 15 % (ref 11.5–14.5)
GLUCOSE SERPL-MCNC: 85 MG/DL (ref 74–99)
GLUCOSE UR STRIP.AUTO-MCNC: NORMAL MG/DL
HCT VFR BLD AUTO: 37.8 % (ref 36–46)
HGB BLD-MCNC: 12.1 G/DL (ref 12–16)
HOLD SPECIMEN: NORMAL
IMM GRANULOCYTES # BLD AUTO: 0.02 X10*3/UL (ref 0–0.7)
IMM GRANULOCYTES NFR BLD AUTO: 0.4 % (ref 0–0.9)
INR PPP: 1 (ref 0.9–1.1)
KETONES UR STRIP.AUTO-MCNC: NEGATIVE MG/DL
LEFT ATRIUM VOLUME AREA LENGTH INDEX BSA: 38.2 ML/M2
LEFT VENTRICLE INTERNAL DIMENSION DIASTOLE: 4.74 CM (ref 3.5–6)
LEFT VENTRICULAR OUTFLOW TRACT DIAMETER: 2 CM
LEUKOCYTE ESTERASE UR QL STRIP.AUTO: ABNORMAL
LYMPHOCYTES # BLD AUTO: 1.17 X10*3/UL (ref 1.2–4.8)
LYMPHOCYTES NFR BLD AUTO: 21.2 %
MAGNESIUM SERPL-MCNC: 2.1 MG/DL (ref 1.6–2.4)
MCH RBC QN AUTO: 28.9 PG (ref 26–34)
MCHC RBC AUTO-ENTMCNC: 32 G/DL (ref 32–36)
MCV RBC AUTO: 90 FL (ref 80–100)
MITRAL VALVE E/A RATIO: 1.23
MONOCYTES # BLD AUTO: 0.42 X10*3/UL (ref 0.1–1)
MONOCYTES NFR BLD AUTO: 7.6 %
MUCOUS THREADS #/AREA URNS AUTO: ABNORMAL /LPF
NEUTROPHILS # BLD AUTO: 3.55 X10*3/UL (ref 1.2–7.7)
NEUTROPHILS NFR BLD AUTO: 64.4 %
NITRITE UR QL STRIP.AUTO: NEGATIVE
NRBC BLD-RTO: 0 /100 WBCS (ref 0–0)
PH UR STRIP.AUTO: 5.5 [PH]
PHOSPHATE SERPL-MCNC: 3.4 MG/DL (ref 2.5–4.9)
PLATELET # BLD AUTO: 198 X10*3/UL (ref 150–450)
POTASSIUM SERPL-SCNC: 3.8 MMOL/L (ref 3.5–5.3)
PROT SERPL-MCNC: 6 G/DL (ref 6.4–8.2)
PROT UR STRIP.AUTO-MCNC: ABNORMAL MG/DL
PROTHROMBIN TIME: 11.6 SECONDS (ref 9.8–12.8)
PTH-INTACT SERPL-MCNC: 59.9 PG/ML (ref 18.5–88)
RBC # BLD AUTO: 4.18 X10*6/UL (ref 4–5.2)
RBC # UR STRIP.AUTO: ABNORMAL /UL
RBC #/AREA URNS AUTO: >20 /HPF
RH FACTOR (ANTIGEN D): NORMAL
RIGHT VENTRICLE FREE WALL PEAK S': 15 CM/S
RIGHT VENTRICLE PEAK SYSTOLIC PRESSURE: 49.9 MMHG
SODIUM SERPL-SCNC: 140 MMOL/L (ref 136–145)
SP GR UR STRIP.AUTO: 1.02
SQUAMOUS #/AREA URNS AUTO: ABNORMAL /HPF
TRICUSPID ANNULAR PLANE SYSTOLIC EXCURSION: 2.9 CM
URATE SERPL-MCNC: 4.5 MG/DL (ref 2.3–6.7)
UROBILINOGEN UR STRIP.AUTO-MCNC: NORMAL MG/DL
WBC # BLD AUTO: 5.5 X10*3/UL (ref 4.4–11.3)
WBC #/AREA URNS AUTO: ABNORMAL /HPF

## 2024-12-31 PROCEDURE — 85025 COMPLETE CBC W/AUTO DIFF WBC: CPT

## 2024-12-31 PROCEDURE — 2500000001 HC RX 250 WO HCPCS SELF ADMINISTERED DRUGS (ALT 637 FOR MEDICARE OP)

## 2024-12-31 PROCEDURE — 2500000005 HC RX 250 GENERAL PHARMACY W/O HCPCS

## 2024-12-31 PROCEDURE — 1100000001 HC PRIVATE ROOM DAILY

## 2024-12-31 PROCEDURE — 2500000004 HC RX 250 GENERAL PHARMACY W/ HCPCS (ALT 636 FOR OP/ED)

## 2024-12-31 PROCEDURE — 2500000002 HC RX 250 W HCPCS SELF ADMINISTERED DRUGS (ALT 637 FOR MEDICARE OP, ALT 636 FOR OP/ED)

## 2024-12-31 PROCEDURE — 87086 URINE CULTURE/COLONY COUNT: CPT

## 2024-12-31 PROCEDURE — 85610 PROTHROMBIN TIME: CPT

## 2024-12-31 PROCEDURE — 94760 N-INVAS EAR/PLS OXIMETRY 1: CPT

## 2024-12-31 PROCEDURE — 83970 ASSAY OF PARATHORMONE: CPT | Performed by: STUDENT IN AN ORGANIZED HEALTH CARE EDUCATION/TRAINING PROGRAM

## 2024-12-31 PROCEDURE — 99222 1ST HOSP IP/OBS MODERATE 55: CPT

## 2024-12-31 PROCEDURE — 83735 ASSAY OF MAGNESIUM: CPT

## 2024-12-31 PROCEDURE — 36415 COLL VENOUS BLD VENIPUNCTURE: CPT | Performed by: STUDENT IN AN ORGANIZED HEALTH CARE EDUCATION/TRAINING PROGRAM

## 2024-12-31 PROCEDURE — 94640 AIRWAY INHALATION TREATMENT: CPT

## 2024-12-31 PROCEDURE — 94660 CPAP INITIATION&MGMT: CPT

## 2024-12-31 PROCEDURE — 36415 COLL VENOUS BLD VENIPUNCTURE: CPT

## 2024-12-31 PROCEDURE — 84550 ASSAY OF BLOOD/URIC ACID: CPT | Performed by: STUDENT IN AN ORGANIZED HEALTH CARE EDUCATION/TRAINING PROGRAM

## 2024-12-31 PROCEDURE — 84100 ASSAY OF PHOSPHORUS: CPT

## 2024-12-31 PROCEDURE — 99232 SBSQ HOSP IP/OBS MODERATE 35: CPT

## 2024-12-31 PROCEDURE — 86901 BLOOD TYPING SEROLOGIC RH(D): CPT

## 2024-12-31 PROCEDURE — 80053 COMPREHEN METABOLIC PANEL: CPT

## 2024-12-31 PROCEDURE — 81001 URINALYSIS AUTO W/SCOPE: CPT

## 2024-12-31 RX ORDER — CEFTRIAXONE 1 G/50ML
1 INJECTION, SOLUTION INTRAVENOUS EVERY 24 HOURS
Status: DISCONTINUED | OUTPATIENT
Start: 2024-12-31 | End: 2025-01-01

## 2024-12-31 RX ORDER — HYDRALAZINE HYDROCHLORIDE 25 MG/1
50 TABLET, FILM COATED ORAL 2 TIMES DAILY
Status: DISCONTINUED | OUTPATIENT
Start: 2024-12-31 | End: 2025-01-02 | Stop reason: HOSPADM

## 2024-12-31 RX ORDER — SERTRALINE HYDROCHLORIDE 100 MG/1
200 TABLET, FILM COATED ORAL DAILY
Status: DISCONTINUED | OUTPATIENT
Start: 2024-12-31 | End: 2025-01-02 | Stop reason: HOSPADM

## 2024-12-31 RX ORDER — LANOLIN ALCOHOL/MO/W.PET/CERES
1000 CREAM (GRAM) TOPICAL DAILY
Status: DISCONTINUED | OUTPATIENT
Start: 2024-12-31 | End: 2025-01-02 | Stop reason: HOSPADM

## 2024-12-31 RX ORDER — OXYBUTYNIN CHLORIDE 5 MG/1
5 TABLET ORAL 2 TIMES DAILY
Status: DISCONTINUED | OUTPATIENT
Start: 2024-12-31 | End: 2024-12-31 | Stop reason: DRUGHIGH

## 2024-12-31 RX ORDER — LUBIPROSTONE 8 UG/1
8 CAPSULE ORAL
Status: DISCONTINUED | OUTPATIENT
Start: 2024-12-31 | End: 2025-01-02 | Stop reason: HOSPADM

## 2024-12-31 RX ORDER — PANTOPRAZOLE SODIUM 40 MG/1
40 TABLET, DELAYED RELEASE ORAL
Status: DISCONTINUED | OUTPATIENT
Start: 2024-12-31 | End: 2025-01-02 | Stop reason: HOSPADM

## 2024-12-31 RX ORDER — TALC
3 POWDER (GRAM) TOPICAL NIGHTLY
Status: DISCONTINUED | OUTPATIENT
Start: 2024-12-31 | End: 2025-01-02 | Stop reason: HOSPADM

## 2024-12-31 RX ORDER — METHENAMINE HIPPURATE 1000 MG/1
1 TABLET ORAL 2 TIMES DAILY
Status: DISCONTINUED | OUTPATIENT
Start: 2024-12-31 | End: 2025-01-02 | Stop reason: HOSPADM

## 2024-12-31 RX ORDER — CALCIUM CARBONATE 200(500)MG
1500 TABLET,CHEWABLE ORAL DAILY
Status: DISCONTINUED | OUTPATIENT
Start: 2024-12-31 | End: 2025-01-02

## 2024-12-31 RX ORDER — ENOXAPARIN SODIUM 100 MG/ML
60 INJECTION SUBCUTANEOUS EVERY 12 HOURS SCHEDULED
Status: DISCONTINUED | OUTPATIENT
Start: 2024-12-31 | End: 2025-01-02 | Stop reason: HOSPADM

## 2024-12-31 RX ORDER — ASPIRIN 325 MG
325 TABLET ORAL DAILY
Status: DISCONTINUED | OUTPATIENT
Start: 2024-12-31 | End: 2025-01-02 | Stop reason: HOSPADM

## 2024-12-31 RX ORDER — KETOROLAC TROMETHAMINE 15 MG/ML
15 INJECTION, SOLUTION INTRAMUSCULAR; INTRAVENOUS EVERY 6 HOURS PRN
Status: DISCONTINUED | OUTPATIENT
Start: 2024-12-31 | End: 2025-01-02 | Stop reason: HOSPADM

## 2024-12-31 RX ORDER — ALBUTEROL SULFATE 0.83 MG/ML
2.5 SOLUTION RESPIRATORY (INHALATION) EVERY 6 HOURS PRN
Status: DISCONTINUED | OUTPATIENT
Start: 2024-12-31 | End: 2025-01-02 | Stop reason: HOSPADM

## 2024-12-31 RX ORDER — BUSPIRONE HYDROCHLORIDE 15 MG/1
30 TABLET ORAL 2 TIMES DAILY
Status: DISCONTINUED | OUTPATIENT
Start: 2024-12-31 | End: 2025-01-02 | Stop reason: HOSPADM

## 2024-12-31 RX ORDER — MEMANTINE HYDROCHLORIDE 10 MG/1
10 TABLET ORAL 2 TIMES DAILY
Status: DISCONTINUED | OUTPATIENT
Start: 2024-12-31 | End: 2025-01-02 | Stop reason: HOSPADM

## 2024-12-31 RX ORDER — BUPROPION HYDROCHLORIDE 150 MG/1
150 TABLET, EXTENDED RELEASE ORAL 2 TIMES DAILY
Status: DISCONTINUED | OUTPATIENT
Start: 2024-12-31 | End: 2025-01-02 | Stop reason: HOSPADM

## 2024-12-31 RX ORDER — FLUTICASONE PROPIONATE 50 MCG
2 SPRAY, SUSPENSION (ML) NASAL ONCE
Status: COMPLETED | OUTPATIENT
Start: 2024-12-31 | End: 2024-12-31

## 2024-12-31 RX ORDER — SODIUM CHLORIDE, SODIUM LACTATE, POTASSIUM CHLORIDE, CALCIUM CHLORIDE 600; 310; 30; 20 MG/100ML; MG/100ML; MG/100ML; MG/100ML
100 INJECTION, SOLUTION INTRAVENOUS CONTINUOUS
Status: DISCONTINUED | OUTPATIENT
Start: 2024-12-31 | End: 2024-12-31

## 2024-12-31 RX ORDER — TAMSULOSIN HYDROCHLORIDE 0.4 MG/1
0.4 CAPSULE ORAL DAILY
Status: DISCONTINUED | OUTPATIENT
Start: 2024-12-31 | End: 2025-01-02 | Stop reason: HOSPADM

## 2024-12-31 RX ORDER — FLUTICASONE FUROATE AND VILANTEROL 200; 25 UG/1; UG/1
1 POWDER RESPIRATORY (INHALATION)
Status: DISCONTINUED | OUTPATIENT
Start: 2024-12-31 | End: 2025-01-02 | Stop reason: HOSPADM

## 2024-12-31 RX ORDER — SIMVASTATIN 20 MG/1
80 TABLET, FILM COATED ORAL NIGHTLY
Status: DISCONTINUED | OUTPATIENT
Start: 2024-12-31 | End: 2025-01-02 | Stop reason: HOSPADM

## 2024-12-31 RX ORDER — HYDROXYZINE PAMOATE 50 MG/1
50 CAPSULE ORAL 4 TIMES DAILY PRN
Status: DISCONTINUED | OUTPATIENT
Start: 2024-12-31 | End: 2025-01-02 | Stop reason: HOSPADM

## 2024-12-31 RX ORDER — MONTELUKAST SODIUM 10 MG/1
10 TABLET ORAL DAILY
Status: DISCONTINUED | OUTPATIENT
Start: 2024-12-31 | End: 2025-01-02 | Stop reason: HOSPADM

## 2024-12-31 RX ORDER — OXYBUTYNIN CHLORIDE 5 MG/1
5 TABLET ORAL 3 TIMES DAILY
Status: DISCONTINUED | OUTPATIENT
Start: 2024-12-31 | End: 2025-01-02 | Stop reason: HOSPADM

## 2024-12-31 RX ORDER — ONDANSETRON 4 MG/1
4 TABLET, FILM COATED ORAL EVERY 8 HOURS PRN
Status: DISCONTINUED | OUTPATIENT
Start: 2024-12-31 | End: 2024-12-31

## 2024-12-31 RX ORDER — ONDANSETRON HYDROCHLORIDE 2 MG/ML
4 INJECTION, SOLUTION INTRAVENOUS EVERY 8 HOURS PRN
Status: DISCONTINUED | OUTPATIENT
Start: 2024-12-31 | End: 2024-12-31

## 2024-12-31 RX ORDER — POTASSIUM CITRATE 5 MEQ/1
15 TABLET, EXTENDED RELEASE ORAL
Status: DISCONTINUED | OUTPATIENT
Start: 2025-01-01 | End: 2025-01-02 | Stop reason: HOSPADM

## 2024-12-31 RX ADMIN — MEMANTINE 10 MG: 10 TABLET ORAL at 09:23

## 2024-12-31 RX ADMIN — FLUTICASONE PROPIONATE 2 SPRAY: 50 SPRAY, METERED NASAL at 11:01

## 2024-12-31 RX ADMIN — METHENAMINE HIPPURATE 1 G: 1000 TABLET ORAL at 09:24

## 2024-12-31 RX ADMIN — LUBIPROSTONE 8 MCG: 8 CAPSULE, GELATIN COATED ORAL at 17:48

## 2024-12-31 RX ADMIN — METHENAMINE HIPPURATE 1 G: 1000 TABLET ORAL at 20:14

## 2024-12-31 RX ADMIN — Medication 5 L/MIN: at 07:56

## 2024-12-31 RX ADMIN — BUPROPION HYDROCHLORIDE 150 MG: 150 TABLET, FILM COATED, EXTENDED RELEASE ORAL at 20:14

## 2024-12-31 RX ADMIN — SERTRALINE HYDROCHLORIDE 200 MG: 100 TABLET ORAL at 09:27

## 2024-12-31 RX ADMIN — RIOCIGUAT 2.5 MG: 2 TABLET, FILM COATED ORAL at 20:15

## 2024-12-31 RX ADMIN — MEMANTINE 10 MG: 10 TABLET ORAL at 20:14

## 2024-12-31 RX ADMIN — MACITENTAN 10 MG: 10 TABLET, FILM COATED ORAL at 10:58

## 2024-12-31 RX ADMIN — KETOROLAC TROMETHAMINE 15 MG: 15 INJECTION, SOLUTION INTRAMUSCULAR; INTRAVENOUS at 20:24

## 2024-12-31 RX ADMIN — SIMVASTATIN 80 MG: 20 TABLET, FILM COATED ORAL at 20:14

## 2024-12-31 RX ADMIN — Medication 3 MG: at 03:20

## 2024-12-31 RX ADMIN — RIOCIGUAT 2.5 MG: 2 TABLET, FILM COATED ORAL at 14:59

## 2024-12-31 RX ADMIN — PANTOPRAZOLE SODIUM 40 MG: 40 TABLET, DELAYED RELEASE ORAL at 09:24

## 2024-12-31 RX ADMIN — BUPROPION HYDROCHLORIDE 150 MG: 150 TABLET, FILM COATED, EXTENDED RELEASE ORAL at 09:25

## 2024-12-31 RX ADMIN — SODIUM CHLORIDE, POTASSIUM CHLORIDE, SODIUM LACTATE AND CALCIUM CHLORIDE 100 ML/HR: 600; 310; 30; 20 INJECTION, SOLUTION INTRAVENOUS at 04:36

## 2024-12-31 RX ADMIN — ENOXAPARIN SODIUM 60 MG: 60 INJECTION SUBCUTANEOUS at 20:13

## 2024-12-31 RX ADMIN — Medication 6 L/MIN: at 03:21

## 2024-12-31 RX ADMIN — HYDROXYZINE PAMOATE 50 MG: 50 CAPSULE ORAL at 09:27

## 2024-12-31 RX ADMIN — CYANOCOBALAMIN TAB 1000 MCG 1000 MCG: 1000 TAB at 09:27

## 2024-12-31 RX ADMIN — KETOROLAC TROMETHAMINE 15 MG: 15 INJECTION, SOLUTION INTRAMUSCULAR; INTRAVENOUS at 06:53

## 2024-12-31 RX ADMIN — OXYBUTYNIN CHLORIDE 5 MG: 5 TABLET ORAL at 09:23

## 2024-12-31 RX ADMIN — MONTELUKAST 10 MG: 10 TABLET, FILM COATED ORAL at 09:27

## 2024-12-31 RX ADMIN — LUBIPROSTONE 8 MCG: 8 CAPSULE, GELATIN COATED ORAL at 09:27

## 2024-12-31 RX ADMIN — Medication 6 L/MIN: at 20:24

## 2024-12-31 RX ADMIN — FLUTICASONE FUROATE AND VILANTEROL TRIFENATATE 1 PUFF: 200; 25 POWDER RESPIRATORY (INHALATION) at 07:56

## 2024-12-31 RX ADMIN — ASPIRIN 325 MG ORAL TABLET 325 MG: 325 PILL ORAL at 09:28

## 2024-12-31 RX ADMIN — OXYBUTYNIN CHLORIDE 5 MG: 5 TABLET ORAL at 20:15

## 2024-12-31 RX ADMIN — BUSPIRONE HYDROCHLORIDE 30 MG: 15 TABLET ORAL at 20:14

## 2024-12-31 RX ADMIN — OXYBUTYNIN CHLORIDE 5 MG: 5 TABLET ORAL at 14:59

## 2024-12-31 RX ADMIN — HYDRALAZINE HYDROCHLORIDE 50 MG: 25 TABLET ORAL at 20:15

## 2024-12-31 RX ADMIN — CEFTRIAXONE SODIUM 1 G: 1 INJECTION, SOLUTION INTRAVENOUS at 14:59

## 2024-12-31 RX ADMIN — HYDROXYZINE PAMOATE 50 MG: 50 CAPSULE ORAL at 20:22

## 2024-12-31 RX ADMIN — Medication 3 MG: at 20:15

## 2024-12-31 RX ADMIN — CALCIUM CARBONATE (ANTACID) CHEW TAB 500 MG 1500 MG: 500 CHEW TAB at 09:28

## 2024-12-31 RX ADMIN — ALBUTEROL SULFATE 2.5 MG: 2.5 SOLUTION RESPIRATORY (INHALATION) at 03:25

## 2024-12-31 RX ADMIN — TAMSULOSIN HYDROCHLORIDE 0.4 MG: 0.4 CAPSULE ORAL at 09:23

## 2024-12-31 RX ADMIN — ENOXAPARIN SODIUM 60 MG: 60 INJECTION SUBCUTANEOUS at 09:22

## 2024-12-31 RX ADMIN — BUSPIRONE HYDROCHLORIDE 30 MG: 15 TABLET ORAL at 09:24

## 2024-12-31 SDOH — SOCIAL STABILITY: SOCIAL INSECURITY: WITHIN THE LAST YEAR, HAVE YOU BEEN AFRAID OF YOUR PARTNER OR EX-PARTNER?: NO

## 2024-12-31 SDOH — SOCIAL STABILITY: SOCIAL INSECURITY: HAVE YOU HAD THOUGHTS OF HARMING ANYONE ELSE?: NO

## 2024-12-31 SDOH — ECONOMIC STABILITY: TRANSPORTATION INSECURITY: IN THE PAST 12 MONTHS, HAS LACK OF TRANSPORTATION KEPT YOU FROM MEDICAL APPOINTMENTS OR FROM GETTING MEDICATIONS?: NO

## 2024-12-31 SDOH — ECONOMIC STABILITY: HOUSING INSECURITY: AT ANY TIME IN THE PAST 12 MONTHS, WERE YOU HOMELESS OR LIVING IN A SHELTER (INCLUDING NOW)?: NO

## 2024-12-31 SDOH — ECONOMIC STABILITY: FOOD INSECURITY: WITHIN THE PAST 12 MONTHS, YOU WORRIED THAT YOUR FOOD WOULD RUN OUT BEFORE YOU GOT THE MONEY TO BUY MORE.: NEVER TRUE

## 2024-12-31 SDOH — ECONOMIC STABILITY: HOUSING INSECURITY: IN THE PAST 12 MONTHS, HOW MANY TIMES HAVE YOU MOVED WHERE YOU WERE LIVING?: 0

## 2024-12-31 SDOH — SOCIAL STABILITY: SOCIAL INSECURITY: ARE YOU OR HAVE YOU BEEN THREATENED OR ABUSED PHYSICALLY, EMOTIONALLY, OR SEXUALLY BY ANYONE?: NO

## 2024-12-31 SDOH — ECONOMIC STABILITY: HOUSING INSECURITY: IN THE LAST 12 MONTHS, WAS THERE A TIME WHEN YOU WERE NOT ABLE TO PAY THE MORTGAGE OR RENT ON TIME?: NO

## 2024-12-31 SDOH — SOCIAL STABILITY: SOCIAL INSECURITY: WITHIN THE LAST YEAR, HAVE YOU BEEN HUMILIATED OR EMOTIONALLY ABUSED IN OTHER WAYS BY YOUR PARTNER OR EX-PARTNER?: NO

## 2024-12-31 SDOH — ECONOMIC STABILITY: INCOME INSECURITY: IN THE PAST 12 MONTHS HAS THE ELECTRIC, GAS, OIL, OR WATER COMPANY THREATENED TO SHUT OFF SERVICES IN YOUR HOME?: NO

## 2024-12-31 SDOH — SOCIAL STABILITY: SOCIAL INSECURITY: DO YOU FEEL UNSAFE GOING BACK TO THE PLACE WHERE YOU ARE LIVING?: NO

## 2024-12-31 SDOH — SOCIAL STABILITY: SOCIAL INSECURITY: DOES ANYONE TRY TO KEEP YOU FROM HAVING/CONTACTING OTHER FRIENDS OR DOING THINGS OUTSIDE YOUR HOME?: NO

## 2024-12-31 SDOH — ECONOMIC STABILITY: FOOD INSECURITY: WITHIN THE PAST 12 MONTHS, THE FOOD YOU BOUGHT JUST DIDN'T LAST AND YOU DIDN'T HAVE MONEY TO GET MORE.: NEVER TRUE

## 2024-12-31 SDOH — HEALTH STABILITY: MENTAL HEALTH
DO YOU FEEL STRESS - TENSE, RESTLESS, NERVOUS, OR ANXIOUS, OR UNABLE TO SLEEP AT NIGHT BECAUSE YOUR MIND IS TROUBLED ALL THE TIME - THESE DAYS?: TO SOME EXTENT

## 2024-12-31 SDOH — ECONOMIC STABILITY: FOOD INSECURITY: HOW HARD IS IT FOR YOU TO PAY FOR THE VERY BASICS LIKE FOOD, HOUSING, MEDICAL CARE, AND HEATING?: NOT VERY HARD

## 2024-12-31 SDOH — SOCIAL STABILITY: SOCIAL INSECURITY: HAVE YOU HAD ANY THOUGHTS OF HARMING ANYONE ELSE?: NO

## 2024-12-31 SDOH — SOCIAL STABILITY: SOCIAL INSECURITY: ABUSE: ADULT

## 2024-12-31 SDOH — SOCIAL STABILITY: SOCIAL INSECURITY: WERE YOU ABLE TO COMPLETE ALL THE BEHAVIORAL HEALTH SCREENINGS?: YES

## 2024-12-31 SDOH — SOCIAL STABILITY: SOCIAL INSECURITY: DO YOU FEEL ANYONE HAS EXPLOITED OR TAKEN ADVANTAGE OF YOU FINANCIALLY OR OF YOUR PERSONAL PROPERTY?: NO

## 2024-12-31 SDOH — SOCIAL STABILITY: SOCIAL INSECURITY: HAS ANYONE EVER THREATENED TO HURT YOUR FAMILY OR YOUR PETS?: NO

## 2024-12-31 SDOH — SOCIAL STABILITY: SOCIAL INSECURITY: ARE THERE ANY APPARENT SIGNS OF INJURIES/BEHAVIORS THAT COULD BE RELATED TO ABUSE/NEGLECT?: NO

## 2024-12-31 ASSESSMENT — ACTIVITIES OF DAILY LIVING (ADL)
TOILETING: INDEPENDENT
LACK_OF_TRANSPORTATION: NO
HEARING - RIGHT EAR: FUNCTIONAL
HEARING - LEFT EAR: FUNCTIONAL
GROOMING: INDEPENDENT
ADEQUATE_TO_COMPLETE_ADL: YES
JUDGMENT_ADEQUATE_SAFELY_COMPLETE_DAILY_ACTIVITIES: YES
WALKS IN HOME: INDEPENDENT
FEEDING YOURSELF: INDEPENDENT
LACK_OF_TRANSPORTATION: NO
DRESSING YOURSELF: INDEPENDENT
PATIENT'S MEMORY ADEQUATE TO SAFELY COMPLETE DAILY ACTIVITIES?: YES
BATHING: INDEPENDENT

## 2024-12-31 ASSESSMENT — COGNITIVE AND FUNCTIONAL STATUS - GENERAL
PATIENT BASELINE BEDBOUND: NO
DAILY ACTIVITIY SCORE: 23
DRESSING REGULAR LOWER BODY CLOTHING: A LITTLE
MOBILITY SCORE: 24
DAILY ACTIVITIY SCORE: 23
DRESSING REGULAR LOWER BODY CLOTHING: A LITTLE
MOBILITY SCORE: 24
MOBILITY SCORE: 24

## 2024-12-31 ASSESSMENT — PAIN DESCRIPTION - ORIENTATION
ORIENTATION: RIGHT;MID
ORIENTATION: RIGHT;MID

## 2024-12-31 ASSESSMENT — LIFESTYLE VARIABLES
HOW OFTEN DO YOU HAVE 6 OR MORE DRINKS ON ONE OCCASION: NEVER
AUDIT-C TOTAL SCORE: 0
PRESCIPTION_ABUSE_PAST_12_MONTHS: NO
AUDIT-C TOTAL SCORE: 0
HOW OFTEN DO YOU HAVE A DRINK CONTAINING ALCOHOL: NEVER
SUBSTANCE_ABUSE_PAST_12_MONTHS: NO
SKIP TO QUESTIONS 9-10: 1
HOW MANY STANDARD DRINKS CONTAINING ALCOHOL DO YOU HAVE ON A TYPICAL DAY: PATIENT DOES NOT DRINK

## 2024-12-31 ASSESSMENT — PAIN - FUNCTIONAL ASSESSMENT
PAIN_FUNCTIONAL_ASSESSMENT: 0-10

## 2024-12-31 ASSESSMENT — PAIN DESCRIPTION - LOCATION
LOCATION: BACK
LOCATION: BACK

## 2024-12-31 ASSESSMENT — PATIENT HEALTH QUESTIONNAIRE - PHQ9
SUM OF ALL RESPONSES TO PHQ9 QUESTIONS 1 & 2: 2
2. FEELING DOWN, DEPRESSED OR HOPELESS: SEVERAL DAYS
1. LITTLE INTEREST OR PLEASURE IN DOING THINGS: SEVERAL DAYS

## 2024-12-31 ASSESSMENT — PAIN SCALES - GENERAL
PAINLEVEL_OUTOF10: 5 - MODERATE PAIN
PAINLEVEL_OUTOF10: 0 - NO PAIN
PAINLEVEL_OUTOF10: 0 - NO PAIN
PAINLEVEL_OUTOF10: 4
PAINLEVEL_OUTOF10: 0 - NO PAIN
PAINLEVEL_OUTOF10: 1

## 2024-12-31 NOTE — CONSULTS
"Farida Traylor   64 y.o.    @WT@  MRN/Room: [id number]  DOA: 12/31/2024    REASON FOR CONSULT: Per urology, suspect Uric acid stones given urine pH 5.5 and stone HU of 350-750. would appreciate the medical management recs from nephro.     REQUESTING PHYSICIAN: Eva Baca MD  PRIMARY CARE PHYSICIAN: Jelena Romo,     ADMISSION DIAGNOSIS:   1. Kidney stone          P/C: nausea and lower back pain      HPI:  Farida Traylor is a 64 y.o. female with PMH of pulmonary hypertension (grade II/III) with chronic hypoxic respiratory failure(3-4 L NC during the day and 2 L NC at night), obesity (BMI of 55), complex MARLON (ASV), asthma (PFT March 2024,  FEV1 71%), recurrent cystitis per chart review, multiple kidney stones presented with flank pain and episodic nausea in setting of kidney stones for which nephrology is consulted.     Endorses nausea and flank pain. Denies fever, rash, LUTS at this time. No Vomiting or diarrhea. No history of gout, RTA or family history of kidney problems. History of high salt intake, meat intake 3-7 times a week. Takes calcium and Vit D supplements, eat less vegetables. Does not like tums. Denies Vit C intake, has history of taking colace which she is using. Drinks only 1L of water. There is hx of recurrent UTIs through out her life.     ROS: Other than noted in the HPI 12 point review of system was negative.      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), Acute upper respiratory infection, unspecified (01/14/2020), Allergic rhinitis due to animal (cat) (dog) hair and dander (01/02/2020), Allergic rhinitis due to pollen (01/02/2020), Allergic rhinitis due to pollen (02/26/2018), Allergy status to unspecified drugs, medicaments " and biological substances (06/30/2015), Anemia, unspecified (07/23/2018), Anxiety disorder due to known physiological condition (06/05/2013), Anxiety disorder, unspecified (01/28/2016), Asymptomatic varicose veins of bilateral lower extremities (07/16/2019), Atypical squamous cells of undetermined significance on cytologic smear of cervix (ASC-US) (06/26/2013), Candidiasis, unspecified (12/10/2019), Cellulitis of left finger (07/31/2018), Changes in skin texture (03/09/2021), Contusion of left lesser toe(s) with damage to nail, initial encounter (02/22/2018), Contusion of right hand, sequela (08/04/2020), Corns and callosities (05/25/2021), Disorder of pigmentation, unspecified (09/28/2016), Disorder of the skin and subcutaneous tissue, unspecified (08/27/2020), Dorsalgia, unspecified (09/23/2021), Dorsalgia, unspecified (01/29/2019), Encounter for general adult medical examination without abnormal findings (06/08/2020), Encounter for gynecological examination (general) (routine) without abnormal findings (12/14/2021), Encounter for gynecological examination (general) (routine) without abnormal findings (12/11/2020), Encounter for other screening for malignant neoplasm of breast, Encounter for screening for malignant neoplasm of cervix, Encounter for screening for malignant neoplasm of cervix (11/04/2014), Encounter for screening for malignant neoplasm of cervix, Hepatomegaly, not elsewhere classified (04/21/2021), History of falling (12/28/2018), Inappropriate diet and eating habits (05/13/2021), Irregular menstruation, unspecified, Localized edema (07/29/2015), Localized swelling, mass and lump, left lower limb (09/11/2020), Localized swelling, mass and lump, left lower limb (09/24/2020), Localized swelling, mass and lump, unspecified lower limb (09/24/2020), Melanocytic nevi, unspecified (09/10/2019), Multiple births, unspecified, all liveborn ([facility]), Other allergic rhinitis (01/02/2020), Other allergic  rhinitis (01/02/2020), Other conditions influencing health status (06/26/2013), Other conditions influencing health status, Other conditions influencing health status (02/05/2019), Other conditions influencing health status (12/14/2021), Other conditions influencing health status (02/14/2019), Other conditions influencing health status (01/14/2020), Other conditions influencing health status (06/05/2013), Other conditions influencing health status (06/05/2013), Other hypertrophic disorders of the skin (07/29/2015), Other shoulder lesions, right shoulder (11/11/2019), Other specified disorders of bone, thigh (09/09/2020), Other specified noninflammatory disorders of vagina (03/12/2019), Other specified postprocedural states (05/04/2017), Other specified soft tissue disorders (10/08/2020), Other specified soft tissue disorders (08/04/2020), Pain in left thigh (08/27/2020), Pain in right foot (03/09/2021), Pain in right hip (12/28/2018), Pain in right knee (03/12/2021), Pain in right knee (03/19/2021), Pain in right leg (05/25/2021), Pain in right leg (01/12/2022), Pain in unspecified joint, 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 (02/22/2016), Personal history of contraception, Personal history of diseases of the skin and subcutaneous tissue (12/22/2020), Personal history of malignant neoplasm of other parts of uterus, Personal history of other (healed) physical injury and trauma (03/01/2017), Personal history of other benign neoplasm (09/10/2019), Personal history of other benign neoplasm (05/06/2014), Personal history of other benign neoplasm (12/11/2020), Personal history of other diseases of the circulatory system (04/14/2021), Personal history of other diseases of the circulatory system (04/14/2021), Personal history of other diseases of the circulatory system (04/14/2021), Personal history of other diseases of the circulatory system (02/01/2017), Personal history  of other diseases of the circulatory system (04/14/2021), Personal history of other diseases of the digestive system (03/24/2021), Personal history of other diseases of the female genital tract (12/28/2016), Personal history of other diseases of the female genital tract (05/19/2022), Personal history of other diseases of the female genital tract, Personal history of other diseases of the female genital tract, Personal history of other diseases of the musculoskeletal system and connective tissue, Personal history of other diseases of the musculoskeletal system and connective tissue (11/18/2019), Personal history of other diseases of the respiratory system (05/27/2021), Personal history of other diseases of the respiratory system (04/12/2019), Personal history of other diseases of the respiratory system (01/04/2020), Personal history of other endocrine, nutritional and metabolic disease (02/01/2017), Personal history of other endocrine, nutritional and metabolic disease (04/18/2016), Personal history of other endocrine, nutritional and metabolic disease (05/26/2020), Personal history of other infectious and parasitic diseases, Personal history of other medical treatment (12/11/2020), Personal history of other medical treatment (12/14/2021), Personal history of other medical treatment, Personal history of other specified conditions (08/21/2019), Personal history of other specified conditions (02/02/2017), Personal history of other specified conditions (12/07/2020), Personal history of other specified conditions (04/14/2021), Personal history of other specified conditions (12/05/2014), Personal history of other specified conditions (08/25/2021), Personal history of other specified conditions, Personal history of other specified conditions, Personal history of other specified conditions (01/12/2018), Personal history of transient ischemic attack (TIA), and 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 (12/30/2015),  Personal history of urinary (tract) infections (09/02/2021), Personal history of urinary (tract) infections (05/23/2019), Personal history of urinary (tract) infections (09/02/2021), Personal history of urinary calculi (09/12/2019), Polyphagia (05/13/2021), Primary central sleep apnea (10/21/2017), Pulmonary hypertension (Multi), Pure hypercholesterolemia, unspecified, Residual hemorrhoidal skin tags (07/06/2017), Sleep apnea, Slurred speech (01/02/2015), Tinea pedis (05/25/2021), Unspecified abdominal pain (04/13/2021), Unspecified injury of right wrist, hand and finger(s), sequela (08/04/2020), Unspecified internal derangement of unspecified knee (03/01/2017), Unspecified symptoms and signs involving the genitourinary system (12/16/2021), Unspecified symptoms and signs involving the genitourinary system (02/05/2019), Unspecified symptoms and signs involving the genitourinary system (09/03/2020), Urinary tract infection, site not specified (01/17/2020), Urinary tract infection, site not specified (01/10/2022), and Xerosis cutis (09/10/2019).    Surgical History  She has a past surgical history that includes Other surgical history (07/31/2013); Other surgical history (09/10/2019); Other surgical history (11/30/2018); Mouth surgery (11/04/2014); Knee surgery (05/04/2017); Other surgical history (06/08/2020); MR angio head wo IV contrast (02/09/2016); Cardiac catheterization (Right, 01/04/2024); and Cardiac catheterization.     Social History  She reports that she has never smoked. She has never used smokeless tobacco. She reports that she does not currently use alcohol. She reports that she does not use drugs.    Family History  Family History   Problem Relation Name Age of Onset    Hypertension Mother      Breast cancer Mother      Other (cardiac disorder) Mother      Cardiomyopathy Mother      Diabetes Mother      Other (chronic kidney disease) Mother      Alcohol abuse Father joel serna     Stroke Brother       Brain Aneurysm Brother         Meds:   aspirin, 325 mg, Daily  buPROPion SR, 150 mg, BID  busPIRone, 30 mg, BID  calcium carbonate, 1,500 mg, Daily  cefTRIAXone, 1 g, q24h  cyanocobalamin, 1,000 mcg, Daily  enoxaparin, 60 mg, q12h AMRK  fluticasone furoate-vilanteroL, 1 puff, Daily  hydrALAZINE, 50 mg, BID  lubiprostone, 8 mcg, BID  macitentan, 10 mg, Daily  melatonin, 3 mg, Nightly  memantine, 10 mg, BID  methenamine hippurate, 1 g, BID  montelukast, 10 mg, Daily  oxybutynin, 5 mg, TID  oxygen, , Continuous - Inhalation  pantoprazole, 40 mg, Daily before breakfast  riociguat, 2.5 mg, TID  sertraline, 200 mg, Daily  simvastatin, 80 mg, Nightly  tamsulosin, 0.4 mg, Daily         albuterol, 2.5 mg, q6h PRN  hydrOXYzine pamoate, 50 mg, 4x daily PRN  ketorolac, 15 mg, q6h PRN      Current Outpatient Medications   Medication Instructions    Adempas 2.5 mg, oral, 3 times daily    albuterol 90 mcg/actuation inhaler INHALE 1 TO 2 PUFFS BY MOUTH EVERY 4 TO 6 HOURS AS NEEDED    aspirin 325 mg tablet 1 tablet, Daily    blood pressure monitor (Blood Pressure Kit) kit 1 kit, miscellaneous, As needed    buPROPion SR (Wellbutrin SR) 150 mg 12 hr tablet TAKE 1 TABLET BY MOUTH EVERY MORNING AND AFTERNOON    busPIRone (Buspar) 30 mg tablet 1 tablet, 2 times daily    calcium carbonate (CALCIUM 600 ORAL) 2 tablets, Daily    cyanocobalamin (VITAMIN B-12) 1,000 mcg, Daily    cyclobenzaprine (FLEXERIL) 10 mg, oral, 3 times daily PRN    docusate sodium (COLACE) 100 mg, oral, 2 times daily    enoxaparin (LOVENOX) 60 mg, subcutaneous, Every 12 hours scheduled    ferrous sulfate 65 mg, 3 times daily (morning, midday, late afternoon)    fluticasone propion-salmeteroL (Advair Diskus) 250-50 mcg/dose diskus inhaler INHALE ONE (1) PUFF BY MOUTH TWICE DAILY. RINSE MOUTH OUT AFTER EACH USE.    hydrALAZINE (APRESOLINE) 50 mg, 2 times daily    hydrOXYzine pamoate (Vistaril) 50 mg capsule TAKE 3 CAPSULES BY MOUTH EVERY NIGHT AT BEDTIME AND TAKE 1  CAPSULE BY MOUTH EVERY MORNING    ketorolac (TORADOL) 15 mg, intravenous, Every 6 hours scheduled    lubiprostone (AMITIZA) 8 mcg, oral, 2 times daily (morning and late afternoon)    magnesium oxide (MAG-OX) 400 mg, oral, Daily    memantine (NAMENDA) 10 mg, 2 times daily    methenamine hippurate (HIPREX) 1 g, oral, 2 times daily    montelukast (SINGULAIR) 10 mg, oral, Daily    omeprazole (PRILOSEC) 40 mg, oral, Daily before breakfast    Opsumit 10 mg tablet tablet TAKE 1 TABLET BY MOUTH DAILY. DO NOT HANDLE IF PREGNANT. DO NOT SPLIT, CRUSH, OR CHEW. REVIEW MEDICATION GUIDE.    piperacillin-tazobactam (Zosyn) 3.375 gram/50 mL IV 3.375 g, intravenous, Every 6 hours    sertraline (Zoloft) 100 mg tablet 2 tablets, Daily    simvastatin (ZOCOR) 80 mg, oral, Nightly    tamsulosin (FLOMAX) 0.4 mg, oral, Daily    trospium (SANCTURA) 20 mg, oral, 2 times daily         VITALS:  Temp:  [36.1 °C (96.9 °F)-36.6 °C (97.9 °F)] 36.6 °C (97.9 °F)  Heart Rate:  [59-76] 59  Resp:  [18-22] 18  BP: (104-143)/() 124/66  FiO2 (%):  [40 %] 40 %     Intake/Output Summary (Last 24 hours) at 12/31/2024 1621  Last data filed at 12/31/2024 1345  Gross per 24 hour   Intake 488.33 ml   Output --   Net 488.33 ml      I/O last 3 completed shifts:  In: 140 (1.1 mL/kg) [I.V.:140 (1.1 mL/kg)]  Out: - (0 mL/kg)   Weight: 132 kg        PHYSICAL EXAMINATION:  General appearance: no distress, morbid obesity  Eyes: non-icteric  Skin: no apparent rash  Heart: regular  Lungs: NVB B/L with dec air entry and wheezing  Abdomen: soft, nt/nd  Extremities: trace edema B/L      INVESTIGATIONS:  Results from last 7 days   Lab Units 12/31/24  0638   WBC AUTO x10*3/uL 5.5   RBC AUTO x10*6/uL 4.18   HEMOGLOBIN g/dL 12.1   HEMATOCRIT % 37.8     Results from last 7 days   Lab Units 12/31/24  0638   SODIUM mmol/L 140   POTASSIUM mmol/L 3.8   CHLORIDE mmol/L 107   CO2 mmol/L 23   BUN mg/dL 15   CREATININE mg/dL 0.81   CALCIUM mg/dL 8.6   PHOSPHORUS mg/dL 3.4  "  MAGNESIUM mg/dL 2.10   BILIRUBIN TOTAL mg/dL 0.4   ALT U/L 9   AST U/L 12     Results from last 7 days   Lab Units 12/31/24  0624 12/29/24  0430   COLOR U  Yellow Dark-Orange*   PH U  5.5 5.5   SPEC GRAV UR  1.025 1.019   PROTEIN U mg/dL 20 (TRACE) 30 (1+)*   BLOOD UR  0.2 (2+)* OVER (3+)*   NITRITE U  NEGATIVE NEGATIVE   WBC UR /HPF 11-20* 21-50*   BACTERIA UR /HPF  --  1+*     No results found for: \"ALBUR\", \"[id number]\"   No results found for the last 90 days.      IMAGING:  CT abdomen pelvis wo IV contrast    Result Date: 12/29/2024  1.  Moderate right hydroureteronephrosis due to an obstructing 8 mm calculus within the distal right ureter. Additionally nonobstructing right posterior intrarenal calculus. 2. Additional chronic findings including uterine fibroid and right posterior hepatic lobe benign hemangiomas measuring up to 7.7 x 7.0 cm, slightly increased since 2019.     Signed by: Jonathan Guevara 12/29/2024 2:21 AM Dictation workstation:   [id number]      ASSESSMENT:  Farida Traylor is a 64 y.o. female with PMH of pulmonary hypertension (grade II/III) with chronic hypoxic respiratory failure(3-4 L NC during the day and 2 L NC at night), obesity (BMI of 55), complex MARLON (ASV), asthma (PFT March 2024,  FEV1 71%), recurrent cystitis per chart review, multiple kidney stones presented with flank pain and episodic nausea in setting of kidney stones for which nephrology is consulted.     #Nephrolithiasis c/b moderate right hydronephrosis  - her high BMI puts her at risk of stones  - first noticed B/L hydro mild on 2/2019 renal US with no visible calculus.   - First noticed 9 mm nonobstructive right renal calculus in 9/2019 followed by renal US in 2022 which had no definite renal stones or hydronephrosis    - recent scan 12/29 showing moderate right hydroureteronephrosis due to an obstructing 8 mm calculus within the distal right ureter. Additionally nonobstructing right posterior intrarenal calculus. Has cysts " B/L  - UA latest one has sp. Gravity on higher normal end which can indicate decreased fluid intake, urine ph is acidic 5.5, hematuria and pyuria with l.est +ve  - normal serum calcium and phosphate    RECOMMENDATIONS:  - check serum uric acid and 24 hrs urine lytes ( Calcium, sodium, potassium, cystine, citrate, uric acid, oxalate, chloride, creatinine), stone analysis if she passes stone.   - iPTH levels  - encourage oral hydration so she is making 2-2.5L/day of urine  - start potassium citrate 15 meq BID meanwhile to help increase urine pH  - low salt and less animal meat in diet  - will follow      Patient is discussed with the attending.      Ruthy Wade MD  Nephrology Fellow   Daytime / Weekend Renal Pager [phone]  After 7 pm Emergencies [phone] Pager [phone]

## 2024-12-31 NOTE — CARE PLAN
The clinical goals for the shift include Pt will remain free from falls or injury by the end of the shift on 12/31  at 0700    Over the shift, the patient did make progress toward the goals.

## 2024-12-31 NOTE — PROGRESS NOTES
Farida Traylor is a 64 y.o. female on day 0 of admission presenting with Kidney stone.      Subjective   No acute events overnight. Pt denied SOB, cough, abdominal pain, dysuria. Reported chest pain that comes and goes. Also felt congested and asked for nasal spray.        Objective     Last Recorded Vitals  /65 (BP Location: Right arm)   Pulse 71   Temp 36.6 °C (97.9 °F) (Temporal)   Resp 21   Wt 132 kg (291 lb 0.1 oz)   SpO2 96%   Intake/Output last 3 Shifts:    Intake/Output Summary (Last 24 hours) at 12/31/2024 0943  Last data filed at 12/31/2024 0600  Gross per 24 hour   Intake 140 ml   Output --   Net 140 ml       Admission Weight  Weight: 132 kg (291 lb 0.1 oz) (12/31/24 0451)    Daily Weight  12/31/24 : 132 kg (291 lb 0.1 oz)    Image Results  ECG 12 lead  Normal sinus rhythm  Nonspecific intraventricular block  Cannot rule out Anterior infarct (cited on or before 12-APR-2024)  Abnormal ECG  When compared with ECG of 01-NOV-2024 19:00,  QRS axis Shifted left  T wave inversion now evident in Inferior leads  ECG 12 lead  Sinus bradycardia  Low voltage QRS  Cannot rule out Anterior infarct (cited on or before 12-APR-2024)  Abnormal ECG  When compared with ECG of 01-NOV-2024 19:00,  Right bundle branch block is no longer Present      Physical Exam    PHYSICAL EXAM:  General: awake, alert, sitting on the bed, large body habitus   HEENT: pupils equal and round, no scleral icterus or conjunctivitis, facial flushing   Skin: no suspect lesions or rashes noted on visible skin  Chest: decreased breath sounds b/l, normal respiratory effort, on 5L NC  Cardiac: regular rate and rhythm, normal s1, s2, mild systolic murmur on LLSB, no JVD  Abdomen: soft, ND, NT, no involuntary guarding, no CVA tenderness   : no flank pain or indwelling urinary catheter  EXT: 2+ pitting edema on LE b/l  Neuro: AOx4, moving all limbs spontaneously, follows commands  Psych: coherent thought process, appropriate mood and  affect      Relevant Results      Results for orders placed or performed during the hospital encounter of 12/31/24 (from the past 24 hours)   Urinalysis with Reflex Culture and Microscopic   Result Value Ref Range    Color, Urine Yellow Light-Yellow, Yellow, Dark-Yellow    Appearance, Urine Clear Clear    Specific Gravity, Urine 1.025 1.005 - 1.035    pH, Urine 5.5 5.0, 5.5, 6.0, 6.5, 7.0, 7.5, 8.0    Protein, Urine 20 (TRACE) NEGATIVE, 10 (TRACE), 20 (TRACE) mg/dL    Glucose, Urine Normal Normal mg/dL    Blood, Urine 0.2 (2+) (A) NEGATIVE    Ketones, Urine NEGATIVE NEGATIVE mg/dL    Bilirubin, Urine NEGATIVE NEGATIVE    Urobilinogen, Urine Normal Normal mg/dL    Nitrite, Urine NEGATIVE NEGATIVE    Leukocyte Esterase, Urine 75 Parker/µL (A) NEGATIVE   Microscopic Only, Urine   Result Value Ref Range    WBC, Urine 11-20 (A) 1-5, NONE /HPF    RBC, Urine >20 (A) NONE, 1-2, 3-5 /HPF    Squamous Epithelial Cells, Urine 1-9 (SPARSE) Reference range not established. /HPF    Mucus, Urine 2+ Reference range not established. /LPF   CBC and Auto Differential   Result Value Ref Range    WBC 5.5 4.4 - 11.3 x10*3/uL    nRBC 0.0 0.0 - 0.0 /100 WBCs    RBC 4.18 4.00 - 5.20 x10*6/uL    Hemoglobin 12.1 12.0 - 16.0 g/dL    Hematocrit 37.8 36.0 - 46.0 %    MCV 90 80 - 100 fL    MCH 28.9 26.0 - 34.0 pg    MCHC 32.0 32.0 - 36.0 g/dL    RDW 15.0 (H) 11.5 - 14.5 %    Platelets 198 150 - 450 x10*3/uL    Neutrophils % 64.4 40.0 - 80.0 %    Immature Granulocytes %, Automated 0.4 0.0 - 0.9 %    Lymphocytes % 21.2 13.0 - 44.0 %    Monocytes % 7.6 2.0 - 10.0 %    Eosinophils % 6.0 0.0 - 6.0 %    Basophils % 0.4 0.0 - 2.0 %    Neutrophils Absolute 3.55 1.20 - 7.70 x10*3/uL    Immature Granulocytes Absolute, Automated 0.02 0.00 - 0.70 x10*3/uL    Lymphocytes Absolute 1.17 (L) 1.20 - 4.80 x10*3/uL    Monocytes Absolute 0.42 0.10 - 1.00 x10*3/uL    Eosinophils Absolute 0.33 0.00 - 0.70 x10*3/uL    Basophils Absolute 0.02 0.00 - 0.10 x10*3/uL    Comprehensive metabolic panel   Result Value Ref Range    Glucose 85 74 - 99 mg/dL    Sodium 140 136 - 145 mmol/L    Potassium 3.8 3.5 - 5.3 mmol/L    Chloride 107 98 - 107 mmol/L    Bicarbonate 23 21 - 32 mmol/L    Anion Gap 14 10 - 20 mmol/L    Urea Nitrogen 15 6 - 23 mg/dL    Creatinine 0.81 0.50 - 1.05 mg/dL    eGFR 80 >60 mL/min/1.73m*2    Calcium 8.6 8.6 - 10.6 mg/dL    Albumin 3.7 3.4 - 5.0 g/dL    Alkaline Phosphatase 51 33 - 136 U/L    Total Protein 6.0 (L) 6.4 - 8.2 g/dL    AST 12 9 - 39 U/L    Bilirubin, Total 0.4 0.0 - 1.2 mg/dL    ALT 9 7 - 45 U/L   Magnesium   Result Value Ref Range    Magnesium 2.10 1.60 - 2.40 mg/dL   Phosphorus   Result Value Ref Range    Phosphorus 3.4 2.5 - 4.9 mg/dL   Coagulation Screen   Result Value Ref Range    Protime 11.6 9.8 - 12.8 seconds    INR 1.0 0.9 - 1.1    aPTT 38 27 - 38 seconds   Type and screen   Result Value Ref Range    ABO TYPE A     Rh TYPE POS     ANTIBODY SCREEN NEG      *Note: Due to a large number of results and/or encounters for the requested time period, some results have not been displayed. A complete set of results can be found in Results Review.         ECG 12 lead    Result Date: 12/30/2024  Normal sinus rhythm Nonspecific intraventricular block Cannot rule out Anterior infarct (cited on or before 12-APR-2024) Abnormal ECG When compared with ECG of 01-NOV-2024 19:00, QRS axis Shifted left T wave inversion now evident in Inferior leads          Assessment/Plan          Farida Traylor is a 64 y.o. female with PMH of pulmonary hypertension (grade II/III) with chronic hypoxic respiratory failure(3-4 L NC during the day and 2 L NC at night), obesity (BMI of 55), complex MARLON (ASV), asthma (PFT March 2024,  FEV1 71%) and multiple kidney stones presenting with a new kidney stone.     #Acute kidney stone  #History of UTIs  -Urology at OSH recommended a procedure  -UA with hematuria, 8 mm stone seen on CT     Plan  -Urology consulted, doesn't rec any  urologic intervention, favors medical expulsive therapy (start Flomax, pain control, antiemetics, hydration)   -nephrology consulted, appreciate recs   -Methanamine hipurate 1 gTID  -Zofran PRN  -Toradol Q6H PRN  -restarted ceftriaxone 1g daily      #MARLON  -Continue CPAP at night     #Pulmonary Hypertension, Grade II/III  #Chronic hypoxic respiratory failure  -Last heart cath Jan 2024: mPAP of 31, PAOP of 10, PVR of 3.1 and CO of 6.7  -Last echo 12/26, wnl overall   -Continue 3-4  L NC during the day and 2 L NC at night  -Continue macitentan 10 mg daily, and riociguat 2.5 mg TID  -LR dis/cont   -on continuous pulse ox   -on CPAP at night      #Bladder spasms  -At home is on Tropsium 20 mg Bid  -Oxybutynin 5 mg BID     #Anxiety  #Depression  -Sertraline 200 mg   -Buproprion 150 mg BID  -Melatonin 3 mg  -Hydoxyzine 50 mg Q6H PRN anxieety     #Asthma  -Montelukast 10 mg  -Breo-Ellipta 1 puff daily (at home on Advair 250-50)     #Hypertension  -Hydralazine 50 mg BID     #Dyslipidemia  #Obesity  -Simvastatin 80 mg daily  -Aspirin 325 mg daily     Diet: Regular  DVT: Lovenox 60 mg BID  Code: Full Code  NOK: Briseida Rafa ([phone])    Benjamín Martino MD  PGY-1 Neurology

## 2024-12-31 NOTE — CONSULTS
Reason For Consult  Ureteral stone    History Of Present Illness  Farida Traylor is a 64 y.o. female with pulmonary HTN on 3L NC/cor pulmonale, MARLON, asthma, nephrolithiasis, chronic UTIs who presented to [facility] with right lower back pain that has been ongoing 3-4 months but worsened yesterday. Associated with nausea and bladder pressure. No dysuria or hematuria. No fevers or chills. No changes in appetite, fatigue, or other complaints.    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), Acute upper respiratory infection, unspecified (01/14/2020), Allergic rhinitis due to animal (cat) (dog) hair and dander (01/02/2020), Allergic rhinitis due to pollen (01/02/2020), Allergic rhinitis due to pollen (02/26/2018), Allergy status to unspecified drugs, medicaments and biological substances (06/30/2015), Anemia, unspecified (07/23/2018), Anxiety disorder due to known physiological condition (06/05/2013), Anxiety disorder, unspecified (01/28/2016), Asymptomatic varicose veins of bilateral lower extremities (07/16/2019), Atypical squamous cells of undetermined significance on cytologic smear of cervix (ASC-US) (06/26/2013), Candidiasis, unspecified (12/10/2019), Cellulitis of left finger (07/31/2018), Changes in skin texture (03/09/2021), Contusion of left lesser toe(s) with damage to nail, initial encounter (02/22/2018), Contusion of right hand, sequela (08/04/2020), Corns and callosities (05/25/2021), Disorder of pigmentation, unspecified (09/28/2016), Disorder of the skin and subcutaneous tissue, unspecified (08/27/2020), Dorsalgia, unspecified (09/23/2021), Dorsalgia, unspecified (01/29/2019), Encounter for general adult medical examination without abnormal findings (06/08/2020), Encounter for gynecological examination (general) (routine) without abnormal  findings (12/14/2021), Encounter for gynecological examination (general) (routine) without abnormal findings (12/11/2020), Encounter for other screening for malignant neoplasm of breast, Encounter for screening for malignant neoplasm of cervix, Encounter for screening for malignant neoplasm of cervix (11/04/2014), Encounter for screening for malignant neoplasm of cervix, Hepatomegaly, not elsewhere classified (04/21/2021), History of falling (12/28/2018), Inappropriate diet and eating habits (05/13/2021), Irregular menstruation, unspecified, Localized edema (07/29/2015), Localized swelling, mass and lump, left lower limb (09/11/2020), Localized swelling, mass and lump, left lower limb (09/24/2020), Localized swelling, mass and lump, unspecified lower limb (09/24/2020), Melanocytic nevi, unspecified (09/10/2019), Multiple births, unspecified, all liveborn ([facility]), Other allergic rhinitis (01/02/2020), Other allergic rhinitis (01/02/2020), Other conditions influencing health status (06/26/2013), Other conditions influencing health status, Other conditions influencing health status (02/05/2019), Other conditions influencing health status (12/14/2021), Other conditions influencing health status (02/14/2019), Other conditions influencing health status (01/14/2020), Other conditions influencing health status (06/05/2013), Other conditions influencing health status (06/05/2013), Other hypertrophic disorders of the skin (07/29/2015), Other shoulder lesions, right shoulder (11/11/2019), Other specified disorders of bone, thigh (09/09/2020), Other specified noninflammatory disorders of vagina (03/12/2019), Other specified postprocedural states (05/04/2017), Other specified soft tissue disorders (10/08/2020), Other specified soft tissue disorders (08/04/2020), Pain in left thigh (08/27/2020), Pain in right foot (03/09/2021), Pain in right hip (12/28/2018), Pain in right knee (03/12/2021), Pain in right knee (03/19/2021),  Pain in right leg (05/25/2021), Pain in right leg (01/12/2022), Pain in unspecified joint, 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 (02/22/2016), Personal history of contraception, Personal history of diseases of the skin and subcutaneous tissue (12/22/2020), Personal history of malignant neoplasm of other parts of uterus, Personal history of other (healed) physical injury and trauma (03/01/2017), Personal history of other benign neoplasm (09/10/2019), Personal history of other benign neoplasm (05/06/2014), Personal history of other benign neoplasm (12/11/2020), Personal history of other diseases of the circulatory system (04/14/2021), Personal history of other diseases of the circulatory system (04/14/2021), Personal history of other diseases of the circulatory system (04/14/2021), Personal history of other diseases of the circulatory system (02/01/2017), Personal history of other diseases of the circulatory system (04/14/2021), Personal history of other diseases of the digestive system (03/24/2021), Personal history of other diseases of the female genital tract (12/28/2016), Personal history of other diseases of the female genital tract (05/19/2022), Personal history of other diseases of the female genital tract, Personal history of other diseases of the female genital tract, Personal history of other diseases of the musculoskeletal system and connective tissue, Personal history of other diseases of the musculoskeletal system and connective tissue (11/18/2019), Personal history of other diseases of the respiratory system (05/27/2021), Personal history of other diseases of the respiratory system (04/12/2019), Personal history of other diseases of the respiratory system (01/04/2020), Personal history of other endocrine, nutritional and metabolic disease (02/01/2017), Personal history of other endocrine, nutritional and metabolic disease (04/18/2016), Personal history  of other endocrine, nutritional and metabolic disease (05/26/2020), Personal history of other infectious and parasitic diseases, Personal history of other medical treatment (12/11/2020), Personal history of other medical treatment (12/14/2021), Personal history of other medical treatment, Personal history of other specified conditions (08/21/2019), Personal history of other specified conditions (02/02/2017), Personal history of other specified conditions (12/07/2020), Personal history of other specified conditions (04/14/2021), Personal history of other specified conditions (12/05/2014), Personal history of other specified conditions (08/25/2021), Personal history of other specified conditions, Personal history of other specified conditions, Personal history of other specified conditions (01/12/2018), Personal history of transient ischemic attack (TIA), and 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 (12/30/2015), Personal history of urinary (tract) infections (09/02/2021), Personal history of urinary (tract) infections (05/23/2019), Personal history of urinary (tract) infections (09/02/2021), Personal history of urinary calculi (09/12/2019), Polyphagia (05/13/2021), Primary central sleep apnea (10/21/2017), Pulmonary hypertension (Multi), Pure hypercholesterolemia, unspecified, Residual hemorrhoidal skin tags (07/06/2017), Sleep apnea, Slurred speech (01/02/2015), Tinea pedis (05/25/2021), Unspecified abdominal pain (04/13/2021), Unspecified injury of right wrist, hand and finger(s), sequela (08/04/2020), Unspecified internal derangement of unspecified knee (03/01/2017), Unspecified symptoms and signs involving the genitourinary system (12/16/2021), Unspecified symptoms and signs involving the genitourinary system (02/05/2019), Unspecified symptoms and signs involving the genitourinary system (09/03/2020), Urinary tract infection, site not specified (01/17/2020), Urinary tract infection, site not specified  "(01/10/2022), and Xerosis cutis (09/10/2019).    Surgical History  She has a past surgical history that includes Other surgical history (07/31/2013); Other surgical history (09/10/2019); Other surgical history (11/30/2018); Mouth surgery (11/04/2014); Knee surgery (05/04/2017); Other surgical history (06/08/2020); MR angio head wo IV contrast (02/09/2016); Cardiac catheterization (Right, 01/04/2024); and Cardiac catheterization.     Social History  She reports that she has never smoked. She has never used smokeless tobacco. She reports that she does not currently use alcohol. She reports that she does not use drugs.    Family History  Family History   Problem Relation Name Age of Onset    Hypertension Mother      Breast cancer Mother      Other (cardiac disorder) Mother      Cardiomyopathy Mother      Diabetes Mother      Other (chronic kidney disease) Mother      Alcohol abuse Father joel serna     Stroke Brother      Brain Aneurysm Brother          Allergies  Grass pollen, Other, Pollen extracts, and Tree and shrub pollen    Review of Systems  12 pt ROS reviewed and negative except as in HPI     Physical Exam  NAD, lying in bed  CPAP on, non labored  RR  Obese abdomen, soft NT ND  No CVAT  Voiding freely  Fay     Last Recorded Vitals  Blood pressure 104/65, pulse 71, temperature 36.6 °C (97.9 °F), temperature source Temporal, resp. rate 21, height 1.554 m (5' 1.18\"), weight 132 kg (291 lb 0.1 oz), SpO2 96%.    Relevant Results  UA neg nitrite, 500 LE, UCX negative  Cr 0.98  WBC 6.2     Assessment/Plan   63 yo F with multiple medical issues including cor pulmonale who presents as transfer with an 8 mm right distal ureteral stone.    Patient's pain controlled. Cr 0.98. No leukocytosis.  UCX negative    Plan  - No acute urologic intervention  - Will favor medical expulsive therapy: start Flomax, pain control, antiemetics, hydration  - recommend nephrology consult for medical stone management, suspect Uric acid " stones given urine pH 5.5 and stone HU of 350-750. Would consider dissolution therapy with Kcit.   - will arrange follow up  - Urology signing off    To be discussed with Dr. Arden Vilchis MD  [id number]    Edits by: Reagan Cerda MD

## 2024-12-31 NOTE — H&P
History Of Present Illness  Farida Traylor is a 64 y.o. female with PMH of pulmonary hypertension with chronic hypoxic respiratory failure(3-4 L NC during the day and 2 L NC at night), obesity (BMI of 55), complex MARLON (ASV), asthma (PFT March 2024) and multiple kidney stones presenting with a new kidney stone.     For her pulmonary hypertension, she follows with Dr. Richy Raman. Her last heart cath was on Jan 2024, which noted a mPAP of 31, PAOP of 10, PVR of 3.1 and CO of 6.7. She had a TTE on 12/26 and read isn't back. She is on macitentan and riociguat. She had a history of a 5 mm non-obstructing right renal calculus in 2021 and a 9 mm non-obstructing right renal calculus in 2019.She reports that two days ago she had acute lower back pain and bladder spasms. She denies any fever, chills, chest pain or shortness of breath. She had some episodic nausea, but no vomiting.     She came in to [facility] on 12/29. On admission, she was afebrile with a HR o f71, RR of 22, and BP of 150/109. CT A/P noted moderate right hydroureteronephrosis with obstructing 8 mm stone in distal right ureter and a non-obstructing right posterior intrarenal calculus. She also reported some chest pain in the ED, but resolved on its own.  Urology consulted, and recommended operative intervention but concerned for perioperative risk given obesity/MARLON/pulmonary hypertension. Of note, she had been cleared by her pulm doctor for an endoscopy in the weeks prior. She was initially started on Ceftriaxone for coverage of a UTI and toradol for pain control    At [facility] [place], patient denies any pain. She is wondering when  her procedure will be.    ED labs  Chemistry: 139/3.6/104/25/13/0.98  INR 1.0  CBC: 6.2/13.8/214  UA: +Leuk esterase, 21-50 WBC, hematuria    Micro data  Urine cultures  Jan,Feb 2020: Klebsiella Pneumonaie  July 2021  - Ampicillin and Cefazolin resistant Enterobacter aerogenes  Mar 2019, Aug 2019, Nov 2021, Dec 2021 - Pan sensitive E.  coli  Jan 2018 - amp resistant otherwise sensitive E. coli    Home Meds  Albuterol PRN  Aspirin 325 mg daily  Bupropion 150 mg BID  Buspar 30 mg BID  Calcium carbonate 600 mg BID  Cyanocobalamin 1000 mcg daily  Ferrous sulfate 325 mg TID  Fluticasone propionate-salmeterol 250-50  Hydralazine 50 mg BID  Hydroxyzine 150 mg bedtime, 50 mg am  Lubiprostone 8 mcg BID  Memantine 10 mg BID  Methanamine hippurate 1 tablet BID  Macitentan 10 mg daily  Sertraline 200 mg daily  Simvastatin 80 mg at bedtime  Tropsium 20 mg BID  Riociguat 2.5 mg TID  Cyclobenzaprine 10 mg TID PRN  Montelukast 10 mg daily  Omeprazole 40 mg daily     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), Acute upper respiratory infection, unspecified (01/14/2020), Allergic rhinitis due to animal (cat) (dog) hair and dander (01/02/2020), Allergic rhinitis due to pollen (01/02/2020), Allergic rhinitis due to pollen (02/26/2018), Allergy status to unspecified drugs, medicaments and biological substances (06/30/2015), Anemia, unspecified (07/23/2018), Anxiety disorder due to known physiological condition (06/05/2013), Anxiety disorder, unspecified (01/28/2016), Asymptomatic varicose veins of bilateral lower extremities (07/16/2019), Atypical squamous cells of undetermined significance on cytologic smear of cervix (ASC-US) (06/26/2013), Candidiasis, unspecified (12/10/2019), Cellulitis of left finger (07/31/2018), Changes in skin texture (03/09/2021), Contusion of left lesser toe(s) with damage to nail, initial encounter (02/22/2018), Contusion of right hand, sequela (08/04/2020), Corns and callosities (05/25/2021), Disorder of pigmentation, unspecified (09/28/2016), Disorder of the skin and subcutaneous tissue, unspecified (08/27/2020), Dorsalgia, unspecified (09/23/2021), Dorsalgia, unspecified (01/29/2019), Encounter for general adult medical examination without abnormal findings  (06/08/2020), Encounter for gynecological examination (general) (routine) without abnormal findings (12/14/2021), Encounter for gynecological examination (general) (routine) without abnormal findings (12/11/2020), Encounter for other screening for malignant neoplasm of breast, Encounter for screening for malignant neoplasm of cervix, Encounter for screening for malignant neoplasm of cervix (11/04/2014), Encounter for screening for malignant neoplasm of cervix, Hepatomegaly, not elsewhere classified (04/21/2021), History of falling (12/28/2018), Inappropriate diet and eating habits (05/13/2021), Irregular menstruation, unspecified, Localized edema (07/29/2015), Localized swelling, mass and lump, left lower limb (09/11/2020), Localized swelling, mass and lump, left lower limb (09/24/2020), Localized swelling, mass and lump, unspecified lower limb (09/24/2020), Melanocytic nevi, unspecified (09/10/2019), Multiple births, unspecified, all liveborn ([facility]), Other allergic rhinitis (01/02/2020), Other allergic rhinitis (01/02/2020), Other conditions influencing health status (06/26/2013), Other conditions influencing health status, Other conditions influencing health status (02/05/2019), Other conditions influencing health status (12/14/2021), Other conditions influencing health status (02/14/2019), Other conditions influencing health status (01/14/2020), Other conditions influencing health status (06/05/2013), Other conditions influencing health status (06/05/2013), Other hypertrophic disorders of the skin (07/29/2015), Other shoulder lesions, right shoulder (11/11/2019), Other specified disorders of bone, thigh (09/09/2020), Other specified noninflammatory disorders of vagina (03/12/2019), Other specified postprocedural states (05/04/2017), Other specified soft tissue disorders (10/08/2020), Other specified soft tissue disorders (08/04/2020), Pain in left thigh (08/27/2020), Pain in right foot (03/09/2021), Pain in  right hip (12/28/2018), Pain in right knee (03/12/2021), Pain in right knee (03/19/2021), Pain in right leg (05/25/2021), Pain in right leg (01/12/2022), Pain in unspecified joint, 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 (02/22/2016), Personal history of contraception, Personal history of diseases of the skin and subcutaneous tissue (12/22/2020), Personal history of malignant neoplasm of other parts of uterus, Personal history of other (healed) physical injury and trauma (03/01/2017), Personal history of other benign neoplasm (09/10/2019), Personal history of other benign neoplasm (05/06/2014), Personal history of other benign neoplasm (12/11/2020), Personal history of other diseases of the circulatory system (04/14/2021), Personal history of other diseases of the circulatory system (04/14/2021), Personal history of other diseases of the circulatory system (04/14/2021), Personal history of other diseases of the circulatory system (02/01/2017), Personal history of other diseases of the circulatory system (04/14/2021), Personal history of other diseases of the digestive system (03/24/2021), Personal history of other diseases of the female genital tract (12/28/2016), Personal history of other diseases of the female genital tract (05/19/2022), Personal history of other diseases of the female genital tract, Personal history of other diseases of the female genital tract, Personal history of other diseases of the musculoskeletal system and connective tissue, Personal history of other diseases of the musculoskeletal system and connective tissue (11/18/2019), Personal history of other diseases of the respiratory system (05/27/2021), Personal history of other diseases of the respiratory system (04/12/2019), Personal history of other diseases of the respiratory system (01/04/2020), Personal history of other endocrine, nutritional and metabolic disease (02/01/2017), Personal  history of other endocrine, nutritional and metabolic disease (04/18/2016), Personal history of other endocrine, nutritional and metabolic disease (05/26/2020), Personal history of other infectious and parasitic diseases, Personal history of other medical treatment (12/11/2020), Personal history of other medical treatment (12/14/2021), Personal history of other medical treatment, Personal history of other specified conditions (08/21/2019), Personal history of other specified conditions (02/02/2017), Personal history of other specified conditions (12/07/2020), Personal history of other specified conditions (04/14/2021), Personal history of other specified conditions (12/05/2014), Personal history of other specified conditions (08/25/2021), Personal history of other specified conditions, Personal history of other specified conditions, Personal history of other specified conditions (01/12/2018), Personal history of transient ischemic attack (TIA), and 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 (12/30/2015), Personal history of urinary (tract) infections (09/02/2021), Personal history of urinary (tract) infections (05/23/2019), Personal history of urinary (tract) infections (09/02/2021), Personal history of urinary calculi (09/12/2019), Polyphagia (05/13/2021), Primary central sleep apnea (10/21/2017), Pulmonary hypertension (Multi), Pure hypercholesterolemia, unspecified, Residual hemorrhoidal skin tags (07/06/2017), Sleep apnea, Slurred speech (01/02/2015), Tinea pedis (05/25/2021), Unspecified abdominal pain (04/13/2021), Unspecified injury of right wrist, hand and finger(s), sequela (08/04/2020), Unspecified internal derangement of unspecified knee (03/01/2017), Unspecified symptoms and signs involving the genitourinary system (12/16/2021), Unspecified symptoms and signs involving the genitourinary system (02/05/2019), Unspecified symptoms and signs involving the genitourinary system (09/03/2020), Urinary tract  "infection, site not specified (01/17/2020), Urinary tract infection, site not specified (01/10/2022), and Xerosis cutis (09/10/2019).    Surgical History  She has a past surgical history that includes Other surgical history (07/31/2013); Other surgical history (09/10/2019); Other surgical history (11/30/2018); Mouth surgery (11/04/2014); Knee surgery (05/04/2017); Other surgical history (06/08/2020); MR angio head wo IV contrast (02/09/2016); Cardiac catheterization (Right, 01/04/2024); and Cardiac catheterization.       Social History  She reports that she has never smoked. She has never used smokeless tobacco. She reports that she does not currently use alcohol. She reports that she does not use drugs.     Smoking: Denies  Alcohol: Denies  Drugs: Denies  Social: Lives by herself    Allergies  Grass pollen, Other, Pollen extracts, and Tree and shrub pollen     Last Recorded Vitals  Blood pressure 104/65, pulse 71, temperature 36.6 °C (97.9 °F), temperature source Temporal, resp. rate 21, height 1.554 m (5' 1.18\"), weight 132 kg (291 lb 0.1 oz), SpO2 96%.    Physical Exam  General: Patient is awake, non-toxic appearing, large body habitus  Pulm: Normal WOB at rest and when sitting up, no crackles or rhonchi  Cardiac: Regular rate and rhythm, normal S1/S2  Abdomen: Non-tender to palpation, non-distended, no CVA tenderness  Extremities: No peripheral edema, or cyanosis  Neuro: Patient alert and oriented, cranial nerves grossly intact, normal strength and sensation.    Relevant Results  Results for orders placed or performed during the hospital encounter of 12/29/24 (from the past 24 hours)   Type and screen   Result Value Ref Range    ABO TYPE A     Rh TYPE POS     ANTIBODY SCREEN NEG      *Note: Due to a large number of results and/or encounters for the requested time period, some results have not been displayed. A complete set of results can be found in Results Review.     Imaging  ECG 12 lead    Result Date: " 12/30/2024  Normal sinus rhythm Nonspecific intraventricular block Cannot rule out Anterior infarct (cited on or before 12-APR-2024) Abnormal ECG When compared with ECG of 01-NOV-2024 19:00, QRS axis Shifted left T wave inversion now evident in Inferior leads    ECG 12 lead    Result Date: 12/30/2024  Sinus bradycardia Low voltage QRS Cannot rule out Anterior infarct (cited on or before 12-APR-2024) Abnormal ECG When compared with ECG of 01-NOV-2024 19:00, Right bundle branch block is no longer Present        Assessment/Plan   Assessment & Plan  Kidney stone    Farida Traylor is a 64 y.o. female with PMH of pulmonary hypertension with chronic hypoxic respiratory failure(3-4 L NC during the day and 2 L NC at night), obesity (BMI of 55), complex MARLON (ASV), asthma (PFT March 2024) and multiple kidney stones presenting with a new kidney stone.    #Acute kidney stone  #History of UTIs  -Urology at OSH recommended a procedure  -UA with hematuria, 8 mm stone seen on CT    Plan  -Consult urology here for what kind of procedure  -100 mL/hr LR to target urine output of 2.5 L per day  -Tamsulosin 0.4 mg for medical expulsive therapy  -Methanamine hipurate 1 gTID  -Zofran PRN  -Toradol Q6H PRN  -Hold antibiotics for now    #MARLON  -Continue CPAP at night    #Pulmonary Hypertension  #Chronic hypoxic respiratory failure  -Last heart cath Jan 2024: mPAP of 31, PAOP of 10, PVR of 3.1 and CO of 6.7  -Last echo 12/26  -Continue 3-4  L NC during the day and 2 L NC at night  -Continue macitentan 10 mg daily, and riociguat 2.5 mg TID    #Bladder spasms  -At home is on Tropsium 20 mg Bid  -Oxybutynin 5 mg BID    #Anxiety  #Depression  -Sertraline 200 mg   -Buproprion 150 mg BID  -Melatonin 3 mg  -Hydoxyzine 50 mg Q6H PRN anxieety    #Asthma  -Montelukast 10 mg  -Breo-Ellipta 1 puff daily (at home on Advair 250-50)    #Hypertension  -Hydralazine 50 mg BID    #Dyslipidemia  #Obesity  -Simvastatin 80 mg daily  -Aspirin 325 mg  daily    Diet: Regular  DVT: Lovenox 60 mg BID  Code: Full Code  NOK: Briseida Craig ([phone])    Ashu Birmingham MD

## 2025-01-01 LAB
ALBUMIN SERPL BCP-MCNC: 3.9 G/DL (ref 3.4–5)
ANION GAP SERPL CALC-SCNC: 13 MMOL/L (ref 10–20)
BACTERIA UR CULT: NO GROWTH
BASOPHILS # BLD AUTO: 0.02 X10*3/UL (ref 0–0.1)
BASOPHILS NFR BLD AUTO: 0.5 %
BUN SERPL-MCNC: 13 MG/DL (ref 6–23)
CALCIUM SERPL-MCNC: 8.9 MG/DL (ref 8.6–10.6)
CHLORIDE SERPL-SCNC: 104 MMOL/L (ref 98–107)
CO2 SERPL-SCNC: 27 MMOL/L (ref 21–32)
CREAT SERPL-MCNC: 0.66 MG/DL (ref 0.5–1.05)
EGFRCR SERPLBLD CKD-EPI 2021: >90 ML/MIN/1.73M*2
EOSINOPHIL # BLD AUTO: 0.24 X10*3/UL (ref 0–0.7)
EOSINOPHIL NFR BLD AUTO: 5.5 %
ERYTHROCYTE [DISTWIDTH] IN BLOOD BY AUTOMATED COUNT: 14.6 % (ref 11.5–14.5)
GLUCOSE SERPL-MCNC: 89 MG/DL (ref 74–99)
HCT VFR BLD AUTO: 39.3 % (ref 36–46)
HGB BLD-MCNC: 12.9 G/DL (ref 12–16)
IMM GRANULOCYTES # BLD AUTO: 0.01 X10*3/UL (ref 0–0.7)
IMM GRANULOCYTES NFR BLD AUTO: 0.2 % (ref 0–0.9)
LYMPHOCYTES # BLD AUTO: 1.1 X10*3/UL (ref 1.2–4.8)
LYMPHOCYTES NFR BLD AUTO: 25 %
MAGNESIUM SERPL-MCNC: 1.84 MG/DL (ref 1.6–2.4)
MCH RBC QN AUTO: 29.1 PG (ref 26–34)
MCHC RBC AUTO-ENTMCNC: 32.8 G/DL (ref 32–36)
MCV RBC AUTO: 89 FL (ref 80–100)
MONOCYTES # BLD AUTO: 0.3 X10*3/UL (ref 0.1–1)
MONOCYTES NFR BLD AUTO: 6.8 %
NEUTROPHILS # BLD AUTO: 2.73 X10*3/UL (ref 1.2–7.7)
NEUTROPHILS NFR BLD AUTO: 62 %
NRBC BLD-RTO: 0 /100 WBCS (ref 0–0)
PHOSPHATE SERPL-MCNC: 3.7 MG/DL (ref 2.5–4.9)
PLATELET # BLD AUTO: 184 X10*3/UL (ref 150–450)
POTASSIUM SERPL-SCNC: 3.8 MMOL/L (ref 3.5–5.3)
RBC # BLD AUTO: 4.43 X10*6/UL (ref 4–5.2)
SODIUM SERPL-SCNC: 140 MMOL/L (ref 136–145)
WBC # BLD AUTO: 4.4 X10*3/UL (ref 4.4–11.3)

## 2025-01-01 PROCEDURE — 5A09357 ASSISTANCE WITH RESPIRATORY VENTILATION, LESS THAN 24 CONSECUTIVE HOURS, CONTINUOUS POSITIVE AIRWAY PRESSURE: ICD-10-PCS | Performed by: INTERNAL MEDICINE

## 2025-01-01 PROCEDURE — 2500000001 HC RX 250 WO HCPCS SELF ADMINISTERED DRUGS (ALT 637 FOR MEDICARE OP)

## 2025-01-01 PROCEDURE — 85025 COMPLETE CBC W/AUTO DIFF WBC: CPT

## 2025-01-01 PROCEDURE — 2500000002 HC RX 250 W HCPCS SELF ADMINISTERED DRUGS (ALT 637 FOR MEDICARE OP, ALT 636 FOR OP/ED)

## 2025-01-01 PROCEDURE — 1100000001 HC PRIVATE ROOM DAILY

## 2025-01-01 PROCEDURE — 36415 COLL VENOUS BLD VENIPUNCTURE: CPT

## 2025-01-01 PROCEDURE — 2500000005 HC RX 250 GENERAL PHARMACY W/O HCPCS

## 2025-01-01 PROCEDURE — 80069 RENAL FUNCTION PANEL: CPT

## 2025-01-01 PROCEDURE — 2500000004 HC RX 250 GENERAL PHARMACY W/ HCPCS (ALT 636 FOR OP/ED)

## 2025-01-01 PROCEDURE — 83735 ASSAY OF MAGNESIUM: CPT

## 2025-01-01 PROCEDURE — 99232 SBSQ HOSP IP/OBS MODERATE 35: CPT

## 2025-01-01 PROCEDURE — 2500000001 HC RX 250 WO HCPCS SELF ADMINISTERED DRUGS (ALT 637 FOR MEDICARE OP): Performed by: STUDENT IN AN ORGANIZED HEALTH CARE EDUCATION/TRAINING PROGRAM

## 2025-01-01 RX ADMIN — TAMSULOSIN HYDROCHLORIDE 0.4 MG: 0.4 CAPSULE ORAL at 10:42

## 2025-01-01 RX ADMIN — LUBIPROSTONE 8 MCG: 8 CAPSULE, GELATIN COATED ORAL at 10:43

## 2025-01-01 RX ADMIN — LUBIPROSTONE 8 MCG: 8 CAPSULE, GELATIN COATED ORAL at 18:43

## 2025-01-01 RX ADMIN — RIOCIGUAT 2.5 MG: 2 TABLET, FILM COATED ORAL at 15:30

## 2025-01-01 RX ADMIN — PANTOPRAZOLE SODIUM 40 MG: 40 TABLET, DELAYED RELEASE ORAL at 06:02

## 2025-01-01 RX ADMIN — MEMANTINE 10 MG: 10 TABLET ORAL at 21:04

## 2025-01-01 RX ADMIN — ENOXAPARIN SODIUM 60 MG: 60 INJECTION SUBCUTANEOUS at 10:45

## 2025-01-01 RX ADMIN — RIOCIGUAT 2.5 MG: 2 TABLET, FILM COATED ORAL at 10:43

## 2025-01-01 RX ADMIN — OXYBUTYNIN CHLORIDE 5 MG: 5 TABLET ORAL at 21:04

## 2025-01-01 RX ADMIN — OXYBUTYNIN CHLORIDE 5 MG: 5 TABLET ORAL at 10:43

## 2025-01-01 RX ADMIN — METHENAMINE HIPPURATE 1 G: 1000 TABLET ORAL at 10:42

## 2025-01-01 RX ADMIN — SIMVASTATIN 80 MG: 20 TABLET, FILM COATED ORAL at 21:04

## 2025-01-01 RX ADMIN — MONTELUKAST 10 MG: 10 TABLET, FILM COATED ORAL at 10:43

## 2025-01-01 RX ADMIN — BUPROPION HYDROCHLORIDE 150 MG: 150 TABLET, FILM COATED, EXTENDED RELEASE ORAL at 10:43

## 2025-01-01 RX ADMIN — CYANOCOBALAMIN TAB 1000 MCG 1000 MCG: 1000 TAB at 10:47

## 2025-01-01 RX ADMIN — FLUTICASONE FUROATE AND VILANTEROL TRIFENATATE 1 PUFF: 200; 25 POWDER RESPIRATORY (INHALATION) at 06:02

## 2025-01-01 RX ADMIN — ASPIRIN 325 MG ORAL TABLET 325 MG: 325 PILL ORAL at 10:42

## 2025-01-01 RX ADMIN — POTASSIUM CITRATE 15 MEQ: 5 TABLET ORAL at 10:41

## 2025-01-01 RX ADMIN — BUSPIRONE HYDROCHLORIDE 30 MG: 15 TABLET ORAL at 21:03

## 2025-01-01 RX ADMIN — Medication 3 MG: at 21:03

## 2025-01-01 RX ADMIN — BUSPIRONE HYDROCHLORIDE 30 MG: 15 TABLET ORAL at 10:42

## 2025-01-01 RX ADMIN — METHENAMINE HIPPURATE 1 G: 1000 TABLET ORAL at 21:04

## 2025-01-01 RX ADMIN — BUPROPION HYDROCHLORIDE 150 MG: 150 TABLET, FILM COATED, EXTENDED RELEASE ORAL at 21:03

## 2025-01-01 RX ADMIN — Medication 4 L/MIN: at 21:05

## 2025-01-01 RX ADMIN — SERTRALINE HYDROCHLORIDE 200 MG: 100 TABLET ORAL at 10:42

## 2025-01-01 RX ADMIN — MEMANTINE 10 MG: 10 TABLET ORAL at 10:43

## 2025-01-01 RX ADMIN — HYDRALAZINE HYDROCHLORIDE 50 MG: 25 TABLET ORAL at 21:03

## 2025-01-01 RX ADMIN — RIOCIGUAT 2.5 MG: 2 TABLET, FILM COATED ORAL at 21:03

## 2025-01-01 RX ADMIN — ENOXAPARIN SODIUM 60 MG: 60 INJECTION SUBCUTANEOUS at 21:04

## 2025-01-01 RX ADMIN — HYDROXYZINE PAMOATE 50 MG: 50 CAPSULE ORAL at 21:03

## 2025-01-01 RX ADMIN — Medication 4 L/MIN: at 10:47

## 2025-01-01 RX ADMIN — MACITENTAN 10 MG: 10 TABLET, FILM COATED ORAL at 10:41

## 2025-01-01 RX ADMIN — OXYBUTYNIN CHLORIDE 5 MG: 5 TABLET ORAL at 15:30

## 2025-01-01 ASSESSMENT — COGNITIVE AND FUNCTIONAL STATUS - GENERAL
DAILY ACTIVITIY SCORE: 23
DRESSING REGULAR LOWER BODY CLOTHING: A LITTLE
MOBILITY SCORE: 24

## 2025-01-01 ASSESSMENT — PAIN SCALES - GENERAL
PAINLEVEL_OUTOF10: 0 - NO PAIN
PAINLEVEL_OUTOF10: 0 - NO PAIN

## 2025-01-01 ASSESSMENT — PAIN - FUNCTIONAL ASSESSMENT: PAIN_FUNCTIONAL_ASSESSMENT: 0-10

## 2025-01-01 NOTE — DISCHARGE INSTRUCTIONS
REASON FOR ADMISSION & BRIEF DESCRIPTION OF HOSPITAL STAY:   Dear Ms. Traylor,   You presented to [facility] with a chief complaint of kidney stones.  We consulted urology who did not recommend acute intervention.  Will also consulted nephrology, who recommended diet change, start with a medication, and outpatient follow-up with urine electrolytes.  You have been medically stable since admission, and we will discharge her home today.      The central scheduling line is [phone] if you do not hear from one of these services or if you need to reschedule.  If you have any worsening symptoms including shortness of breath, chest pain, severe back pain, nausea or vomiting, bloody urine, or extremely high blood sugars please do not hesitate to go to the emergency room.       HOW TO TAKE CARE OF YOURSELF AFTER DISCHARGE:  - Please follow-up with your doctors appointment and take medications as prescribed.   - Please continue to have low-salt, less animal meat diet, and drink enough water to keep yourself hydrated, which helps to expulse the kidney stone.   - Please follow up with the lab for 24hr urine electrolytes, please see the instructions below.  - Please follow up with your PCP in 1-2 weeks regarding the liver hemangioma noted on CT abdomen on 12/29, as well as a recent hospital stay.      The collection process typically includes the following steps:  1. The patient begins by urinating into the toilet and noting the time.  2. For the next 24 hours, all urine is collected in a special container provided by the healthcare provider.  3. The container is stored in a refrigerator or cooler with ice during the collection period.  4. At the end of the 24-hour period, the patient collects one final urine sample and returns the container to the healthcare provider or laboratory for analysis.    Medication Changes:   Medications started: Potassium citrate  Medications stopped:  None      Appointments/Follow-up:  Future Appointments   Date Time Provider Department Center   1/6/2025  2:20 PM Richy Raman MD [id number] [facility]   1/21/2025  9:00 AM GEA CT 1 [facility] [place] [facility]   1/30/2025  2:20 PM Sanford Arredondo MD [id number] [facility]   2/17/2025 10:30 AM Jelena Romo DO [id number] [facility]   2/19/2025  1:00 PM Brionna Douglas DPM [id number] [facility]   3/28/2025  9:30 AM Mary Kate Leon MD [id number] [facility]   4/11/2025 11:20 AM Hortencia Sotomayor, APRN-CNP [id number] [facility]          Please follow up with:    Pulmonology, urology, nephrology, PCP, podiatry, GI, cardiology      Sincerely,  Your  Care Team

## 2025-01-01 NOTE — PROGRESS NOTES
Farida Traylor is a 64 y.o. female on day 1 of admission presenting with Kidney stone.      Subjective   No acute events overnight. Pt reported headache wearing cpap mask. Had 2 loose stools overnight. Elevated BP but otherwise vitally stable. Denied SOB, sputum production, cough, n/v/d/c.        Objective     Last Recorded Vitals  /73   Pulse 60   Temp 36.5 °C (97.7 °F)   Resp 18   Wt 132 kg (291 lb 0.1 oz)   SpO2 94%   Intake/Output last 3 Shifts:    Intake/Output Summary (Last 24 hours) at 1/1/2025 0912  Last data filed at 12/31/2024 1345  Gross per 24 hour   Intake 348.33 ml   Output --   Net 348.33 ml       Admission Weight  Weight: 132 kg (291 lb 0.1 oz) (12/31/24 0451)    Daily Weight  12/31/24 : 132 kg (291 lb 0.1 oz)    Image Results  Transthoracic Echo (TTE) Complete     [facility], [address] [place] [address]                Tel [phone] and Fax [phone]    TRANSTHORACIC ECHOCARDIOGRAM REPORT       Patient Name:       MS. FARIDA SALVADOR         Reading Physician:    [id number] Campos Dalal MD  Study Date:         12/26/2024          Ordering Provider:    [id number] DELGADO WOMACK  MRN/PID:            [id number]            Fellow:  Accession#:         [id number]        Nurse:                Jordyn Gamino RN  Date of Birth/Age:  1960 / 64 years Sonographer:          Charlotte Rasmussen RDCS  Gender assigned at  F                   Additional Staff:  Birth:  Height:             157.48 cm           Admit Date:  Weight:             132.00 kg           Admission Status:     Outpatient  BSA / BMI:          2.24 m2 / 53.22     Encounter#:           [id number]                      kg/m2  Blood Pressure:     137/69 mmHg         Department Location:  [facility]                                                                 Invasive    Study Type:    TRANSTHORACIC ECHO (TTE) COMPLETE  Diagnosis/ICD: Primary pulmonary hypertension-I27.0  Indication:    Idiopathic PAH  CPT Code:      Echo Complete w Full Doppler-[id number]    Patient History:  Pertinent History: CVA, Dyspnea and LE Edema. MARLON.    Study Detail: The following Echo studies were performed: 2D, M-Mode, Doppler and                color flow. Technically challenging study due to body habitus.                Definity used as a contrast agent for endocardial border                definition. Total contrast used for this procedure was 2.5 mL via                IV push. The patient was awake.       PHYSICIAN INTERPRETATION:  Left Ventricle: The left ventricular systolic function is normal, with a Giron's biplane calculated ejection fraction of 65%. There are no regional left ventricular wall motion abnormalities. The left ventricular cavity size is normal. There is normal septal and normal posterior left ventricular wall thickness. Spectral Doppler shows a normal pattern of left ventricular diastolic filling. Subtle D shaped septum in systole suggesting right ventricular pressure overload.  Left Atrium: The left atrium is mildly dilated.  Right Ventricle: The right ventricle is normal in size. There is normal right ventricular global systolic function. Although RV dimensions are normal by measurement, visually the RV appears mildly dilated.  Right Atrium: The right atrium is moderately dilated.  Aortic Valve: The aortic valve is probably trileaflet. The aortic valve dimensionless index is 0.77. There is no evidence of aortic valve regurgitation. The peak instantaneous gradient of the aortic valve is 18 mmHg. The mean gradient of the aortic valve is 9 mmHg.  Mitral Valve: The mitral valve is normal in structure. There is trace mitral valve regurgitation.  Tricuspid Valve: The tricuspid valve is structurally normal. There is trace tricuspid regurgitation. The  Doppler estimated RVSP is mildly elevated at 49.9 mmHg.  Pulmonic Valve: The pulmonic valve is structurally normal. There is trace pulmonic valve regurgitation.  Pericardium: There is no pericardial effusion noted.  Aorta: The aortic root was not well visualized. There is no dilatation of the aortic root.  Systemic Veins: The inferior vena cava appears normal in size, with IVC inspiratory collapse less than 50%.       CONCLUSIONS:   1. The left ventricular systolic function is normal, with a Giron's biplane calculated ejection fraction of 65%.   2. Subtle D shaped septum in systole suggesting right ventricular pressure overload.   3. There is normal right ventricular global systolic function.   4. Although RV dimensions are normal by measurement, visually the RV appears mildly dilated.   5. The left atrium is mildly dilated.   6. The right atrium is moderately dilated.   7. Mildly elevated right ventricular systolic pressure.    QUANTITATIVE DATA SUMMARY:     2D MEASUREMENTS:          Normal Ranges:  IVSd:            0.76 cm  (0.6-1.1cm)  LVPWd:           0.81 cm  (0.6-1.1cm)  LVIDd:           4.74 cm  (3.9-5.9cm)  LVIDs:           3.13 cm  LV Mass Index:   54 g/m2  LVEDV Index:     72 ml/m2  LV % FS          33.9 %       LA VOLUME:                    Normal Ranges:  LA Vol A4C:        85.2 ml    (22+/-6mL/m2)  LA Vol A2C:        81.2 ml  LA Vol BP:         85.5 ml  LA Vol Index A4C:  38.0 ml/m2  LA Vol Index A2C:  36.2 ml/m2  LA Vol Index BP:   38.2 ml/m2  LA Area A4C:       26.3 cm2  LA Area A2C:       26.4 cm2  LA Major Axis A4C: 6.9 cm  LA Major Axis A2C: 7.3 cm  LA Volume Index:   38.1 ml/m2  LA Vol A4C:        81.6 ml  LA Vol A2C:        80.3 ml  LA Vol Index BSA:  36.1 ml/m2       RA VOLUME BY A/L METHOD:          Normal Ranges:  RA Area A4C:             24.4 cm2       M-MODE MEASUREMENTS:         Normal Ranges:  Ao Root:             2.60 cm (2.0-3.7cm)  LAs:                 4.94 cm (2.7-4.0cm)       AORTA  MEASUREMENTS:         Normal Ranges:  Ao Sinus, d:        2.90 cm (2.1-3.5cm)  Asc Ao, d:          3.20 cm (2.1-3.4cm)       LV SYSTOLIC FUNCTION BY 2D PLANIMETRY (MOD):                       Normal Ranges:  EF-A4C View:    67 % (>=55%)  EF-A2C View:    63 %  EF-Biplane:     65 %  LV EF Reported: 65 %       LV DIASTOLIC FUNCTION:             Normal Ranges:  MV Peak E:             0.76 m/s    (0.7-1.2 m/s)  MV Peak A:             0.62 m/s    (0.42-0.7 m/s)  E/A Ratio:             1.23        (1.0-2.2)  MV e'                  0.130 m/s   (>8.0)  MV lateral e'          0.13 m/s  MV medial e'           0.13 m/s  MV A Dur:              140.82 msec  E/e' Ratio:            5.86        (<8.0)  PulmV Sys Marty:         19.42 cm/s  PulmV Epstein Marty:        14.58 cm/s  PulmV S/D Marty:         1.33  PulmV A Revs Marty:      20.88 cm/s  PulmV A Revs Dur:      123.69 msec       MITRAL VALVE:          Normal Ranges:  MV DT:        248 msec (150-240msec)       AORTIC VALVE:                      Normal Ranges:  AoV Vmax:                2.13 m/s  (<=1.7m/s)  AoV Peak P.1 mmHg (<20mmHg)  AoV Mean P.3 mmHg  (1.7-11.5mmHg)  LVOT Max Marty:            1.53 m/s  (<=1.1m/s)  AoV VTI:                 42.84 cm  (18-25cm)  LVOT VTI:                32.95 cm  LVOT Diameter:           2.00 cm   (1.8-2.4cm)  AoV Area, VTI:           2.42 cm2  (2.5-5.5cm2)  AoV Area,Vmax:           2.26 cm2  (2.5-4.5cm2)  AoV Dimensionless Index: 0.77       RIGHT VENTRICLE:  RV Basal 3.90 cm  RV Mid   2.30 cm  RV Major 8.9 cm  TAPSE:   29.0 mm  RV s'    0.15 m/s       TRICUSPID VALVE/RVSP:          Normal Ranges:  Peak TR Velocity:     3.43 m/s  RV Syst Pressure:     50 mmHg  (< 30mmHg)  IVC Diam:             2.05 cm       PULMONIC VALVE:           Normal Ranges:  PV Max Marty:     1.8 m/s   (0.6-0.9m/s)  PV Max P.9 mmHg       Pulmonary Veins:  PulmV A Revs Dur: 123.69 msec  PulmV A Revs Marty: 20.88 cm/s  PulmV Epstein Marty:   14.58  cm/s  PulmV S/D Marty:    1.33  PulmV Sys Marty:    19.42 cm/s       AORTA:  Asc Ao Diam 3.23 cm       [id number] Campos Dalal MD  Electronically signed on 12/31/2024 at 12:25:39 PM       ** Final **      Physical Exam    PHYSICAL EXAM:  General: awake, alert, sitting on the bed, large body habitus   HEENT: pupils equal and round, no scleral icterus or conjunctivitis  Skin: no suspect lesions or rashes noted on visible skin  Chest: decreased breath sounds b/l, normal respiratory effort, on 4L NC  Cardiac: regular rate and rhythm, normal s1, s2   Abdomen: soft, ND, NT, no involuntary guarding, no CVA tenderness   : no flank pain or indwelling urinary catheter  EXT: 2+ pitting edema on LE b/l  Neuro: AOx4, moving all limbs spontaneously, follows commands  Psych: coherent thought process, appropriate mood and affect      Relevant Results      Results for orders placed or performed during the hospital encounter of 12/31/24 (from the past 24 hours)   Uric Acid   Result Value Ref Range    Uric Acid 4.5 2.3 - 6.7 mg/dL   PTH, Intact   Result Value Ref Range    Parathyroid Hormone, Intact 59.9 18.5 - 88.0 pg/mL   CBC and Auto Differential   Result Value Ref Range    WBC 4.4 4.4 - 11.3 x10*3/uL    nRBC 0.0 0.0 - 0.0 /100 WBCs    RBC 4.43 4.00 - 5.20 x10*6/uL    Hemoglobin 12.9 12.0 - 16.0 g/dL    Hematocrit 39.3 36.0 - 46.0 %    MCV 89 80 - 100 fL    MCH 29.1 26.0 - 34.0 pg    MCHC 32.8 32.0 - 36.0 g/dL    RDW 14.6 (H) 11.5 - 14.5 %    Platelets 184 150 - 450 x10*3/uL    Neutrophils % 62.0 40.0 - 80.0 %    Immature Granulocytes %, Automated 0.2 0.0 - 0.9 %    Lymphocytes % 25.0 13.0 - 44.0 %    Monocytes % 6.8 2.0 - 10.0 %    Eosinophils % 5.5 0.0 - 6.0 %    Basophils % 0.5 0.0 - 2.0 %    Neutrophils Absolute 2.73 1.20 - 7.70 x10*3/uL    Immature Granulocytes Absolute, Automated 0.01 0.00 - 0.70 x10*3/uL    Lymphocytes Absolute 1.10 (L) 1.20 - 4.80 x10*3/uL    Monocytes Absolute 0.30 0.10 - 1.00 x10*3/uL    Eosinophils Absolute  0.24 0.00 - 0.70 x10*3/uL    Basophils Absolute 0.02 0.00 - 0.10 x10*3/uL     *Note: Due to a large number of results and/or encounters for the requested time period, some results have not been displayed. A complete set of results can be found in Results Review.                 Assessment/Plan          Farida Traylor is a 64 y.o. female with PMH of pulmonary hypertension (grade II/III) with chronic hypoxic respiratory failure(3-4 L NC during the day and 2 L NC at night), obesity (BMI of 55), complex MARLON (ASV), asthma (PFT March 2024,  FEV1 71%) and multiple kidney stones presenting with a new kidney stone.    01/01/25 Update    - Nephro recs serum uric acid, 24hr lytes, stone analysis, iPTH, K citrate 15meq BID, low salt/meat diet  - pending PT/OT eval   - Urine Cx neg  - dis/cont CTX today 1/1/25       #Acute kidney stone  #History of UTIs  :: Urology at OSH recommended a procedure  :: UA with hematuria, 8 mm stone seen on CT  :: Urology consulted, doesn't rec any urologic intervention, favors medical expulsive therapy (start Flomax, pain control, antiemetics, hydration)   :: consulted nephro, recs labs, diet change and start K citrate  :: serum uric acid wnl, iPTH wnl       Plan  - pending 24 hr urine lytes and stone analysis   - Methanamine hipurate 1 gTID  - Zofran PRN  -Toradol Q6H PRN  - restarted ceftriaxone 1g daily 12/31, stopped today 1/1     #MARLON  -Continue CPAP at night     #Pulmonary Hypertension, Grade II/III  #Chronic hypoxic respiratory failure  -Last heart cath Jan 2024: mPAP of 31, PAOP of 10, PVR of 3.1 and CO of 6.7  -Last echo 12/26, wnl overall   -Continue 3-4  L NC during the day and 2 L NC at night  -Continue macitentan 10 mg daily, and riociguat 2.5 mg TID  -LR dis/cont   -on continuous pulse ox   -on CPAP at night      #Bladder spasms  -At home is on Tropsium 20 mg Bid  -Oxybutynin 5 mg BID     #Anxiety  #Depression  -Sertraline 200 mg   -Buproprion 150 mg BID  -Melatonin 3  mg  -Hydoxyzine 50 mg Q6H PRN anxieety     #Asthma  -Montelukast 10 mg  -Breo-Ellipta 1 puff daily (at home on Advair 250-50)     #Hypertension  -Hydralazine 50 mg BID     #Dyslipidemia  #Obesity  -Simvastatin 80 mg daily  -Aspirin 325 mg daily     Diet: low salt, less meat diet   DVT: Lovenox 60 mg BID  Code: Full Code  NOK: Briseida Craig ([phone])    Benjamín Martino MD  PGY-1 Neurology

## 2025-01-01 NOTE — CARE PLAN
The clinical goals for the shift include Pt will remain free from falls or injury throughout the shift by 01/01/2025 at 0700.    Over the shift, the patient did make progress toward the goals.

## 2025-01-01 NOTE — HOSPITAL COURSE
Farida Tarylor is a 64 y.o. female with PMH of pulmonary hypertension with chronic hypoxic respiratory failure(3-4 L NC during the day and 2 L NC at night), obesity (BMI of 55), complex MARLON (ASV), asthma (PFT March 2024) and multiple kidney stones presenting with a new kidney stone.     For her pulmonary hypertension, she follows with Dr. Richy Raman. Her last heart cath was on Jan 2024, which noted a mPAP of 31, PAOP of 10, PVR of 3.1 and CO of 6.7. She had a TTE on 12/26 and read isn't back. She is on macitentan and riociguat. She had a history of a 5 mm non-obstructing right renal calculus in 2021 and a 9 mm non-obstructing right renal calculus in 2019.She reports that two days ago she had acute lower back pain and bladder spasms. She denies any fever, chills, chest pain or shortness of breath. She had some episodic nausea, but no vomiting.      She came in to [facility] on 12/29. On admission, she was afebrile with a HR o f71, RR of 22, and BP of 150/109. CT A/P noted moderate right hydroureteronephrosis with obstructing 8 mm stone in distal right ureter and a non-obstructing right posterior intrarenal calculus. She also reported some chest pain in the ED, but resolved on its own.  Urology consulted, and recommended operative intervention but concerned for perioperative risk given obesity/MARLON/pulmonary hypertension. Of note, she had been cleared by her pulm doctor for an endoscopy in the weeks prior. She was initially started on Ceftriaxone for coverage of a UTI and toradol for pain control.    At [facility], urology was consulted, which recommended no acute intervention and deferred to nephrology consult. CT abdomen w/o contrast showed a hemangioma in the liver, which the pt will follow up with her PCP after discharge. Patient was started with tamsulosin 0.5 mg and Toradol Q6 as needed.  Per nephrology, patient is stable to be discharged and to follow-up as outpatient.  Intact PTH and uric acid are both normal.   Patient was also started with potassium citrate 15 mEq twice daily.  Urine culture was negative. 24 hr urine lytes were all within normal limits. Given the contamination, pt will repeat the lab as outpatient. Patient is medically stable to be discharged.

## 2025-01-02 ENCOUNTER — DOCUMENTATION (OUTPATIENT)
Dept: HOME HEALTH SERVICES | Facility: HOME HEALTH | Age: 65
End: 2025-01-02
Payer: COMMERCIAL

## 2025-01-02 ENCOUNTER — HOME HEALTH ADMISSION (OUTPATIENT)
Dept: HOME HEALTH SERVICES | Facility: HOME HEALTH | Age: 65
End: 2025-01-02
Payer: COMMERCIAL

## 2025-01-02 ENCOUNTER — PHARMACY VISIT (OUTPATIENT)
Dept: PHARMACY | Facility: CLINIC | Age: 65
End: 2025-01-02
Payer: COMMERCIAL

## 2025-01-02 VITALS
WEIGHT: 291.01 LBS | DIASTOLIC BLOOD PRESSURE: 69 MMHG | HEART RATE: 57 BPM | TEMPERATURE: 97.3 F | RESPIRATION RATE: 20 BRPM | BODY MASS INDEX: 54.94 KG/M2 | SYSTOLIC BLOOD PRESSURE: 135 MMHG | HEIGHT: 61 IN | OXYGEN SATURATION: 94 %

## 2025-01-02 LAB
ALBUMIN SERPL BCP-MCNC: 3.9 G/DL (ref 3.4–5)
ANION GAP SERPL CALC-SCNC: 13 MMOL/L (ref 10–20)
BASOPHILS # BLD AUTO: 0.03 X10*3/UL (ref 0–0.1)
BASOPHILS NFR BLD AUTO: 0.7 %
BUN SERPL-MCNC: 11 MG/DL (ref 6–23)
CALCIUM SERPL-MCNC: 8.8 MG/DL (ref 8.6–10.6)
CHLORIDE (MMOL/24HR) IN 24 HOUR URINE: 134 MMOL/24HR (ref 110–250)
CHLORIDE 24H UR-SCNC: 100 MMOL/L
CHLORIDE SERPL-SCNC: 105 MMOL/L (ref 98–107)
CO2 SERPL-SCNC: 28 MMOL/L (ref 21–32)
COLLECT DURATION TIME SPEC: 24 HRS
CREAT SERPL-MCNC: 0.8 MG/DL (ref 0.5–1.05)
EGFRCR SERPLBLD CKD-EPI 2021: 81 ML/MIN/1.73M*2
EOSINOPHIL # BLD AUTO: 0.23 X10*3/UL (ref 0–0.7)
EOSINOPHIL NFR BLD AUTO: 5.1 %
ERYTHROCYTE [DISTWIDTH] IN BLOOD BY AUTOMATED COUNT: 14.9 % (ref 11.5–14.5)
GLUCOSE SERPL-MCNC: 96 MG/DL (ref 74–99)
HCT VFR BLD AUTO: 38.5 % (ref 36–46)
HGB BLD-MCNC: 12.5 G/DL (ref 12–16)
IMM GRANULOCYTES # BLD AUTO: 0.02 X10*3/UL (ref 0–0.7)
IMM GRANULOCYTES NFR BLD AUTO: 0.4 % (ref 0–0.9)
LYMPHOCYTES # BLD AUTO: 1.13 X10*3/UL (ref 1.2–4.8)
LYMPHOCYTES NFR BLD AUTO: 25.1 %
MAGNESIUM SERPL-MCNC: 1.84 MG/DL (ref 1.6–2.4)
MCH RBC QN AUTO: 29.1 PG (ref 26–34)
MCHC RBC AUTO-ENTMCNC: 32.5 G/DL (ref 32–36)
MCV RBC AUTO: 90 FL (ref 80–100)
MONOCYTES # BLD AUTO: 0.39 X10*3/UL (ref 0.1–1)
MONOCYTES NFR BLD AUTO: 8.7 %
NEUTROPHILS # BLD AUTO: 2.7 X10*3/UL (ref 1.2–7.7)
NEUTROPHILS NFR BLD AUTO: 60 %
NRBC BLD-RTO: 0 /100 WBCS (ref 0–0)
PHOSPHATE SERPL-MCNC: 4 MG/DL (ref 2.5–4.9)
PLATELET # BLD AUTO: 190 X10*3/UL (ref 150–450)
POTASSIUM 24H UR-SCNC: 21 MMOL/L
POTASSIUM 24H UR-SRATE: 28 MMOL/24 H (ref 25–125)
POTASSIUM SERPL-SCNC: 3.7 MMOL/L (ref 3.5–5.3)
RBC # BLD AUTO: 4.3 X10*6/UL (ref 4–5.2)
SODIUM 24H UR-SCNC: 83 MMOL/L
SODIUM 24H UR-SRATE: 111 MMOL/24 H (ref 80–220)
SODIUM SERPL-SCNC: 142 MMOL/L (ref 136–145)
SPECIMEN VOL 24H UR: 1336 ML
WBC # BLD AUTO: 4.5 X10*3/UL (ref 4.4–11.3)

## 2025-01-02 PROCEDURE — RXMED WILLOW AMBULATORY MEDICATION CHARGE

## 2025-01-02 PROCEDURE — 97165 OT EVAL LOW COMPLEX 30 MIN: CPT | Mod: GO

## 2025-01-02 PROCEDURE — 94640 AIRWAY INHALATION TREATMENT: CPT

## 2025-01-02 PROCEDURE — 94660 CPAP INITIATION&MGMT: CPT

## 2025-01-02 PROCEDURE — 2500000002 HC RX 250 W HCPCS SELF ADMINISTERED DRUGS (ALT 637 FOR MEDICARE OP, ALT 636 FOR OP/ED)

## 2025-01-02 PROCEDURE — 97161 PT EVAL LOW COMPLEX 20 MIN: CPT | Mod: GP

## 2025-01-02 PROCEDURE — 83735 ASSAY OF MAGNESIUM: CPT

## 2025-01-02 PROCEDURE — 99239 HOSP IP/OBS DSCHRG MGMT >30: CPT

## 2025-01-02 PROCEDURE — 2500000004 HC RX 250 GENERAL PHARMACY W/ HCPCS (ALT 636 FOR OP/ED)

## 2025-01-02 PROCEDURE — 2500000001 HC RX 250 WO HCPCS SELF ADMINISTERED DRUGS (ALT 637 FOR MEDICARE OP)

## 2025-01-02 PROCEDURE — 2500000001 HC RX 250 WO HCPCS SELF ADMINISTERED DRUGS (ALT 637 FOR MEDICARE OP): Performed by: STUDENT IN AN ORGANIZED HEALTH CARE EDUCATION/TRAINING PROGRAM

## 2025-01-02 PROCEDURE — 84100 ASSAY OF PHOSPHORUS: CPT

## 2025-01-02 PROCEDURE — 36415 COLL VENOUS BLD VENIPUNCTURE: CPT

## 2025-01-02 PROCEDURE — 85025 COMPLETE CBC W/AUTO DIFF WBC: CPT

## 2025-01-02 PROCEDURE — 81050 URINALYSIS VOLUME MEASURE: CPT | Performed by: STUDENT IN AN ORGANIZED HEALTH CARE EDUCATION/TRAINING PROGRAM

## 2025-01-02 PROCEDURE — 2500000005 HC RX 250 GENERAL PHARMACY W/O HCPCS

## 2025-01-02 RX ORDER — POTASSIUM CITRATE 15 MEQ/1
15 TABLET, EXTENDED RELEASE ORAL
Qty: 60 TABLET | Refills: 2 | Status: SHIPPED | OUTPATIENT
Start: 2025-01-02 | End: 2025-01-02

## 2025-01-02 RX ORDER — CALCIUM CARBONATE 500(1250)
1250 TABLET ORAL EVERY MORNING
Status: DISCONTINUED | OUTPATIENT
Start: 2025-01-02 | End: 2025-01-02 | Stop reason: HOSPADM

## 2025-01-02 RX ORDER — HYDRALAZINE HYDROCHLORIDE 50 MG/1
50 TABLET, FILM COATED ORAL 3 TIMES DAILY
Qty: 180 TABLET | Refills: 0 | Status: SHIPPED | OUTPATIENT
Start: 2025-01-02 | End: 2025-01-02 | Stop reason: HOSPADM

## 2025-01-02 RX ORDER — HYDRALAZINE HYDROCHLORIDE 50 MG/1
50 TABLET, FILM COATED ORAL 2 TIMES DAILY
Qty: 60 TABLET | Refills: 1 | Status: SHIPPED | OUTPATIENT
Start: 2025-01-02 | End: 2025-01-02

## 2025-01-02 RX ORDER — LORAZEPAM 0.5 MG/1
0.5 TABLET ORAL ONCE
Status: COMPLETED | OUTPATIENT
Start: 2025-01-02 | End: 2025-01-02

## 2025-01-02 RX ORDER — HYDRALAZINE HYDROCHLORIDE 50 MG/1
50 TABLET, FILM COATED ORAL 2 TIMES DAILY
Qty: 60 TABLET | Refills: 1 | Status: SHIPPED | OUTPATIENT
Start: 2025-01-02 | End: 2025-03-03

## 2025-01-02 RX ORDER — POTASSIUM CITRATE 15 MEQ/1
15 TABLET, EXTENDED RELEASE ORAL
Qty: 60 TABLET | Refills: 2 | Status: SHIPPED | OUTPATIENT
Start: 2025-01-02 | End: 2025-04-02

## 2025-01-02 RX ADMIN — MEMANTINE 10 MG: 10 TABLET ORAL at 09:17

## 2025-01-02 RX ADMIN — LORAZEPAM 0.5 MG: 0.5 TABLET ORAL at 01:26

## 2025-01-02 RX ADMIN — POTASSIUM CITRATE 15 MEQ: 5 TABLET ORAL at 01:26

## 2025-01-02 RX ADMIN — HYDRALAZINE HYDROCHLORIDE 50 MG: 25 TABLET ORAL at 09:09

## 2025-01-02 RX ADMIN — LUBIPROSTONE 8 MCG: 8 CAPSULE, GELATIN COATED ORAL at 09:09

## 2025-01-02 RX ADMIN — OXYBUTYNIN CHLORIDE 5 MG: 5 TABLET ORAL at 09:09

## 2025-01-02 RX ADMIN — OXYBUTYNIN CHLORIDE 5 MG: 5 TABLET ORAL at 16:15

## 2025-01-02 RX ADMIN — PANTOPRAZOLE SODIUM 40 MG: 40 TABLET, DELAYED RELEASE ORAL at 05:50

## 2025-01-02 RX ADMIN — SERTRALINE HYDROCHLORIDE 200 MG: 100 TABLET ORAL at 09:09

## 2025-01-02 RX ADMIN — BUPROPION HYDROCHLORIDE 150 MG: 150 TABLET, FILM COATED, EXTENDED RELEASE ORAL at 09:09

## 2025-01-02 RX ADMIN — TAMSULOSIN HYDROCHLORIDE 0.4 MG: 0.4 CAPSULE ORAL at 09:09

## 2025-01-02 RX ADMIN — MONTELUKAST 10 MG: 10 TABLET, FILM COATED ORAL at 09:17

## 2025-01-02 RX ADMIN — CYANOCOBALAMIN TAB 1000 MCG 1000 MCG: 1000 TAB at 09:09

## 2025-01-02 RX ADMIN — MACITENTAN 10 MG: 10 TABLET, FILM COATED ORAL at 09:09

## 2025-01-02 RX ADMIN — ENOXAPARIN SODIUM 60 MG: 60 INJECTION SUBCUTANEOUS at 09:08

## 2025-01-02 RX ADMIN — RIOCIGUAT 2.5 MG: 2 TABLET, FILM COATED ORAL at 16:15

## 2025-01-02 RX ADMIN — ASPIRIN 325 MG ORAL TABLET 325 MG: 325 PILL ORAL at 09:09

## 2025-01-02 RX ADMIN — METHENAMINE HIPPURATE 1 G: 1000 TABLET ORAL at 09:08

## 2025-01-02 RX ADMIN — RIOCIGUAT 2.5 MG: 2 TABLET, FILM COATED ORAL at 09:09

## 2025-01-02 RX ADMIN — POTASSIUM CITRATE 15 MEQ: 5 TABLET ORAL at 09:08

## 2025-01-02 RX ADMIN — CALCIUM 1250 MG: 500 TABLET ORAL at 11:48

## 2025-01-02 RX ADMIN — FLUTICASONE FUROATE AND VILANTEROL TRIFENATATE 1 PUFF: 200; 25 POWDER RESPIRATORY (INHALATION) at 07:42

## 2025-01-02 RX ADMIN — LUBIPROSTONE 8 MCG: 8 CAPSULE, GELATIN COATED ORAL at 16:15

## 2025-01-02 RX ADMIN — Medication 3 L/MIN: at 07:44

## 2025-01-02 RX ADMIN — BUSPIRONE HYDROCHLORIDE 30 MG: 15 TABLET ORAL at 09:09

## 2025-01-02 ASSESSMENT — PAIN SCALES - GENERAL: PAINLEVEL_OUTOF10: 0 - NO PAIN

## 2025-01-02 ASSESSMENT — PAIN - FUNCTIONAL ASSESSMENT
PAIN_FUNCTIONAL_ASSESSMENT: 0-10
PAIN_FUNCTIONAL_ASSESSMENT: 0-10

## 2025-01-02 ASSESSMENT — COGNITIVE AND FUNCTIONAL STATUS - GENERAL
DRESSING REGULAR LOWER BODY CLOTHING: A LITTLE
MOBILITY SCORE: 23
DRESSING REGULAR UPPER BODY CLOTHING: A LITTLE
MOBILITY SCORE: 21
DAILY ACTIVITIY SCORE: 20
DRESSING REGULAR LOWER BODY CLOTHING: A LITTLE
HELP NEEDED FOR BATHING: A LITTLE
MOVING FROM LYING ON BACK TO SITTING ON SIDE OF FLAT BED WITH BEDRAILS: A LITTLE
TURNING FROM BACK TO SIDE WHILE IN FLAT BAD: A LITTLE
HELP NEEDED FOR BATHING: A LITTLE
DAILY ACTIVITIY SCORE: 21
TOILETING: A LITTLE
CLIMB 3 TO 5 STEPS WITH RAILING: A LITTLE
CLIMB 3 TO 5 STEPS WITH RAILING: A LITTLE
TOILETING: A LITTLE

## 2025-01-02 ASSESSMENT — ACTIVITIES OF DAILY LIVING (ADL)
ADL_ASSISTANCE: INDEPENDENT
ADL_ASSISTANCE: INDEPENDENT
BATHING_ASSISTANCE: MINIMAL

## 2025-01-02 NOTE — CARE PLAN
The clinical goals for the shift include Pt will remain free from falls or injury throughout the shift.    Over the shift, the patient did make progress toward the goals.

## 2025-01-02 NOTE — PROGRESS NOTES
01/02/25 1327   Discharge Planning   Home or Post Acute Services In home services   Type of Home Care Services Home OT;Home PT   Expected Discharge Disposition Home H  (HC)     Transitional Care Coordination Discharge Planning Note:  TCC made aware patient medically ready for dischrage 1/2/25  HC order placed TCC notified [facility] of discharge  HC soc 1/4 or 1/5    ALEXSANDRA Atkins-RN  Transitional Care Coordinator (TCC)  [phone] ext [phone]

## 2025-01-02 NOTE — HH CARE COORDINATION
Home Care received a Referral for Physical Therapy and Occupational Therapy. We have processed the referral for a Start of Care on 1/4/25.     If you have any questions or concerns, please feel free to contact us at [phone]. Follow the prompts, enter your five digit zip code, and you will be directed to your care team on EAST 2.

## 2025-01-02 NOTE — DISCHARGE SUMMARY
Discharge Diagnosis  Kidney stone    Issues Requiring Follow-Up  Pulmonology, urology, nephrology, PCP, podiatry, GI, cardiology  Outpatient lab for 24hr urine electrolytes   PCP for hemangioma in the liver noted on CT abdomen on 12/29/24    Discharge Meds     Medication List      START taking these medications     oxygen gas therapy; Commonly known as: O2; Inhale 1 each continuously.   potassium citrate CR 15 mEq ER tablet; Commonly known as: Urocit-K-15;   Take 1 tablet (15 mEq) by mouth 2 times daily (morning and late   afternoon). Do not crush, chew, or split.     CHANGE how you take these medications     hydrALAZINE 50 mg tablet; Commonly known as: Apresoline; Take 1 tablet   (50 mg) by mouth 2 times a day.; What changed: additional instructions     CONTINUE taking these medications     Adempas 2.5 mg tablet; Generic drug: riociguat; TAKE 1 TABLET BY MOUTH 3   TIMES A DAY   albuterol 90 mcg/actuation inhaler; INHALE 1 TO 2 PUFFS BY MOUTH EVERY 4   TO 6 HOURS AS NEEDED   aspirin 325 mg tablet   blood pressure monitor kit; Commonly known as: Blood Pressure Kit; 1 kit   if needed (take BP as needed).   buPROPion  mg 12 hr tablet; Commonly known as: Wellbutrin SR   busPIRone 30 mg tablet; Commonly known as: Buspar   CALCIUM 600 ORAL   cyclobenzaprine 10 mg tablet; Commonly known as: Flexeril; Take 1 tablet   (10 mg) by mouth 3 times a day as needed for muscle spasms.   docusate sodium 100 mg capsule; Commonly known as: Colace; Take 1   capsule (100 mg) by mouth 2 times a day.   enoxaparin 60 mg/0.6 mL syringe; Commonly known as: Lovenox; Inject 0.6   mL (60 mg) under the skin every 12 hours.   ferrous sulfate 325 (65 Fe) MG EC tablet   fluticasone propion-salmeteroL 250-50 mcg/dose diskus inhaler; Commonly   known as: Advair Diskus; INHALE ONE (1) PUFF BY MOUTH TWICE DAILY. RINSE   MOUTH OUT AFTER EACH USE.   hydrOXYzine pamoate 50 mg capsule; Commonly known as: Vistaril   ketorolac 15 mg/mL injection;  Commonly known as: Toradol; Infuse 1 mL   (15 mg) into a venous catheter every 6 hours for 19 doses.   lubiprostone 8 mcg capsule; Commonly known as: Amitiza; Take 1 capsule   (8 mcg) by mouth 2 times a day with meals.   memantine 10 mg tablet; Commonly known as: Namenda   methenamine hippurate 1 gram tablet; Commonly known as: Hiprex; TAKE 1   TABLET BY MOUTH TWICE DAILY   montelukast 10 mg tablet; Commonly known as: Singulair; TAKE 1 TABLET BY   MOUTH ONCE DAILY   omeprazole 40 mg DR capsule; Commonly known as: PriLOSEC; Take 1 capsule   (40 mg) by mouth once daily in the morning. Take before meals.   Opsumit 10 mg tablet tablet; Generic drug: macitentan; TAKE 1 TABLET BY   MOUTH DAILY. DO NOT HANDLE IF PREGNANT. DO NOT SPLIT, CRUSH, OR CHEW.   REVIEW MEDICATION GUIDE.   sertraline 100 mg tablet; Commonly known as: Zoloft   simvastatin 80 mg tablet; Commonly known as: Zocor; TAKE 1 TABLET BY   MOUTH ONCE DAILY AT BEDTIME   tamsulosin 0.4 mg 24 hr capsule; Commonly known as: Flomax; Take 1   capsule (0.4 mg) by mouth once daily.   trospium 20 mg tablet; Commonly known as: Sanctura; Take 1 tablet (20   mg) by mouth 2 times a day.   Vitamin B-12 1,000 mcg tablet; Generic drug: cyanocobalamin     STOP taking these medications     magnesium oxide 400 mg (241.3 mg magnesium) tablet; Commonly known as:   Mag-Ox   piperacillin-tazobactam 3.375 gram/50 mL IV; Commonly known as: Zosyn       Test Results Pending At Discharge  Pending Labs       Order Current Status    Stone analysis Collected (01/02/25 0651)            Hospital Course  Farida Traylor is a 64 y.o. female with PMH of pulmonary hypertension with chronic hypoxic respiratory failure(3-4 L NC during the day and 2 L NC at night), obesity (BMI of 55), complex MARLON (ASV), asthma (PFT March 2024) and multiple kidney stones presenting with a new kidney stone.     For her pulmonary hypertension, she follows with Dr. Richy Raman. Her last heart cath was on Jan 2024,  which noted a mPAP of 31, PAOP of 10, PVR of 3.1 and CO of 6.7. She had a TTE on 12/26 and read isn't back. She is on macitentan and riociguat. She had a history of a 5 mm non-obstructing right renal calculus in 2021 and a 9 mm non-obstructing right renal calculus in 2019.She reports that two days ago she had acute lower back pain and bladder spasms. She denies any fever, chills, chest pain or shortness of breath. She had some episodic nausea, but no vomiting.      She came in to [facility] on 12/29. On admission, she was afebrile with a HR o f71, RR of 22, and BP of 150/109. CT A/P noted moderate right hydroureteronephrosis with obstructing 8 mm stone in distal right ureter and a non-obstructing right posterior intrarenal calculus. She also reported some chest pain in the ED, but resolved on its own.  Urology consulted, and recommended operative intervention but concerned for perioperative risk given obesity/MARLON/pulmonary hypertension. Of note, she had been cleared by her pulm doctor for an endoscopy in the weeks prior. She was initially started on Ceftriaxone for coverage of a UTI and toradol for pain control.    At [facility], urology was consulted, which recommended no acute intervention and deferred to nephrology consult. CT abdomen w/o contrast showed a hemangioma in the liver, which the pt will follow up with her PCP after discharge. Patient was started with tamsulosin 0.5 mg and Toradol Q6 as needed.  Per nephrology, patient is stable to be discharged and to follow-up as outpatient.  Intact PTH and uric acid are both normal.  Patient was also started with potassium citrate 15 mEq twice daily.  Urine culture was negative. 24 hr urine lytes were all within normal limits. Given the contamination, pt will repeat the lab as outpatient. Patient is medically stable to be discharged.             Pertinent Physical Exam At Time of Discharge  Physical Exam    PHYSICAL EXAM:  General: awake, alert, sitting on the bed, large  body habitus   HEENT: pupils equal and round, no scleral icterus or conjunctivitis  Skin: no suspect lesions or rashes noted on visible skin  Chest: decreased breath sounds b/l, normal respiratory effort, on 3L NC  Cardiac: regular rate and rhythm, normal s1, s2   Abdomen: soft, ND, NT, no involuntary guarding, no CVA tenderness   : no flank pain or indwelling urinary catheter  EXT: no LE edema   Neuro: AOx4, moving all limbs spontaneously, follows commands  Psych: coherent thought process, appropriate mood and affect    Outpatient Follow-Up  Future Appointments   Date Time Provider Department Center   1/4/2025 To Be Determined Emily Flynn, PT [facility]   1/6/2025  2:20 PM Richy Raman MD [id number] [place]   1/7/2025 To Be Determined Phil Price, OT [facility]   1/8/2025 10:50 AM Jose Rosario MD [id number] [place]   1/21/2025  9:00 AM GEA CT 1 GEACT [place] [facility]   1/30/2025  2:20 PM Sanford Arredondo MD [id number] [facility]   1/31/2025 10:45 AM Kanika Navarro MD [id number] [place]   2/11/2025 11:30 AM Slime Moncada MD [id number] [place]   2/17/2025 10:30 AM Jelena Romo DO [id number] [place]   2/19/2025  1:00 PM Brionna Douglas, DPBRIAN [id number] [facility]   3/28/2025  9:30 AM Mary Kate Leon MD [id number] [facility]   4/11/2025 11:20 AM Hortencia Sotomayor, APRN-CNP [id number] [place]       Benjamín Martino MD  PGY-1 Neurology

## 2025-01-02 NOTE — PROGRESS NOTES
Physical Therapy    Physical Therapy Evaluation    Patient Name: Farida Traylor  MRN: [id number]  Department: [place] [address]  Room: [facility]70  Today's Date: 1/2/2025   Time Calculation  Start Time: 0804  Stop Time: 0816  Time Calculation (min): 12 min    Assessment/Plan   PT Assessment  PT Assessment Results: Decreased endurance, Decreased mobility  Rehab Prognosis: Excellent  Barriers to Discharge Home: No anticipated barriers  Evaluation/Treatment Tolerance: Patient tolerated treatment well  End of Session Communication: Bedside nurse  Assessment Comment: 64 y.o. admitted with kidney stones though grossly able to mobilize independently with only slight assistance with hospital bed mobility. Pt will benefit from continued PT in house and after discharge to ensure safe return to independent function.  End of Session Patient Position: Bed, 3 rail up, Alarm on (Seated EOB.)  IP OR SWING BED PT PLAN  Inpatient or Swing Bed: Inpatient  PT Plan  Treatment/Interventions: Bed mobility, Transfer training, Gait training, Balance training, Strengthening, Therapeutic exercise, Therapeutic activity  PT Plan: Ongoing PT  PT Frequency: 3 times per week  PT Discharge Recommendations: Low intensity level of continued care  PT Recommended Transfer Status: Contact guard, Stand by assist  PT - OK to Discharge: Yes    Subjective   General Visit Information:  General  Reason for Referral: kidney stone  Past Medical History Relevant to Rehab: 64 y.o. female with PMH of pulmonary hypertension with chronic hypoxic respiratory failure(3-4 L NC during the day and 2 L NC at night), obesity (BMI of 55), complex MARLON, asthma, recurrent cystitis per chart review, multiple kidney stones presented with flank pain and episodic nausea in setting of kidney stones  Family/Caregiver Present: No  Patient Position Received: Bed, 3 rail up, Alarm off, not on at start of session  Preferred Learning Style: verbal, auditory  General Comment: Pt resting in bed  upon PT entry. Pleasant and cooperative. Willing to mobilize with PT.  Home Living:  Home Living  Type of Home: House  Lives With: Alone  Home Adaptive Equipment: Cane, Walker rolling or standard (elevated commode)  Home Layout: One level  Home Access: Stairs to enter with rails  Entrance Stairs-Rails: Both  Entrance Stairs-Number of Steps: 3  Home Living Comments: No concerns.  Prior Level of Function:  Prior Function Per Pt/Caregiver Report  Level of [place]: Independent with ADLs and functional transfers, Independent with homemaking with ambulation  Receives Help From: Family (Can assist if needed.)  ADL Assistance: Independent  Homemaking Assistance: Independent  Ambulatory Assistance: Independent (PRN use of cane or walker as pt notes h/o R knee meniscus issues.)  Precautions:  Precautions  Medical Precautions: Fall precautions     Objective   Pain:  Pain Assessment  Pain Assessment: 0-10  0-10 (Numeric) Pain Score:  (No complaints of pain during PT visit.)  Cognition:  Cognition  Overall Cognitive Status: Within Functional Limits  Attention: Within Functional Limits  Memory: Within Funtional Limits  Insight: Within function limits  Impulsive: Within functional limits    General Assessments:     Activity Tolerance  Endurance: Tolerates 10 - 20 min exercise with multiple rests    Sensation  Light Touch: No apparent deficits    Strength  Strength Comments: Anticipate near baseline. Grossly >4+/5 throughout  Strength  Strength Comments: Anticipate near baseline. Grossly >4+/5 throughout    Perception  Inattention/Neglect: Appears intact    Coordination  Movements are Fluid and Coordinated: Yes    Postural Control  Postural Control: Within Functional Limits    Static Sitting Balance  Static Sitting-Balance Support: Feet supported  Static Sitting-Level of Assistance: Independent  Static Sitting-Comment/Number of Minutes: Patient without complaints of lightheadedness or dizziness. Sitting independently and  opting to remain sitting at EOB post PT to order breakfast.    Static Standing Balance  Static Standing-Balance Support: No upper extremity supported  Static Standing-Level of Assistance: Close supervision  Functional Assessments:     Bed Mobility  Bed Mobility: Yes  Bed Mobility 1  Bed Mobility 1: Supine to sitting  Level of Assistance 1: Minimum assistance (hand held)  Bed Mobility Comments 1: sit-sup not performed as pt remained sitting EOB.    Transfers  Transfer: Yes  Transfer 1  Transfer From 1: Sit to, Stand to  Transfer to 1: Stand, Sit  Transfer Level of Assistance 1: Close supervision    Ambulation/Gait Training  Ambulation/Gait Training Performed: Yes  Ambulation/Gait Training 1  Surface 1: Level tile  Device 1: No device  Assistance 1: Close supervision, Contact guard  Quality of Gait 1:  (Wide DUC with short step lengths. Pt reporting gait is near baseline. No acute LOB.)  Comments/Distance (ft) 1: 20ft x 2    Outcome Measures:  [facility] Basic Mobility  Turning from your back to your side while in a flat bed without using bedrails: A little  Moving from lying on your back to sitting on the side of a flat bed without using bedrails: A little  Moving to and from bed to chair (including a wheelchair): None  Standing up from a chair using your arms (e.g. wheelchair or bedside chair): None  To walk in hospital room: None  Climbing 3-5 steps with railing: A little  Basic Mobility - Total Score: 21    Encounter Problems       Encounter Problems (Active)       Mobility       STG - Patient will ambulate >50ft, no device, independent       Start:  01/02/25    Expected End:  01/16/25               PT Transfers       STG - Patient to transfer to and from sit to supine independent       Start:  01/02/25    Expected End:  01/16/25            STG - Patient will transfer sit to and from stand independent       Start:  01/02/25    Expected End:  01/16/25                   Education Documentation  Precautions, taught by  John Gordon, PT at 1/2/2025  8:38 AM.  Learner: Patient  Readiness: Acceptance  Method: Explanation  Response: Verbalizes Understanding  Comment: Low intensity PT after discharge.    Mobility Training, taught by Jhon Gordon PT at 1/2/2025  8:38 AM.  Learner: Patient  Readiness: Acceptance  Method: Explanation  Response: Verbalizes Understanding  Comment: Low intensity PT after discharge.    Education Comments  No comments found.

## 2025-01-02 NOTE — PROGRESS NOTES
Occupational Therapy    Evaluation    Patient Name: Farida Traylor  MRN: [id number]  Department: [facility] 55  Room: [facility]-  Today's Date: 1/2/2025  Time Calculation  Start Time: 0903  Stop Time: 0917  Time Calculation (min): 14 min        Assessment:  OT Assessment: Pt will benefit from continued skilled OT to increase independence in ADLs, functional mobility, activity tolerance, safety, and strength.  Prognosis: Excellent  Barriers to Discharge Home: No anticipated barriers  Evaluation/Treatment Tolerance: Patient tolerated treatment well  Medical Staff Made Aware: Yes  End of Session Communication: Bedside nurse  End of Session Patient Position: Alarm on, Up in chair  OT Assessment Results: Decreased ADL status, Decreased upper extremity strength, Decreased safe judgment during ADL, Decreased endurance, Decreased functional mobility, Decreased IADLs  Prognosis: Excellent  Evaluation/Treatment Tolerance: Patient tolerated treatment well  Medical Staff Made Aware: Yes  Strengths: Attitude of self  Barriers to Participation: Comorbidities  Plan:  Treatment Interventions: ADL retraining, Functional transfer training, UE strengthening/ROM, Endurance training, Patient/family training, Equipment evaluation/education, Compensatory technique education  OT Frequency: 2 times per week  OT Discharge Recommendations: Low intensity level of continued care  Equipment Recommended upon Discharge:  (showr bench, hip ikit)  OT Recommended Transfer Status: Assist of 1  OT - OK to Discharge: Yes (upon medical clearance)  Treatment Interventions: ADL retraining, Functional transfer training, UE strengthening/ROM, Endurance training, Patient/family training, Equipment evaluation/education, Compensatory technique education    Subjective   Current Problem:  1. Kidney stone  Electrolytes Urine 24 Hr    Stone analysis    Referral to Nephrology    potassium citrate CR (Urocit-K-15) 15 mEq ER tablet    DISCONTINUED: potassium citrate CR  (Urocit-K-15) 15 mEq ER tablet      2. Idiopathic PAH (pulmonary arterial hypertension) (Multi)  oxygen (O2) gas therapy    Referral to Home Health      3. Benign essential hypertension  hydrALAZINE (Apresoline) 50 mg tablet    DISCONTINUED: hydrALAZINE (Apresoline) 50 mg tablet        General:  General  Reason for Referral: kidney stone  Past Medical History Relevant to Rehab: 64 y.o. female with PMH of pulmonary hypertension with chronic hypoxic respiratory failure(3-4 L NC during the day and 2 L NC at night), obesity (BMI of 55), complex MARLON, asthma, recurrent cystitis per chart review, multiple kidney stones presented with flank pain and episodic nausea in setting of kidney stones  Family/Caregiver Present: No  Prior to Session Communication: Bedside nurse  Patient Position Received: Alarm on, Bed, 2 rail up (seated EOB)  Preferred Learning Style: verbal, auditory  General Comment: Pt seated EOB upon arrival, agreeable to OT  Precautions:  Medical Precautions: Fall precautions    Pain:  Pain Assessment  Pain Assessment: 0-10  0-10 (Numeric) Pain Score: 0 - No pain    Objective   Cognition:  Overall Cognitive Status: Within Functional Limits  Attention: Within Functional Limits  Memory: Within Funtional Limits  Insight: Within function limits  Impulsive: Within functional limits     Home Living:  Type of Home: House  Lives With: Alone  Home Adaptive Equipment: Cane, Walker rolling or standard ([facility])  Home Layout: One level  Home Access: Stairs to enter with rails  Entrance Stairs-Rails: Both  Entrance Stairs-Number of Steps: 3  Bathroom Shower/Tub: Walk-in shower  Bathroom Equipment: None  Home Living Comments: denies falls  Prior Function:  Level of [place]: Independent with ADLs and functional transfers, Independent with homemaking with ambulation  ADL Assistance: Independent  Homemaking Assistance: Independent  Ambulatory Assistance: Independent (cane vs walker PRN)  IADL History:  Homemaking  Responsibilities: Yes  Meal Prep Responsibility: Primary  Laundry Responsibility: Primary  Cleaning Responsibility: Primary  Bill Paying/Finance Responsibility: Primary  Shopping Responsibility: Primary  ADL:  Eating Assistance: Independent (I eating breakfast upon arrival)  Grooming Assistance: Independent (anticipated)  Bathing Assistance: Minimal (anicipated seated)  UE Dressing Assistance: Stand by (anticipated)  LE Dressing Assistance: Minimal (anticipated)  Toileting Assistance with Device: Minimal (anticipated)  Activity Tolerance:  Endurance: Tolerates 10 - 20 min exercise with multiple rests  Bed Mobility/Transfers: Bed Mobility  Bed Mobility: No    Transfers  Transfer: Yes  Transfer 1  Transfer From 1: Sit to, Stand to  Transfer to 1: Stand, Sit  Technique 1: Sit to stand, Stand to sit  Transfer Level of Assistance 1: Close supervision    Functional Mobility:  Functional Mobility  Functional Mobility Performed: Yes  Functional Mobility 1  Surface 1: Level tile  Device 1: No device  Assistance 1: Close supervision  Comments 1: bed > chair  Sitting Balance:  Static Sitting Balance  Static Sitting-Balance Support: Feet supported  Static Sitting-Level of Assistance: Independent  Dynamic Sitting Balance  Dynamic Sitting-Balance Support: Feet supported  Dynamic Sitting-Level of Assistance: Independent  Standing Balance:  Static Standing Balance  Static Standing-Balance Support: No upper extremity supported  Static Standing-Level of Assistance: Close supervision  Dynamic Standing Balance  Dynamic Standing-Balance Support: No upper extremity supported  Dynamic Standing-Level of Assistance: Close supervision   Modalities:  Modalities Used: No    Sensation:  Light Touch: No apparent deficits  Strength:  Strength Comments: MANUEL WFL  Perception:  Inattention/Neglect: Appears intact  Coordination:  Movements are Fluid and Coordinated: Yes   Hand Function:  Gross Grasp: Functional  Coordination: Functional  Extremities:  RUE   RUE : Within Functional Limits and LUE   LUE: Within Functional Limits    Outcome Measures:[facility] Daily Activity  Putting on and taking off regular lower body clothing: A little  Bathing (including washing, rinsing, drying): A little  Putting on and taking off regular upper body clothing: A little  Toileting, which includes using toilet, bedpan or urinal: A little  Taking care of personal grooming such as brushing teeth: None  Eating Meals: None  Daily Activity - Total Score: 20     and OT Adult Other Outcome Measures  4AT: negative    Education Documentation  Body Mechanics, taught by Mahendra Aponte OT at 1/2/2025 12:13 PM.  Learner: Patient  Readiness: Acceptance  Method: Explanation  Response: Verbalizes Understanding    Precautions, taught by Mahendra Aponte OT at 1/2/2025 12:13 PM.  Learner: Patient  Readiness: Acceptance  Method: Explanation  Response: Verbalizes Understanding    ADL Training, taught by Mahendra Aponte OT at 1/2/2025 12:13 PM.  Learner: Patient  Readiness: Acceptance  Method: Explanation  Response: Verbalizes Understanding    Education Comments  No comments found.      Goals:  Encounter Problems       Encounter Problems (Active)       ADLs       Patient with complete lower body dressing with modified independent level of assistance donning and doffing all LE clothes  with PRN adaptive equipment while supported sitting and standing (Progressing)       Start:  01/02/25    Expected End:  01/23/25            Patient will complete toileting including hygiene clothing management/hygiene with modified independent level of assistance and grab bars. (Progressing)       Start:  01/02/25    Expected End:  01/23/25               BALANCE       Pt will maintain dynamic standing balance during ADL task with modified independent level of assistance in order to demonstrate decreased risk of falling and improved postural control. (Progressing)       Start:  01/02/25    Expected End:  01/23/25                MOBILITY       Patient will perform Functional mobility max Household distances/Community Distances with modified independent level of assistance and least restrictive device in order to improve safety and functional mobility. (Progressing)       Start:  01/02/25    Expected End:  01/23/25               TRANSFERS       Patient will complete sit to stand transfer with modified independent level of assistance and least restrictive device in order to improve safety and prepare for out of bed mobility. (Progressing)       Start:  01/02/25    Expected End:  01/23/25                  MAURICE Kat/L

## 2025-01-02 NOTE — NURSING NOTE
1/2/2025 1753 Discharge Note  Patient discharged home with home care. Discharge instructions discussed with patient and daughter, verbalized understanding. Patient very upset with orders to complete 24 hour urine at home, refusing to complete, MD spoke with patient regarding her concerns. Patient was also upset about the hydralazine dose, MD spoke with patient about these concerns. Aware of follow up appointments needed. Meds to beds delivered to patient prior to discharge. Patient's own meds returned to patient. Additional medications sent to patient's preferred pharmacy. Peripheral IV removed prior to discharge. Belongings gathered per patient. Patient on continuous oxygen at home, no portable tank was brought in by patient or family for transport home. Patient's daughter refused to go back home to get tank since it would take her 2 hours to go home and back. TCC team gone for the day. Spoke with SW (Beverly) who stated SW management (Viky Russell) stated we can provide patient with home oxygen tank and [employer] Rep will return it to the hospital. Spoke with Nursing supervisor who agreed to plan. Patient provided with full oxygen tank in [place] for transport home. Explained to both patient and daughter that this is not something we can typically do and that in the future it is very important that she bring a portable tank with her for discharge transport home. Also explained importance of having oxygen on at all times when it is ordered as continuous because patient was stating she would leave without it. Finally, instructed patient to switch to her home oxygen upon arrival home and leave tank for [employer] to  and return to hospital. Patient placed in transport for discharge home. ---- Maria Eugenia Hurtado RN

## 2025-01-03 DIAGNOSIS — I27.0 IDIOPATHIC PAH (PULMONARY ARTERIAL HYPERTENSION) (MULTI): ICD-10-CM

## 2025-01-03 DIAGNOSIS — J96.11 CHRONIC RESPIRATORY FAILURE WITH HYPOXIA (MULTI): ICD-10-CM

## 2025-01-04 ENCOUNTER — HOME CARE VISIT (OUTPATIENT)
Dept: HOME HEALTH SERVICES | Facility: HOME HEALTH | Age: 65
End: 2025-01-04
Payer: COMMERCIAL

## 2025-01-04 VITALS
TEMPERATURE: 98.7 F | OXYGEN SATURATION: 98 % | HEART RATE: 66 BPM | SYSTOLIC BLOOD PRESSURE: 137 MMHG | DIASTOLIC BLOOD PRESSURE: 67 MMHG | RESPIRATION RATE: 22 BRPM

## 2025-01-04 PROCEDURE — G0151 HHCP-SERV OF PT,EA 15 MIN: HCPCS

## 2025-01-04 ASSESSMENT — ENCOUNTER SYMPTOMS
PAIN: 1
HIGHEST PAIN SEVERITY IN PAST 24 HOURS: 5/10
PERSON REPORTING PAIN: PATIENT
SUBJECTIVE PAIN PROGRESSION: WAXING AND WANING
OCCASIONAL FEELINGS OF UNSTEADINESS: 1
LOWEST PAIN SEVERITY IN PAST 24 HOURS: 0/10

## 2025-01-05 SDOH — HEALTH STABILITY: MENTAL HEALTH: SMOKING IN HOME: 0

## 2025-01-05 SDOH — HEALTH STABILITY: PHYSICAL HEALTH
EXERCISE COMMENTS: PT INSTRUCTED PATIENT IN SUPINE EXERCISES 1X10:  - ANKLE PUMPS  - GLUT SETS  - QUAD SETS  - HEELSLIDES  - HIP ABDUCTION    PT EDUCATED PATIENT ON IMPORTANCE OF BEGINNING TO PERFORM HEP 1-2X DAILY IN ORDER TO IMPROVE STRENGTH, ACTIVITY TOLERANCE, AND

## 2025-01-05 SDOH — HEALTH STABILITY: PHYSICAL HEALTH: EXERCISE COMMENTS: FUNCTIONAL PERFORMANCE.

## 2025-01-05 SDOH — ECONOMIC STABILITY: HOUSING INSECURITY: EVIDENCE OF SMOKING MATERIAL: 0

## 2025-01-05 ASSESSMENT — ACTIVITIES OF DAILY LIVING (ADL)
OASIS_M1830: 05
AMBULATION ASSISTANCE ON FLAT SURFACES: 1
ENTERING_EXITING_HOME: MINIMUM ASSIST

## 2025-01-06 ENCOUNTER — OFFICE VISIT (OUTPATIENT)
Dept: PULMONOLOGY | Facility: CLINIC | Age: 65
End: 2025-01-06
Payer: COMMERCIAL

## 2025-01-06 ENCOUNTER — TELEPHONE (OUTPATIENT)
Dept: GASTROENTEROLOGY | Facility: CLINIC | Age: 65
End: 2025-01-06
Payer: COMMERCIAL

## 2025-01-06 VITALS
DIASTOLIC BLOOD PRESSURE: 83 MMHG | RESPIRATION RATE: 16 BRPM | SYSTOLIC BLOOD PRESSURE: 168 MMHG | OXYGEN SATURATION: 88 % | WEIGHT: 290.5 LBS | HEART RATE: 72 BPM | BODY MASS INDEX: 54.57 KG/M2

## 2025-01-06 DIAGNOSIS — J45.909 ASTHMA, UNSPECIFIED ASTHMA SEVERITY, UNSPECIFIED WHETHER COMPLICATED, UNSPECIFIED WHETHER PERSISTENT (HHS-HCC): ICD-10-CM

## 2025-01-06 DIAGNOSIS — I27.0 IDIOPATHIC PAH (PULMONARY ARTERIAL HYPERTENSION) (MULTI): Primary | ICD-10-CM

## 2025-01-06 DIAGNOSIS — G47.33 OBSTRUCTIVE SLEEP APNEA SYNDROME: ICD-10-CM

## 2025-01-06 DIAGNOSIS — J96.11 CHRONIC RESPIRATORY FAILURE WITH HYPOXIA (MULTI): ICD-10-CM

## 2025-01-06 PROCEDURE — 3077F SYST BP >= 140 MM HG: CPT | Performed by: INTERNAL MEDICINE

## 2025-01-06 PROCEDURE — 1111F DSCHRG MED/CURRENT MED MERGE: CPT | Performed by: INTERNAL MEDICINE

## 2025-01-06 PROCEDURE — 1159F MED LIST DOCD IN RCRD: CPT | Performed by: INTERNAL MEDICINE

## 2025-01-06 PROCEDURE — 1157F ADVNC CARE PLAN IN RCRD: CPT | Performed by: INTERNAL MEDICINE

## 2025-01-06 PROCEDURE — 3079F DIAST BP 80-89 MM HG: CPT | Performed by: INTERNAL MEDICINE

## 2025-01-06 PROCEDURE — 1036F TOBACCO NON-USER: CPT | Performed by: INTERNAL MEDICINE

## 2025-01-06 PROCEDURE — 1160F RVW MEDS BY RX/DR IN RCRD: CPT | Performed by: INTERNAL MEDICINE

## 2025-01-06 PROCEDURE — 99214 OFFICE O/P EST MOD 30 MIN: CPT | Performed by: INTERNAL MEDICINE

## 2025-01-06 PROCEDURE — 1126F AMNT PAIN NOTED NONE PRSNT: CPT | Performed by: INTERNAL MEDICINE

## 2025-01-06 ASSESSMENT — ENCOUNTER SYMPTOMS
GASTROINTESTINAL NEGATIVE: 1
SHORTNESS OF BREATH: 1
CHILLS: 0
NEUROLOGICAL NEGATIVE: 1
CARDIOVASCULAR NEGATIVE: 1
PSYCHIATRIC NEGATIVE: 1
FEVER: 0
COUGH: 0

## 2025-01-06 ASSESSMENT — COLUMBIA-SUICIDE SEVERITY RATING SCALE - C-SSRS
2. HAVE YOU ACTUALLY HAD ANY THOUGHTS OF KILLING YOURSELF?: NO
1. IN THE PAST MONTH, HAVE YOU WISHED YOU WERE DEAD OR WISHED YOU COULD GO TO SLEEP AND NOT WAKE UP?: NO
6. HAVE YOU EVER DONE ANYTHING, STARTED TO DO ANYTHING, OR PREPARED TO DO ANYTHING TO END YOUR LIFE?: NO

## 2025-01-06 ASSESSMENT — PATIENT HEALTH QUESTIONNAIRE - PHQ9
2. FEELING DOWN, DEPRESSED OR HOPELESS: NOT AT ALL
SUM OF ALL RESPONSES TO PHQ9 QUESTIONS 1 AND 2: 0
1. LITTLE INTEREST OR PLEASURE IN DOING THINGS: NOT AT ALL

## 2025-01-06 ASSESSMENT — PAIN SCALES - GENERAL: PAINLEVEL_OUTOF10: 0-NO PAIN

## 2025-01-06 NOTE — PROGRESS NOTES
Subjective   Patient ID: Farida Traylor is a 64 y.o. female who presents for Lung Eval.  h/o PAH, complex sleep apnea and asthma here for follow up visit. Pt currently on 3-4L. Recently hospitalized for nephrolithiasis.  No fevers, chills.  Some cough.  No palpitations, presyncopal symptoms, LE edema is the same.  ET is a few hundred feet.   Wearing her ASV every night for 8h on avg.        Review of Systems   Constitutional:  Negative for chills and fever.   Respiratory:  Positive for shortness of breath. Negative for cough.    Cardiovascular: Negative.    Gastrointestinal: Negative.    Neurological: Negative.    Psychiatric/Behavioral: Negative.     All other systems reviewed and are negative.      Objective   Physical Exam  Vitals reviewed.   Constitutional:       Appearance: She is obese. She is ill-appearing.   HENT:      Head: Normocephalic and atraumatic.   Eyes:      Extraocular Movements: Extraocular movements intact.   Cardiovascular:      Rate and Rhythm: Normal rate and regular rhythm.      Heart sounds: Normal heart sounds.   Pulmonary:      Effort: Pulmonary effort is normal.      Breath sounds: Normal breath sounds.   Abdominal:      Palpations: Abdomen is soft.      Tenderness: There is no abdominal tenderness.   Musculoskeletal:      Cervical back: Normal range of motion.   Skin:     General: Skin is warm.   Neurological:      General: No focal deficit present.      Mental Status: She is alert and oriented to person, place, and time. Mental status is at baseline.   Psychiatric:         Mood and Affect: Mood normal.         Behavior: Behavior normal.         Assessment/Plan   Problem List Items Addressed This Visit       Idiopathic PAH (pulmonary arterial hypertension) (Multi) - Primary     Pulmonary arterial hypertension - WHO Functional Class II/III. Idiopathic. Cont macitentan and riociguat. Letitia started 7/2021. Lasix as needed when weight increases. Plan for follow-up RHC in the spring.      RHC: 1/2024 mPAP 31 PAOP 10 PVR 3.1 CO 6.7  11/2022 mPAP 31, PAOP 11, PVR 2.6 CO 7.7 4/2021 mPAP 35, PAOP 2, PVR 4.5 CO 7.4 10/2019 mPAP 31   TTE:  12/2024 RVSP 50 peak TRV 3.43 Mildly dilated RV, normal function. 1/2023 mildly enlarged RV 1/2022 54.5 10/2020 62.1 11/2018 43.6  6MWT: 3/2024 240m 4/2023 180m 1/2022 150m 6/2021 231.7m 1/2021 140m 11/2019 250m          Chronic respiratory failure with hypoxia (Multi)     Cont 2L at night w/ ASV. Cont 3-4 L O2 during day         Obstructive sleep apnea syndrome     Complex sleep apnea - Cont ASV.           Asthma     NL PFT 3/2024. controlled on Advair, singulair and proair. Cont Flonase.            RTC in 3 months    Time Spent  Prep time on day of patient encounter: 10 minutes  Time spent directly with patient, family or caregiver: 15 minutes  Additional Time Spent on Patient Care Activities: 0 minutes  Documentation Time: 5 minutes  Other Time Spent: 0 minutes  Total: 30 minutes        Richy Raman MD 01/06/25 3:58 PM

## 2025-01-06 NOTE — ASSESSMENT & PLAN NOTE
Pulmonary arterial hypertension - WHO Functional Class II/III. Idiopathic. Cont macitentan and riociguat. Letitia started 7/2021. Lasix as needed when weight increases. Plan for follow-up RHC in the spring.     RHC: 1/2024 mPAP 31 PAOP 10 PVR 3.1 CO 6.7  11/2022 mPAP 31, PAOP 11, PVR 2.6 CO 7.7 4/2021 mPAP 35, PAOP 2, PVR 4.5 CO 7.4 10/2019 mPAP 31   TTE:  12/2024 RVSP 50 peak TRV 3.43 Mildly dilated RV, normal function. 1/2023 mildly enlarged RV 1/2022 54.5 10/2020 62.1 11/2018 43.6  6MWT: 3/2024 240m 4/2023 180m 1/2022 150m 6/2021 231.7m 1/2021 140m 11/2019 250m

## 2025-01-07 ENCOUNTER — HOME CARE VISIT (OUTPATIENT)
Dept: HOME HEALTH SERVICES | Facility: HOME HEALTH | Age: 65
End: 2025-01-07
Payer: COMMERCIAL

## 2025-01-07 VITALS
HEART RATE: 76 BPM | RESPIRATION RATE: 16 BRPM | SYSTOLIC BLOOD PRESSURE: 130 MMHG | OXYGEN SATURATION: 93 % | TEMPERATURE: 98.1 F | DIASTOLIC BLOOD PRESSURE: 80 MMHG

## 2025-01-07 VITALS
TEMPERATURE: 98.1 F | SYSTOLIC BLOOD PRESSURE: 130 MMHG | OXYGEN SATURATION: 93 % | DIASTOLIC BLOOD PRESSURE: 70 MMHG | HEART RATE: 67 BPM

## 2025-01-07 PROCEDURE — G0152 HHCP-SERV OF OT,EA 15 MIN: HCPCS

## 2025-01-07 PROCEDURE — G0157 HHC PT ASSISTANT EA 15: HCPCS | Mod: CQ

## 2025-01-07 SDOH — HEALTH STABILITY: MENTAL HEALTH: SMOKING IN HOME: 0

## 2025-01-07 SDOH — ECONOMIC STABILITY: HOUSING INSECURITY: EVIDENCE OF SMOKING MATERIAL: 0

## 2025-01-07 ASSESSMENT — ACTIVITIES OF DAILY LIVING (ADL)
TOILETING: INDEPENDENT
PHYSICAL TRANSFERS ASSESSED: 1
CURRENT_FUNCTION: INDEPENDENT
GROOMING_CURRENT_FUNCTION: INDEPENDENT
TOILETING: 1
GROOMING ASSESSED: 1
DRESSING_UB_CURRENT_FUNCTION: INDEPENDENT
DRESSING_LB_CURRENT_FUNCTION: INDEPENDENT
BATHING_CURRENT_FUNCTION: INDEPENDENT
BATHING ASSESSED: 1
PREPARING MEALS: INDEPENDENT

## 2025-01-07 ASSESSMENT — ENCOUNTER SYMPTOMS
PAIN: 1
LOWEST PAIN SEVERITY IN PAST 24 HOURS: 1/10
PAIN SEVERITY GOAL: 0/10
PAIN LOCATION - RELIEVING FACTORS: TYLENOL
SUBJECTIVE PAIN PROGRESSION: UNCHANGED
PERSON REPORTING PAIN: PATIENT
PERSON REPORTING PAIN: PATIENT
PAIN LOCATION - PAIN SEVERITY: 2/10
HIGHEST PAIN SEVERITY IN PAST 24 HOURS: 5/10
PAIN LOCATION - PAIN QUALITY: ACHY
PAIN: 1
PAIN LOCATION: NECK
PAIN LOCATION - PAIN FREQUENCY: INTERMITTENT
PAIN LOCATION - EXACERBATING FACTORS: SITTING POSTURE

## 2025-01-08 ENCOUNTER — APPOINTMENT (OUTPATIENT)
Dept: NEPHROLOGY | Facility: CLINIC | Age: 65
End: 2025-01-08
Payer: COMMERCIAL

## 2025-01-08 VITALS
SYSTOLIC BLOOD PRESSURE: 114 MMHG | HEIGHT: 61 IN | HEART RATE: 76 BPM | BODY MASS INDEX: 55.32 KG/M2 | WEIGHT: 293 LBS | DIASTOLIC BLOOD PRESSURE: 69 MMHG

## 2025-01-08 DIAGNOSIS — N20.0 KIDNEY STONE: ICD-10-CM

## 2025-01-08 PROCEDURE — 99213 OFFICE O/P EST LOW 20 MIN: CPT | Performed by: INTERNAL MEDICINE

## 2025-01-08 PROCEDURE — 1111F DSCHRG MED/CURRENT MED MERGE: CPT | Performed by: INTERNAL MEDICINE

## 2025-01-08 PROCEDURE — 1036F TOBACCO NON-USER: CPT | Performed by: INTERNAL MEDICINE

## 2025-01-08 PROCEDURE — 3074F SYST BP LT 130 MM HG: CPT | Performed by: INTERNAL MEDICINE

## 2025-01-08 PROCEDURE — 3008F BODY MASS INDEX DOCD: CPT | Performed by: INTERNAL MEDICINE

## 2025-01-08 PROCEDURE — 1157F ADVNC CARE PLAN IN RCRD: CPT | Performed by: INTERNAL MEDICINE

## 2025-01-08 PROCEDURE — 3078F DIAST BP <80 MM HG: CPT | Performed by: INTERNAL MEDICINE

## 2025-01-08 NOTE — PROGRESS NOTES
Farida Traylor   64 y.o.      Vitals:    01/08/25 1049   Weight: 133 kg (293 lb 12.8 oz)      MRN/Room: [id number]/Room/bed info not found        History Of Present Illness  Farida Traylor is a 64 y.o. female presenting with kidney stone.     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), Acute upper respiratory infection, unspecified (01/14/2020), Allergic rhinitis due to animal (cat) (dog) hair and dander (01/02/2020), Allergic rhinitis due to pollen (01/02/2020), Allergic rhinitis due to pollen (02/26/2018), Allergy status to unspecified drugs, medicaments and biological substances (06/30/2015), Anemia, unspecified (07/23/2018), Anxiety disorder due to known physiological condition (06/05/2013), Anxiety disorder, unspecified (01/28/2016), Asymptomatic varicose veins of bilateral lower extremities (07/16/2019), Atypical squamous cells of undetermined significance on cytologic smear of cervix (ASC-US) (06/26/2013), Candidiasis, unspecified (12/10/2019), Cellulitis of left finger (07/31/2018), Changes in skin texture (03/09/2021), Contusion of left lesser toe(s) with damage to nail, initial encounter (02/22/2018), Contusion of right hand, sequela (08/04/2020), Corns and callosities (05/25/2021), Disorder of pigmentation, unspecified (09/28/2016), Disorder of the skin and subcutaneous tissue, unspecified (08/27/2020), Dorsalgia, unspecified (09/23/2021), Dorsalgia, unspecified (01/29/2019), Encounter for general adult medical examination without abnormal findings (06/08/2020), Encounter for gynecological examination (general) (routine) without abnormal findings (12/14/2021), Encounter for gynecological examination (general) (routine) without abnormal findings (12/11/2020), Encounter for other screening for malignant neoplasm of breast, Encounter for screening for malignant neoplasm of cervix, Encounter for screening for malignant  neoplasm of cervix (11/04/2014), Encounter for screening for malignant neoplasm of cervix, Hepatomegaly, not elsewhere classified (04/21/2021), History of falling (12/28/2018), Inappropriate diet and eating habits (05/13/2021), Irregular menstruation, unspecified, Localized edema (07/29/2015), Localized swelling, mass and lump, left lower limb (09/11/2020), Localized swelling, mass and lump, left lower limb (09/24/2020), Localized swelling, mass and lump, unspecified lower limb (09/24/2020), Melanocytic nevi, unspecified (09/10/2019), Multiple births, unspecified, all liveborn ([facility]), Other allergic rhinitis (01/02/2020), Other allergic rhinitis (01/02/2020), Other conditions influencing health status (06/26/2013), Other conditions influencing health status, Other conditions influencing health status (02/05/2019), Other conditions influencing health status (12/14/2021), Other conditions influencing health status (02/14/2019), Other conditions influencing health status (01/14/2020), Other conditions influencing health status (06/05/2013), Other conditions influencing health status (06/05/2013), Other hypertrophic disorders of the skin (07/29/2015), Other shoulder lesions, right shoulder (11/11/2019), Other specified disorders of bone, thigh (09/09/2020), Other specified noninflammatory disorders of vagina (03/12/2019), Other specified postprocedural states (05/04/2017), Other specified soft tissue disorders (10/08/2020), Other specified soft tissue disorders (08/04/2020), Pain in left thigh (08/27/2020), Pain in right foot (03/09/2021), Pain in right hip (12/28/2018), Pain in right knee (03/12/2021), Pain in right knee (03/19/2021), Pain in right leg (05/25/2021), Pain in right leg (01/12/2022), Pain in unspecified joint, 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 (02/22/2016), Personal history of contraception, Personal history of diseases of the skin and subcutaneous  tissue (12/22/2020), Personal history of malignant neoplasm of other parts of uterus, Personal history of other (healed) physical injury and trauma (03/01/2017), Personal history of other benign neoplasm (09/10/2019), Personal history of other benign neoplasm (05/06/2014), Personal history of other benign neoplasm (12/11/2020), Personal history of other diseases of the circulatory system (04/14/2021), Personal history of other diseases of the circulatory system (04/14/2021), Personal history of other diseases of the circulatory system (04/14/2021), Personal history of other diseases of the circulatory system (02/01/2017), Personal history of other diseases of the circulatory system (04/14/2021), Personal history of other diseases of the digestive system (03/24/2021), Personal history of other diseases of the female genital tract (12/28/2016), Personal history of other diseases of the female genital tract (05/19/2022), Personal history of other diseases of the female genital tract, Personal history of other diseases of the female genital tract, Personal history of other diseases of the musculoskeletal system and connective tissue, Personal history of other diseases of the musculoskeletal system and connective tissue (11/18/2019), Personal history of other diseases of the respiratory system (05/27/2021), Personal history of other diseases of the respiratory system (04/12/2019), Personal history of other diseases of the respiratory system (01/04/2020), Personal history of other endocrine, nutritional and metabolic disease (02/01/2017), Personal history of other endocrine, nutritional and metabolic disease (04/18/2016), Personal history of other endocrine, nutritional and metabolic disease (05/26/2020), Personal history of other infectious and parasitic diseases, Personal history of other medical treatment (12/11/2020), Personal history of other medical treatment (12/14/2021), Personal history of other medical  treatment, Personal history of other specified conditions (08/21/2019), Personal history of other specified conditions (02/02/2017), Personal history of other specified conditions (12/07/2020), Personal history of other specified conditions (04/14/2021), Personal history of other specified conditions (12/05/2014), Personal history of other specified conditions (08/25/2021), Personal history of other specified conditions, Personal history of other specified conditions, Personal history of other specified conditions (01/12/2018), Personal history of transient ischemic attack (TIA), and 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 (12/30/2015), Personal history of urinary (tract) infections (09/02/2021), Personal history of urinary (tract) infections (05/23/2019), Personal history of urinary (tract) infections (09/02/2021), Personal history of urinary calculi (09/12/2019), Polyphagia (05/13/2021), Primary central sleep apnea (10/21/2017), Pulmonary hypertension (Multi), Pure hypercholesterolemia, unspecified, Residual hemorrhoidal skin tags (07/06/2017), Sleep apnea, Slurred speech (01/02/2015), Tinea pedis (05/25/2021), Unspecified abdominal pain (04/13/2021), Unspecified injury of right wrist, hand and finger(s), sequela (08/04/2020), Unspecified internal derangement of unspecified knee (03/01/2017), Unspecified symptoms and signs involving the genitourinary system (12/16/2021), Unspecified symptoms and signs involving the genitourinary system (02/05/2019), Unspecified symptoms and signs involving the genitourinary system (09/03/2020), Urinary tract infection, site not specified (01/17/2020), Urinary tract infection, site not specified (01/10/2022), and Xerosis cutis (09/10/2019).    Surgical History  She has a past surgical history that includes Other surgical history (07/31/2013); Other surgical history (09/10/2019); Other surgical history (11/30/2018); Mouth surgery (11/04/2014); Knee surgery (05/04/2017); Other  surgical history (06/08/2020); MR angio head wo IV contrast (02/09/2016); Cardiac catheterization (Right, 01/04/2024); and Cardiac catheterization.     Social History  She reports that she has never smoked. She has never used smokeless tobacco. She reports that she does not currently use alcohol. She reports that she does not use drugs.    Family History  Family History   Problem Relation Name Age of Onset    Hypertension Mother      Breast cancer Mother      Other (cardiac disorder) Mother      Cardiomyopathy Mother      Diabetes Mother      Other (chronic kidney disease) Mother      Alcohol abuse Father joel serna     Stroke Brother      Brain Aneurysm Brother          Allergies  Grass pollen, Other, Pollen extracts, and Tree and shrub pollen    Meds:         Current Outpatient Medications   Medication Sig Dispense Refill    Adempas 2.5 mg tablet TAKE 1 TABLET BY MOUTH 3 TIMES A DAY 90 tablet 3    albuterol 90 mcg/actuation inhaler INHALE 1 TO 2 PUFFS BY MOUTH EVERY 4 TO 6 HOURS AS NEEDED 6.7 g 10    blood pressure monitor (Blood Pressure Kit) kit 1 kit if needed (take BP as needed). 1 kit 0    buPROPion SR (Wellbutrin SR) 150 mg 12 hr tablet TAKE 1 TABLET BY MOUTH EVERY MORNING AND AFTERNOON      busPIRone (Buspar) 30 mg tablet Take 1 tablet (30 mg) by mouth 2 times a day.      calcium carbonate (CALCIUM 600 ORAL) Take 2 tablets by mouth once daily.      cyanocobalamin (Vitamin B-12) 1,000 mcg tablet Take 1 tablet (1,000 mcg) by mouth once daily.      cyclobenzaprine (Flexeril) 10 mg tablet Take 1 tablet (10 mg) by mouth 3 times a day as needed for muscle spasms. 60 tablet 0    docusate sodium (Colace) 100 mg capsule Take 1 capsule (100 mg) by mouth 2 times a day.      enoxaparin (Lovenox) 60 mg/0.6 mL syringe Inject 0.6 mL (60 mg) under the skin every 12 hours.      ferrous sulfate 325 (65 Fe) MG EC tablet Take 65 mg by mouth 3 times daily (morning, midday, late afternoon). Do not crush, chew, or split.       fluticasone propion-salmeteroL (Advair Diskus) 250-50 mcg/dose diskus inhaler INHALE ONE (1) PUFF BY MOUTH TWICE DAILY. RINSE MOUTH OUT AFTER EACH USE. 60 each 11    hydrALAZINE (Apresoline) 50 mg tablet Take 1 tablet (50 mg) by mouth 2 times a day. 60 tablet 1    hydrOXYzine pamoate (Vistaril) 50 mg capsule TAKE 3 CAPSULES BY MOUTH EVERY NIGHT AT BEDTIME AND TAKE 1 CAPSULE BY MOUTH EVERY MORNING      lubiprostone (Amitiza) 8 mcg capsule Take 1 capsule (8 mcg) by mouth 2 times a day with meals. 180 capsule 3    memantine (Namenda) 10 mg tablet Take 1 tablet (10 mg) by mouth 2 times a day.      methenamine hippurate (Hiprex) 1 gram tablet TAKE 1 TABLET BY MOUTH TWICE DAILY 60 tablet 10    montelukast (Singulair) 10 mg tablet TAKE 1 TABLET BY MOUTH ONCE DAILY 30 tablet 10    omeprazole (PriLOSEC) 40 mg DR capsule Take 1 capsule (40 mg) by mouth once daily in the morning. Take before meals. 90 capsule 1    Opsumit 10 mg tablet tablet TAKE 1 TABLET BY MOUTH DAILY. DO NOT HANDLE IF PREGNANT. DO NOT SPLIT, CRUSH, OR CHEW. REVIEW MEDICATION GUIDE. 30 tablet 11    oxygen (O2) gas therapy Inhale 1 each continuously. (Patient taking differently: Inhale 3 L/min continuously.)      potassium citrate CR (Urocit-K-15) 15 mEq ER tablet Take 1 tablet (15 mEq) by mouth 2 times daily (morning and late afternoon). Do not crush, chew, or split. 60 tablet 2    sertraline (Zoloft) 100 mg tablet Take 2 tablets (200 mg) by mouth once daily.      simvastatin (Zocor) 80 mg tablet TAKE 1 TABLET BY MOUTH ONCE DAILY AT BEDTIME 30 tablet 10    tamsulosin (Flomax) 0.4 mg 24 hr capsule Take 1 capsule (0.4 mg) by mouth once daily.      trospium (Sanctura) 20 mg tablet Take 1 tablet (20 mg) by mouth 2 times a day. 60 tablet 10     No current facility-administered medications for this visit.         ROS:  The patient is awake and oriented. No dizziness or lightheadedness. No chills and no fever. No headaches. No nausea and no vomiting. No  shortness of breath. No cough. No chest pain. No abdominal pain. No diarrhea. No hematemesis or hemoptysis. No hematuria. No rectal bleeding. No melena. No epistaxis. No urinary symptoms. No flank pain. No leg edema. No leg pain. No itching. Overall, the rest of the review of systems is also negative.  12 point review of systems otherwise negative as stated in HPI.        Physical Exam:        Vitals:    01/08/25 1049   BP: 114/69   Pulse: 76     General: The patient is awake, oriented, and is not in any distress.  Head and Neck: Normocephalic. No periorbital edema.  Eyes: Not icteric.   Respiratory: Symmetric chest expansion. No respiratory distress.  Skin: No maculopapular rash.  Musculoskeletal: No peripheral edema.  Neuro Exam: Speech is fluent. Moves extremities.        Blood Labs:  No results found. However, due to the size of the patient record, not all encounters were searched. Please check Results Review for a complete set of results.   Lab Results   Component Value Date    GLUCOSE 96 01/02/2025    CALCIUM 8.8 01/02/2025     01/02/2025    K 3.7 01/02/2025    CO2 28 01/02/2025     01/02/2025    BUN 11 01/02/2025    CREATININE 0.80 01/02/2025       Imaging:  === 06/17/22 ===    US RENAL COMPLETE    - Impression -  No definite renal stones or hydronephrosis.    Bilateral simple renal cysts.      Assessment and Plan:  1 nephrolithiasis.  The patient had a recent admission because of flank pain and nephrolithiasis.  A 24-hour urine collection for stone panel was tried but eventually test was not done.  Her PTH and uric acid level are normal.  Stone analysis is pending.  Neurology is suspicious for uric acid stone giving stone density which is HU of 352 750 and also urine pH of 5.5.  She is a started on potassium citrate.  I ordered a 24-hour urine collection for stone panel.    I will see her in about a months for follow-up.    Jose Rosario MD  Senior Attending Physician  Director of  Onco-Nephrology Program  Division of Nephrology & Hypertension  [facility]

## 2025-01-09 ENCOUNTER — HOME CARE VISIT (OUTPATIENT)
Dept: HOME HEALTH SERVICES | Facility: HOME HEALTH | Age: 65
End: 2025-01-09
Payer: COMMERCIAL

## 2025-01-09 VITALS
BODY MASS INDEX: 54.75 KG/M2 | TEMPERATURE: 97.8 F | DIASTOLIC BLOOD PRESSURE: 68 MMHG | SYSTOLIC BLOOD PRESSURE: 112 MMHG | WEIGHT: 290 LBS | HEIGHT: 61 IN | RESPIRATION RATE: 16 BRPM | HEART RATE: 72 BPM | OXYGEN SATURATION: 92 %

## 2025-01-09 PROCEDURE — G0270 MNT SUBS TX FOR CHANGE DX: HCPCS

## 2025-01-09 PROCEDURE — G0299 HHS/HOSPICE OF RN EA 15 MIN: HCPCS

## 2025-01-09 SDOH — HEALTH STABILITY: MENTAL HEALTH: SMOKING IN HOME: 0

## 2025-01-09 SDOH — HEALTH STABILITY: MENTAL HEALTH: NUTRITION HISTORY: PT ORDERED LOW SODIUM DIET, PT WAS UNSURE OF AMOUNT OF NA IN MG TO BE FOLLOWING.

## 2025-01-09 SDOH — ECONOMIC STABILITY: HOUSING INSECURITY: EVIDENCE OF SMOKING MATERIAL: 0

## 2025-01-09 ASSESSMENT — PAIN SCALES - PAIN ASSESSMENT IN ADVANCED DEMENTIA (PAINAD)
FACIALEXPRESSION: 0 - SMILING OR INEXPRESSIVE.
BREATHING: 0
NEGVOCALIZATION: 0 - NONE.
BODYLANGUAGE: 0 - RELAXED.
CONSOLABILITY: 0 - NO NEED TO CONSOLE.
FACIALEXPRESSION: 0
BODYLANGUAGE: 0
NEGVOCALIZATION: 0
TOTALSCORE: 0
CONSOLABILITY: 0

## 2025-01-09 ASSESSMENT — ENCOUNTER SYMPTOMS
DRY SKIN: 1
PAIN LOCATION - PAIN QUALITY: DULL
MUSCLE WEAKNESS: 1
PAIN LOCATION: CHEST
APPETITE LEVEL: GOOD
PERSON REPORTING PAIN: PATIENT
SHORTNESS OF BREATH: 1
LAST BOWEL MOVEMENT: 67212
DYSPNEA ON EXERTION: 1
PAIN: 1
DYSPNEA ACTIVITY LEVEL: AFTER AMBULATING LESS THAN 10 FT
STOOL FREQUENCY: DAILY

## 2025-01-09 ASSESSMENT — ACTIVITIES OF DAILY LIVING (ADL)
CURRENT_FUNCTION: STAND BY ASSIST
AMBULATION ASSISTANCE: STAND BY ASSIST

## 2025-01-10 ENCOUNTER — OFFICE VISIT (OUTPATIENT)
Dept: CARDIOLOGY | Facility: HOSPITAL | Age: 65
End: 2025-01-10
Payer: COMMERCIAL

## 2025-01-10 ENCOUNTER — HOME CARE VISIT (OUTPATIENT)
Dept: HOME HEALTH SERVICES | Facility: HOME HEALTH | Age: 65
End: 2025-01-10
Payer: COMMERCIAL

## 2025-01-10 ENCOUNTER — LAB (OUTPATIENT)
Dept: LAB | Facility: LAB | Age: 65
End: 2025-01-10
Payer: COMMERCIAL

## 2025-01-10 VITALS
RESPIRATION RATE: 14 BRPM | DIASTOLIC BLOOD PRESSURE: 80 MMHG | HEART RATE: 66 BPM | SYSTOLIC BLOOD PRESSURE: 122 MMHG | TEMPERATURE: 97 F | OXYGEN SATURATION: 95 %

## 2025-01-10 VITALS
WEIGHT: 291.23 LBS | OXYGEN SATURATION: 92 % | BODY MASS INDEX: 55.03 KG/M2 | DIASTOLIC BLOOD PRESSURE: 67 MMHG | SYSTOLIC BLOOD PRESSURE: 131 MMHG | HEART RATE: 74 BPM

## 2025-01-10 DIAGNOSIS — N20.0 KIDNEY STONE: ICD-10-CM

## 2025-01-10 DIAGNOSIS — I10 HYPERTENSION, UNSPECIFIED TYPE: Primary | ICD-10-CM

## 2025-01-10 DIAGNOSIS — E78.00 HYPERCHOLESTEREMIA: ICD-10-CM

## 2025-01-10 PROCEDURE — 82507 ASSAY OF CITRATE: CPT

## 2025-01-10 PROCEDURE — 83735 ASSAY OF MAGNESIUM: CPT

## 2025-01-10 PROCEDURE — 99214 OFFICE O/P EST MOD 30 MIN: CPT | Performed by: NURSE PRACTITIONER

## 2025-01-10 PROCEDURE — 3078F DIAST BP <80 MM HG: CPT | Performed by: NURSE PRACTITIONER

## 2025-01-10 PROCEDURE — 1111F DSCHRG MED/CURRENT MED MERGE: CPT | Performed by: NURSE PRACTITIONER

## 2025-01-10 PROCEDURE — 3075F SYST BP GE 130 - 139MM HG: CPT | Performed by: NURSE PRACTITIONER

## 2025-01-10 PROCEDURE — G0157 HHC PT ASSISTANT EA 15: HCPCS | Mod: CQ

## 2025-01-10 PROCEDURE — 1036F TOBACCO NON-USER: CPT | Performed by: NURSE PRACTITIONER

## 2025-01-10 PROCEDURE — 1159F MED LIST DOCD IN RCRD: CPT | Performed by: NURSE PRACTITIONER

## 2025-01-10 PROCEDURE — 1157F ADVNC CARE PLAN IN RCRD: CPT | Performed by: NURSE PRACTITIONER

## 2025-01-10 PROCEDURE — 84392 ASSAY OF URINE SULFATE: CPT

## 2025-01-10 PROCEDURE — G2211 COMPLEX E/M VISIT ADD ON: HCPCS | Performed by: NURSE PRACTITIONER

## 2025-01-10 ASSESSMENT — ENCOUNTER SYMPTOMS
DIZZINESS: 1
PERSON REPORTING PAIN: PATIENT
PAIN LOCATION - PAIN DURATION: CHRONIC
PAIN LOCATION - PAIN QUALITY: ACHING
PAIN LOCATION - PAIN FREQUENCY: FREQUENT
PAIN LOCATION: BACK
ENDOCRINE NEGATIVE: 1
HEMATOLOGIC/LYMPHATIC NEGATIVE: 1
PAIN: 1
GASTROINTESTINAL NEGATIVE: 1
PAIN LOCATION - PAIN SEVERITY: 4/10
DYSPNEA ON EXERTION: 1
PAIN LOCATION - EXACERBATING FACTORS: ACTIVITY
ALLERGIC/IMMUNOLOGIC NEGATIVE: 1
PSYCHIATRIC NEGATIVE: 1
MYALGIAS: 1
SHORTNESS OF BREATH: 1
EYES NEGATIVE: 1

## 2025-01-10 NOTE — PROGRESS NOTES
Referred by Dr. Lopez ref. provider found for Follow-up     History Of Present Illness:    Farida Traylor is a very pleasant 64 year old female with a history of PAH, MARLON and HTN, she is here for a follow up visit. The patient is seen in collaboration with Dr. Faulkner. Mrs. Garza breathing ok, currently wearing oxygen all the time. Recently admitted kidney stone.  Denies chest pain or heart palpitations.  Complains of migraines    Review of Systems   HENT: Negative.     Eyes: Negative.    Cardiovascular:  Positive for dyspnea on exertion.   Respiratory:  Positive for shortness of breath.    Endocrine: Negative.    Hematologic/Lymphatic: Negative.    Skin: Negative.    Musculoskeletal:  Positive for muscle weakness and myalgias.   Gastrointestinal: Negative.    Neurological:  Positive for dizziness.   Psychiatric/Behavioral: Negative.     Allergic/Immunologic: Negative.       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), Acute upper respiratory infection, unspecified (01/14/2020), Allergic rhinitis due to animal (cat) (dog) hair and dander (01/02/2020), Allergic rhinitis due to pollen (01/02/2020), Allergic rhinitis due to pollen (02/26/2018), Allergy status to unspecified drugs, medicaments and biological substances (06/30/2015), Anemia, unspecified (07/23/2018), Anxiety disorder due to known physiological condition (06/05/2013), Anxiety disorder, unspecified (01/28/2016), Asymptomatic varicose veins of bilateral lower extremities (07/16/2019), Atypical squamous cells of undetermined significance on cytologic smear of cervix (ASC-US) (06/26/2013), Candidiasis, unspecified (12/10/2019), Cellulitis of left finger (07/31/2018), Changes in skin texture (03/09/2021), Contusion of left lesser toe(s) with damage to nail, initial encounter (02/22/2018), Contusion of right hand, sequela (08/04/2020), Corns and callosities (05/25/2021),  Disorder of pigmentation, unspecified (09/28/2016), Disorder of the skin and subcutaneous tissue, unspecified (08/27/2020), Dorsalgia, unspecified (09/23/2021), Dorsalgia, unspecified (01/29/2019), Encounter for general adult medical examination without abnormal findings (06/08/2020), Encounter for gynecological examination (general) (routine) without abnormal findings (12/14/2021), Encounter for gynecological examination (general) (routine) without abnormal findings (12/11/2020), Encounter for other screening for malignant neoplasm of breast, Encounter for screening for malignant neoplasm of cervix, Encounter for screening for malignant neoplasm of cervix (11/04/2014), Encounter for screening for malignant neoplasm of cervix, Hepatomegaly, not elsewhere classified (04/21/2021), History of falling (12/28/2018), Inappropriate diet and eating habits (05/13/2021), Irregular menstruation, unspecified, Localized edema (07/29/2015), Localized swelling, mass and lump, left lower limb (09/11/2020), Localized swelling, mass and lump, left lower limb (09/24/2020), Localized swelling, mass and lump, unspecified lower limb (09/24/2020), Melanocytic nevi, unspecified (09/10/2019), Multiple births, unspecified, all liveborn ([facility]), Other allergic rhinitis (01/02/2020), Other allergic rhinitis (01/02/2020), Other conditions influencing health status (06/26/2013), Other conditions influencing health status, Other conditions influencing health status (02/05/2019), Other conditions influencing health status (12/14/2021), Other conditions influencing health status (02/14/2019), Other conditions influencing health status (01/14/2020), Other conditions influencing health status (06/05/2013), Other conditions influencing health status (06/05/2013), Other hypertrophic disorders of the skin (07/29/2015), Other shoulder lesions, right shoulder (11/11/2019), Other specified disorders of bone, thigh (09/09/2020), Other specified  noninflammatory disorders of vagina (03/12/2019), Other specified postprocedural states (05/04/2017), Other specified soft tissue disorders (10/08/2020), Other specified soft tissue disorders (08/04/2020), Pain in left thigh (08/27/2020), Pain in right foot (03/09/2021), Pain in right hip (12/28/2018), Pain in right knee (03/12/2021), Pain in right knee (03/19/2021), Pain in right leg (05/25/2021), Pain in right leg (01/12/2022), Pain in unspecified joint, 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 (02/22/2016), Personal history of contraception, Personal history of diseases of the skin and subcutaneous tissue (12/22/2020), Personal history of malignant neoplasm of other parts of uterus, Personal history of other (healed) physical injury and trauma (03/01/2017), Personal history of other benign neoplasm (09/10/2019), Personal history of other benign neoplasm (05/06/2014), Personal history of other benign neoplasm (12/11/2020), Personal history of other diseases of the circulatory system (04/14/2021), Personal history of other diseases of the circulatory system (04/14/2021), Personal history of other diseases of the circulatory system (04/14/2021), Personal history of other diseases of the circulatory system (02/01/2017), Personal history of other diseases of the circulatory system (04/14/2021), Personal history of other diseases of the digestive system (03/24/2021), Personal history of other diseases of the female genital tract (12/28/2016), Personal history of other diseases of the female genital tract (05/19/2022), Personal history of other diseases of the female genital tract, Personal history of other diseases of the female genital tract, Personal history of other diseases of the musculoskeletal system and connective tissue, Personal history of other diseases of the musculoskeletal system and connective tissue (11/18/2019), Personal history of other diseases of the  respiratory system (05/27/2021), Personal history of other diseases of the respiratory system (04/12/2019), Personal history of other diseases of the respiratory system (01/04/2020), Personal history of other endocrine, nutritional and metabolic disease (02/01/2017), Personal history of other endocrine, nutritional and metabolic disease (04/18/2016), Personal history of other endocrine, nutritional and metabolic disease (05/26/2020), Personal history of other infectious and parasitic diseases, Personal history of other medical treatment (12/11/2020), Personal history of other medical treatment (12/14/2021), Personal history of other medical treatment, Personal history of other specified conditions (08/21/2019), Personal history of other specified conditions (02/02/2017), Personal history of other specified conditions (12/07/2020), Personal history of other specified conditions (04/14/2021), Personal history of other specified conditions (12/05/2014), Personal history of other specified conditions (08/25/2021), Personal history of other specified conditions, Personal history of other specified conditions, Personal history of other specified conditions (01/12/2018), Personal history of transient ischemic attack (TIA), and 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 (12/30/2015), Personal history of urinary (tract) infections (09/02/2021), Personal history of urinary (tract) infections (05/23/2019), Personal history of urinary (tract) infections (09/02/2021), Personal history of urinary calculi (09/12/2019), Polyphagia (05/13/2021), Primary central sleep apnea (10/21/2017), Pulmonary hypertension (Multi), Pure hypercholesterolemia, unspecified, Residual hemorrhoidal skin tags (07/06/2017), Sleep apnea, Slurred speech (01/02/2015), Tinea pedis (05/25/2021), Unspecified abdominal pain (04/13/2021), Unspecified injury of right wrist, hand and finger(s), sequela (08/04/2020), Unspecified internal derangement of  unspecified knee (03/01/2017), Unspecified symptoms and signs involving the genitourinary system (12/16/2021), Unspecified symptoms and signs involving the genitourinary system (02/05/2019), Unspecified symptoms and signs involving the genitourinary system (09/03/2020), Urinary tract infection, site not specified (01/17/2020), Urinary tract infection, site not specified (01/10/2022), and Xerosis cutis (09/10/2019).    Past Surgical History:  She has a past surgical history that includes Other surgical history (07/31/2013); Other surgical history (09/10/2019); Other surgical history (11/30/2018); Mouth surgery (11/04/2014); Knee surgery (05/04/2017); Other surgical history (06/08/2020); MR angio head wo IV contrast (02/09/2016); Cardiac catheterization (Right, 01/04/2024); and Cardiac catheterization.      Social History:  She reports that she has never smoked. She has never used smokeless tobacco. She reports that she does not currently use alcohol. She reports that she does not use drugs.    Family History:  Family History   Problem Relation Name Age of Onset    Hypertension Mother      Breast cancer Mother      Other (cardiac disorder) Mother      Cardiomyopathy Mother      Diabetes Mother      Other (chronic kidney disease) Mother      Alcohol abuse Father joel serna     Stroke Brother      Brain Aneurysm Brother          Allergies:  Grass pollen, Other, Pollen extracts, and Tree and shrub pollen    Outpatient Medications:  Current Outpatient Medications   Medication Instructions    Adempas 2.5 mg, oral, 3 times daily    albuterol 90 mcg/actuation inhaler INHALE 1 TO 2 PUFFS BY MOUTH EVERY 4 TO 6 HOURS AS NEEDED    blood pressure monitor (Blood Pressure Kit) kit 1 kit, miscellaneous, As needed    buPROPion SR (Wellbutrin SR) 150 mg 12 hr tablet TAKE 1 TABLET BY MOUTH EVERY MORNING AND AFTERNOON    busPIRone (Buspar) 30 mg tablet 1 tablet, 2 times daily    calcium carbonate (CALCIUM 600 ORAL) 2 tablets, Daily     cyanocobalamin (VITAMIN B-12) 1,000 mcg, Daily    cyclobenzaprine (FLEXERIL) 10 mg, oral, 3 times daily PRN    docusate sodium (COLACE) 100 mg, oral, 2 times daily    enoxaparin (LOVENOX) 60 mg, subcutaneous, Every 12 hours scheduled    ferrous sulfate 65 mg, 3 times daily (morning, midday, late afternoon)    fluticasone propion-salmeteroL (Advair Diskus) 250-50 mcg/dose diskus inhaler INHALE ONE (1) PUFF BY MOUTH TWICE DAILY. RINSE MOUTH OUT AFTER EACH USE.    hydrALAZINE (APRESOLINE) 50 mg, oral, 2 times daily    hydrOXYzine pamoate (Vistaril) 50 mg capsule TAKE 3 CAPSULES BY MOUTH EVERY NIGHT AT BEDTIME AND TAKE 1 CAPSULE BY MOUTH EVERY MORNING    lubiprostone (AMITIZA) 8 mcg, oral, 2 times daily (morning and late afternoon)    memantine (NAMENDA) 10 mg, 2 times daily    methenamine hippurate (HIPREX) 1 g, oral, 2 times daily    montelukast (SINGULAIR) 10 mg, oral, Daily    omeprazole (PRILOSEC) 40 mg, oral, Daily before breakfast    Opsumit 10 mg tablet tablet TAKE 1 TABLET BY MOUTH DAILY. DO NOT HANDLE IF PREGNANT. DO NOT SPLIT, CRUSH, OR CHEW. REVIEW MEDICATION GUIDE.    oxygen (O2) gas therapy 1 each, inhalation, Continuous    potassium citrate CR (Urocit-K-15) 15 mEq ER tablet 15 mEq, oral, 2 times daily (morning and late afternoon), Do not crush, chew, or split.    sertraline (Zoloft) 100 mg tablet 2 tablets, Daily    simvastatin (ZOCOR) 80 mg, oral, Nightly    tamsulosin (FLOMAX) 0.4 mg, oral, Daily    trospium (SANCTURA) 20 mg, oral, 2 times daily        Last Recorded Vitals:  Vitals:    01/10/25 1145   BP: 131/67   Pulse: 74   SpO2: 92%   Weight: 132 kg (291 lb 3.6 oz)       Physical Exam:  Physical Exam  Vitals reviewed.   HENT:      Head: Normocephalic.      Nose: Nose normal.   Eyes:      Pupils: Pupils are equal, round, and reactive to light.   Cardiovascular:      Rate and Rhythm: Normal rate and regular rhythm with a 2/6 CARLITO   Pulmonary:      Effort: Pulmonary effort is normal.      Breath  sounds: diminished in the bases   Abdominal:      General: Abdomen is flat.      Palpations: Abdomen is soft.   Musculoskeletal:         General: Normal range of motion.      Cervical back: Normal range of motion.   Skin:     General: Skin is warm and dry.   Neurological:      General: No focal deficit present.      Mental Status: He is alert and oriented to person, place, and time.   Psychiatric:         Mood and Affect: Mood normal.            Last Labs:  CBC -  Lab Results   Component Value Date    WBC 4.5 01/02/2025    HGB 12.5 01/02/2025    HCT 38.5 01/02/2025    MCV 90 01/02/2025     01/02/2025       CMP -  Lab Results   Component Value Date    CALCIUM 8.8 01/02/2025    PHOS 4.0 01/02/2025    PROT 6.0 (L) 12/31/2024    ALBUMIN 3.9 01/02/2025    AST 12 12/31/2024    ALT 9 12/31/2024    ALKPHOS 51 12/31/2024    BILITOT 0.4 12/31/2024       LIPID PANEL -   Lab Results   Component Value Date    CHOL 177 08/16/2024    TRIG 134 08/16/2024    HDL 55.9 08/16/2024    CHHDL 3.2 08/16/2024    LDLF 94 12/15/2022    VLDL 27 08/16/2024    NHDL 121 08/16/2024       RENAL FUNCTION PANEL -   Lab Results   Component Value Date    GLUCOSE 96 01/02/2025     01/02/2025    K 3.7 01/02/2025     01/02/2025    CO2 28 01/02/2025    ANIONGAP 13 01/02/2025    BUN 11 01/02/2025    CREATININE 0.80 01/02/2025    CALCIUM 8.8 01/02/2025    PHOS 4.0 01/02/2025    ALBUMIN 3.9 01/02/2025        Lab Results   Component Value Date     (H) 12/29/2024    HGBA1C 5.5 12/15/2022       Last Cardiology Tests:  ECG:    Echo:  Echocardiogram 12/2024  1. The left ventricular systolic function is normal, with a Giron's biplane calculated ejection fraction of 65%.   2. Subtle D shaped septum in systole suggesting right ventricular pressure overload.   3. There is normal right ventricular global systolic function.   4. Although RV dimensions are normal by measurement, visually the RV appears mildly dilated.   5. The left atrium is  mildly dilated.   6. The right atrium is moderately dilated.   7. Mildly elevated right ventricular systolic pressure.    Echocardiogram 1/18/2023  1. Left ventricular systolic function is normal with a 60-65% estimated ejection fraction.  2. Technically difficult study precludes definitive valvular assessment, albeit no gross abnormalities evident.  3. There is no evidence of a patent foramen ovale.    Echocardiogram 10/7/2020   1. The left ventricular systolic function is hyperdynamic with a 65-70%  estimated ejection fraction.  2. Moderately enlarged right ventricle.  3. There is mild to moderate tricuspid regurgitation.  4. Moderately elevated pulmonary artery pressure, RVSP 62 mmHg.  Echo 11/2018:   1. Suboptimal image quality due to body habitus.   2. The left ventricular systolic function is normal with a 60-65% estimated  ejection fraction.   3. RV and RA appear mildly dilated.   4. Mildly elevated RVSP.   Ejection Fractions  LVEF 60-65%    Cath:  Right heart cath 10/11/2022  1. Normal cardiac output.  2. MPAP 31, PAOP 11, PVR 2.6, CO 7.7.  3. Pulmonary arterial hypertension.      Right heart cath 4/12/2021  1. Normal cardiac output.  2. RA 15 mmHg, RV 35/15, PA 44/26 (35 mmHg), PAOP 2 mmHg.  3. Thermo CO 7.4 l/min, CI 3.4 l/min/m2.  4. PVR 4.5.  5. Pulmonary arterial hypertension.   Stress Test:  Stress test 5/2019:  1. Normal myocardial perfusion imaging in response to pharmacologic  stress, no evidence of ischemia or prior infarct.  2. Well-maintained left ventricular function, estimated to be greater  than 65%.  Cardiac Imaging:  Zio monitor 6/3/2019   sinus rhythm first degree AV block   12 SVT   max heart rate 182 bpm   min heart rate 42 bpm   average heart rate 60 bpm     Assessment/Plan   Very pleasant 64 year-old female with PAH, asthma, sleep apnea, and HTN, recently admitted with kidney stones. Blood pressure has been well controlled, continue Hydralazine 50 mg twice a day. She continues to do  well from a cardiac standpoint. Breathing has been stable. She currently is seeing pulmonary for her pulmonary hypertension. Blood pressure and heart rate are well controlled today.     Plan   -continue Hydralazine 50 mg twice a day   -continue Aspirin 81 mg daily and Simvastatin 80 mg daily   -follow up in June   -call with any questions         Hortencia Ferraro, ANNETTE-CNP

## 2025-01-11 SDOH — HEALTH STABILITY: MENTAL HEALTH: SMOKING IN HOME: 0

## 2025-01-11 SDOH — ECONOMIC STABILITY: HOUSING INSECURITY: EVIDENCE OF SMOKING MATERIAL: 0

## 2025-01-14 ENCOUNTER — HOME CARE VISIT (OUTPATIENT)
Dept: HOME HEALTH SERVICES | Facility: HOME HEALTH | Age: 65
End: 2025-01-14
Payer: COMMERCIAL

## 2025-01-14 VITALS
DIASTOLIC BLOOD PRESSURE: 80 MMHG | SYSTOLIC BLOOD PRESSURE: 130 MMHG | OXYGEN SATURATION: 94 % | RESPIRATION RATE: 14 BRPM | TEMPERATURE: 98.2 F | HEART RATE: 71 BPM

## 2025-01-14 DIAGNOSIS — N32.81 OAB (OVERACTIVE BLADDER): ICD-10-CM

## 2025-01-14 DIAGNOSIS — K59.04 CHRONIC IDIOPATHIC CONSTIPATION: ICD-10-CM

## 2025-01-14 PROCEDURE — G0157 HHC PT ASSISTANT EA 15: HCPCS | Mod: CQ

## 2025-01-14 ASSESSMENT — ENCOUNTER SYMPTOMS
PAIN LOCATION: ABDOMEN
PAIN LOCATION - PAIN DURATION: 3 DAYS
PERSON REPORTING PAIN: PATIENT
PAIN: 1
PAIN LOCATION - PAIN SEVERITY: 5/10
PAIN LOCATION - PAIN QUALITY: PRESSURE

## 2025-01-16 LAB
AMMONIA 24H UR-SRATE: 25 MMOL/24 H (ref 15–56)
BRUSHITE CRYSTAL(MAYO): 1.48 DG
BSA: ABNORMAL 1.73M(2)
CALCIUM 24H UR-MRATE: 300 MG/24 H
CAOX 24H ENGDIFF UR: 2.06 DG
CHLORIDE 24H UR-SRATE: 88 MMOL/24 H (ref 34–286)
CITRATE 24H UR-MRATE: 810 MG/24 H
COLLECT DURATION TIME UR: 24 H
CREAT 24H UR-MRATE: 1464 MG/24 H (ref 603–1783)
HYDROXYAPATITE 24H ENGDIFF UR: 5.72 DG
MAGNESIUM 24H UR-MRATE: 120 MG/24 H (ref 51–269)
OSMOLALITY 24H UR: 602 MOSM/KG (ref 150–1150)
OXALATE 24H UR-MRATE: 15.8 MG/24 H (ref 9.7–40.5)
OXALATE 24H UR-SRATE: 0.18 MMOL/24 H (ref 0.11–0.46)
PH 24H UR: 6.2 [PH] (ref 4.5–8)
PHOSPHATE 24H UR-MRATE: 756 MG/24 H (ref 226–1797)
POTASSIUM 24H UR-SRATE: 80 MMOL/24 H (ref 16–105)
PROTEIN CATABOLIC RATE 24H UR-MRATE: 86 G/24 H (ref 56–125)
SODIUM 24H UR-SRATE: 97 MMOL/24 H (ref 22–328)
SPECIMEN VOL 24H UR: 1200 ML
SULFATE 24H UR-SRATE: 17 MMOL/24 H (ref 7–47)
URATE 24H UR-MRATE: 504 MG/24 H (ref 250–750)
URIC ACID CRYSTAL(MAYO): 0.24 DG
URINALYSIS SPECIALIST REVIEW: ABNORMAL
UUN 24H UR-MRATE: 9.7 G/24 H (ref 7–42)

## 2025-01-17 ENCOUNTER — APPOINTMENT (OUTPATIENT)
Dept: PRIMARY CARE | Facility: CLINIC | Age: 65
End: 2025-01-17
Payer: COMMERCIAL

## 2025-01-17 ENCOUNTER — HOME CARE VISIT (OUTPATIENT)
Dept: HOME HEALTH SERVICES | Facility: HOME HEALTH | Age: 65
End: 2025-01-17
Payer: COMMERCIAL

## 2025-01-17 VITALS
TEMPERATURE: 97.8 F | OXYGEN SATURATION: 94 % | RESPIRATION RATE: 14 BRPM | DIASTOLIC BLOOD PRESSURE: 70 MMHG | SYSTOLIC BLOOD PRESSURE: 124 MMHG | HEART RATE: 76 BPM

## 2025-01-17 DIAGNOSIS — R82.994 HYPERCALCIURIA: Primary | ICD-10-CM

## 2025-01-17 PROCEDURE — G0157 HHC PT ASSISTANT EA 15: HCPCS | Mod: CQ

## 2025-01-17 RX ORDER — TROSPIUM CHLORIDE 20 MG/1
20 TABLET, FILM COATED ORAL 2 TIMES DAILY
Qty: 60 TABLET | Refills: 10 | Status: SHIPPED | OUTPATIENT
Start: 2025-01-17

## 2025-01-17 RX ORDER — HYDROCHLOROTHIAZIDE 25 MG/1
25 TABLET ORAL DAILY
Qty: 90 TABLET | Refills: 3 | Status: SHIPPED | OUTPATIENT
Start: 2025-01-17 | End: 2026-01-17

## 2025-01-17 SDOH — HEALTH STABILITY: MENTAL HEALTH: SMOKING IN HOME: 0

## 2025-01-17 SDOH — ECONOMIC STABILITY: HOUSING INSECURITY: EVIDENCE OF SMOKING MATERIAL: 0

## 2025-01-17 ASSESSMENT — ENCOUNTER SYMPTOMS
PAIN LOCATION: LEFT KNEE
PAIN LOCATION - EXACERBATING FACTORS: ACTIVITY, POSITIONING
PAIN LOCATION - PAIN FREQUENCY: WITH ACTIVITY
PAIN LOCATION - PAIN QUALITY: ACHING
PAIN LOCATION - PAIN SEVERITY: 3/10
PAIN: 1
PAIN LOCATION - RELIEVING FACTORS: REST
PAIN LOCATION - PAIN SEVERITY: 5/10
PERSON REPORTING PAIN: PATIENT
PAIN LOCATION - PAIN QUALITY: ACHING
PAIN LOCATION - PAIN FREQUENCY: WITH ACTIVITY
PAIN LOCATION - RELIEVING FACTORS: REST
PAIN LOCATION - PAIN DURATION: CHRONIC
PAIN LOCATION: BACK
PAIN LOCATION - PAIN DURATION: CHRONIC

## 2025-01-20 ENCOUNTER — HOME CARE VISIT (OUTPATIENT)
Dept: HOME HEALTH SERVICES | Facility: HOME HEALTH | Age: 65
End: 2025-01-20
Payer: COMMERCIAL

## 2025-01-20 VITALS
RESPIRATION RATE: 12 BRPM | DIASTOLIC BLOOD PRESSURE: 72 MMHG | OXYGEN SATURATION: 92 % | TEMPERATURE: 98 F | HEART RATE: 65 BPM | SYSTOLIC BLOOD PRESSURE: 120 MMHG

## 2025-01-20 PROCEDURE — G0157 HHC PT ASSISTANT EA 15: HCPCS | Mod: CQ

## 2025-01-20 ASSESSMENT — ENCOUNTER SYMPTOMS
PAIN LOCATION - PAIN FREQUENCY: WITH ACTIVITY
PAIN LOCATION - RELIEVING FACTORS: REST, REPOSITIONING
PAIN LOCATION - EXACERBATING FACTORS: ACTIVITY
PAIN SEVERITY GOAL: 0/10
PAIN: 1
PAIN LOCATION - PAIN DURATION: CHRONIC
PERSON REPORTING PAIN: PATIENT
PAIN LOCATION: LEFT KNEE
HIGHEST PAIN SEVERITY IN PAST 24 HOURS: 5/10
PAIN LOCATION - PAIN SEVERITY: 3/10
LOWEST PAIN SEVERITY IN PAST 24 HOURS: 0/10
PAIN LOCATION - PAIN QUALITY: ACHING

## 2025-01-21 ENCOUNTER — APPOINTMENT (OUTPATIENT)
Dept: RADIOLOGY | Facility: HOSPITAL | Age: 65
End: 2025-01-21
Payer: COMMERCIAL

## 2025-01-21 ENCOUNTER — APPOINTMENT (OUTPATIENT)
Dept: PRIMARY CARE | Facility: CLINIC | Age: 65
End: 2025-01-21
Payer: COMMERCIAL

## 2025-01-22 ENCOUNTER — APPOINTMENT (OUTPATIENT)
Dept: RADIOLOGY | Facility: HOSPITAL | Age: 65
End: 2025-01-22
Payer: COMMERCIAL

## 2025-01-23 DIAGNOSIS — J45.902 PERSISTENT ASTHMA WITH STATUS ASTHMATICUS, UNSPECIFIED ASTHMA SEVERITY (HHS-HCC): ICD-10-CM

## 2025-01-23 DIAGNOSIS — G45.9 TRANSIENT ISCHEMIC ATTACK: Primary | ICD-10-CM

## 2025-01-23 RX ORDER — LUBIPROSTONE 8 UG/1
8 CAPSULE ORAL
Qty: 60 CAPSULE | Refills: 10 | Status: SHIPPED | OUTPATIENT
Start: 2025-01-23

## 2025-01-24 ENCOUNTER — HOME CARE VISIT (OUTPATIENT)
Dept: HOME HEALTH SERVICES | Facility: HOME HEALTH | Age: 65
End: 2025-01-24
Payer: COMMERCIAL

## 2025-01-24 VITALS
TEMPERATURE: 98 F | HEART RATE: 66 BPM | OXYGEN SATURATION: 95 % | SYSTOLIC BLOOD PRESSURE: 130 MMHG | RESPIRATION RATE: 14 BRPM | DIASTOLIC BLOOD PRESSURE: 72 MMHG

## 2025-01-24 PROCEDURE — G0157 HHC PT ASSISTANT EA 15: HCPCS | Mod: CQ

## 2025-01-24 ASSESSMENT — ENCOUNTER SYMPTOMS
PAIN LOCATION: BACK
PAIN LOCATION - PAIN SEVERITY: 3/10
PAIN LOCATION - PAIN QUALITY: ACHING
PAIN: 1
PAIN LOCATION - EXACERBATING FACTORS: ACTIVITY
PERSON REPORTING PAIN: PATIENT
PAIN LOCATION - PAIN SEVERITY: 3/10
PAIN LOCATION: LEFT KNEE
PAIN LOCATION - EXACERBATING FACTORS: ACTIVITY
PAIN LOCATION - PAIN FREQUENCY: INTERMITTENT
PAIN LOCATION - PAIN QUALITY: ACHING
PAIN LOCATION - PAIN FREQUENCY: FREQUENT

## 2025-01-24 NOTE — CASE COMMUNICATION
Patient at end of POC next week and plan is for agency discharge by 1/31/2025. Patient feels that she will be functionally ready for discharge at that time and is aware of plan and in agreement.  Patient does have medical appts for urology on 1/30 in the afternoon and 1/31 in the morning. She is able to be reached by phone call or text.  [facility] reviewed with patient and signed electronically with end date of 1/31/2025.

## 2025-01-27 ENCOUNTER — HOSPITAL ENCOUNTER (OUTPATIENT)
Dept: RADIOLOGY | Facility: HOSPITAL | Age: 65
Discharge: HOME | End: 2025-01-27
Payer: COMMERCIAL

## 2025-01-27 ENCOUNTER — HOME CARE VISIT (OUTPATIENT)
Dept: HOME HEALTH SERVICES | Facility: HOME HEALTH | Age: 65
End: 2025-01-27
Payer: COMMERCIAL

## 2025-01-27 VITALS
RESPIRATION RATE: 14 BRPM | HEART RATE: 68 BPM | DIASTOLIC BLOOD PRESSURE: 70 MMHG | OXYGEN SATURATION: 94 % | TEMPERATURE: 96.8 F | SYSTOLIC BLOOD PRESSURE: 124 MMHG

## 2025-01-27 DIAGNOSIS — M89.9 SKULL LESION: ICD-10-CM

## 2025-01-27 PROCEDURE — G0157 HHC PT ASSISTANT EA 15: HCPCS | Mod: CQ

## 2025-01-27 PROCEDURE — RXMED WILLOW AMBULATORY MEDICATION CHARGE

## 2025-01-27 PROCEDURE — 70450 CT HEAD/BRAIN W/O DYE: CPT | Performed by: RADIOLOGY

## 2025-01-27 PROCEDURE — 70450 CT HEAD/BRAIN W/O DYE: CPT

## 2025-01-27 SDOH — ECONOMIC STABILITY: HOUSING INSECURITY: EVIDENCE OF SMOKING MATERIAL: 0

## 2025-01-27 SDOH — HEALTH STABILITY: MENTAL HEALTH: SMOKING IN HOME: 0

## 2025-01-27 ASSESSMENT — ENCOUNTER SYMPTOMS
PAIN LOCATION: LEFT KNEE
PAIN: 1
PAIN LOCATION - PAIN SEVERITY: 2/10
LOWEST PAIN SEVERITY IN PAST 24 HOURS: 1/10
HIGHEST PAIN SEVERITY IN PAST 24 HOURS: 5/10
PAIN LOCATION - RELIEVING FACTORS: REST, MEDICATION
PAIN LOCATION - PAIN DURATION: CHRONIC
PERSON REPORTING PAIN: PATIENT
PAIN LOCATION - PAIN FREQUENCY: INTERMITTENT

## 2025-01-28 ENCOUNTER — PHARMACY VISIT (OUTPATIENT)
Dept: PHARMACY | Facility: CLINIC | Age: 65
End: 2025-01-28
Payer: COMMERCIAL

## 2025-01-29 DIAGNOSIS — N20.0 KIDNEY STONE: ICD-10-CM

## 2025-01-29 RX ORDER — POTASSIUM CITRATE 15 MEQ/1
15 TABLET, EXTENDED RELEASE ORAL
Qty: 180 TABLET | Refills: 3 | Status: SHIPPED | OUTPATIENT
Start: 2025-01-29 | End: 2026-01-29

## 2025-01-30 ENCOUNTER — APPOINTMENT (OUTPATIENT)
Dept: UROLOGY | Facility: CLINIC | Age: 65
End: 2025-01-30
Payer: COMMERCIAL

## 2025-01-30 DIAGNOSIS — N32.81 OAB (OVERACTIVE BLADDER): Primary | ICD-10-CM

## 2025-01-30 PROCEDURE — 1157F ADVNC CARE PLAN IN RCRD: CPT | Performed by: STUDENT IN AN ORGANIZED HEALTH CARE EDUCATION/TRAINING PROGRAM

## 2025-01-30 PROCEDURE — 99213 OFFICE O/P EST LOW 20 MIN: CPT | Performed by: STUDENT IN AN ORGANIZED HEALTH CARE EDUCATION/TRAINING PROGRAM

## 2025-01-30 PROCEDURE — 1111F DSCHRG MED/CURRENT MED MERGE: CPT | Performed by: STUDENT IN AN ORGANIZED HEALTH CARE EDUCATION/TRAINING PROGRAM

## 2025-01-30 NOTE — PROGRESS NOTES
HISTORY OF PRESENT ILLNESS:  Farida Traylor is a 64 y.o. female who presents today for a follow up visit. She is doing well.  She has no complaints or concerns at this time.          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), Acute upper respiratory infection, unspecified (01/14/2020), Allergic rhinitis due to animal (cat) (dog) hair and dander (01/02/2020), Allergic rhinitis due to pollen (01/02/2020), Allergic rhinitis due to pollen (02/26/2018), Allergy status to unspecified drugs, medicaments and biological substances (06/30/2015), Anemia, unspecified (07/23/2018), Anxiety disorder due to known physiological condition (06/05/2013), Anxiety disorder, unspecified (01/28/2016), Asymptomatic varicose veins of bilateral lower extremities (07/16/2019), Atypical squamous cells of undetermined significance on cytologic smear of cervix (ASC-US) (06/26/2013), Candidiasis, unspecified (12/10/2019), Cellulitis of left finger (07/31/2018), Changes in skin texture (03/09/2021), Contusion of left lesser toe(s) with damage to nail, initial encounter (02/22/2018), Contusion of right hand, sequela (08/04/2020), Corns and callosities (05/25/2021), Disorder of pigmentation, unspecified (09/28/2016), Disorder of the skin and subcutaneous tissue, unspecified (08/27/2020), Dorsalgia, unspecified (09/23/2021), Dorsalgia, unspecified (01/29/2019), Encounter for general adult medical examination without abnormal findings (06/08/2020), Encounter for gynecological examination (general) (routine) without abnormal findings (12/14/2021), Encounter for gynecological examination (general) (routine) without abnormal findings (12/11/2020), Encounter for other screening for malignant neoplasm of breast, Encounter for screening for malignant neoplasm of cervix, Encounter for screening for malignant neoplasm of cervix (11/04/2014), Encounter for screening for  malignant neoplasm of cervix, Hepatomegaly, not elsewhere classified (04/21/2021), History of falling (12/28/2018), Inappropriate diet and eating habits (05/13/2021), Irregular menstruation, unspecified, Localized edema (07/29/2015), Localized swelling, mass and lump, left lower limb (09/11/2020), Localized swelling, mass and lump, left lower limb (09/24/2020), Localized swelling, mass and lump, unspecified lower limb (09/24/2020), Melanocytic nevi, unspecified (09/10/2019), Multiple births, unspecified, all liveborn ([facility]), Other allergic rhinitis (01/02/2020), Other allergic rhinitis (01/02/2020), Other conditions influencing health status (06/26/2013), Other conditions influencing health status, Other conditions influencing health status (02/05/2019), Other conditions influencing health status (12/14/2021), Other conditions influencing health status (02/14/2019), Other conditions influencing health status (01/14/2020), Other conditions influencing health status (06/05/2013), Other conditions influencing health status (06/05/2013), Other hypertrophic disorders of the skin (07/29/2015), Other shoulder lesions, right shoulder (11/11/2019), Other specified disorders of bone, thigh (09/09/2020), Other specified noninflammatory disorders of vagina (03/12/2019), Other specified postprocedural states (05/04/2017), Other specified soft tissue disorders (10/08/2020), Other specified soft tissue disorders (08/04/2020), Pain in left thigh (08/27/2020), Pain in right foot (03/09/2021), Pain in right hip (12/28/2018), Pain in right knee (03/12/2021), Pain in right knee (03/19/2021), Pain in right leg (05/25/2021), Pain in right leg (01/12/2022), Pain in unspecified joint, 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 (02/22/2016), Personal history of contraception, Personal history of diseases of the skin and subcutaneous tissue (12/22/2020), Personal history of malignant neoplasm  of other parts of uterus, Personal history of other (healed) physical injury and trauma (03/01/2017), Personal history of other benign neoplasm (09/10/2019), Personal history of other benign neoplasm (05/06/2014), Personal history of other benign neoplasm (12/11/2020), Personal history of other diseases of the circulatory system (04/14/2021), Personal history of other diseases of the circulatory system (04/14/2021), Personal history of other diseases of the circulatory system (04/14/2021), Personal history of other diseases of the circulatory system (02/01/2017), Personal history of other diseases of the circulatory system (04/14/2021), Personal history of other diseases of the digestive system (03/24/2021), Personal history of other diseases of the female genital tract (12/28/2016), Personal history of other diseases of the female genital tract (05/19/2022), Personal history of other diseases of the female genital tract, Personal history of other diseases of the female genital tract, Personal history of other diseases of the musculoskeletal system and connective tissue, Personal history of other diseases of the musculoskeletal system and connective tissue (11/18/2019), Personal history of other diseases of the respiratory system (05/27/2021), Personal history of other diseases of the respiratory system (04/12/2019), Personal history of other diseases of the respiratory system (01/04/2020), Personal history of other endocrine, nutritional and metabolic disease (02/01/2017), Personal history of other endocrine, nutritional and metabolic disease (04/18/2016), Personal history of other endocrine, nutritional and metabolic disease (05/26/2020), Personal history of other infectious and parasitic diseases, Personal history of other medical treatment (12/11/2020), Personal history of other medical treatment (12/14/2021), Personal history of other medical treatment, Personal history of other specified conditions  (08/21/2019), Personal history of other specified conditions (02/02/2017), Personal history of other specified conditions (12/07/2020), Personal history of other specified conditions (04/14/2021), Personal history of other specified conditions (12/05/2014), Personal history of other specified conditions (08/25/2021), Personal history of other specified conditions, Personal history of other specified conditions, Personal history of other specified conditions (01/12/2018), Personal history of transient ischemic attack (TIA), and 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 (12/30/2015), Personal history of urinary (tract) infections (09/02/2021), Personal history of urinary (tract) infections (05/23/2019), Personal history of urinary (tract) infections (09/02/2021), Personal history of urinary calculi (09/12/2019), Polyphagia (05/13/2021), Primary central sleep apnea (10/21/2017), Pulmonary hypertension (Multi), Pure hypercholesterolemia, unspecified, Residual hemorrhoidal skin tags (07/06/2017), Sleep apnea, Slurred speech (01/02/2015), Tinea pedis (05/25/2021), Unspecified abdominal pain (04/13/2021), Unspecified injury of right wrist, hand and finger(s), sequela (08/04/2020), Unspecified internal derangement of unspecified knee (03/01/2017), Unspecified symptoms and signs involving the genitourinary system (12/16/2021), Unspecified symptoms and signs involving the genitourinary system (02/05/2019), Unspecified symptoms and signs involving the genitourinary system (09/03/2020), Urinary tract infection, site not specified (01/17/2020), Urinary tract infection, site not specified (01/10/2022), and Xerosis cutis (09/10/2019).    Surgical History  She has a past surgical history that includes Other surgical history (07/31/2013); Other surgical history (09/10/2019); Other surgical history (11/30/2018); Mouth surgery (11/04/2014); Knee surgery (05/04/2017); Other surgical history (06/08/2020); MR angio head wo IV  "contrast (02/09/2016); Cardiac catheterization (Right, 01/04/2024); and Cardiac catheterization.     Social History  She reports that she has never smoked. She has never used smokeless tobacco. She reports that she does not currently use alcohol. She reports that she does not use drugs.    Family History  Family History   Problem Relation Name Age of Onset    Hypertension Mother      Breast cancer Mother      Other (cardiac disorder) Mother      Cardiomyopathy Mother      Diabetes Mother      Other (chronic kidney disease) Mother      Alcohol abuse Father joel serna     Stroke Brother      Brain Aneurysm Brother          Allergies  Grass pollen, Other, Pollen extracts, and Tree and shrub pollen      A comprehensive 10+ review of systems was negative except for: see hpi                          PHYSICAL EXAMINATION:  BP Readings from Last 3 Encounters:   01/27/25 124/70   01/24/25 130/72   01/20/25 120/72      Wt Readings from Last 3 Encounters:   01/10/25 132 kg (291 lb 3.6 oz)   01/09/25 132 kg (290 lb)   01/08/25 133 kg (293 lb 12.8 oz)      BMI: Estimated body mass index is 55.03 kg/m² as calculated from the following:    Height as of 1/9/25: 1.549 m (5' 1\").    Weight as of 1/10/25: 132 kg (291 lb 3.6 oz).  BSA: Estimated body surface area is 2.38 meters squared as calculated from the following:    Height as of 1/9/25: 1.549 m (5' 1\").    Weight as of 1/10/25: 132 kg (291 lb 3.6 oz).  HEENT: Normocephalic, atraumatic, PER EOMI, nonicteric, trachea normal, thyroid normal, oropharynx normal.  CARDIAC: regular rate & rhythm, S1 & S2 normal.  No heaves, thrills, gallops or murmurs.  LUNGS: Clear to auscultation, no spinal or CV tenderness.  EXTREMITIES: No evidence of cyanosis, clubbing or edema.               Assessment:  64 -year-old with recurrent UTIs, genitourinary syndrome menopause, and urinary urge incontinence, presenting for a virtual follow-up visit.     Alyce/dysuria:   -continue estradiol   -Patient " is taking methenamine  -urine culture ordered         RYAN:  -Failed myrbetriq   -Patient is currently taking trospium  -s/p Botox, 5/26/22, 5/25/2023, 2/8/24, 12/12/24 ,100 units,   -Doing well       Follow up in 3 months virtually        All questions and concerns were answered and addressed.  The patient expressed understanding and agrees with the plan.     Sanford Arredondo MD    Scribe Attestation  By signing my name below, I, Zina Fuentes, Scribfrederick   attest that this documentation has been prepared under the direction and in the presence of Sanford Arredondo MD.

## 2025-01-31 ENCOUNTER — APPOINTMENT (OUTPATIENT)
Dept: UROLOGY | Facility: CLINIC | Age: 65
End: 2025-01-31
Payer: COMMERCIAL

## 2025-01-31 ENCOUNTER — HOME CARE VISIT (OUTPATIENT)
Dept: HOME HEALTH SERVICES | Facility: HOME HEALTH | Age: 65
End: 2025-01-31
Payer: COMMERCIAL

## 2025-01-31 VITALS
SYSTOLIC BLOOD PRESSURE: 126 MMHG | RESPIRATION RATE: 18 BRPM | TEMPERATURE: 98.1 F | OXYGEN SATURATION: 91 % | DIASTOLIC BLOOD PRESSURE: 68 MMHG | HEART RATE: 72 BPM

## 2025-01-31 DIAGNOSIS — R31.29 MICROSCOPIC HEMATURIA: ICD-10-CM

## 2025-01-31 DIAGNOSIS — N20.2 CALCULUS OF KIDNEY AND URETER: Primary | ICD-10-CM

## 2025-01-31 DIAGNOSIS — I27.0 IDIOPATHIC PAH (PULMONARY ARTERIAL HYPERTENSION) (MULTI): ICD-10-CM

## 2025-01-31 LAB
POC APPEARANCE, URINE: CLEAR
POC BILIRUBIN, URINE: NEGATIVE
POC BLOOD, URINE: ABNORMAL
POC COLOR, URINE: YELLOW
POC GLUCOSE, URINE: NEGATIVE MG/DL
POC KETONES, URINE: NEGATIVE MG/DL
POC LEUKOCYTES, URINE: ABNORMAL
POC NITRITE,URINE: NEGATIVE
POC PH, URINE: 8.5 PH
POC PROTEIN, URINE: NEGATIVE MG/DL
POC SPECIFIC GRAVITY, URINE: 1.02
POC UROBILINOGEN, URINE: 0.2 EU/DL

## 2025-01-31 PROCEDURE — 99213 OFFICE O/P EST LOW 20 MIN: CPT | Performed by: SURGERY

## 2025-01-31 PROCEDURE — 1125F AMNT PAIN NOTED PAIN PRSNT: CPT | Performed by: SURGERY

## 2025-01-31 PROCEDURE — 1111F DSCHRG MED/CURRENT MED MERGE: CPT | Performed by: SURGERY

## 2025-01-31 PROCEDURE — 81003 URINALYSIS AUTO W/O SCOPE: CPT | Performed by: SURGERY

## 2025-01-31 PROCEDURE — 1036F TOBACCO NON-USER: CPT | Performed by: SURGERY

## 2025-01-31 PROCEDURE — 1157F ADVNC CARE PLAN IN RCRD: CPT | Performed by: SURGERY

## 2025-01-31 PROCEDURE — 1159F MED LIST DOCD IN RCRD: CPT | Performed by: SURGERY

## 2025-01-31 PROCEDURE — 1160F RVW MEDS BY RX/DR IN RCRD: CPT | Performed by: SURGERY

## 2025-01-31 PROCEDURE — G0151 HHCP-SERV OF PT,EA 15 MIN: HCPCS

## 2025-01-31 SDOH — HEALTH STABILITY: PHYSICAL HEALTH
EXERCISE COMMENTS: SEATED THER EX X 20  -ANKLE PUMPS  -LAQ  -MARCH    STANDING THER EX X 10   HEEL RAISE  HIP EXTENSION  HIP ABDUCTION  LATERAL WEIGHT SHIFT

## 2025-01-31 ASSESSMENT — ACTIVITIES OF DAILY LIVING (ADL)
OASIS_M1830: 00
AMBULATION ASSISTANCE ON FLAT SURFACES: 1
HOME_HEALTH_OASIS: 00

## 2025-01-31 ASSESSMENT — PAIN SCALES - GENERAL: PAINLEVEL_OUTOF10: 2

## 2025-01-31 ASSESSMENT — ENCOUNTER SYMPTOMS
PAIN LOCATION: LEFT KNEE
PAIN LOCATION - PAIN SEVERITY: 4/10
PAIN: 1
PERSON REPORTING PAIN: PATIENT
HIGHEST PAIN SEVERITY IN PAST 24 HOURS: 6/10

## 2025-01-31 NOTE — PROGRESS NOTES
Subjective   Patient ID: Farida Traylor is a 64 y.o. female who presents for Nephrolithiasis.        HPI  She is here for hospital follow-up.  She was hospitalized last month with a symptomatic 8 mm right ureteral stone.  Due to her anesthesia risks she was transferred to the [facility].  Once she was transferred her symptoms resolved.  No intervention was done.  She has been on medical expulsive therapy.  She has some mild pelvic pain near her urethra.              Objective   Physical Exam  Morbidly obese  Partly labored breathing  Nasal cannula oxygen  Abdomen soft, ND, NT              Assessment/Plan   Problem List Items Addressed This Visit             ICD-10-CM       Genitourinary and Reproductive    Kidney stone - Primary N20.0          She likely needs repeat imaging to assess passage of the stone.  I will order a stone CT today.  I will call her with the results.  In the meantime I encouraged liberal hydration.        Kanika Navarro MD 01/31/25 11:05 AM

## 2025-02-01 LAB
APPEARANCE UR: CLEAR
BACTERIA #/AREA URNS HPF: ABNORMAL /HPF
BILIRUB UR QL STRIP: NEGATIVE
COLOR UR: YELLOW
GLUCOSE UR QL STRIP: NEGATIVE
HGB UR QL STRIP: ABNORMAL
HYALINE CASTS #/AREA URNS LPF: ABNORMAL /LPF
KETONES UR QL STRIP: NEGATIVE
LEUKOCYTE ESTERASE UR QL STRIP: ABNORMAL
NITRITE UR QL STRIP: NEGATIVE
PH UR STRIP: ABNORMAL [PH] (ref 5–8)
PROT UR QL STRIP: NEGATIVE
RBC #/AREA URNS HPF: ABNORMAL /HPF
SERVICE CMNT-IMP: ABNORMAL
SP GR UR STRIP: 1.01 (ref 1–1.03)
SQUAMOUS #/AREA URNS HPF: ABNORMAL /HPF
WBC #/AREA URNS HPF: ABNORMAL /HPF

## 2025-02-02 LAB — BACTERIA UR CULT: NORMAL

## 2025-02-04 ENCOUNTER — OFFICE VISIT (OUTPATIENT)
Dept: PRIMARY CARE | Facility: CLINIC | Age: 65
End: 2025-02-04
Payer: COMMERCIAL

## 2025-02-04 VITALS
DIASTOLIC BLOOD PRESSURE: 78 MMHG | OXYGEN SATURATION: 89 % | BODY MASS INDEX: 54.98 KG/M2 | WEIGHT: 291 LBS | SYSTOLIC BLOOD PRESSURE: 128 MMHG | TEMPERATURE: 97 F | HEART RATE: 86 BPM

## 2025-02-04 DIAGNOSIS — H65.03 NON-RECURRENT ACUTE SEROUS OTITIS MEDIA OF BOTH EARS: Primary | ICD-10-CM

## 2025-02-04 DIAGNOSIS — I10 BENIGN ESSENTIAL HYPERTENSION: ICD-10-CM

## 2025-02-04 PROCEDURE — 99213 OFFICE O/P EST LOW 20 MIN: CPT | Performed by: FAMILY MEDICINE

## 2025-02-04 PROCEDURE — 1157F ADVNC CARE PLAN IN RCRD: CPT | Performed by: FAMILY MEDICINE

## 2025-02-04 PROCEDURE — 3074F SYST BP LT 130 MM HG: CPT | Performed by: FAMILY MEDICINE

## 2025-02-04 PROCEDURE — 3078F DIAST BP <80 MM HG: CPT | Performed by: FAMILY MEDICINE

## 2025-02-04 PROCEDURE — 1036F TOBACCO NON-USER: CPT | Performed by: FAMILY MEDICINE

## 2025-02-04 PROCEDURE — 1159F MED LIST DOCD IN RCRD: CPT | Performed by: FAMILY MEDICINE

## 2025-02-04 RX ORDER — FLUTICASONE PROPIONATE 50 MCG
1 SPRAY, SUSPENSION (ML) NASAL DAILY
Qty: 16 G | Refills: 11 | Status: SHIPPED | OUTPATIENT
Start: 2025-02-04 | End: 2026-02-04

## 2025-02-04 RX ORDER — HYDRALAZINE HYDROCHLORIDE 50 MG/1
50 TABLET, FILM COATED ORAL 2 TIMES DAILY
Qty: 180 TABLET | Refills: 2 | Status: SHIPPED | OUTPATIENT
Start: 2025-02-04 | End: 2025-11-01

## 2025-02-04 ASSESSMENT — ENCOUNTER SYMPTOMS
HEADACHES: 0
DIZZINESS: 0
SLEEP DISTURBANCE: 0
DYSPHORIC MOOD: 0
RHINORRHEA: 0
FEVER: 0
SORE THROAT: 0
SHORTNESS OF BREATH: 1
NUMBNESS: 0
LIGHT-HEADEDNESS: 0
NAUSEA: 0
EYE DISCHARGE: 0
BLOOD IN STOOL: 0
WHEEZING: 0
EYE ITCHING: 0
SINUS PRESSURE: 0
ACTIVITY CHANGE: 0
UNEXPECTED WEIGHT CHANGE: 0
VOMITING: 0
APPETITE CHANGE: 0
WEAKNESS: 0
DYSURIA: 0
ABDOMINAL PAIN: 0
CONSTIPATION: 0
HEMATURIA: 0
COUGH: 0
DIARRHEA: 0
JOINT SWELLING: 0
NERVOUS/ANXIOUS: 0
PALPITATIONS: 0
MYALGIAS: 0
FLANK PAIN: 0
ARTHRALGIAS: 0

## 2025-02-04 NOTE — PATIENT INSTRUCTIONS
Drink lots of water with passing the kidney stone  Sent nasal medication to help open the ears   Follow when needed

## 2025-02-04 NOTE — PROGRESS NOTES
Subjective   Patient ID: Farida Traylor is a 64 y.o. female who presents for Earache (Here with bilateral ear pain, has had it for 2 or 3 weeks , is having dizziness, no drainage, ear popping ).    HPI PATIENT JUST IN THE HOSPITAL WITH PASSING A KIDNEY STONE   PATIENT HAS CONTINUOUS 02      Review of Systems   Constitutional:  Negative for activity change, appetite change, fever and unexpected weight change.   HENT:  Positive for congestion and ear pain. Negative for postnasal drip, rhinorrhea, sinus pressure and sore throat.    Eyes:  Negative for discharge, itching and visual disturbance.   Respiratory:  Positive for shortness of breath. Negative for cough and wheezing.         CONTINUOUS 02    Cardiovascular:  Negative for chest pain, palpitations and leg swelling.   Gastrointestinal:  Negative for abdominal pain, blood in stool, constipation, diarrhea, nausea and vomiting.   Endocrine: Negative for cold intolerance, heat intolerance and polyuria.   Genitourinary:  Negative for dysuria, flank pain and hematuria.        PASSING RENAL STONE /FOLLOWS WITH UROLOGY SOON    Musculoskeletal:  Negative for arthralgias, gait problem, joint swelling and myalgias.   Skin:  Negative for rash.   Allergic/Immunologic: Negative for environmental allergies and food allergies.   Neurological:  Negative for dizziness, syncope, weakness, light-headedness, numbness and headaches.   Psychiatric/Behavioral:  Negative for dysphoric mood and sleep disturbance. The patient is not nervous/anxious.        Objective   /78 (BP Location: Left arm)   Pulse 86   Temp 36.1 °C (97 °F)   Wt 132 kg (291 lb)   SpO2 (!) 89%   BMI 54.98 kg/m²     Physical Exam  Vitals and nursing note reviewed.   Constitutional:       Appearance: Normal appearance.   HENT:      Head: Normocephalic.      Right Ear: There is no impacted cerumen.      Left Ear: There is no impacted cerumen.      Ears:      Comments: BILATERAL FLUID BEHIND BOTH TM'S DULL  MILDLY PINK AND RETRACTED      Mouth/Throat:      Mouth: Mucous membranes are dry.      Pharynx: Posterior oropharyngeal erythema present.   Cardiovascular:      Rate and Rhythm: Normal rate and regular rhythm.      Pulses: Normal pulses.      Heart sounds: Normal heart sounds. No murmur heard.     No friction rub. No gallop.   Pulmonary:      Effort: Pulmonary effort is normal. No respiratory distress.      Breath sounds: Normal breath sounds. No wheezing.   Abdominal:      General: There is no distension.      Tenderness: There is no abdominal tenderness.   Musculoskeletal:         General: No deformity. Normal range of motion.   Skin:     General: Skin is warm and dry.      Comments: FACE IS REALLY FLUSHED NO FEVER    Neurological:      General: No focal deficit present.      Mental Status: She is alert and oriented to person, place, and time.   Psychiatric:         Mood and Affect: Mood normal.         Assessment/Plan   Problem List Items Addressed This Visit    None  Visit Diagnoses         Codes    Non-recurrent acute serous otitis media of both ears    -  Primary H65.03    Relevant Medications    fluticasone (Flonase) 50 mcg/actuation nasal spray

## 2025-02-04 NOTE — PROGRESS NOTES
Pharmacy Post-Discharge Visit    Farida Traylor is a 64 y.o. female was referred to Clinical Pharmacy Team to complete a post-discharge medication optimization and monitoring visit.  The patient was referred for their Asthma and Medication Review.    Pt is here for First appointment.   Referring Provider: Jelena Romo DO  PCP: Jelena Romo DO, last visit: 11/6/24, next visit: 2/21/25    Admission Date: 12/31/24  Discharge Date: 1/2/25  Discharge Diagnosis: Kidney Stone    Subjective   HPI  PMH significant for TIA and asthma.    Medication System Management  Patients preferred pharmacy: [facility] for long term meds, [facility] for short term  Adherence/Organization:  Hydrochlorothaizde patient states she does not have  Affordability/Accessibility: No current concerns    Drug Interactions  No relevant drug interactions were noted.    ASTHMA  Patient has been diagnosed with: Asthma  Does patient see a pulmonologist: No    Asthma Severity  Symptoms/week: < or = 2 days/week  Primary Symptoms: dyspnea, non-productive cough, and wheezing  Aggravating Factors: None  Alleviating Factors:  has not needed recently  Nighttime awakenings: < or = 2 times/month   SHAYNA use: < or = 2 days/week  Interference with normal activity: none    Office Spirometry Results    None in chart    Exacerbation History:  When was your last hospitalization for an exacerbation? unknown  When was the last time you were treated with antibiotics and/or steroids? unknown     Preventative Care  Immunizations Needed: All up-to-date and documented  Tobacco Use: non-smoker      Objective   Allergies   Allergen Reactions    Grass Pollen Other    Other Unknown    Pollen Extracts Unknown    Tree And Shrub Pollen Other     Social History     Social History Narrative    Not on file      Medication Review  Current Outpatient Medications   Medication Instructions    albuterol 90 mcg/actuation inhaler INHALE 1 TO 2 PUFFS BY  MOUTH EVERY 4 TO 6 HOURS AS NEEDED    aspirin 325 mg, Daily    buPROPion SR (Wellbutrin SR) 150 mg 12 hr tablet TAKE 1 TABLET BY MOUTH EVERY MORNING AND AFTERNOON    busPIRone (Buspar) 30 mg tablet 1 tablet, 2 times daily    calcium carbonate 430 mg calcium (1,000 mg) tablet,chewable 1 tablet, Daily    cholecalciferol (VITAMIN D3) 5,000 Units, Daily    cyanocobalamin (VITAMIN B-12) 1,000 mcg, Daily    cyclobenzaprine (FLEXERIL) 10 mg, oral, 3 times daily PRN    ferrous sulfate 325 mg, Daily with breakfast    fluticasone (Flonase) 50 mcg/actuation nasal spray 1 spray, Each Nostril, Daily, Shake gently. Before first use, prime pump. After use, clean tip and replace cap.    fluticasone propion-salmeteroL (Advair Diskus) 250-50 mcg/dose diskus inhaler INHALE ONE (1) PUFF BY MOUTH TWICE DAILY. RINSE MOUTH OUT AFTER EACH USE.    hydrALAZINE (APRESOLINE) 50 mg, oral, 2 times daily    hydroCHLOROthiazide (HYDRODIURIL) 25 mg, oral, Daily    hydrOXYzine pamoate (Vistaril) 50 mg capsule TAKE 3 CAPSULES BY MOUTH EVERY NIGHT AT BEDTIME AND TAKE 1 CAPSULE BY MOUTH EVERY MORNING    lubiprostone (AMITIZA) 8 mcg, oral, 2 times daily (morning and late afternoon)    memantine (NAMENDA) 10 mg, 2 times daily    methenamine hippurate (HIPREX) 1 g, oral, 2 times daily    montelukast (SINGULAIR) 10 mg, oral, Daily    omeprazole (PRILOSEC) 40 mg, oral, Daily before breakfast    Opsumit 10 mg tablet tablet TAKE 1 TABLET BY MOUTH DAILY. DO NOT HANDLE IF PREGNANT. DO NOT SPLIT, CRUSH, OR CHEW. REVIEW MEDICATION GUIDE.    oxygen (O2) gas therapy 1 each, inhalation, Continuous    polyethylene glycol (GLYCOLAX, MIRALAX) 17 g, Daily PRN    potassium citrate CR (Urocit-K-15) 15 mEq ER tablet 15 mEq, oral, 2 times daily (morning and late afternoon), Do not crush, chew, or split.    riociguat (ADEMPAS) 2.5 mg, oral, 3 times daily    sertraline (Zoloft) 100 mg tablet 2 tablets, Daily    simvastatin (ZOCOR) 80 mg, oral, Nightly    trospium (SANCTURA)  "20 mg, oral, 2 times daily      Vitals  BP Readings from Last 2 Encounters:   02/04/25 128/78   01/31/25 126/68     Labs  A1C  Lab Results   Component Value Date    HGBA1C 5.5 12/15/2022    HGBA1C 6.0 (A) 07/29/2022    HGBA1C 5.6 01/05/2022     BMP  Lab Results   Component Value Date    CALCIUM 8.8 01/02/2025     01/02/2025    K 3.7 01/02/2025    CO2 28 01/02/2025     01/02/2025    BUN 11 01/02/2025    CREATININE 0.80 01/02/2025    EGFR 81 01/02/2025     LFTs  Lab Results   Component Value Date    ALT 9 12/31/2024    AST 12 12/31/2024    ALKPHOS 51 12/31/2024    BILITOT 0.4 12/31/2024     FLP  Lab Results   Component Value Date    TRIG 134 08/16/2024    CHOL 177 08/16/2024    LDLF 94 12/15/2022    LDLCALC 94 08/16/2024    HDL 55.9 08/16/2024     Urine Microalbumin  No results found for: \"MICROALBCREA\"  Wt Readings from Last 3 Encounters:   02/04/25 132 kg (291 lb)   01/10/25 132 kg (291 lb 3.6 oz)   01/09/25 132 kg (290 lb)      BMI Readings from Last 1 Encounters:   02/04/25 54.98 kg/m²       Assessment/Plan   Problem List Items Addressed This Visit          Neuro    Transient ischemic attack       Pulmonary and Pneumonias    Asthma     Medication review  Patient not taking hydrochlorothiazide as previously prescribed to her. Advised that she call [facility] to get this medication.   Added: Miralax, Vitamin D3, aspirin  Discontinued: Colace  Updated: Calcium    Asthma  Patient with Asthma that is well controlled and stable.     Continue medications as previously prescribed. Patient having no concerns with asthma at this time.     Patient Education:  Counseled patient on relevant medication mechanisms of action, expectations, side effects, duration of therapy, contraindications, administration, and monitoring parameters.  All questions and concerns addressed. Contact pharmacist with any further questions or concerns prior to next appointment.    Clinical Pharmacist follow-up: as needed    Thank " Nelson vaughan PharmD, [facility]  Clinical Pharmacy Specialist-Primary Care  [phone]     Continue all meds under the continuation of care with the referring provider and clinical pharmacy team.  Verbal consent to manage patient's drug therapy was obtained from the patient. They were informed they may decline to participate or withdraw from participation in pharmacy services at any time.

## 2025-02-05 ENCOUNTER — APPOINTMENT (OUTPATIENT)
Dept: PODIATRY | Facility: CLINIC | Age: 65
End: 2025-02-05
Payer: COMMERCIAL

## 2025-02-05 ENCOUNTER — TELEMEDICINE (OUTPATIENT)
Dept: PHARMACY | Facility: HOSPITAL | Age: 65
End: 2025-02-05
Payer: COMMERCIAL

## 2025-02-05 DIAGNOSIS — G45.9 TRANSIENT ISCHEMIC ATTACK: ICD-10-CM

## 2025-02-05 DIAGNOSIS — J45.902 PERSISTENT ASTHMA WITH STATUS ASTHMATICUS, UNSPECIFIED ASTHMA SEVERITY (HHS-HCC): ICD-10-CM

## 2025-02-05 RX ORDER — POLYETHYLENE GLYCOL 3350 17 G/17G
17 POWDER, FOR SOLUTION ORAL DAILY PRN
COMMUNITY

## 2025-02-05 RX ORDER — ACETAMINOPHEN 500 MG
5000 TABLET ORAL DAILY
COMMUNITY

## 2025-02-05 RX ORDER — ASPIRIN 325 MG
325 TABLET ORAL DAILY
COMMUNITY

## 2025-02-11 ENCOUNTER — APPOINTMENT (OUTPATIENT)
Dept: OBSTETRICS AND GYNECOLOGY | Facility: CLINIC | Age: 65
End: 2025-02-11
Payer: COMMERCIAL

## 2025-02-11 ENCOUNTER — TELEMEDICINE (OUTPATIENT)
Dept: NEUROSURGERY | Facility: CLINIC | Age: 65
End: 2025-02-11
Payer: COMMERCIAL

## 2025-02-11 ENCOUNTER — APPOINTMENT (OUTPATIENT)
Dept: PODIATRY | Facility: CLINIC | Age: 65
End: 2025-02-11
Payer: COMMERCIAL

## 2025-02-11 DIAGNOSIS — M89.9 SKULL LESION: Primary | ICD-10-CM

## 2025-02-11 PROCEDURE — 99212 OFFICE O/P EST SF 10 MIN: CPT | Performed by: NEUROLOGICAL SURGERY

## 2025-02-11 PROCEDURE — 1157F ADVNC CARE PLAN IN RCRD: CPT | Performed by: NEUROLOGICAL SURGERY

## 2025-02-11 NOTE — PROGRESS NOTES
Telemedicine follow-up for skull lesion both the patient and the provider in the [facility]..    64-year-old woman has telemedicine follow-up for left parietal skull lesion.  She denies any new symptoms.    On video examination she is alert and interactive.  Extraocular movements are intact.  Face moves symmetrically.  She moves upper extremities symmetrically.    A new CT of the head that I personally reviewed demonstrates the stable lucency in the skull of the left parietal region.  It is unchanged compared to a scan from 2 years ago.    There has been no change in the skull lesion.  There is no evidence of cyst or neoplasm.  As such, I do not believe the patient requires any surgical intervention.  I be happy to see her again if any new issues arise.

## 2025-02-14 ENCOUNTER — HOSPITAL ENCOUNTER (OUTPATIENT)
Dept: RADIOLOGY | Facility: HOSPITAL | Age: 65
Discharge: HOME | End: 2025-02-14
Payer: COMMERCIAL

## 2025-02-14 DIAGNOSIS — N20.2 CALCULUS OF KIDNEY AND URETER: ICD-10-CM

## 2025-02-14 PROCEDURE — 74176 CT ABD & PELVIS W/O CONTRAST: CPT

## 2025-02-17 ENCOUNTER — APPOINTMENT (OUTPATIENT)
Dept: PRIMARY CARE | Facility: CLINIC | Age: 65
End: 2025-02-17
Payer: COMMERCIAL

## 2025-02-17 DIAGNOSIS — N20.1 CALCULUS OF URETER: Primary | ICD-10-CM

## 2025-02-19 ENCOUNTER — APPOINTMENT (OUTPATIENT)
Dept: PODIATRY | Facility: CLINIC | Age: 65
End: 2025-02-19
Payer: COMMERCIAL

## 2025-02-20 ENCOUNTER — TELEPHONE (OUTPATIENT)
Dept: OBSTETRICS AND GYNECOLOGY | Facility: CLINIC | Age: 65
End: 2025-02-20
Payer: COMMERCIAL

## 2025-02-21 ENCOUNTER — APPOINTMENT (OUTPATIENT)
Dept: PRIMARY CARE | Facility: CLINIC | Age: 65
End: 2025-02-21
Payer: COMMERCIAL

## 2025-02-26 ENCOUNTER — ANESTHESIA EVENT (OUTPATIENT)
Dept: OPERATING ROOM | Facility: HOSPITAL | Age: 65
End: 2025-02-26
Payer: COMMERCIAL

## 2025-02-26 ENCOUNTER — PRE-ADMISSION TESTING (OUTPATIENT)
Dept: PREADMISSION TESTING | Facility: HOSPITAL | Age: 65
End: 2025-02-26
Payer: COMMERCIAL

## 2025-02-26 VITALS
TEMPERATURE: 98.2 F | RESPIRATION RATE: 18 BRPM | OXYGEN SATURATION: 94 % | SYSTOLIC BLOOD PRESSURE: 121 MMHG | HEART RATE: 71 BPM | HEIGHT: 61 IN | DIASTOLIC BLOOD PRESSURE: 60 MMHG | BODY MASS INDEX: 53.49 KG/M2 | WEIGHT: 283.29 LBS

## 2025-02-26 DIAGNOSIS — N20.1 CALCULUS OF URETER: ICD-10-CM

## 2025-02-26 DIAGNOSIS — Z01.818 PREOPERATIVE EXAMINATION: Primary | ICD-10-CM

## 2025-02-26 LAB
ALBUMIN SERPL BCP-MCNC: 3.9 G/DL (ref 3.4–5)
ALP SERPL-CCNC: 54 U/L (ref 33–136)
ALT SERPL W P-5'-P-CCNC: 14 U/L (ref 7–45)
ANION GAP SERPL CALC-SCNC: 11 MMOL/L (ref 10–20)
AST SERPL W P-5'-P-CCNC: 14 U/L (ref 9–39)
BASOPHILS # BLD AUTO: 0.01 X10*3/UL (ref 0–0.1)
BASOPHILS NFR BLD AUTO: 0.2 %
BILIRUB SERPL-MCNC: 0.3 MG/DL (ref 0–1.2)
BUN SERPL-MCNC: 11 MG/DL (ref 6–23)
CALCIUM SERPL-MCNC: 8.9 MG/DL (ref 8.6–10.3)
CHLORIDE SERPL-SCNC: 102 MMOL/L (ref 98–107)
CO2 SERPL-SCNC: 30 MMOL/L (ref 21–32)
CREAT SERPL-MCNC: 0.82 MG/DL (ref 0.5–1.05)
EGFRCR SERPLBLD CKD-EPI 2021: 79 ML/MIN/1.73M*2
EOSINOPHIL # BLD AUTO: 0.16 X10*3/UL (ref 0–0.7)
EOSINOPHIL NFR BLD AUTO: 3.4 %
ERYTHROCYTE [DISTWIDTH] IN BLOOD BY AUTOMATED COUNT: 14.8 % (ref 11.5–14.5)
GLUCOSE SERPL-MCNC: 103 MG/DL (ref 74–99)
HCT VFR BLD AUTO: 43.8 % (ref 36–46)
HGB BLD-MCNC: 13.9 G/DL (ref 12–16)
IMM GRANULOCYTES # BLD AUTO: 0.01 X10*3/UL (ref 0–0.7)
IMM GRANULOCYTES NFR BLD AUTO: 0.2 % (ref 0–0.9)
LYMPHOCYTES # BLD AUTO: 1.21 X10*3/UL (ref 1.2–4.8)
LYMPHOCYTES NFR BLD AUTO: 25.8 %
MCH RBC QN AUTO: 28.7 PG (ref 26–34)
MCHC RBC AUTO-ENTMCNC: 31.7 G/DL (ref 32–36)
MCV RBC AUTO: 91 FL (ref 80–100)
MONOCYTES # BLD AUTO: 0.32 X10*3/UL (ref 0.1–1)
MONOCYTES NFR BLD AUTO: 6.8 %
NEUTROPHILS # BLD AUTO: 2.98 X10*3/UL (ref 1.2–7.7)
NEUTROPHILS NFR BLD AUTO: 63.6 %
NRBC BLD-RTO: 0 /100 WBCS (ref 0–0)
PLATELET # BLD AUTO: 205 X10*3/UL (ref 150–450)
POTASSIUM SERPL-SCNC: 4.3 MMOL/L (ref 3.5–5.3)
PROT SERPL-MCNC: 6.2 G/DL (ref 6.4–8.2)
RBC # BLD AUTO: 4.84 X10*6/UL (ref 4–5.2)
SODIUM SERPL-SCNC: 139 MMOL/L (ref 136–145)
WBC # BLD AUTO: 4.7 X10*3/UL (ref 4.4–11.3)

## 2025-02-26 PROCEDURE — 99204 OFFICE O/P NEW MOD 45 MIN: CPT | Performed by: NURSE PRACTITIONER

## 2025-02-26 PROCEDURE — 84075 ASSAY ALKALINE PHOSPHATASE: CPT

## 2025-02-26 PROCEDURE — 36415 COLL VENOUS BLD VENIPUNCTURE: CPT

## 2025-02-26 PROCEDURE — 85025 COMPLETE CBC W/AUTO DIFF WBC: CPT

## 2025-02-26 ASSESSMENT — ENCOUNTER SYMPTOMS
GASTROINTESTINAL NEGATIVE: 1
CONSTITUTIONAL NEGATIVE: 1
NECK NEGATIVE: 1
MUSCULOSKELETAL NEGATIVE: 1
NEUROLOGICAL NEGATIVE: 1
SHORTNESS OF BREATH: 1
DYSPNEA WITH EXERTION: 1

## 2025-02-26 ASSESSMENT — LIFESTYLE VARIABLES: SMOKING_STATUS: NONSMOKER

## 2025-02-26 ASSESSMENT — DUKE ACTIVITY SCORE INDEX (DASI)
CAN YOU DO HEAVY WORK AROUND THE HOUSE LIKE SCRUBBING FLOORS OR LIFTING AND MOVING HEAVY FURNITURE: NO
CAN YOU RUN A SHORT DISTANCE: NO
CAN YOU TAKE CARE OF YOURSELF (EAT, DRESS, BATHE, OR USE TOILET): YES
CAN YOU WALK INDOORS, SUCH AS AROUND YOUR HOUSE: YES
CAN YOU CLIMB A FLIGHT OF STAIRS OR WALK UP A HILL: NO
CAN YOU DO LIGHT WORK AROUND THE HOUSE LIKE DUSTING OR WASHING DISHES: YES
CAN YOU PARTICIPATE IN MODERATE RECREATIONAL ACTIVITIES LIKE GOLF, BOWLING, DANCING, DOUBLES TENNIS OR THROWING A BASEBALL OR FOOTBALL: NO
CAN YOU HAVE SEXUAL RELATIONS: NO
CAN YOU WALK A BLOCK OR TWO ON LEVEL GROUND: YES
CAN YOU DO YARD WORK LIKE RAKING LEAVES, WEEDING OR PUSHING A MOWER: NO
CAN YOU DO MODERATE WORK AROUND THE HOUSE LIKE VACUUMING, SWEEPING FLOORS OR CARRYING GROCERIES: YES
TOTAL_SCORE: 13.45
CAN YOU PARTICIPATE IN STRENOUS SPORTS LIKE SWIMMING, SINGLES TENNIS, FOOTBALL, BASKETBALL, OR SKIING: NO
DASI METS SCORE: 4.4

## 2025-02-26 ASSESSMENT — PAIN - FUNCTIONAL ASSESSMENT: PAIN_FUNCTIONAL_ASSESSMENT: 0-10

## 2025-02-26 ASSESSMENT — PAIN SCALES - GENERAL: PAINLEVEL_OUTOF10: 1

## 2025-02-26 NOTE — CPM/PAT H&P
CPM/PAT Evaluation       Name: Farida Traylor (Farida Traylor)  /Age: 1960/64 y.o.     Visit Type:   In-Person       Chief Complaint: calculus of ureter    HPI 65 y/o female scheduled for diascopy with laser lithotripsy-right on 3/6/2025 with   secondary to calculus of ureter.  PMHX includes MDD, BEREKET, HTN, MARLON, skull lesion.  PAT is consulted today for perioperative risk stratification and optimization.      Past Medical History:   Diagnosis Date    Acquired keratosis (keratoderma) palmaris et plantaris 2022    Acquired plantar porokeratosis    Acute upper respiratory infection, unspecified 2020    URI, acute    Allergic rhinitis due to animal (cat) (dog) hair and dander 2020    Allergic rhinitis due to dogs    Allergic rhinitis due to pollen 2020    Allergic rhinitis due to pollen    Allergic rhinitis due to pollen 2018    Seasonal allergic rhinitis due to pollen    Allergy status to unspecified drugs, medicaments and biological substances 2015    History of allergy    Anemia, unspecified 2018    Normocytic anemia    Anxiety     Anxiety disorder due to known physiological condition 2013    Anxiety disorder due to medical condition    Anxiety disorder, unspecified 2016    Anxiety    Asthma     Asymptomatic varicose veins of bilateral lower extremities 2019    Varicose veins of legs    Atypical squamous cells of undetermined significance on cytologic smear of cervix (ASC-US) 2013    Pap smear abnormality of cervix with ASCUS favoring benign    Candidiasis, unspecified 12/10/2019    Yeast infection    Cellulitis of left finger 2018    Paronychia of finger of left hand    Changes in skin texture 2021    Cracked skin    Contusion of left lesser toe(s) with damage to nail, initial encounter 2018    Subungual contusion of toenail of left foot    Contusion of right hand, sequela 2020    Traumatic ecchymosis of  hand, right, sequela    Corns and callosities 05/25/2021    Callus of foot    Depression     Disorder of pigmentation, unspecified 09/28/2016    Atypical pigmented skin lesion    Disorder of the skin and subcutaneous tissue, unspecified 08/27/2020    Lesion of subcutaneous tissue    Dorsalgia, unspecified 09/23/2021    Acute back pain    Dorsalgia, unspecified 01/29/2019    Costovertebral angle tenderness    Encounter for general adult medical examination without abnormal findings 06/08/2020    Medicare annual wellness visit, subsequent    Encounter for gynecological examination (general) (routine) without abnormal findings 12/14/2021    Encounter for gynecological examination    Encounter for gynecological examination (general) (routine) without abnormal findings 12/11/2020    Encounter for gynecological examination    Encounter for other screening for malignant neoplasm of breast     Breast cancer screening    Encounter for screening for malignant neoplasm of cervix     Encounter for screening for malignant neoplasm of cervix    Encounter for screening for malignant neoplasm of cervix 11/04/2014    Screening for malignant neoplasm of cervix    Encounter for screening for malignant neoplasm of cervix     Cervical cancer screening    GERD (gastroesophageal reflux disease)     Heart murmur     Hepatomegaly, not elsewhere classified 04/21/2021    Liver mass, right lobe    History of falling 12/28/2018    Status post fall    Hypertension     Inappropriate diet and eating habits 05/13/2021    Inappropriate diet and eating habits    Irregular menstruation, unspecified     Irregular periods/menstrual cycles    Localized edema 07/29/2015    Pedal edema    Localized swelling, mass and lump, left lower limb 09/11/2020    Lump of left thigh    Localized swelling, mass and lump, left lower limb 09/24/2020    Mass of left thigh    Localized swelling, mass and lump, unspecified lower limb 09/24/2020    Mass of thigh     Melanocytic nevi, unspecified 09/10/2019    Numerous skin moles    Multiple births, unspecified, all liveborn ([facility])     Multiple births, all liveborn    Nephrolithiasis     Obesity     Other allergic rhinitis 01/02/2020    Allergic rhinitis due to dust mite    Other allergic rhinitis 01/02/2020    Allergic rhinitis due to mold    Other conditions influencing health status 06/26/2013    Uterine Rupture    Other conditions influencing health status     Normal colonoscopy    Other conditions influencing health status 02/05/2019    History of burning on urination    Other conditions influencing health status 12/14/2021    History of cough    Other conditions influencing health status 02/14/2019    History of dyspareunia    Other conditions influencing health status 01/14/2020    History of cough    Other conditions influencing health status 06/05/2013    Leiomyomatous Degeneration Of The Uterus    Other conditions influencing health status 06/05/2013    Viremia    Other hypertrophic disorders of the skin 07/29/2015    Skin tag    Other shoulder lesions, right shoulder 11/11/2019    Tendinitis of right rotator cuff    Other specified disorders of bone, thigh 09/09/2020    Pain of left femur    Other specified noninflammatory disorders of vagina 03/12/2019    Vaginal dryness    Other specified postprocedural states 05/04/2017    History of arthroscopic knee surgery    Other specified soft tissue disorders 10/08/2020    Soft tissue mass    Other specified soft tissue disorders 08/04/2020    Swelling of right hand    Oxygen dependent     Pain in left thigh 08/27/2020    Pain of left thigh    Pain in right foot 03/09/2021    Right foot pain    Pain in right hip 12/28/2018    Right hip pain    Pain in right knee 03/12/2021    Bilateral knee pain    Pain in right knee 03/19/2021    Right knee pain    Pain in right leg 05/25/2021    Bilateral pain of leg and foot    Pain in right leg 01/12/2022    Bilateral leg and foot  pain    Pain in unspecified joint     Joint pain    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 02/22/2016    History of tetralogy of Fallot    Personal history of contraception     Personal history of contraceptive use    Personal history of diseases of the skin and subcutaneous tissue 12/22/2020    History of ingrown nail    Personal history of malignant neoplasm of other parts of uterus     History of malignant neoplasm of endometrium    Personal history of other (healed) physical injury and trauma 03/01/2017    History of sprain of ankle    Personal history of other benign neoplasm 09/10/2019    History of other benign neoplasm    Personal history of other benign neoplasm 05/06/2014    History of uterine leiomyoma    Personal history of other benign neoplasm 12/11/2020    History of uterine leiomyoma    Personal history of other diseases of the circulatory system 04/14/2021    History of atrial fibrillation    Personal history of other diseases of the circulatory system 04/14/2021    History of atrial fibrillation    Personal history of other diseases of the circulatory system 04/14/2021    History of atrial fibrillation    Personal history of other diseases of the circulatory system 02/01/2017    History of hypertension    Personal history of other diseases of the circulatory system 04/14/2021    History of hypotension    Personal history of other diseases of the digestive system 03/24/2021    History of gastroesophageal reflux (GERD)    Personal history of other diseases of the female genital tract 12/28/2016    History of postmenopausal bleeding    Personal history of other diseases of the female genital tract 05/19/2022    History of postmenopausal bleeding    Personal history of other diseases of the female genital tract     History of vaginal discharge    Personal history of other diseases of the female genital tract     Vaginal delivery    Personal history of  other diseases of the musculoskeletal system and connective tissue     Personal history of arthritis    Personal history of other diseases of the musculoskeletal system and connective tissue 11/18/2019    History of muscle spasm    Personal history of other diseases of the respiratory system 05/27/2021    History of asthma    Personal history of other diseases of the respiratory system 04/12/2019    History of acute bronchitis    Personal history of other diseases of the respiratory system 01/04/2020    History of bronchitis    Personal history of other endocrine, nutritional and metabolic disease 02/01/2017    History of hyperlipidemia    Personal history of other endocrine, nutritional and metabolic disease 04/18/2016    History of obesity    Personal history of other endocrine, nutritional and metabolic disease 05/26/2020    History of thyroid nodule    Personal history of other infectious and parasitic diseases     History of varicella    Personal history of other medical treatment 12/11/2020    History of screening mammography    Personal history of other medical treatment 12/14/2021    History of screening mammography    Personal history of other medical treatment     History of mammogram    Personal history of other specified conditions 08/21/2019    History of gross hematuria    Personal history of other specified conditions 02/02/2017    History of weight gain    Personal history of other specified conditions 12/07/2020    History of chest pain    Personal history of other specified conditions 04/14/2021    History of palpitations    Personal history of other specified conditions 12/05/2014    History of vertigo    Personal history of other specified conditions 08/25/2021    History of exertional chest pain    Personal history of other specified conditions     History of shortness of breath    Personal history of other specified conditions     H/O heartburn    Personal history of other specified conditions  01/12/2018    History of urinary frequency    Personal history of transient ischemic attack (TIA), and 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 12/30/2015    History of TIA (transient ischemic attack)    Personal history of urinary (tract) infections 09/02/2021    History of recurrent cystitis    Personal history of urinary (tract) infections 05/23/2019    History of cystitis    Personal history of urinary (tract) infections 09/02/2021    History of recurrent urinary tract infection    Personal history of urinary calculi 09/12/2019    History of renal calculi    Polyphagia 05/13/2021    Polyphagia    Primary central sleep apnea 10/21/2017    Central sleep apnea    Pulmonary hypertension (Multi)     Pure hypercholesterolemia, unspecified     High cholesterol    Residual hemorrhoidal skin tags 07/06/2017    External hemorrhoid    Shortness of breath     Sleep apnea     cpap with oxygen blow in    Slurred speech 01/02/2015    Slurred speech    TIA (transient ischemic attack)     Tinea pedis 05/25/2021    Tinea pedis of right foot    Unspecified abdominal pain 04/13/2021    Abdominal pain, acute    Unspecified injury of right wrist, hand and finger(s), sequela 08/04/2020    Hand trauma, right, sequela    Unspecified internal derangement of unspecified knee 03/01/2017    Internal derangement of knee    Unspecified symptoms and signs involving the genitourinary system 12/16/2021    UTI symptoms    Unspecified symptoms and signs involving the genitourinary system 02/05/2019    UTI symptoms    Unspecified symptoms and signs involving the genitourinary system 09/03/2020    UTI symptoms    Urinary tract infection     Urinary tract infection, site not specified 01/17/2020    Acute urinary tract infection    Urinary tract infection, site not specified 01/10/2022    Acute UTI    Xerosis cutis 09/10/2019    Dry skin dermatitis       Past Surgical History:   Procedure Laterality Date    CARDIAC CATHETERIZATION Right  01/04/2024    Procedure: Right Heart Cath;  Surgeon: Richy Raman MD;  Location: [facility];  Service: Cardiovascular;  Laterality: Right;    CARDIAC CATHETERIZATION      COLONOSCOPY      KNEE SURGERY  05/04/2017    Knee Surgery    MOUTH SURGERY  11/04/2014    Oral Surgery Tooth Extraction    MR HEAD ANGIO WO IV CONTRAST  02/09/2016    MR HEAD ANGIO WO IV CONTRAST [facility]    OTHER SURGICAL HISTORY  07/31/2013    Wrist Carpectomy    OTHER SURGICAL HISTORY  09/10/2019    [place] tooth extraction    OTHER SURGICAL HISTORY  11/30/2018    Complete colonoscopy    OTHER SURGICAL HISTORY  06/08/2020    Thyroid biopsy    TOENAIL EXCISION         Patient  reports that she is not currently sexually active and has had partner(s) who are male. She reports using the following method of birth control/protection: None.    Family History   Problem Relation Name Age of Onset    Hypertension Mother chapis serna     Breast cancer Mother chapis serna     Other (cardiac disorder) Mother chapis serna     Cardiomyopathy Mother chapis serna     Diabetes Mother chapis serna     Other (chronic kidney disease) Mother chapis serna     Alcohol abuse Father joel serna     Lung disease Father joel serna     Stroke Brother dorian serna     Brain Aneurysm Brother dorian serna        Allergies   Allergen Reactions    Grass Pollen Other    Other Unknown    Pollen Extracts Unknown    Tree And Shrub Pollen Other       Prior to Admission medications    Medication Sig Start Date End Date Taking? Authorizing Provider   albuterol 90 mcg/actuation inhaler INHALE 1 TO 2 PUFFS BY MOUTH EVERY 4 TO 6 HOURS AS NEEDED 9/17/24   Richy Raman MD   aspirin 325 mg tablet Take 1 tablet (325 mg) by mouth once daily. For TIA    Historical Provider, MD   buPROPion SR (Wellbutrin SR) 150 mg 12 hr tablet TAKE 1 TABLET BY MOUTH EVERY MORNING AND AFTERNOON 1/16/24   Historical Provider, MD   busPIRone (Buspar) 30 mg tablet Take 1  tablet (30 mg) by mouth 2 times a day. 11/8/21   Historical Provider, MD   calcium carbonate 430 mg calcium (1,000 mg) tablet,chewable Chew 1 tablet once daily. 1000mg    Historical Provider, MD   cholecalciferol (Vitamin D3) 5,000 Units tablet Take 1 tablet (5,000 Units) by mouth once daily.    Historical Provider, MD   cyanocobalamin (Vitamin B-12) 1,000 mcg tablet Take 1 tablet (1,000 mcg) by mouth once daily.    Historical Provider, MD   cyclobenzaprine (Flexeril) 10 mg tablet Take 1 tablet (10 mg) by mouth 3 times a day as needed for muscle spasms. 6/21/24 2/4/25  Jelena Romo DO   ferrous sulfate 325 (65 Fe) MG EC tablet Take 1 tablet by mouth once daily with breakfast. Do not crush, chew, or split.    Historical Provider, MD   fluticasone (Flonase) 50 mcg/actuation nasal spray Administer 1 spray into each nostril once daily. Shake gently. Before first use, prime pump. After use, clean tip and replace cap. 2/4/25 2/4/26  Jelena Romo DO   fluticasone propion-salmeteroL (Advair Diskus) 250-50 mcg/dose diskus inhaler INHALE ONE (1) PUFF BY MOUTH TWICE DAILY. RINSE MOUTH OUT AFTER EACH USE. 9/17/24 9/17/25  Richy Raman MD   hydrALAZINE (Apresoline) 50 mg tablet Take 1 tablet (50 mg) by mouth 2 times a day. 2/4/25 11/1/25  ANNETTE [facility]chaca[facility]   hydroCHLOROthiazide (HYDRODiuril) 25 mg tablet Take 1 tablet (25 mg) by mouth once daily.  Patient not taking: Reported on 2/5/2025 1/17/25 1/17/26  Jose Rosario MD   hydrOXYzine pamoate (Vistaril) 50 mg capsule TAKE 3 CAPSULES BY MOUTH EVERY NIGHT AT BEDTIME AND TAKE 1 CAPSULE BY MOUTH EVERY MORNING 1/16/24   Historical Provider, MD   lubiprostone (Amitiza) 8 mcg capsule TAKE 1 CAPSULE BY MOUTH TWICE DAILY WITH MEALS 1/23/25   Mary Kate Leon MD   memantine (Namenda) 10 mg tablet Take 1 tablet (10 mg) by mouth 2 times a day.    Historical Provider, MD   methenamine hippurate (Hiprex) 1 gram tablet TAKE 1 TABLET BY MOUTH TWICE DAILY 7/16/24    Sanford Arredondo MD   montelukast (Singulair) 10 mg tablet TAKE 1 TABLET BY MOUTH ONCE DAILY 12/16/24   Jelena Romo DO   omeprazole (PriLOSEC) 40 mg DR capsule Take 1 capsule (40 mg) by mouth once daily in the morning. Take before meals. 12/12/24 12/12/25  Mary Kate Leon MD   Opsumit 10 mg tablet tablet TAKE 1 TABLET BY MOUTH DAILY. DO NOT HANDLE IF PREGNANT. DO NOT SPLIT, CRUSH, OR CHEW. REVIEW MEDICATION GUIDE. 5/28/24   Richy Raman MD   oxygen (O2) gas therapy Inhale 1 each continuously. 1/2/25   Hazel Johns MD   polyethylene glycol (Glycolax, Miralax) 17 gram packet Take 17 g by mouth once daily as needed.    Historical Provider, MD   potassium citrate CR (Urocit-K-15) 15 mEq ER tablet Take 1 tablet (15 mEq) by mouth 2 times daily (morning and late afternoon). Do not crush, chew, or split. 1/29/25 1/29/26  Jelena Romo DO   riociguat (Adempas) 2.5 mg tablet Take 1 tablet (2.5 mg) by mouth 3 times a day. 1/31/25   Richy Raman MD   sertraline (Zoloft) 100 mg tablet Take 2 tablets (200 mg) by mouth once daily. 1/25/16   Historical Provider, MD   simvastatin (Zocor) 80 mg tablet TAKE 1 TABLET BY MOUTH ONCE DAILY AT BEDTIME 9/13/24   Jelena Romo DO   trospium (Sanctura) 20 mg tablet TAKE 1 TABLET BY MOUTH TWICE DAILY 1/17/25   Sanford Arredondo MD        PAT ROS:   Constitutional:   neg    Neuro/Psych:   neg    Eyes:    use of corrective lenses  Ears:   neg    Nose:   Mouth:   Throat:   Neck:   neg    Cardio:    KIM  Respiratory:    shortness of breath  Endocrine:   GI:   neg    :   neg    Musculoskeletal:   neg    Hematologic:   neg    Skin:      Physical Exam  Vitals reviewed.   Constitutional:       Appearance: Normal appearance. She is obese.   HENT:      Head: Normocephalic and atraumatic.      Mouth/Throat:      Mouth: Mucous membranes are dry.      Pharynx: Oropharynx is clear.   Eyes:      Extraocular Movements: Extraocular movements intact.      Pupils: Pupils are  "equal, round, and reactive to light.   Cardiovascular:      Rate and Rhythm: Normal rate and regular rhythm.      Comments: 2/6 2ICS LSB  Pulmonary:      Effort: Pulmonary effort is normal.      Breath sounds: Rhonchi present.      Comments: L>R  Abdominal:      General: Abdomen is flat.      Palpations: Abdomen is soft.   Musculoskeletal:         General: Normal range of motion.      Cervical back: Normal range of motion and neck supple.   Skin:     General: Skin is warm and dry.   Neurological:      General: No focal deficit present.      Mental Status: She is alert and oriented to person, place, and time.   Psychiatric:         Mood and Affect: Mood normal.         Behavior: Behavior normal.          Airway      Visit Vitals  /60   Pulse 71   Temp 36.8 °C (98.2 °F) (Tympanic)   Resp 18   Ht 1.549 m (5' 1\")   Wt 129 kg (283 lb 4.7 oz)   SpO2 94%   BMI 53.53 kg/m²   OB Status Postmenopausal   Smoking Status Never   BSA 2.36 m²       DASI Risk Score      Flowsheet Row Pre-Admission Testing from 2/26/2025 in [facility]   Can you take care of yourself (eat, dress, bathe, or use toilet)?  2.75 filed at 02/26/2025 1102   Can you walk indoors, such as around your house? 1.75 filed at 02/26/2025 1102   Can you walk a block or two on level ground?  2.75 filed at 02/26/2025 1102   Can you climb a flight of stairs or walk up a hill? 0 filed at 02/26/2025 1102   Can you run a short distance? 0 filed at 02/26/2025 1102   Can you do light work around the house like dusting or washing dishes? 2.7 filed at 02/26/2025 1102   Can you do moderate work around the house like vacuuming, sweeping floors or carrying groceries? 3.5 filed at 02/26/2025 1102   Can you do heavy work around the house like scrubbing floors or lifting and moving heavy furniture?  0 filed at 02/26/2025 1102   Can you do yard work like raking leaves, weeding or pushing a mower? 0 filed at 02/26/2025 1102   Can you have sexual relations? 0 " filed at 02/26/2025 1102   Can you participate in moderate recreational activities like golf, bowling, dancing, doubles tennis or throwing a baseball or football? 0 filed at 02/26/2025 1102   Can you participate in strenous sports like swimming, singles tennis, football, basketball, or skiing? 0 filed at 02/26/2025 1102   DASI SCORE 13.45 filed at 02/26/2025 1102   METS Score (Will be calculated only when all the questions are answered) 4.4 filed at 02/26/2025 1102          Caprini DVT Assessment      Flowsheet Row Pre-Admission Testing from 2/26/2025 in [facility] Admission (Discharged) from 12/31/2024 in [facility] 53 with Eva Baca MD   DVT Score (IF A SCORE IS NOT CALCULATING, MUST SELECT A BMI TO COMPLETE) 7 filed at 02/26/2025 1117 5 filed at 12/30/2024 2205   Medical Factors COPD filed at 02/26/2025 1117 --   Surgical Factors Minor surgery planned filed at 02/26/2025 1117 --   BMI (BMI MUST BE CHOSEN) Greater than 50 (Venous stasis syndrome) filed at 02/26/2025 1117 Greater than 50 (Venous stasis syndrome) filed at 12/30/2024 2205          Modified Frailty Index    No data to display       CHADS2 Stroke Risk  Current as of about an hour ago        N/A 3 to 100%: High Risk   2 to < 3%: Medium Risk   0 to < 2%: Low Risk     Last Change: N/A          This score determines the patient's risk of having a stroke if the patient has atrial fibrillation.        This score is not applicable to this patient. Components are not calculated.          Revised Cardiac Risk Index      Flowsheet Row Pre-Admission Testing from 2/26/2025 in [facility]   High-Risk Surgery (Intraperitoneal, Intrathoracic,Suprainguinal vascular) 0 filed at 02/26/2025 1135   History of ischemic heart disease (History of MI, History of positive exercuse test, Current chest paint considered due to myocardial ischemia, Use of nitrate therapy, ECG with pathological Q Waves) 0 filed at  02/26/2025 1135   History of congestive heart failure (pulmonary edemia, bilateral rales or S3 gallop, Paroxysmal nocturnal dyspnea, CXR showing pulmonary vascular redistribution) 0 filed at 02/26/2025 1135   History of cerebrovascular disease (Prior TIA or stroke) 1 filed at 02/26/2025 1135   Pre-operative insulin treatment 0 filed at 02/26/2025 1135   Pre-operative creatinine>2 mg/dl 0 filed at 02/26/2025 1135   Revised Cardiac Risk Calculator 1 filed at 02/26/2025 1135          Apfel Simplified Score      Flowsheet Row Pre-Admission Testing from 2/26/2025 in [facility]   Smoking status 1 filed at 02/26/2025 1117   History of motion sickness or PONV  0 filed at 02/26/2025 1117   Use of postoperative opioids 1 filed at 02/26/2025 1117   Gender - Female 1=Yes filed at 02/26/2025 1117   Apfel Simplified Score Calculator 3 filed at 02/26/2025 1117          Risk Analysis Index Results This Encounter    No data found in the last 10 encounters.       Stop Bang Score      Flowsheet Row Pre-Admission Testing from 2/26/2025 in [facility]   Do you snore loudly? 1 filed at 02/26/2025 1101   Do you often feel tired or fatigued after your sleep? 1 filed at 02/26/2025 1101   Has anyone ever observed you stop breathing in your sleep? 1 filed at 02/26/2025 1101   Do you have or are you being treated for high blood pressure? 1 filed at 02/26/2025 1101   Recent BMI (Calculated) 55.1 filed at 02/26/2025 1101   Is BMI greater than 35 kg/m2? 1=Yes filed at 02/26/2025 1101   Age older than 50 years old? 1=Yes filed at 02/26/2025 1101   Is your neck circumference greater than 17 inches (Male) or 16 inches (Female)? 1 filed at 02/26/2025 1101   Gender - Male 0=No filed at 02/26/2025 1101   STOP-BANG Total Score 7 filed at 02/26/2025 1101          Prodigy: High Risk  Total Score: 8              Prodigy Age Score           ARISCAT Score for Postoperative Pulmonary Complications      Flowsheet Row Pre-Admission  Testing from 2/26/2025 in [facility]   Age Calculated Score 3 filed at 02/26/2025 1136   Preoperative SpO2 8 filed at 02/26/2025 1136   Respiratory infection in the last month Either upper or lower (i.e., URI, bronchitis, pneumonia), with fever and antibiotic treatment 0 filed at 02/26/2025 1136   Preoperative anemia (Hgb less than 10 g/dl) 0 filed at 02/26/2025 1136   Surgical incision  0 filed at 02/26/2025 1136   Duration of surgery  0 filed at 02/26/2025 1136   Emergency Procedure  0 filed at 02/26/2025 1136   ARISCAT Total Score  11 filed at 02/26/2025 1136          Pawel Perioperative Risk for Myocardial Infarction or Cardiac Arrest ([place])      Flowsheet Row Pre-Admission Testing from 2/26/2025 in [facility]   Calculated Age Score 1.32 filed at 02/26/2025 1137   Functional Status  0 filed at 02/26/2025 1137   ASA Class  -1.92 filed at 02/26/2025 1137   Creatinine 0 filed at 02/26/2025 1137   Type of Procedure  -0.26 filed at 02/26/2025 1137   BRENTON Total Score  -6.11 filed at 02/26/2025 1137   BRENTON % 0.22 filed at 02/26/2025 1137                Assessment and Plan:     Neuro:  No neurologic diagnosis, however, the patient is at increased risk for perioperative delirium secondary to age, decreased functional status, polypharmacy  Patient is at increased risk for perioperative CVA secondary to previous CVA/TIA, HTN, increased age    Neurosurgery  Telemedicine visit with Dr. Barclay on 2/11/2025  No change in skull lesion and no evidnece of cyst or neoplasm    HEENT:  No HEENT diagnosis or significant findings on chart review or clinical presentation and evaluation. No further preoperative testing/intervention indicated at this time.    Cardiovascular:  Seen by Cardiology on 1/10/2025  ECHO 12/2024:  EF 65%; BA dilated; mild RVSP: 50mmHg  Continue hydralazine   Holding ASA 1 day   METS: 4.4  RCRI: 1 point, 6.0% risk for postoperative MACE       Pulmonary:  Most with pulmonary, Dr. Raman.   Last seen 1/6/2025 for idiopathic PHTN & chronic resp failure  [facility] [address] 1/2024:  mPAP 31 PAOP 10 PVR 3.1 CO 6.7   On 3L O2 - continuous  Lasix PRN  Continue Riociguat and Macitentan  Stop Bang score is 7 placing patient at high risk for MARLON  PRODIGY: High risk for opioid induced respiratory depression  Pumonary education discussed, patient also provided deep breathing exerciseswith educational handout    Renal:   No renal diagnosis, however patient is at increase risk for perioperative renal complications secondary to  Age equal to or greater than 54, BMI equal to or greater than 30, HTN, COPD      Endocrine:  No endocrine diagnosis or significant findings on chart review or clinical presentation and evaluation. No further testing or intervention is indicated at this time.    Hematologic:  No hematologic diagnosis, however patient is at an increased risk for DVT  Caprini Score 7, patient at High risk for perioperative DVT.  Patient provided with VTE education/handout.    Gastrointestinal:   No GI diagnosis or significant findings on chart review or clinical presentation and evaluation.   Apfel 3    Urology  Seen by Dr. Navarro on 1/31/2025 for calculus of kidney and ureter  Plan for cystoscopy w/lithotripsy    Infectious disease:   No infectious diagnosis or significant findings on chart review or clinical presentation and evaluation.       Musculoskeletal:   No diagnosis or significant findings on chart review or clinical presentation and evaluation.     Anesthesia/Airway:  No anesthesia complications      Medication instructions and NPO guidelines reviewed with the patient.  All questions or concerns discussed and addressed.      Labs ordered

## 2025-02-26 NOTE — PREPROCEDURE INSTRUCTIONS
Thank you for visiting [facility]mission [facility] (PAT) today for your pre-procedure evaluation, you were seen by     Xin Claire CNP  Pre Admission Testing  [facility]  [phone]    This summary includes instructions and information to aid you during your perioperative period.  Please read carefully. If you have any questions about your visit today, please call the number listed above.  If you become ill or have any changes to your health before your surgery, please contact your primary care provider and alert your surgeon.        Preparing for your Surgery       Exercises  Preoperative Deep Breathing Exercises  Why it is important to do deep breathing exercises before my surgery?  Deep breathing exercises strengthen your breathing muscles.  This helps you to recover after your surgery and decreases the chance of breathing complications.  How are the deep breathing exercises done?  Sit straight with your back supported.  Breathe in deeply and slowly through your nose. Your lower rib cage should expand and your abdomen may move forward.  Hold that breath for 3 to 5 seconds.  Breathe out through pursed lips, slowly and completely.  Rest and repeat 10 times every hour while awake.  Rest longer if you become dizzy or lightheaded.       Preoperative Brain Exercises    What are brain exercises?  A brain exercise is any activity that engages your thinking (cognitive) skills.    What types of activities are considered brain exercises?  Jigsaw puzzles, crossword puzzles, word jumble, memory games, word search, and many more.  Many can be found free online or on your phone via a mobile ottoniel.    Why should I do brain exercises before my surgery?  More recent research has shown brain exercise before surgery can lower the risk of postoperative delirium (confusion) which can be especially important for older adults.  Patients who did brain exercises for 5 to 10 hours the days before surgery, cut their  risk of postoperative delirium in half up to 1 week after surgery.    Sit-to-Stand Exercise    What is the sit-to-stand exercise?  The sit-to-stand exercise strengthens the muscles of your lower body and muscles in the center of your body (core muscles for stability) helping to maintain and improve your strength and mobility.  How do I do the sit-to-stand exercise?  The goal is to do this exercise without using your arms or hands.  If this is too difficult, use your arms and hands or a chair with armrests to help slowly push yourself to the standing position and lower yourself back to the sitting position. As the movement becomes easier use your arms and hands less.    Steps to the sit-to-stand exercise  Sit up tall in a sturdy chair, knees bent, feet flat on the floor shoulder-width apart.  Shift your hips/pelvis forward in the chair to correctly position yourself for the next movement.  Lean forward at your hips.  Stand up straight putting equal weight on both feet.  Check to be sure you are properly aligned with the chair, in a slow controlled movement sit back down.  Repeat this exercise 10-15 times.  If needed you can do it fewer times until your strength improves.  Rest for 1 minute.  Do another 10-15 sit-to-stand exercises.  Try to do this in the morning and evening.        Instructions    Preoperative Fasting Guidelines    Why must I stop eating and drinking near surgery time?  With sedation, food or liquid in your stomach can enter your lungs causing serious complications  Food can increase nausea and vomiting  When do I need to stop eating and drinking before my surgery?      Do not eat any food after midnight the night before your surgery/procedure. You may have up to 13.5 ounces of clear liquid until TWO hours before your instructed arrival time to the hospital.  This includes water, black tea/coffee, (no milk or cream) apple juice, and electrolyte drinks (Gatorade). You may chew gum until TWO hours  before your surgery/procedure            Simple things you can do to help prevent blood clots     Blood clots are blockages that can form in the body's veins. When a blood clot forms in your deep veins, it may be called a deep vein thrombosis, or DVT for short. Blood clots can happen in any part of the body where blood flows, but they are most common in the arms and legs. If a piece of a blood clot breaks free and travels to the lungs, it is called a pulmonary embolus (PE). A PE can be a very serious problem.         Being in the hospital or having surgery can raise your chances of getting a blood clot because you may not be well enough to move around as much as you normally do.         Ways you can help prevent blood clots in the hospital       Wearing SCDs  SCDs stands for Sequential Compression Devices.   SCDs are special sleeves that wrap around your legs. They attach to a pump that fills them with air to gently squeeze your legs every few minutes.  This helps return the blood in your legs to your heart.   SCDs should only be taken off when walking or bathing. SCDs may not be comfortable, but they can help save your life.              Pump SCD leg sleeves  Wearing compression stockings - if your doctor orders them. These special snug-fitting stockings gently squeeze your legs to help blood flow.       Walking. Walking helps move the blood in your legs.   If your doctor says it is ok, try walking the halls at least   5 times a day. Ask us to help you get up, so you don't fall.      Taking any blood-thinning medicines your doctor orders.              Ways you can help prevent blood clots at home         Wearing compression stockings - if your doctor orders them.   Walking - to help move the blood in your legs.    Taking any blood-thinning medicines your doctor orders.      Signs of a blood clot or PE    Tell your doctor or nurse right away if you have any of the problems listed below.         If you are at home,  seek medical care right away. Call 911 for chest pain or problems breathing.            Signs of a blood clot (DVT) - such as pain, swelling, redness, or warmth in your arm or legs.  Signs of a pulmonary embolism (PE) - such as chest pain or feeling short of breath      Tobacco and Alcohol;  Do not drink alcohol or smoke within 24 hours of surgery.  It is best to quit smoking for as long as possible before any surgery or procedure.      The Week before Surgery        Seven days before Surgery  Check your PAT medication instructions  Do the exercises provided to you by PAT  Arrange for a responsible, adult licensed  to take you home after surgery and stay with you for 24 hours.  You will not be permitted to drive yourself home if you have received any anesthetic/sedation  Six days before surgery  Check your PAT medication instructions  Do the exercises provided to you by PAT  Start using Chlorhexidene (CHG) body wash if prescribed  Five days before surgery  Check your PAT medication instructions  Do the exercises provided to you by PAT  Continue to use CHG body wash if prescribed  Three days before surgery  Check your PAT medication instructions  Do the exercises provided to you by PAT  Continue to use CHG body wash if prescribed  Two days before surgery  Check your PAT medication instructions  Do the exercises provided to you by PAT  Continue to use CHG body wash if prescribed    The Day before Surgery       Check your PAT medication and all other PAT instructions including when to stop eating and drinking  You will be called with your arrival time for surgery in the late afternoon.  If you do not receive a call please reach out to [facility]. [phone]  Do not smoke or drink 24 hours before surgery  Prepare items to bring with you to the hospital  Shower with your chlorhexidine wash if prescribed  Brush your teeth and use your chlorhexidine dental rinse if prescribed    The Day of Surgery       Check  your PAT medication instructions  Ensure you follow the instructions for when to stop eating and drinking  Shower, if prescribed use CHG.  Do not apply any lotions, creams, moisturizers, perfume or deodorant  Brush your teeth and use your CHG dental rinse if prescribed  Wear loose comfortable clothing  Avoid make-up  Remove  jewelry and piercings, consider professional piercing removal with a plastic spacer if needed  Bring photo ID and Insurance card  Bring an accurate medication list that includes medication dose, frequency and allergies  Bring a copy of your advanced directives (will, health care power of )  Bring any devices and controllers as well as medical devices you have been provided with for surgery (CPAP, slings, braces, etc.)  Dentures, eyeglasses, and contacts will be removed before surgery, please bring cases for contacts or glasses

## 2025-02-26 NOTE — H&P (VIEW-ONLY)
CPM/PAT Evaluation       Name: Farida Traylor (Farida Traylor)  /Age: 1960/64 y.o.     Visit Type:   In-Person       Chief Complaint: calculus of ureter    HPI 65 y/o female scheduled for diascopy with laser lithotripsy-right on 3/6/2025 with   secondary to calculus of ureter.  PMHX includes MDD, BEREKET, HTN, MARLON, skull lesion.  PAT is consulted today for perioperative risk stratification and optimization.      Past Medical History:   Diagnosis Date    Acquired keratosis (keratoderma) palmaris et plantaris 2022    Acquired plantar porokeratosis    Acute upper respiratory infection, unspecified 2020    URI, acute    Allergic rhinitis due to animal (cat) (dog) hair and dander 2020    Allergic rhinitis due to dogs    Allergic rhinitis due to pollen 2020    Allergic rhinitis due to pollen    Allergic rhinitis due to pollen 2018    Seasonal allergic rhinitis due to pollen    Allergy status to unspecified drugs, medicaments and biological substances 2015    History of allergy    Anemia, unspecified 2018    Normocytic anemia    Anxiety     Anxiety disorder due to known physiological condition 2013    Anxiety disorder due to medical condition    Anxiety disorder, unspecified 2016    Anxiety    Asthma     Asymptomatic varicose veins of bilateral lower extremities 2019    Varicose veins of legs    Atypical squamous cells of undetermined significance on cytologic smear of cervix (ASC-US) 2013    Pap smear abnormality of cervix with ASCUS favoring benign    Candidiasis, unspecified 12/10/2019    Yeast infection    Cellulitis of left finger 2018    Paronychia of finger of left hand    Changes in skin texture 2021    Cracked skin    Contusion of left lesser toe(s) with damage to nail, initial encounter 2018    Subungual contusion of toenail of left foot    Contusion of right hand, sequela 2020    Traumatic ecchymosis of  hand, right, sequela    Corns and callosities 05/25/2021    Callus of foot    Depression     Disorder of pigmentation, unspecified 09/28/2016    Atypical pigmented skin lesion    Disorder of the skin and subcutaneous tissue, unspecified 08/27/2020    Lesion of subcutaneous tissue    Dorsalgia, unspecified 09/23/2021    Acute back pain    Dorsalgia, unspecified 01/29/2019    Costovertebral angle tenderness    Encounter for general adult medical examination without abnormal findings 06/08/2020    Medicare annual wellness visit, subsequent    Encounter for gynecological examination (general) (routine) without abnormal findings 12/14/2021    Encounter for gynecological examination    Encounter for gynecological examination (general) (routine) without abnormal findings 12/11/2020    Encounter for gynecological examination    Encounter for other screening for malignant neoplasm of breast     Breast cancer screening    Encounter for screening for malignant neoplasm of cervix     Encounter for screening for malignant neoplasm of cervix    Encounter for screening for malignant neoplasm of cervix 11/04/2014    Screening for malignant neoplasm of cervix    Encounter for screening for malignant neoplasm of cervix     Cervical cancer screening    GERD (gastroesophageal reflux disease)     Heart murmur     Hepatomegaly, not elsewhere classified 04/21/2021    Liver mass, right lobe    History of falling 12/28/2018    Status post fall    Hypertension     Inappropriate diet and eating habits 05/13/2021    Inappropriate diet and eating habits    Irregular menstruation, unspecified     Irregular periods/menstrual cycles    Localized edema 07/29/2015    Pedal edema    Localized swelling, mass and lump, left lower limb 09/11/2020    Lump of left thigh    Localized swelling, mass and lump, left lower limb 09/24/2020    Mass of left thigh    Localized swelling, mass and lump, unspecified lower limb 09/24/2020    Mass of thigh     Melanocytic nevi, unspecified 09/10/2019    Numerous skin moles    Multiple births, unspecified, all liveborn ([facility]     Multiple births, all liveborn    Nephrolithiasis     Obesity     Other allergic rhinitis 01/02/2020    Allergic rhinitis due to dust mite    Other allergic rhinitis 01/02/2020    Allergic rhinitis due to mold    Other conditions influencing health status 06/26/2013    Uterine Rupture    Other conditions influencing health status     Normal colonoscopy    Other conditions influencing health status 02/05/2019    History of burning on urination    Other conditions influencing health status 12/14/2021    History of cough    Other conditions influencing health status 02/14/2019    History of dyspareunia    Other conditions influencing health status 01/14/2020    History of cough    Other conditions influencing health status 06/05/2013    Leiomyomatous Degeneration Of The Uterus    Other conditions influencing health status 06/05/2013    Viremia    Other hypertrophic disorders of the skin 07/29/2015    Skin tag    Other shoulder lesions, right shoulder 11/11/2019    Tendinitis of right rotator cuff    Other specified disorders of bone, thigh 09/09/2020    Pain of left femur    Other specified noninflammatory disorders of vagina 03/12/2019    Vaginal dryness    Other specified postprocedural states 05/04/2017    History of arthroscopic knee surgery    Other specified soft tissue disorders 10/08/2020    Soft tissue mass    Other specified soft tissue disorders 08/04/2020    Swelling of right hand    Oxygen dependent     Pain in left thigh 08/27/2020    Pain of left thigh    Pain in right foot 03/09/2021    Right foot pain    Pain in right hip 12/28/2018    Right hip pain    Pain in right knee 03/12/2021    Bilateral knee pain    Pain in right knee 03/19/2021    Right knee pain    Pain in right leg 05/25/2021    Bilateral pain of leg and foot    Pain in right leg 01/12/2022    Bilateral leg and foot  pain    Pain in unspecified joint     Joint pain    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 02/22/2016    History of tetralogy of Fallot    Personal history of contraception     Personal history of contraceptive use    Personal history of diseases of the skin and subcutaneous tissue 12/22/2020    History of ingrown nail    Personal history of malignant neoplasm of other parts of uterus     History of malignant neoplasm of endometrium    Personal history of other (healed) physical injury and trauma 03/01/2017    History of sprain of ankle    Personal history of other benign neoplasm 09/10/2019    History of other benign neoplasm    Personal history of other benign neoplasm 05/06/2014    History of uterine leiomyoma    Personal history of other benign neoplasm 12/11/2020    History of uterine leiomyoma    Personal history of other diseases of the circulatory system 04/14/2021    History of atrial fibrillation    Personal history of other diseases of the circulatory system 04/14/2021    History of atrial fibrillation    Personal history of other diseases of the circulatory system 04/14/2021    History of atrial fibrillation    Personal history of other diseases of the circulatory system 02/01/2017    History of hypertension    Personal history of other diseases of the circulatory system 04/14/2021    History of hypotension    Personal history of other diseases of the digestive system 03/24/2021    History of gastroesophageal reflux (GERD)    Personal history of other diseases of the female genital tract 12/28/2016    History of postmenopausal bleeding    Personal history of other diseases of the female genital tract 05/19/2022    History of postmenopausal bleeding    Personal history of other diseases of the female genital tract     History of vaginal discharge    Personal history of other diseases of the female genital tract     Vaginal delivery    Personal history of  other diseases of the musculoskeletal system and connective tissue     Personal history of arthritis    Personal history of other diseases of the musculoskeletal system and connective tissue 11/18/2019    History of muscle spasm    Personal history of other diseases of the respiratory system 05/27/2021    History of asthma    Personal history of other diseases of the respiratory system 04/12/2019    History of acute bronchitis    Personal history of other diseases of the respiratory system 01/04/2020    History of bronchitis    Personal history of other endocrine, nutritional and metabolic disease 02/01/2017    History of hyperlipidemia    Personal history of other endocrine, nutritional and metabolic disease 04/18/2016    History of obesity    Personal history of other endocrine, nutritional and metabolic disease 05/26/2020    History of thyroid nodule    Personal history of other infectious and parasitic diseases     History of varicella    Personal history of other medical treatment 12/11/2020    History of screening mammography    Personal history of other medical treatment 12/14/2021    History of screening mammography    Personal history of other medical treatment     History of mammogram    Personal history of other specified conditions 08/21/2019    History of gross hematuria    Personal history of other specified conditions 02/02/2017    History of weight gain    Personal history of other specified conditions 12/07/2020    History of chest pain    Personal history of other specified conditions 04/14/2021    History of palpitations    Personal history of other specified conditions 12/05/2014    History of vertigo    Personal history of other specified conditions 08/25/2021    History of exertional chest pain    Personal history of other specified conditions     History of shortness of breath    Personal history of other specified conditions     H/O heartburn    Personal history of other specified conditions  01/12/2018    History of urinary frequency    Personal history of transient ischemic attack (TIA), and 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 12/30/2015    History of TIA (transient ischemic attack)    Personal history of urinary (tract) infections 09/02/2021    History of recurrent cystitis    Personal history of urinary (tract) infections 05/23/2019    History of cystitis    Personal history of urinary (tract) infections 09/02/2021    History of recurrent urinary tract infection    Personal history of urinary calculi 09/12/2019    History of renal calculi    Polyphagia 05/13/2021    Polyphagia    Primary central sleep apnea 10/21/2017    Central sleep apnea    Pulmonary hypertension (Multi)     Pure hypercholesterolemia, unspecified     High cholesterol    Residual hemorrhoidal skin tags 07/06/2017    External hemorrhoid    Shortness of breath     Sleep apnea     cpap with oxygen blow in    Slurred speech 01/02/2015    Slurred speech    TIA (transient ischemic attack)     Tinea pedis 05/25/2021    Tinea pedis of right foot    Unspecified abdominal pain 04/13/2021    Abdominal pain, acute    Unspecified injury of right wrist, hand and finger(s), sequela 08/04/2020    Hand trauma, right, sequela    Unspecified internal derangement of unspecified knee 03/01/2017    Internal derangement of knee    Unspecified symptoms and signs involving the genitourinary system 12/16/2021    UTI symptoms    Unspecified symptoms and signs involving the genitourinary system 02/05/2019    UTI symptoms    Unspecified symptoms and signs involving the genitourinary system 09/03/2020    UTI symptoms    Urinary tract infection     Urinary tract infection, site not specified 01/17/2020    Acute urinary tract infection    Urinary tract infection, site not specified 01/10/2022    Acute UTI    Xerosis cutis 09/10/2019    Dry skin dermatitis       Past Surgical History:   Procedure Laterality Date    CARDIAC CATHETERIZATION Right  01/04/2024    Procedure: Right Heart Cath;  Surgeon: Richy Raman MD;  Location: [facility] Card[facility];  Service: Cardiovascular;  Laterality: Right;    CARDIAC CATHETERIZATION      COLONOSCOPY      KNEE SURGERY  05/04/2017    Knee Surgery    MOUTH SURGERY  11/04/2014    Oral Surgery Tooth Extraction    MR HEAD ANGIO WO IV CONTRAST  02/09/2016    MR HEAD ANGIO WO IV CONTRAST [facility]    OTHER SURGICAL HISTORY  07/31/2013    Wrist Carpectomy    OTHER SURGICAL HISTORY  09/10/2019    [place] tooth extraction    OTHER SURGICAL HISTORY  11/30/2018    Complete colonoscopy    OTHER SURGICAL HISTORY  06/08/2020    Thyroid biopsy    TOENAIL EXCISION         Patient  reports that she is not currently sexually active and has had partner(s) who are male. She reports using the following method of birth control/protection: None.    Family History   Problem Relation Name Age of Onset    Hypertension Mother chapis serna     Breast cancer Mother chapis serna     Other (cardiac disorder) Mother chapis serna     Cardiomyopathy Mother chapis serna     Diabetes Mother chapis serna     Other (chronic kidney disease) Mother chapis serna     Alcohol abuse Father joel serna     Lung disease Father joel serna     Stroke Brother dorian serna     Brain Aneurysm Brother dorian serna        Allergies   Allergen Reactions    Grass Pollen Other    Other Unknown    Pollen Extracts Unknown    Tree And Shrub Pollen Other       Prior to Admission medications    Medication Sig Start Date End Date Taking? Authorizing Provider   albuterol 90 mcg/actuation inhaler INHALE 1 TO 2 PUFFS BY MOUTH EVERY 4 TO 6 HOURS AS NEEDED 9/17/24   Richy Raman MD   aspirin 325 mg tablet Take 1 tablet (325 mg) by mouth once daily. For TIA    Historical Provider, MD   buPROPion SR (Wellbutrin SR) 150 mg 12 hr tablet TAKE 1 TABLET BY MOUTH EVERY MORNING AND AFTERNOON 1/16/24   Historical Provider, MD   busPIRone (Buspar) 30 mg tablet Take 1  tablet (30 mg) by mouth 2 times a day. 11/8/21   Historical Provider, MD   calcium carbonate 430 mg calcium (1,000 mg) tablet,chewable Chew 1 tablet once daily. 1000mg    Historical Provider, MD   cholecalciferol (Vitamin D3) 5,000 Units tablet Take 1 tablet (5,000 Units) by mouth once daily.    Historical Provider, MD   cyanocobalamin (Vitamin B-12) 1,000 mcg tablet Take 1 tablet (1,000 mcg) by mouth once daily.    Historical Provider, MD   cyclobenzaprine (Flexeril) 10 mg tablet Take 1 tablet (10 mg) by mouth 3 times a day as needed for muscle spasms. 6/21/24 2/4/25  Jelena Romo DO   ferrous sulfate 325 (65 Fe) MG EC tablet Take 1 tablet by mouth once daily with breakfast. Do not crush, chew, or split.    Historical Provider, MD   fluticasone (Flonase) 50 mcg/actuation nasal spray Administer 1 spray into each nostril once daily. Shake gently. Before first use, prime pump. After use, clean tip and replace cap. 2/4/25 2/4/26  Jelena Romo DO   fluticasone propion-salmeteroL (Advair Diskus) 250-50 mcg/dose diskus inhaler INHALE ONE (1) PUFF BY MOUTH TWICE DAILY. RINSE MOUTH OUT AFTER EACH USE. 9/17/24 9/17/25  Richy Raman MD   hydrALAZINE (Apresoline) 50 mg tablet Take 1 tablet (50 mg) by mouth 2 times a day. 2/4/25 11/1/25  ANNETTE [facility]ch[facility]   hydroCHLOROthiazide (HYDRODiuril) 25 mg tablet Take 1 tablet (25 mg) by mouth once daily.  Patient not taking: Reported on 2/5/2025 1/17/25 1/17/26  Jose Rosario MD   hydrOXYzine pamoate (Vistaril) 50 mg capsule TAKE 3 CAPSULES BY MOUTH EVERY NIGHT AT BEDTIME AND TAKE 1 CAPSULE BY MOUTH EVERY MORNING 1/16/24   Historical Provider, MD   lubiprostone (Amitiza) 8 mcg capsule TAKE 1 CAPSULE BY MOUTH TWICE DAILY WITH MEALS 1/23/25   Mary Kate Leon MD   memantine (Namenda) 10 mg tablet Take 1 tablet (10 mg) by mouth 2 times a day.    Historical Provider, MD   methenamine hippurate (Hiprex) 1 gram tablet TAKE 1 TABLET BY MOUTH TWICE DAILY 7/16/24    Sanford Arredondo MD   montelukast (Singulair) 10 mg tablet TAKE 1 TABLET BY MOUTH ONCE DAILY 12/16/24   Jelena Romo DO   omeprazole (PriLOSEC) 40 mg DR capsule Take 1 capsule (40 mg) by mouth once daily in the morning. Take before meals. 12/12/24 12/12/25  Mary Kate Leon MD   Opsumit 10 mg tablet tablet TAKE 1 TABLET BY MOUTH DAILY. DO NOT HANDLE IF PREGNANT. DO NOT SPLIT, CRUSH, OR CHEW. REVIEW MEDICATION GUIDE. 5/28/24   Richy Raman MD   oxygen (O2) gas therapy Inhale 1 each continuously. 1/2/25   Hazel Johns MD   polyethylene glycol (Glycolax, Miralax) 17 gram packet Take 17 g by mouth once daily as needed.    Historical Provider, MD   potassium citrate CR (Urocit-K-15) 15 mEq ER tablet Take 1 tablet (15 mEq) by mouth 2 times daily (morning and late afternoon). Do not crush, chew, or split. 1/29/25 1/29/26  Jelena Romo DO   riociguat (Adempas) 2.5 mg tablet Take 1 tablet (2.5 mg) by mouth 3 times a day. 1/31/25   Richy Raman MD   sertraline (Zoloft) 100 mg tablet Take 2 tablets (200 mg) by mouth once daily. 1/25/16   Historical Provider, MD   simvastatin (Zocor) 80 mg tablet TAKE 1 TABLET BY MOUTH ONCE DAILY AT BEDTIME 9/13/24   Jelena Romo DO   trospium (Sanctura) 20 mg tablet TAKE 1 TABLET BY MOUTH TWICE DAILY 1/17/25   Sanford Arredondo MD        PAT ROS:   Constitutional:   neg    Neuro/Psych:   neg    Eyes:    use of corrective lenses  Ears:   neg    Nose:   Mouth:   Throat:   Neck:   neg    Cardio:    KIM  Respiratory:    shortness of breath  Endocrine:   GI:   neg    :   neg    Musculoskeletal:   neg    Hematologic:   neg    Skin:      Physical Exam  Vitals reviewed.   Constitutional:       Appearance: Normal appearance. She is obese.   HENT:      Head: Normocephalic and atraumatic.      Mouth/Throat:      Mouth: Mucous membranes are dry.      Pharynx: Oropharynx is clear.   Eyes:      Extraocular Movements: Extraocular movements intact.      Pupils: Pupils are  "equal, round, and reactive to light.   Cardiovascular:      Rate and Rhythm: Normal rate and regular rhythm.      Comments: 2/6 2ICS LSB  Pulmonary:      Effort: Pulmonary effort is normal.      Breath sounds: Rhonchi present.      Comments: L>R  Abdominal:      General: Abdomen is flat.      Palpations: Abdomen is soft.   Musculoskeletal:         General: Normal range of motion.      Cervical back: Normal range of motion and neck supple.   Skin:     General: Skin is warm and dry.   Neurological:      General: No focal deficit present.      Mental Status: She is alert and oriented to person, place, and time.   Psychiatric:         Mood and Affect: Mood normal.         Behavior: Behavior normal.          Airway      Visit Vitals  /60   Pulse 71   Temp 36.8 °C (98.2 °F) (Tympanic)   Resp 18   Ht 1.549 m (5' 1\")   Wt 129 kg (283 lb 4.7 oz)   SpO2 94%   BMI 53.53 kg/m²   OB Status Postmenopausal   Smoking Status Never   BSA 2.36 m²       DASI Risk Score      Flowsheet Row Pre-Admission Testing from 2/26/2025 in [facility]   Can you take care of yourself (eat, dress, bathe, or use toilet)?  2.75 filed at 02/26/2025 1102   Can you walk indoors, such as around your house? 1.75 filed at 02/26/2025 1102   Can you walk a block or two on level ground?  2.75 filed at 02/26/2025 1102   Can you climb a flight of stairs or walk up a hill? 0 filed at 02/26/2025 1102   Can you run a short distance? 0 filed at 02/26/2025 1102   Can you do light work around the house like dusting or washing dishes? 2.7 filed at 02/26/2025 1102   Can you do moderate work around the house like vacuuming, sweeping floors or carrying groceries? 3.5 filed at 02/26/2025 1102   Can you do heavy work around the house like scrubbing floors or lifting and moving heavy furniture?  0 filed at 02/26/2025 1102   Can you do yard work like raking leaves, weeding or pushing a mower? 0 filed at 02/26/2025 1102   Can you have sexual relations? 0 " filed at 02/26/2025 1102   Can you participate in moderate recreational activities like golf, bowling, dancing, doubles tennis or throwing a baseball or football? 0 filed at 02/26/2025 1102   Can you participate in strenous sports like swimming, singles tennis, football, basketball, or skiing? 0 filed at 02/26/2025 1102   DASI SCORE 13.45 filed at 02/26/2025 1102   METS Score (Will be calculated only when all the questions are answered) 4.4 filed at 02/26/2025 1102          Caprini DVT Assessment      Flowsheet Row Pre-Admission Testing from 2/26/2025 in [facility] Admission (Discharged) from 12/31/2024 in [facility] 53 with Eva Baca MD   DVT Score (IF A SCORE IS NOT CALCULATING, MUST SELECT A BMI TO COMPLETE) 7 filed at 02/26/2025 1117 5 filed at 12/30/2024 2205   Medical Factors COPD filed at 02/26/2025 1117 --   Surgical Factors Minor surgery planned filed at 02/26/2025 1117 --   BMI (BMI MUST BE CHOSEN) Greater than 50 (Venous stasis syndrome) filed at 02/26/2025 1117 Greater than 50 (Venous stasis syndrome) filed at 12/30/2024 2205          Modified Frailty Index    No data to display       CHADS2 Stroke Risk  Current as of about an hour ago        N/A 3 to 100%: High Risk   2 to < 3%: Medium Risk   0 to < 2%: Low Risk     Last Change: N/A          This score determines the patient's risk of having a stroke if the patient has atrial fibrillation.        This score is not applicable to this patient. Components are not calculated.          Revised Cardiac Risk Index      Flowsheet Row Pre-Admission Testing from 2/26/2025 in [facility]   High-Risk Surgery (Intraperitoneal, Intrathoracic,Suprainguinal vascular) 0 filed at 02/26/2025 1135   History of ischemic heart disease (History of MI, History of positive exercuse test, Current chest paint considered due to myocardial ischemia, Use of nitrate therapy, ECG with pathological Q Waves) 0 filed at  02/26/2025 1135   History of congestive heart failure (pulmonary edemia, bilateral rales or S3 gallop, Paroxysmal nocturnal dyspnea, CXR showing pulmonary vascular redistribution) 0 filed at 02/26/2025 1135   History of cerebrovascular disease (Prior TIA or stroke) 1 filed at 02/26/2025 1135   Pre-operative insulin treatment 0 filed at 02/26/2025 1135   Pre-operative creatinine>2 mg/dl 0 filed at 02/26/2025 1135   Revised Cardiac Risk Calculator 1 filed at 02/26/2025 1135          Apfel Simplified Score      Flowsheet Row Pre-Admission Testing from 2/26/2025 in [facility]   Smoking status 1 filed at 02/26/2025 1117   History of motion sickness or PONV  0 filed at 02/26/2025 1117   Use of postoperative opioids 1 filed at 02/26/2025 1117   Gender - Female 1=Yes filed at 02/26/2025 1117   Apfel Simplified Score Calculator 3 filed at 02/26/2025 1117          Risk Analysis Index Results This Encounter    No data found in the last 10 encounters.       Stop Bang Score      Flowsheet Row Pre-Admission Testing from 2/26/2025 in [facility]   Do you snore loudly? 1 filed at 02/26/2025 1101   Do you often feel tired or fatigued after your sleep? 1 filed at 02/26/2025 1101   Has anyone ever observed you stop breathing in your sleep? 1 filed at 02/26/2025 1101   Do you have or are you being treated for high blood pressure? 1 filed at 02/26/2025 1101   Recent BMI (Calculated) 55.1 filed at 02/26/2025 1101   Is BMI greater than 35 kg/m2? 1=Yes filed at 02/26/2025 1101   Age older than 50 years old? 1=Yes filed at 02/26/2025 1101   Is your neck circumference greater than 17 inches (Male) or 16 inches (Female)? 1 filed at 02/26/2025 1101   Gender - Male 0=No filed at 02/26/2025 1101   STOP-BANG Total Score 7 filed at 02/26/2025 1101          Prodigy: High Risk  Total Score: 8              Prodigy Age Score           ARISCAT Score for Postoperative Pulmonary Complications      Flowsheet Row Pre-Admission  Testing from 2/26/2025 in [facility]   Age Calculated Score 3 filed at 02/26/2025 1136   Preoperative SpO2 8 filed at 02/26/2025 1136   Respiratory infection in the last month Either upper or lower (i.e., URI, bronchitis, pneumonia), with fever and antibiotic treatment 0 filed at 02/26/2025 1136   Preoperative anemia (Hgb less than 10 g/dl) 0 filed at 02/26/2025 1136   Surgical incision  0 filed at 02/26/2025 1136   Duration of surgery  0 filed at 02/26/2025 1136   Emergency Procedure  0 filed at 02/26/2025 1136   ARISCAT Total Score  11 filed at 02/26/2025 1136          Pawel Perioperative Risk for Myocardial Infarction or Cardiac Arrest ([place])      Flowsheet Row Pre-Admission Testing from 2/26/2025 in [facility]   Calculated Age Score 1.32 filed at 02/26/2025 1137   Functional Status  0 filed at 02/26/2025 1137   ASA Class  -1.92 filed at 02/26/2025 1137   Creatinine 0 filed at 02/26/2025 1137   Type of Procedure  -0.26 filed at 02/26/2025 1137   BRENTON Total Score  -6.11 filed at 02/26/2025 1137   BRENTON % 0.22 filed at 02/26/2025 1137                Assessment and Plan:     Neuro:  No neurologic diagnosis, however, the patient is at increased risk for perioperative delirium secondary to age, decreased functional status, polypharmacy  Patient is at increased risk for perioperative CVA secondary to previous CVA/TIA, HTN, increased age    Neurosurgery  Telemedicine visit with Dr. Barclay on 2/11/2025  No change in skull lesion and no evidnece of cyst or neoplasm    HEENT:  No HEENT diagnosis or significant findings on chart review or clinical presentation and evaluation. No further preoperative testing/intervention indicated at this time.    Cardiovascular:  Seen by Cardiology on 1/10/2025  ECHO 12/2024:  EF 65%; BA dilated; mild RVSP: 50mmHg  Continue hydralazine   Holding ASA 1 day   METS: 4.4  RCRI: 1 point, 6.0% risk for postoperative MACE       Pulmonary:  Most with pulmonary, Dr. Raman.   Last seen 1/6/2025 for idiopathic PHTN & chronic resp failure  [facility] 1/2024:  mPAP 31 PAOP 10 PVR 3.1 CO 6.7   On 3L O2 - continuous  Lasix PRN  Continue Riociguat and Macitentan  Stop Bang score is 7 placing patient at high risk for MARLON  PRODIGY: High risk for opioid induced respiratory depression  Pumonary education discussed, patient also provided deep breathing exerciseswith educational handout    Renal:   No renal diagnosis, however patient is at increase risk for perioperative renal complications secondary to  Age equal to or greater than 54, BMI equal to or greater than 30, HTN, COPD      Endocrine:  No endocrine diagnosis or significant findings on chart review or clinical presentation and evaluation. No further testing or intervention is indicated at this time.    Hematologic:  No hematologic diagnosis, however patient is at an increased risk for DVT  Caprini Score 7, patient at High risk for perioperative DVT.  Patient provided with VTE education/handout.    Gastrointestinal:   No GI diagnosis or significant findings on chart review or clinical presentation and evaluation.   Apfel 3    Urology  Seen by Dr. Navarro on 1/31/2025 for calculus of kidney and ureter  Plan for cystoscopy w/lithotripsy    Infectious disease:   No infectious diagnosis or significant findings on chart review or clinical presentation and evaluation.       Musculoskeletal:   No diagnosis or significant findings on chart review or clinical presentation and evaluation.     Anesthesia/Airway:  No anesthesia complications      Medication instructions and NPO guidelines reviewed with the patient.  All questions or concerns discussed and addressed.      Labs ordered

## 2025-02-27 ENCOUNTER — TELEPHONE (OUTPATIENT)
Dept: CARDIOLOGY | Facility: HOSPITAL | Age: 65
End: 2025-02-27
Payer: COMMERCIAL

## 2025-02-27 DIAGNOSIS — I10 BENIGN ESSENTIAL HYPERTENSION: ICD-10-CM

## 2025-02-28 RX ORDER — HYDRALAZINE HYDROCHLORIDE 25 MG/1
25 TABLET, FILM COATED ORAL 2 TIMES DAILY
Qty: 180 TABLET | Refills: 2 | Status: SHIPPED | OUTPATIENT
Start: 2025-02-28 | End: 2025-11-25

## 2025-03-04 ENCOUNTER — APPOINTMENT (OUTPATIENT)
Dept: OBSTETRICS AND GYNECOLOGY | Facility: CLINIC | Age: 65
End: 2025-03-04
Payer: COMMERCIAL

## 2025-03-05 ENCOUNTER — APPOINTMENT (OUTPATIENT)
Dept: NEPHROLOGY | Facility: CLINIC | Age: 65
End: 2025-03-05
Payer: COMMERCIAL

## 2025-03-05 RX ORDER — CEFAZOLIN SODIUM 2 G/50ML
2 SOLUTION INTRAVENOUS ONCE
Status: CANCELLED | OUTPATIENT
Start: 2025-03-05 | End: 2025-03-05

## 2025-03-06 ENCOUNTER — ANESTHESIA (OUTPATIENT)
Dept: OPERATING ROOM | Facility: HOSPITAL | Age: 65
End: 2025-03-06
Payer: COMMERCIAL

## 2025-03-06 ENCOUNTER — APPOINTMENT (OUTPATIENT)
Dept: RADIOLOGY | Facility: HOSPITAL | Age: 65
End: 2025-03-06
Payer: COMMERCIAL

## 2025-03-06 ENCOUNTER — HOSPITAL ENCOUNTER (OUTPATIENT)
Facility: HOSPITAL | Age: 65
Setting detail: OUTPATIENT SURGERY
Discharge: HOME | End: 2025-03-06
Attending: SURGERY | Admitting: SURGERY
Payer: COMMERCIAL

## 2025-03-06 VITALS
BODY MASS INDEX: 52.42 KG/M2 | HEIGHT: 62 IN | TEMPERATURE: 97.3 F | DIASTOLIC BLOOD PRESSURE: 62 MMHG | WEIGHT: 284.83 LBS | SYSTOLIC BLOOD PRESSURE: 122 MMHG | HEART RATE: 63 BPM | RESPIRATION RATE: 20 BRPM | OXYGEN SATURATION: 94 %

## 2025-03-06 DIAGNOSIS — N20.1 CALCULUS OF URETER: Primary | ICD-10-CM

## 2025-03-06 PROCEDURE — 7100000010 HC PHASE TWO TIME - EACH INCREMENTAL 1 MINUTE: Performed by: SURGERY

## 2025-03-06 PROCEDURE — 76000 FLUOROSCOPY <1 HR PHYS/QHP: CPT

## 2025-03-06 PROCEDURE — 7100000001 HC RECOVERY ROOM TIME - INITIAL BASE CHARGE: Performed by: SURGERY

## 2025-03-06 PROCEDURE — 3700000001 HC GENERAL ANESTHESIA TIME - INITIAL BASE CHARGE: Performed by: SURGERY

## 2025-03-06 PROCEDURE — 7100000002 HC RECOVERY ROOM TIME - EACH INCREMENTAL 1 MINUTE: Performed by: SURGERY

## 2025-03-06 PROCEDURE — 7100000009 HC PHASE TWO TIME - INITIAL BASE CHARGE: Performed by: SURGERY

## 2025-03-06 PROCEDURE — 52353 CYSTOURETERO W/LITHOTRIPSY: CPT | Performed by: SURGERY

## 2025-03-06 PROCEDURE — 3600000003 HC OR TIME - INITIAL BASE CHARGE - PROCEDURE LEVEL THREE: Performed by: SURGERY

## 2025-03-06 PROCEDURE — 2500000005 HC RX 250 GENERAL PHARMACY W/O HCPCS: Performed by: SURGERY

## 2025-03-06 PROCEDURE — 2500000004 HC RX 250 GENERAL PHARMACY W/ HCPCS (ALT 636 FOR OP/ED): Performed by: ANESTHESIOLOGY

## 2025-03-06 PROCEDURE — 3700000002 HC GENERAL ANESTHESIA TIME - EACH INCREMENTAL 1 MINUTE: Performed by: SURGERY

## 2025-03-06 PROCEDURE — 2720000007 HC OR 272 NO HCPCS: Performed by: SURGERY

## 2025-03-06 PROCEDURE — 2500000004 HC RX 250 GENERAL PHARMACY W/ HCPCS (ALT 636 FOR OP/ED): Performed by: NURSE ANESTHETIST, CERTIFIED REGISTERED

## 2025-03-06 PROCEDURE — 3600000008 HC OR TIME - EACH INCREMENTAL 1 MINUTE - PROCEDURE LEVEL THREE: Performed by: SURGERY

## 2025-03-06 RX ORDER — DIPHENHYDRAMINE HYDROCHLORIDE 50 MG/ML
12.5 INJECTION INTRAMUSCULAR; INTRAVENOUS ONCE AS NEEDED
Status: DISCONTINUED | OUTPATIENT
Start: 2025-03-06 | End: 2025-03-06 | Stop reason: HOSPADM

## 2025-03-06 RX ORDER — TAMSULOSIN HYDROCHLORIDE 0.4 MG/1
0.4 CAPSULE ORAL DAILY
Qty: 10 CAPSULE | Refills: 0 | Status: SHIPPED | OUTPATIENT
Start: 2025-03-06 | End: 2025-06-04

## 2025-03-06 RX ORDER — ONDANSETRON HYDROCHLORIDE 2 MG/ML
INJECTION, SOLUTION INTRAVENOUS AS NEEDED
Status: DISCONTINUED | OUTPATIENT
Start: 2025-03-06 | End: 2025-03-06

## 2025-03-06 RX ORDER — ROCURONIUM BROMIDE 10 MG/ML
INJECTION, SOLUTION INTRAVENOUS AS NEEDED
Status: DISCONTINUED | OUTPATIENT
Start: 2025-03-06 | End: 2025-03-06

## 2025-03-06 RX ORDER — SUCCINYLCHOLINE CHLORIDE 20 MG/ML
INJECTION INTRAMUSCULAR; INTRAVENOUS AS NEEDED
Status: DISCONTINUED | OUTPATIENT
Start: 2025-03-06 | End: 2025-03-06

## 2025-03-06 RX ORDER — ACETAMINOPHEN 325 MG/1
650 TABLET ORAL ONCE
Status: DISCONTINUED | OUTPATIENT
Start: 2025-03-06 | End: 2025-03-06 | Stop reason: HOSPADM

## 2025-03-06 RX ORDER — DROPERIDOL 2.5 MG/ML
0.62 INJECTION, SOLUTION INTRAMUSCULAR; INTRAVENOUS ONCE AS NEEDED
Status: DISCONTINUED | OUTPATIENT
Start: 2025-03-06 | End: 2025-03-06 | Stop reason: HOSPADM

## 2025-03-06 RX ORDER — ONDANSETRON HYDROCHLORIDE 2 MG/ML
4 INJECTION, SOLUTION INTRAVENOUS ONCE AS NEEDED
Status: DISCONTINUED | OUTPATIENT
Start: 2025-03-06 | End: 2025-03-06 | Stop reason: HOSPADM

## 2025-03-06 RX ORDER — MEPERIDINE HYDROCHLORIDE 25 MG/ML
12.5 INJECTION INTRAMUSCULAR; INTRAVENOUS; SUBCUTANEOUS EVERY 10 MIN PRN
Status: DISCONTINUED | OUTPATIENT
Start: 2025-03-06 | End: 2025-03-06 | Stop reason: HOSPADM

## 2025-03-06 RX ORDER — FENTANYL CITRATE 50 UG/ML
INJECTION, SOLUTION INTRAMUSCULAR; INTRAVENOUS AS NEEDED
Status: DISCONTINUED | OUTPATIENT
Start: 2025-03-06 | End: 2025-03-06

## 2025-03-06 RX ORDER — SODIUM CHLORIDE 0.9 G/100ML
INJECTION, SOLUTION IRRIGATION AS NEEDED
Status: DISCONTINUED | OUTPATIENT
Start: 2025-03-06 | End: 2025-03-06 | Stop reason: HOSPADM

## 2025-03-06 RX ORDER — ALBUTEROL SULFATE 0.83 MG/ML
2.5 SOLUTION RESPIRATORY (INHALATION) ONCE AS NEEDED
Status: DISCONTINUED | OUTPATIENT
Start: 2025-03-06 | End: 2025-03-06 | Stop reason: HOSPADM

## 2025-03-06 RX ORDER — MIDAZOLAM HYDROCHLORIDE 1 MG/ML
INJECTION INTRAMUSCULAR; INTRAVENOUS AS NEEDED
Status: DISCONTINUED | OUTPATIENT
Start: 2025-03-06 | End: 2025-03-06

## 2025-03-06 RX ORDER — SODIUM CHLORIDE, SODIUM LACTATE, POTASSIUM CHLORIDE, CALCIUM CHLORIDE 600; 310; 30; 20 MG/100ML; MG/100ML; MG/100ML; MG/100ML
20 INJECTION, SOLUTION INTRAVENOUS CONTINUOUS
Status: DISCONTINUED | OUTPATIENT
Start: 2025-03-06 | End: 2025-03-06 | Stop reason: HOSPADM

## 2025-03-06 RX ORDER — SODIUM CHLORIDE, SODIUM LACTATE, POTASSIUM CHLORIDE, CALCIUM CHLORIDE 600; 310; 30; 20 MG/100ML; MG/100ML; MG/100ML; MG/100ML
100 INJECTION, SOLUTION INTRAVENOUS CONTINUOUS
Status: DISCONTINUED | OUTPATIENT
Start: 2025-03-06 | End: 2025-03-06 | Stop reason: HOSPADM

## 2025-03-06 RX ORDER — CEFAZOLIN 1 G/1
INJECTION, POWDER, FOR SOLUTION INTRAVENOUS AS NEEDED
Status: DISCONTINUED | OUTPATIENT
Start: 2025-03-06 | End: 2025-03-06

## 2025-03-06 RX ORDER — LIDOCAINE HYDROCHLORIDE 10 MG/ML
INJECTION, SOLUTION EPIDURAL; INFILTRATION; INTRACAUDAL; PERINEURAL AS NEEDED
Status: DISCONTINUED | OUTPATIENT
Start: 2025-03-06 | End: 2025-03-06

## 2025-03-06 RX ORDER — KETOROLAC TROMETHAMINE 10 MG/1
10 TABLET, FILM COATED ORAL EVERY 8 HOURS PRN
Qty: 15 TABLET | Refills: 0 | Status: SHIPPED | OUTPATIENT
Start: 2025-03-06 | End: 2025-03-11

## 2025-03-06 RX ORDER — KETOROLAC TROMETHAMINE 15 MG/ML
15 INJECTION, SOLUTION INTRAMUSCULAR; INTRAVENOUS ONCE
Status: COMPLETED | OUTPATIENT
Start: 2025-03-06 | End: 2025-03-06

## 2025-03-06 RX ORDER — OXYCODONE HYDROCHLORIDE 5 MG/1
5 TABLET ORAL EVERY 4 HOURS PRN
Status: DISCONTINUED | OUTPATIENT
Start: 2025-03-06 | End: 2025-03-06 | Stop reason: HOSPADM

## 2025-03-06 RX ORDER — PROPOFOL 10 MG/ML
INJECTION, EMULSION INTRAVENOUS AS NEEDED
Status: DISCONTINUED | OUTPATIENT
Start: 2025-03-06 | End: 2025-03-06

## 2025-03-06 RX ADMIN — MIDAZOLAM HYDROCHLORIDE 0.5 MG: 1 INJECTION, SOLUTION INTRAMUSCULAR; INTRAVENOUS at 07:36

## 2025-03-06 RX ADMIN — PROPOFOL 120 MG: 10 INJECTION, EMULSION INTRAVENOUS at 07:41

## 2025-03-06 RX ADMIN — CEFAZOLIN 3 G: 1 INJECTION, POWDER, FOR SOLUTION INTRAMUSCULAR; INTRAVENOUS at 07:45

## 2025-03-06 RX ADMIN — ROCURONIUM BROMIDE 5 MG: 10 INJECTION, SOLUTION INTRAVENOUS at 07:41

## 2025-03-06 RX ADMIN — FENTANYL CITRATE 50 MCG: 50 INJECTION, SOLUTION INTRAMUSCULAR; INTRAVENOUS at 07:41

## 2025-03-06 RX ADMIN — ONDANSETRON 4 MG: 2 INJECTION, SOLUTION INTRAMUSCULAR; INTRAVENOUS at 07:48

## 2025-03-06 RX ADMIN — SODIUM CHLORIDE, SODIUM LACTATE, POTASSIUM CHLORIDE, AND CALCIUM CHLORIDE 20 ML/HR: .6; .31; .03; .02 INJECTION, SOLUTION INTRAVENOUS at 07:07

## 2025-03-06 RX ADMIN — SUGAMMADEX 200 MG: 100 INJECTION, SOLUTION INTRAVENOUS at 08:06

## 2025-03-06 RX ADMIN — SUCCINYLCHOLINE CHLORIDE 160 MG: 20 INJECTION, SOLUTION INTRAMUSCULAR; INTRAVENOUS at 07:41

## 2025-03-06 RX ADMIN — FENTANYL CITRATE 25 MCG: 50 INJECTION, SOLUTION INTRAMUSCULAR; INTRAVENOUS at 07:59

## 2025-03-06 RX ADMIN — LIDOCAINE HYDROCHLORIDE 50 ML: 10 INJECTION, SOLUTION EPIDURAL; INFILTRATION; INTRACAUDAL; PERINEURAL at 07:41

## 2025-03-06 RX ADMIN — KETOROLAC TROMETHAMINE 15 MG: 15 INJECTION, SOLUTION INTRAMUSCULAR; INTRAVENOUS at 08:34

## 2025-03-06 RX ADMIN — PROPOFOL 30 MG: 10 INJECTION, EMULSION INTRAVENOUS at 07:51

## 2025-03-06 RX ADMIN — ROCURONIUM BROMIDE 10 MG: 10 INJECTION, SOLUTION INTRAVENOUS at 07:51

## 2025-03-06 SDOH — HEALTH STABILITY: MENTAL HEALTH: CURRENT SMOKER: 0

## 2025-03-06 ASSESSMENT — PAIN DESCRIPTION - DESCRIPTORS: DESCRIPTORS: DISCOMFORT

## 2025-03-06 ASSESSMENT — PAIN SCALES - GENERAL
PAINLEVEL_OUTOF10: 6
PAINLEVEL_OUTOF10: 3

## 2025-03-06 ASSESSMENT — PAIN - FUNCTIONAL ASSESSMENT: PAIN_FUNCTIONAL_ASSESSMENT: 0-10

## 2025-03-06 NOTE — ANESTHESIA PREPROCEDURE EVALUATION
Patient: Farida Traylor    Procedure Information       Anesthesia Start Date/Time: 03/06/25 0734    Procedure: CYSTOSCOPY, WITH LASER LITHOTRIPSY (Right)    Location: A OR 04 / Virtual [facility] OR    Surgeons: Kanika Navarro MD          Vitals:    03/06/25 0622   BP: (!) 114/49   Pulse: 75   Resp: 18   Temp: 36.6 °C (97.9 °F)   SpO2: 92%       Past Surgical History:   Procedure Laterality Date    CARDIAC CATHETERIZATION Right 01/04/2024    Procedure: Right Heart Cath;  Surgeon: Richy Raman MD;  Location: [facility] Cardiac Cath Lab;  Service: Cardiovascular;  Laterality: Right;    CARDIAC CATHETERIZATION      COLONOSCOPY      KNEE SURGERY  05/04/2017    Knee Surgery    MOUTH SURGERY  11/04/2014    Oral Surgery Tooth Extraction    MR HEAD ANGIO WO IV CONTRAST  02/09/2016    MR HEAD ANGIO WO IV CONTRAST [facility]    OTHER SURGICAL HISTORY  07/31/2013    Wrist Carpectomy    OTHER SURGICAL HISTORY  09/10/2019    Vista tooth extraction    OTHER SURGICAL HISTORY  11/30/2018    Complete colonoscopy    OTHER SURGICAL HISTORY  06/08/2020    Thyroid biopsy    TOENAIL EXCISION       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    Acquired plantar porokeratosis    Acute upper respiratory infection, unspecified 01/14/2020    URI, acute    Allergic rhinitis due to animal (cat) (dog) hair and dander 01/02/2020    Allergic rhinitis due to dogs    Allergic rhinitis due to pollen 01/02/2020    Allergic rhinitis due to pollen    Allergic rhinitis due to pollen 02/26/2018    Seasonal allergic rhinitis due to pollen    Allergy status to unspecified drugs, medicaments and biological substances 06/30/2015    History of allergy    Anemia, unspecified 07/23/2018    Normocytic anemia    Anxiety     Anxiety disorder due to known physiological condition 06/05/2013    Anxiety disorder due to medical condition    Anxiety disorder, unspecified 01/28/2016    Anxiety    Asthma      Asymptomatic varicose veins of bilateral lower extremities 07/16/2019    Varicose veins of legs    Atypical squamous cells of undetermined significance on cytologic smear of cervix (ASC-US) 06/26/2013    Pap smear abnormality of cervix with ASCUS favoring benign    Candidiasis, unspecified 12/10/2019    Yeast infection    Cellulitis of left finger 07/31/2018    Paronychia of finger of left hand    Changes in skin texture 03/09/2021    Cracked skin    Class 3 severe obesity with body mass index (BMI) of 50.0 to 59.9 in adult 02/26/2025    53.53 kg/m²    Contusion of left lesser toe(s) with damage to nail, initial encounter 02/22/2018    Subungual contusion of toenail of left foot    Contusion of right hand, sequela 08/04/2020    Traumatic ecchymosis of hand, right, sequela    Corns and callosities 05/25/2021    Callus of foot    Depression     Disorder of pigmentation, unspecified 09/28/2016    Atypical pigmented skin lesion    Disorder of the skin and subcutaneous tissue, unspecified 08/27/2020    Lesion of subcutaneous tissue    Dorsalgia, unspecified 09/23/2021    Acute back pain    Dorsalgia, unspecified 01/29/2019    Costovertebral angle tenderness    Encounter for general adult medical examination without abnormal findings 06/08/2020    Medicare annual wellness visit, subsequent    Encounter for gynecological examination (general) (routine) without abnormal findings 12/14/2021    Encounter for gynecological examination    Encounter for gynecological examination (general) (routine) without abnormal findings 12/11/2020    Encounter for gynecological examination    Encounter for other screening for malignant neoplasm of breast     Breast cancer screening    Encounter for screening for malignant neoplasm of cervix     Encounter for screening for malignant neoplasm of cervix    Encounter for screening for malignant neoplasm of cervix 11/04/2014    Screening for malignant neoplasm of cervix    Encounter for  screening for malignant neoplasm of cervix     Cervical cancer screening    GERD (gastroesophageal reflux disease)     Heart murmur     Hepatomegaly, not elsewhere classified 04/21/2021    Liver mass, right lobe    History of falling 12/28/2018    Status post fall    Hypertension     Inappropriate diet and eating habits 05/13/2021    Inappropriate diet and eating habits    Irregular menstruation, unspecified     Irregular periods/menstrual cycles    Localized edema 07/29/2015    Pedal edema    Localized swelling, mass and lump, left lower limb 09/11/2020    Lump of left thigh    Localized swelling, mass and lump, left lower limb 09/24/2020    Mass of left thigh    Melanocytic nevi, unspecified 09/10/2019    Numerous skin moles    Multiple births, unspecified, all liveborn ([facility])     Multiple births, all liveborn    Nephrolithiasis 02/14/2025    Bilateral    Obesity     Other allergic rhinitis 01/02/2020    Allergic rhinitis due to dust mite    Other allergic rhinitis 01/02/2020    Allergic rhinitis due to mold    Other conditions influencing health status 06/26/2013    Uterine Rupture    Other conditions influencing health status     Normal colonoscopy    Other conditions influencing health status 02/05/2019    History of burning on urination    Other conditions influencing health status 12/14/2021    History of cough    Other conditions influencing health status 02/14/2019    History of dyspareunia    Other conditions influencing health status 01/14/2020    History of cough    Other conditions influencing health status 06/05/2013    Leiomyomatous Degeneration Of The Uterus    Other conditions influencing health status 06/05/2013    Viremia    Other hypertrophic disorders of the skin 07/29/2015    Skin tag    Other shoulder lesions, right shoulder 11/11/2019    Tendinitis of right rotator cuff    Other specified disorders of bone, thigh 09/09/2020    Pain of left femur    Other specified noninflammatory disorders  of vagina 03/12/2019    Vaginal dryness    Other specified postprocedural states 05/04/2017    History of arthroscopic knee surgery    Other specified soft tissue disorders 10/08/2020    Soft tissue mass    Other specified soft tissue disorders 08/04/2020    Swelling of right hand    Oxygen dependent     Pain in left thigh 08/27/2020    Pain of left thigh    Pain in right foot 03/09/2021    Right foot pain    Pain in right hip 12/28/2018    Right hip pain    Pain in right knee 03/12/2021    Bilateral knee pain    Pain in right knee 03/19/2021    Right knee pain    Pain in right leg 05/25/2021    Bilateral pain of leg and foot    Pain in right leg 01/12/2022    Bilateral leg and foot pain    Pain in unspecified joint     Joint pain    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 02/22/2016    History of tetralogy of Fallot    Personal history of contraception     Personal history of contraceptive use    Personal history of diseases of the skin and subcutaneous tissue 12/22/2020    History of ingrown nail    Personal history of malignant neoplasm of other parts of uterus     History of malignant neoplasm of endometrium    Personal history of other (healed) physical injury and trauma 03/01/2017    History of sprain of ankle    Personal history of other benign neoplasm 09/10/2019    History of other benign neoplasm    Personal history of other benign neoplasm 05/06/2014    History of uterine leiomyoma    Personal history of other benign neoplasm 12/11/2020    History of uterine leiomyoma    Personal history of other diseases of the circulatory system 04/14/2021    History of atrial fibrillation    Personal history of other diseases of the circulatory system 04/14/2021    History of atrial fibrillation    Personal history of other diseases of the circulatory system 04/14/2021    History of atrial fibrillation    Personal history of other diseases of the circulatory system  02/01/2017    History of hypertension    Personal history of other diseases of the circulatory system 04/14/2021    History of hypotension    Personal history of other diseases of the digestive system 03/24/2021    History of gastroesophageal reflux (GERD)    Personal history of other diseases of the female genital tract 12/28/2016    History of postmenopausal bleeding    Personal history of other diseases of the female genital tract 05/19/2022    History of postmenopausal bleeding    Personal history of other diseases of the female genital tract     History of vaginal discharge    Personal history of other diseases of the female genital tract     Vaginal delivery    Personal history of other diseases of the musculoskeletal system and connective tissue     Personal history of arthritis    Personal history of other diseases of the musculoskeletal system and connective tissue 11/18/2019    History of muscle spasm    Personal history of other diseases of the respiratory system 05/27/2021    History of asthma    Personal history of other diseases of the respiratory system 04/12/2019    History of acute bronchitis    Personal history of other diseases of the respiratory system 01/04/2020    History of bronchitis    Personal history of other endocrine, nutritional and metabolic disease 02/01/2017    History of hyperlipidemia    Personal history of other endocrine, nutritional and metabolic disease 04/18/2016    History of obesity    Personal history of other endocrine, nutritional and metabolic disease 05/26/2020    History of thyroid nodule    Personal history of other infectious and parasitic diseases     History of varicella    Personal history of other medical treatment 12/11/2020    History of screening mammography    Personal history of other medical treatment 12/14/2021    History of screening mammography    Personal history of other medical treatment     History of mammogram    Personal history of other specified  conditions 08/21/2019    History of gross hematuria    Personal history of other specified conditions 02/02/2017    History of weight gain    Personal history of other specified conditions 12/07/2020    History of chest pain    Personal history of other specified conditions 04/14/2021    History of palpitations    Personal history of other specified conditions 12/05/2014    History of vertigo    Personal history of other specified conditions 08/25/2021    History of exertional chest pain    Personal history of other specified conditions     History of shortness of breath    Personal history of other specified conditions     H/O heartburn    Personal history of other specified conditions 01/12/2018    History of urinary frequency    Personal history of transient ischemic attack (TIA), and 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 12/30/2015    History of TIA (transient ischemic attack)    Personal history of urinary (tract) infections 09/02/2021    History of recurrent cystitis    Personal history of urinary (tract) infections 05/23/2019    History of cystitis    Personal history of urinary (tract) infections 09/02/2021    History of recurrent urinary tract infection    Personal history of urinary calculi 09/12/2019    History of renal calculi    Polyphagia 05/13/2021    Polyphagia    Primary central sleep apnea 10/21/2017    Central sleep apnea    Pulmonary hypertension (Multi)     Pure hypercholesterolemia, unspecified     High cholesterol    Residual hemorrhoidal skin tags 07/06/2017    External hemorrhoid    Shortness of breath     Sleep apnea     cpap with oxygen blow in    Slurred speech 01/02/2015    Slurred speech    TIA (transient ischemic attack)     Tinea pedis 05/25/2021    Tinea pedis of right foot    Unspecified abdominal pain 04/13/2021    Abdominal pain, acute    Unspecified injury of right wrist, hand and finger(s), sequela 08/04/2020    Hand trauma, right, sequela    Unspecified internal  derangement of unspecified knee 03/01/2017    Internal derangement of knee    Unspecified symptoms and signs involving the genitourinary system 12/16/2021    UTI symptoms    Unspecified symptoms and signs involving the genitourinary system 02/05/2019    UTI symptoms    Unspecified symptoms and signs involving the genitourinary system 09/03/2020    UTI symptoms    Urinary tract infection     Urinary tract infection, site not specified 01/17/2020    Acute urinary tract infection    Urinary tract infection, site not specified 01/10/2022    Acute UTI    Xerosis cutis 09/10/2019    Dry skin dermatitis       Current Facility-Administered Medications:     lactated Ringer's infusion, 20 mL/hr, intravenous, Continuous, Morro Zapata MD, Last Rate: 20 mL/hr at 03/06/25 0734, Continued by Anesthesia at 03/06/25 0734  Prior to Admission medications    Medication Sig Start Date End Date Taking? Authorizing Provider   albuterol 90 mcg/actuation inhaler INHALE 1 TO 2 PUFFS BY MOUTH EVERY 4 TO 6 HOURS AS NEEDED 9/17/24  Yes Richy Raman MD   aspirin 325 mg tablet Take 1 tablet (325 mg) by mouth once daily. For TIA   Yes Historical Provider, MD   buPROPion SR (Wellbutrin SR) 150 mg 12 hr tablet TAKE 1 TABLET BY MOUTH EVERY MORNING AND AFTERNOON 1/16/24  Yes Historical Provider, MD   busPIRone (Buspar) 30 mg tablet Take 1 tablet (30 mg) by mouth 2 times a day. 11/8/21  Yes Historical Provider, MD   calcium carbonate 430 mg calcium (1,000 mg) tablet,chewable Chew 1 tablet once daily. 1000mg   Yes Historical Provider, MD   cholecalciferol (Vitamin D3) 5,000 Units tablet Take 1 tablet (5,000 Units) by mouth once daily.   Yes Historical Provider, MD   cyanocobalamin (Vitamin B-12) 1,000 mcg tablet Take 1 tablet (1,000 mcg) by mouth once daily.   Yes Historical Provider, MD   ferrous sulfate 325 (65 Fe) MG EC tablet Take 1 tablet by mouth once daily with breakfast. Do not crush, chew, or split.   Yes Historical Provider, MD    fluticasone (Flonase) 50 mcg/actuation nasal spray Administer 1 spray into each nostril once daily. Shake gently. Before first use, prime pump. After use, clean tip and replace cap. 2/4/25 2/4/26 Yes Jelena Romo DO   fluticasone propion-salmeteroL (Advair Diskus) 250-50 mcg/dose diskus inhaler INHALE ONE (1) PUFF BY MOUTH TWICE DAILY. RINSE MOUTH OUT AFTER EACH USE. 9/17/24 9/17/25 Yes Richy Raman MD   hydrALAZINE (Apresoline) 25 mg tablet Take 1 tablet (25 mg) by mouth 2 times a day. 2/28/25 11/25/25 Yes MAGDALENA Menchaca   hydroCHLOROthiazide (HYDRODiuril) 25 mg tablet Take 1 tablet (25 mg) by mouth once daily. 1/17/25 1/17/26 Yes Jose Rosario MD   hydrOXYzine pamoate (Vistaril) 50 mg capsule TAKE 3 CAPSULES BY MOUTH EVERY NIGHT AT BEDTIME AND TAKE 1 CAPSULE BY MOUTH EVERY MORNING 1/16/24  Yes Historical Provider, MD   lubiprostone (Amitiza) 8 mcg capsule TAKE 1 CAPSULE BY MOUTH TWICE DAILY WITH MEALS 1/23/25  Yes Mary Kate Leon MD   memantine (Namenda) 10 mg tablet Take 1 tablet (10 mg) by mouth 2 times a day.   Yes Historical Provider, MD   methenamine hippurate (Hiprex) 1 gram tablet TAKE 1 TABLET BY MOUTH TWICE DAILY 7/16/24  Yes Sanford Arredondo MD   montelukast (Singulair) 10 mg tablet TAKE 1 TABLET BY MOUTH ONCE DAILY 12/16/24  Yes Jelena Romo DO   omeprazole (PriLOSEC) 40 mg DR capsule Take 1 capsule (40 mg) by mouth once daily in the morning. Take before meals. 12/12/24 12/12/25 Yes Mary Kate Leon MD   Opsumit 10 mg tablet tablet TAKE 1 TABLET BY MOUTH DAILY. DO NOT HANDLE IF PREGNANT. DO NOT SPLIT, CRUSH, OR CHEW. REVIEW MEDICATION GUIDE. 5/28/24  Yes Richy Raman MD   oxygen (O2) gas therapy Inhale 1 each continuously.  Patient taking differently: Inhale 1 each continuously. 3liters during day/night 1/2/25  Yes Hazel Johns MD   polyethylene glycol (Glycolax, Miralax) 17 gram packet Take 17 g by mouth once daily as needed.   Yes Historical  Provider, MD   potassium citrate CR (Urocit-K-15) 15 mEq ER tablet Take 1 tablet (15 mEq) by mouth 2 times daily (morning and late afternoon). Do not crush, chew, or split. 1/29/25 1/29/26 Yes Jelena Romo DO   riociguat (Adempas) 2.5 mg tablet Take 1 tablet (2.5 mg) by mouth 3 times a day. 1/31/25  Yes Richy Raman MD   sertraline (Zoloft) 100 mg tablet Take 2 tablets (200 mg) by mouth once daily. 1/25/16  Yes Historical Provider, MD   simvastatin (Zocor) 80 mg tablet TAKE 1 TABLET BY MOUTH ONCE DAILY AT BEDTIME 9/13/24  Yes Jelena Romo DO   trospium (Sanctura) 20 mg tablet TAKE 1 TABLET BY MOUTH TWICE DAILY 1/17/25  Yes Sanford Arredondo MD   cyclobenzaprine (Flexeril) 10 mg tablet Take 1 tablet (10 mg) by mouth 3 times a day as needed for muscle spasms.  Patient not taking: Reported on 3/6/2025 6/21/24 2/4/25  Jelena Romo DO   hydrALAZINE (Apresoline) 50 mg tablet Take 1 tablet (50 mg) by mouth 2 times a day. 2/4/25 2/28/25  Hortencia Sotomayor, APRN-CNP     Allergies   Allergen Reactions    Grass Pollen Other    Other Unknown    Pollen Extracts Unknown    Tree And Shrub Pollen Other     Social History     Tobacco Use    Smoking status: Never    Smokeless tobacco: Never   Substance Use Topics    Alcohol use: Not Currently     Comment: Occasional         Chemistry    Lab Results   Component Value Date/Time     02/26/2025 1132    K 4.3 02/26/2025 1132     02/26/2025 1132    CO2 30 02/26/2025 1132    BUN 11 02/26/2025 1132    CREATININE 0.82 02/26/2025 1132    Lab Results   Component Value Date/Time    CALCIUM 8.9 02/26/2025 1132    ALKPHOS 54 02/26/2025 1132    AST 14 02/26/2025 1132    ALT 14 02/26/2025 1132    BILITOT 0.3 02/26/2025 1132          Lab Results   Component Value Date/Time    WBC 4.7 02/26/2025 1132    HGB 13.9 02/26/2025 1132    HCT 43.8 02/26/2025 1132     02/26/2025 1132     Lab Results   Component Value Date/Time    PROTIME 11.6 12/31/2024 0638    INR 1.0  12/31/2024 0638     Encounter Date: 12/29/24   ECG 12 lead   Result Value    Ventricular Rate 61    Atrial Rate 61    IN Interval 206    QRS Duration 126    QT Interval 498    QTC Calculation(Bazett) 501    P Axis -11    R Axis -13    T Axis -39    QRS Count 10    Q Onset 203    P Onset 100    P Offset 138    T Offset 452    QTC Fredericia 500    Narrative    Normal sinus rhythm  Nonspecific intraventricular block  Cannot rule out Anterior infarct (cited on or before 12-APR-2024)  Abnormal ECG  When compared with ECG of 01-NOV-2024 19:00,  QRS axis Shifted left  T wave inversion now evident in Inferior leads     No results found for this or any previous visit from the past 1095 days.    Relevant Problems   Cardiac   (+) Benign essential hypertension   (+) HLD (hyperlipidemia)      Pulmonary   (+) Asthma   (+) Idiopathic PAH (pulmonary arterial hypertension) (Multi)      Neuro   (+) Carotid artery occlusion   (+) Cerebrovascular accident (Multi)   (+) Current moderate episode of major depressive disorder without prior episode (Multi)   (+) Dementia, vascular, with depression   (+) Generalized anxiety disorder   (+) Recurrent major depressive disorder, in partial remission (CMS-HCC)   (+) Transient ischemic attack      GI   (+) Dysphagia   (+) GERD without esophagitis      /Renal   (+) Kidney stone      Endocrine   (+) Class 3 severe obesity due to excess calories with body mass index (BMI) of 50.0 to 59.9 in adult   (+) Obesity, morbid (more than 100 lbs over ideal weight or BMI > 40) (Multi)      Hematology   (+) Iron deficiency anemia      Musculoskeletal   (+) DJD (degenerative joint disease), lumbar   (+) Lumbar canal stenosis   (+) Lumbar spondylosis   (+) Primary osteoarthritis of both knees      ID   (+) COVID-19   (+) Yeast infection       Clinical information reviewed:    Allergies  Meds               NPO Detail:  NPO/Void Status  Date of Last Liquid: 03/05/25  Date of Last Solid: 03/05/25          Physical Exam    Airway  Mallampati: II     Cardiovascular - normal exam     Dental   (+) lower dentures, upper dentures     Pulmonary    Abdominal            Anesthesia Plan    History of general anesthesia?: yes  History of complications of general anesthesia?: no    ASA 4     general     The patient is not a current smoker.  Patient was not previously instructed to abstain from smoking on day of procedure.  Patient did not smoke on day of procedure.  Education provided regarding risk of obstructive sleep apnea.  intravenous induction   Anesthetic plan and risks discussed with patient.

## 2025-03-06 NOTE — ANESTHESIA PROCEDURE NOTES
Airway  Date/Time: 3/6/2025 7:44 AM  Urgency: elective    Airway not difficult    Staffing  Performed: CRNA   Authorized by: Morro Zapata MD    Performed by: ANNETTE Bonilla-ROBERTO  Patient location during procedure: OR    Indications and Patient Condition  Indications for airway management: anesthesia  Spontaneous Ventilation: absent  Sedation level: deep  Preoxygenated: yes  Patient position: sniffing  Mask difficulty assessment: 1 - vent by mask    Final Airway Details  Final airway type: endotracheal airway      Successful airway: ETT  Cuffed: yes   Successful intubation technique: video laryngoscopy  Facilitating devices/methods: intubating stylet  Endotracheal tube insertion site: oral  Blade size: #3  ETT size (mm): 7.5  Cormack-Lehane Classification: grade I - full view of glottis  Inital cuff pressure (cm H2O): 20  Measured from: gums  Number of attempts at approach: 1

## 2025-03-06 NOTE — ANESTHESIA POSTPROCEDURE EVALUATION
Patient: Farida Traylor    Procedure Summary       Date: 03/06/25 Room / Location: [facility] OR 04 / [facility]    Anesthesia Start: 0734 Anesthesia Stop: 0821    Procedure: CYSTOSCOPY, WITH LASER LITHOTRIPSY (Right) Diagnosis:       Calculus of ureter      (Calculus of ureter [N20.1])    Surgeons: Kanika Navarro MD Responsible Provider: Morro Zapata MD    Anesthesia Type: general ASA Status: 4            Anesthesia Type: general    Vitals Value Taken Time   /57 03/06/25 0847   Temp 36.3 °C (97.3 °F) 03/06/25 0817   Pulse 66 03/06/25 0847   Resp 21 03/06/25 0847   SpO2 95 % 03/06/25 0847       Anesthesia Post Evaluation    Patient location during evaluation: PACU  Patient participation: complete - patient participated  Level of consciousness: awake  Pain management: adequate  Multimodal analgesia pain management approach  Airway patency: patent  Two or more strategies used to mitigate risk of obstructive sleep apnea  Cardiovascular status: acceptable  Respiratory status: acceptable  Hydration status: acceptable  Postoperative Nausea and Vomiting: none        No notable events documented.

## 2025-03-06 NOTE — OP NOTE
CYSTOSCOPY, WITH LASER LITHOTRIPSY (R) Operative Note     Date: 3/6/2025  OR Location: [facility]    Name: Farida Traylor, : 1960, Age: 64 y.o., MRN: [id number], Sex: female    Diagnosis  Pre-op Diagnosis      * Calculus of ureter [N20.1] Post-op Diagnosis     * Calculus of ureter [N20.1]     Procedures  CYSTOSCOPY, WITH LASER LITHOTRIPSY  [id number] - [place] CYSTO W/URETEROSCOPY W/LITHOTRIPSY      Surgeons      * Kanika Navarro - Primary    Resident/Fellow/Other Assistant:  Surgeons and Role:  * No surgeons found with a matching role *    Staff:   Scrub Person: Darwin  Surgical Assistant: Navin  Circulator: Radha    Anesthesia Staff: Anesthesiologist: Morro Zapata MD  CRNA: MARY Bonilla    Procedure Summary  Anesthesia: Anesthesia type not filed in the log.  ASA: ASA status not filed in the log.  Estimated Blood Loss: 0 mL  Intra-op Medications:   Administrations occurring from 0715 to 0830 on 25:   Medication Name Total Dose   sodium chloride 0.9 % irrigation solution 6,000 mL   ceFAZolin (Ancef) vial 1 g 3 g   fentaNYL (Sublimaze) injection 50 mcg/mL 75 mcg   lidocaine PF (Xylocaine-MPF) local injection 1 % 50 mL   midazolam PF (Versed) injection 1 mg/mL 0.5 mg   ondansetron (Zofran) 2 mg/mL injection 4 mg   propofol (Diprivan) injection 10 mg/mL 150 mg   rocuronium (ZeMuron) 50 mg/5 mL injection 15 mg   succinylcholine (Anectine) 20 mg/mL injection 160 mg   sugammadex (Bridion) 200 mg/2 mL injection 200 mg              Anesthesia Record               Intraprocedure I/O Totals       None           Specimen: No specimens collected              Drains and/or Catheters: * None in log *    Tourniquet Times:         Implants:     Findings: 1. Solitary stone distal ureter.                   2. Normal bladder mucosa.       Indications: Farida Traylor is an 64 y.o. female who is having surgery for Calculus of ureter [N20.1].     The patient was seen in the preoperative area. The risks, benefits,  complications, treatment options, non-operative alternatives, expected recovery and outcomes were discussed with the patient. The possibilities of reaction to medication, pulmonary aspiration, injury to surrounding structures, bleeding, recurrent infection, the need for additional procedures, failure to diagnose a condition, and creating a complication requiring transfusion or operation were discussed with the patient. The patient concurred with the proposed plan, giving informed consent.  The site of surgery was properly noted/marked if necessary per policy. The patient has been actively warmed in preoperative area. Preoperative antibiotics have been ordered and given within 1 hours of incision. Venous thrombosis prophylaxis have been ordered including bilateral sequential compression devices    Procedure Details: The patient was brought to the operating room table.  General anesthesia was induced.  The patient was placed in the dorsolithotomy position.  Her genitalia were prepped and draped.  We introduced a 22 [place] sheath per urethra and we drained the bladder.  We then introduced a cystoscope.  Once we entered the bladder we carefully inspected the bladder mucosa.  There were no abnormalities noted.  We next introduced a guidewire into the right ureter.  Using fluoroscopy we advanced the wire beyond the stone up towards the right kidney.  We next obtained a semirigid ureteroscope and began ureteroscopy.  Once we entered the bladder we were able to negotiate the right orifice.  We carefully advanced our scope.  Within the distal portion of the ureter we visualized a large obstructing stone.  We introduced a 365 µm laser fiber.  We began our laser lithotripsy.  We used a power setting of 10 W and we were able to easily fragment the stone.  The stone fragmented quite nicely.  Nearly all the fragments spontaneously passed into the bladder.  Once we are satisfied with our stone clearance we then inspected the  remaining right ureter.  We advanced our scope up towards the UPJ.  There was no evidence of any other calculi.  At this point we removed our scope.  The patient was awakened and brought to the recovery room in stable condition.  Complications:  None; patient tolerated the procedure well.    Disposition: PACU - hemodynamically stable.  Condition: stable         Task Performed by RNFA or Surgical Assistant:            Additional Details:     Attending Attestation: I was present and scrubbed for the entire procedure.    Kanika Navarro  Phone Number: [phone]

## 2025-03-08 ENCOUNTER — DOCUMENTATION (OUTPATIENT)
Dept: UROLOGY | Facility: HOSPITAL | Age: 65
End: 2025-03-08
Payer: COMMERCIAL

## 2025-03-08 DIAGNOSIS — N20.2 CALCULUS OF KIDNEY AND URETER: Primary | ICD-10-CM

## 2025-03-08 RX ORDER — OXYBUTYNIN CHLORIDE 5 MG/1
5 TABLET ORAL 3 TIMES DAILY PRN
Qty: 90 TABLET | Refills: 0 | Status: SHIPPED | OUTPATIENT
Start: 2025-03-08 | End: 2025-04-07

## 2025-03-08 RX ORDER — PHENAZOPYRIDINE HYDROCHLORIDE 200 MG/1
200 TABLET, FILM COATED ORAL 3 TIMES DAILY PRN
Qty: 10 TABLET | Refills: 0 | Status: SHIPPED | OUTPATIENT
Start: 2025-03-08 | End: 2025-03-11

## 2025-03-08 NOTE — PROGRESS NOTES
Received page by answering service about patient calling in to urology phone line.  She c/o of stent pain 7/10. Shea lso c/o dysuria ad painful defecation. Denied fever or chills, denied incomplete bladder emptying. She has Toradol and Flomax. Discussed taking tylenol and sent a prescription for oxybutynin and pyridium  to pick it up in the morning. Instructed if her pain is not improving or worsening to go to the nearest [facility] for in person eval.       Isabel Salamanca MD  Urology PGY-3  Pager: [phone]

## 2025-03-14 ENCOUNTER — APPOINTMENT (OUTPATIENT)
Dept: PRIMARY CARE | Facility: CLINIC | Age: 65
End: 2025-03-14
Payer: COMMERCIAL

## 2025-03-14 VITALS
TEMPERATURE: 97.5 F | HEART RATE: 77 BPM | HEIGHT: 62 IN | WEIGHT: 288.7 LBS | BODY MASS INDEX: 53.13 KG/M2 | SYSTOLIC BLOOD PRESSURE: 136 MMHG | OXYGEN SATURATION: 92 % | DIASTOLIC BLOOD PRESSURE: 74 MMHG

## 2025-03-14 DIAGNOSIS — Z00.00 ROUTINE GENERAL MEDICAL EXAMINATION AT HEALTH CARE FACILITY: Primary | ICD-10-CM

## 2025-03-14 PROCEDURE — 1170F FXNL STATUS ASSESSED: CPT | Performed by: FAMILY MEDICINE

## 2025-03-14 PROCEDURE — 3075F SYST BP GE 130 - 139MM HG: CPT | Performed by: FAMILY MEDICINE

## 2025-03-14 PROCEDURE — 3008F BODY MASS INDEX DOCD: CPT | Performed by: FAMILY MEDICINE

## 2025-03-14 PROCEDURE — G0439 PPPS, SUBSEQ VISIT: HCPCS | Performed by: FAMILY MEDICINE

## 2025-03-14 PROCEDURE — 1159F MED LIST DOCD IN RCRD: CPT | Performed by: FAMILY MEDICINE

## 2025-03-14 PROCEDURE — 1036F TOBACCO NON-USER: CPT | Performed by: FAMILY MEDICINE

## 2025-03-14 PROCEDURE — 1157F ADVNC CARE PLAN IN RCRD: CPT | Performed by: FAMILY MEDICINE

## 2025-03-14 PROCEDURE — 3078F DIAST BP <80 MM HG: CPT | Performed by: FAMILY MEDICINE

## 2025-03-14 ASSESSMENT — ACTIVITIES OF DAILY LIVING (ADL)
DRESSING: INDEPENDENT
BATHING: INDEPENDENT
TAKING_MEDICATION: INDEPENDENT
DOING_HOUSEWORK: INDEPENDENT
GROCERY_SHOPPING: INDEPENDENT
MANAGING_FINANCES: INDEPENDENT

## 2025-03-14 ASSESSMENT — ENCOUNTER SYMPTOMS
SLEEP DISTURBANCE: 0
DIZZINESS: 0
WHEEZING: 0
DYSPHORIC MOOD: 0
DIARRHEA: 0
NERVOUS/ANXIOUS: 0
JOINT SWELLING: 0
HEADACHES: 0
VOMITING: 0
EYE ITCHING: 0
WEAKNESS: 0
SHORTNESS OF BREATH: 1
FLANK PAIN: 0
BLOOD IN STOOL: 0
FEVER: 0
RHINORRHEA: 0
PALPITATIONS: 0
EYE DISCHARGE: 0
NAUSEA: 0
COUGH: 0
SORE THROAT: 0
DYSURIA: 0
SINUS PRESSURE: 0
APPETITE CHANGE: 0
NUMBNESS: 0
CONSTIPATION: 0
MYALGIAS: 0
HEMATURIA: 0
LIGHT-HEADEDNESS: 0
ACTIVITY CHANGE: 0
ARTHRALGIAS: 0
ABDOMINAL PAIN: 0
UNEXPECTED WEIGHT CHANGE: 0

## 2025-03-14 ASSESSMENT — PATIENT HEALTH QUESTIONNAIRE - PHQ9
SUM OF ALL RESPONSES TO PHQ9 QUESTIONS 1 AND 2: 3
2. FEELING DOWN, DEPRESSED OR HOPELESS: NOT AT ALL
1. LITTLE INTEREST OR PLEASURE IN DOING THINGS: NEARLY EVERY DAY

## 2025-03-14 NOTE — PROGRESS NOTES
Subjective   Reason for Visit: Farida Traylor is an 64 y.o. female here for a Medicare Wellness visit.     Past Medical, Surgical, and Family History reviewed and updated in chart.    Reviewed all medications by prescribing practitioner or clinical pharmacist (such as prescriptions, OTCs, herbal therapies and supplements) and documented in the medical record.    HPIPRE DM RANGE  NEEDS A1C   LIPIDS GOOD ON MEDICINE   SHE IS OBESE AND HYPERTENSIVE   SHE HAS ADHD AND DEMENTIA AND FOLLOWS WITH SEVERAL [facility] INCLUDING JUST HAVING STENTS FOR RECURRENT STONES     Patient Care Team:  Jelena Romo DO as PCP - General (Family Medicine)  Jose Rosario MD as Nephrologist (Nephrology)     Review of Systems   Constitutional:  Negative for activity change, appetite change, fever and unexpected weight change.   HENT:  Negative for congestion, ear pain, postnasal drip, rhinorrhea, sinus pressure and sore throat.    Eyes:  Negative for discharge, itching and visual disturbance.   Respiratory:  Positive for shortness of breath. Negative for cough and wheezing.         COPD    Cardiovascular:  Negative for chest pain, palpitations and leg swelling.   Gastrointestinal:  Negative for abdominal pain, blood in stool, constipation, diarrhea, nausea and vomiting.   Endocrine: Negative for cold intolerance, heat intolerance and polyuria.   Genitourinary:  Negative for dysuria, flank pain and hematuria.        RENAL CALCULI AND STENTS    Musculoskeletal:  Negative for arthralgias, gait problem, joint swelling and myalgias.   Skin:  Negative for rash.   Allergic/Immunologic: Negative for environmental allergies and food allergies.   Neurological:  Negative for dizziness, syncope, weakness, light-headedness, numbness and headaches.   Psychiatric/Behavioral:  Negative for dysphoric mood and sleep disturbance. The patient is not nervous/anxious.        Objective   Vitals:  /74   Pulse 77   Temp 36.4 °C  "(97.5 °F)   Ht 1.575 m (5' 2\")   Wt 131 kg (288 lb 11.2 oz)   SpO2 92%   BMI 52.80 kg/m²       Physical Exam  Vitals and nursing note reviewed.   Constitutional:       Appearance: She is obese.   HENT:      Head: Normocephalic.   Cardiovascular:      Rate and Rhythm: Normal rate and regular rhythm.      Pulses: Normal pulses.      Heart sounds: Normal heart sounds. No murmur heard.     No friction rub. No gallop.   Pulmonary:      Effort: Respiratory distress present.      Breath sounds: Normal breath sounds. No wheezing.      Comments: CHRONIC RESPIRATORY FAILURE ON 02 2 L NC CONTINUOUS   Abdominal:      General: There is no distension.      Tenderness: There is no abdominal tenderness.   Musculoskeletal:         General: No deformity. Normal range of motion.   Skin:     General: Skin is warm and dry.      Capillary Refill: Capillary refill takes less than 2 seconds.   Neurological:      General: No focal deficit present.      Mental Status: She is alert. Mental status is at baseline.      Gait: Gait abnormal.   Psychiatric:         Mood and Affect: Mood normal.         Assessment & Plan  Routine general medical examination at health care facility    Orders:    1 Year Follow Up In Primary Care - Wellness Exam; Future              "

## 2025-03-20 DIAGNOSIS — I27.0 IDIOPATHIC PAH (PULMONARY ARTERIAL HYPERTENSION) (MULTI): ICD-10-CM

## 2025-03-27 ENCOUNTER — APPOINTMENT (OUTPATIENT)
Dept: GASTROENTEROLOGY | Facility: CLINIC | Age: 65
End: 2025-03-27
Payer: COMMERCIAL

## 2025-03-27 VITALS
SYSTOLIC BLOOD PRESSURE: 124 MMHG | WEIGHT: 287 LBS | DIASTOLIC BLOOD PRESSURE: 73 MMHG | BODY MASS INDEX: 52.49 KG/M2 | HEART RATE: 71 BPM | OXYGEN SATURATION: 89 %

## 2025-03-27 DIAGNOSIS — D12.6 TUBULAR ADENOMA OF COLON: ICD-10-CM

## 2025-03-27 DIAGNOSIS — R13.10 DYSPHAGIA, UNSPECIFIED TYPE: Primary | ICD-10-CM

## 2025-03-27 DIAGNOSIS — K59.04 CHRONIC IDIOPATHIC CONSTIPATION: ICD-10-CM

## 2025-03-27 NOTE — PROGRESS NOTES
Chief Complaint:  Chief Complaint   Patient presents with    Follow-up       Here for follow-up of CIC and GERD. On Amitiza 8mg BID.   Medical co-morbids of morbid obesity, idiopathic PAH, sleep apnea, asthma on 3 L oxygen, following with pulmonology.     Had EGD/Colonoscopy done 9/2024. EGD with no cause to explain GERD, Normal bx for EOE.   Colonoscopy with fair prep and one hyperplastic polyp    Overall symptoms are unchanged,  Chronic constipation managed with Amitiza BID    Longest between bowel movements 2-3 days. Eat prunes and helps.  Normally bowel movement daily  [place] 3-4    Still needs to swallow liquids to help solid foods go down.  One episode where she was eating a biscuit and it felt like it was lodged, needed to swallow liquid to get it down.  Denies pill dysphagia      prior esophogram done with noted mild esophageal dysmotility.     EGD/Colon 9/2024  EGD  Impression  · No obvious cause to explain dysphagia.  · The upper third of the esophagus, middle third of the esophagus, lower third of the esophagus and Z-line appeared normal. Performed random biopsy to rule out eosinophilic esophagitis.  · Moderate erythematous, nodular mucosa in the antrum; performed cold forceps biopsy  · Performed forceps biopsies to rule out H. pylori  · The cardia, fundus of the stomach and body of the stomach appeared normal.  · The duodenal bulb, 1st part of the duodenum and 2nd part of the duodenum appeared normal.  COLON  Findings  Multiple small and large, scattered diverticula of mild severity in the ascending colon, transverse colon, descending colon and sigmoid colon  One 2 mm polyp in the sigmoid colon; performed cold forceps biopsy  There was scattered fibrous material throughout the colon which limited exam  External medium hemorrhoids observed during digital rectal exam and retroflexion; no bleeding was identified    Recommendation  Await pathology results  Follow up with me in clinic, due: 10/5/2024  Repeat  colonoscopy in 5 years, due: 9/4/2029  Repeat colonoscopy for polyp surveillance with extended prep and emphasis on low residue diet in 5 years based on overall health given age >65    A. STOMACH, ANTRUM, BIOPSY:   Chronic inactive gastritis  No Helicobacter pylori organisms identified on H&E stain     B. ESOPHAGUS, MID, BIOPSY:   Squamous mucosa with no significant pathologic abnormality     C. COLON, SIGMOID POLYP, BIOPSY:   Hyperplastic polyp    Last colon 2018 with 1 TA in cecum.                   Review of Systems   Constitutional:  Negative for chills, fever and unexpected weight change.   HENT:  Positive for trouble swallowing.    Respiratory:  Negative for shortness of breath.    Cardiovascular:  Negative for chest pain.   Gastrointestinal:  Positive for constipation. Negative for abdominal distention, abdominal pain, anal bleeding, blood in stool, diarrhea, nausea, rectal pain and vomiting.   Skin:  Negative for color change.   Neurological:  Negative for dizziness, speech difficulty, light-headedness and headaches.   Psychiatric/Behavioral:  Negative for confusion and sleep disturbance.      Family History   Problem Relation Name Age of Onset    Hypertension Mother chapis serna     Breast cancer Mother chapis serna     Other (cardiac disorder) Mother chapis serna     Cardiomyopathy Mother chapis serna     Diabetes Mother chapis serna     Other (chronic kidney disease) Mother chapis serna     Alcohol abuse Father joel serna     Lung disease Father joel serna     Stroke Brother dorian serna     Brain Aneurysm Brother dorian serna        Medications    Current Outpatient Medications:     albuterol 90 mcg/actuation inhaler, INHALE 1 TO 2 PUFFS BY MOUTH EVERY 4 TO 6 HOURS AS NEEDED, Disp: 6.7 g, Rfl: 10    aspirin 325 mg tablet, Take 1 tablet (325 mg) by mouth once daily. For TIA, Disp: , Rfl:     buPROPion SR (Wellbutrin SR) 150 mg 12 hr tablet, TAKE 1 TABLET BY MOUTH EVERY MORNING AND  AFTERNOON, Disp: , Rfl:     busPIRone (Buspar) 30 mg tablet, Take 1 tablet (30 mg) by mouth 2 times a day., Disp: , Rfl:     calcium carbonate 430 mg calcium (1,000 mg) tablet,chewable, Chew 1 tablet once daily. 1000mg, Disp: , Rfl:     cholecalciferol (Vitamin D3) 5,000 Units tablet, Take 1 tablet (5,000 Units) by mouth once daily., Disp: , Rfl:     cyanocobalamin (Vitamin B-12) 1,000 mcg tablet, Take 1 tablet (1,000 mcg) by mouth once daily., Disp: , Rfl:     ferrous sulfate 325 (65 Fe) MG EC tablet, Take 1 tablet by mouth once daily with breakfast. Do not crush, chew, or split., Disp: , Rfl:     fluticasone propion-salmeteroL (Advair Diskus) 250-50 mcg/dose diskus inhaler, INHALE ONE (1) PUFF BY MOUTH TWICE DAILY. RINSE MOUTH OUT AFTER EACH USE., Disp: 60 each, Rfl: 11    hydroCHLOROthiazide (HYDRODiuril) 25 mg tablet, Take 1 tablet (25 mg) by mouth once daily., Disp: 90 tablet, Rfl: 3    hydrOXYzine pamoate (Vistaril) 50 mg capsule, TAKE 3 CAPSULES BY MOUTH EVERY NIGHT AT BEDTIME AND TAKE 1 CAPSULE BY MOUTH EVERY MORNING, Disp: , Rfl:     lubiprostone (Amitiza) 8 mcg capsule, TAKE 1 CAPSULE BY MOUTH TWICE DAILY WITH MEALS, Disp: 60 capsule, Rfl: 10    memantine (Namenda) 10 mg tablet, Take 1 tablet (10 mg) by mouth 2 times a day., Disp: , Rfl:     methenamine hippurate (Hiprex) 1 gram tablet, TAKE 1 TABLET BY MOUTH TWICE DAILY, Disp: 60 tablet, Rfl: 10    montelukast (Singulair) 10 mg tablet, TAKE 1 TABLET BY MOUTH ONCE DAILY, Disp: 30 tablet, Rfl: 10    omeprazole (PriLOSEC) 40 mg DR capsule, Take 1 capsule (40 mg) by mouth once daily in the morning. Take before meals., Disp: 90 capsule, Rfl: 1    Opsumit 10 mg tablet tablet, TAKE 1 TABLET BY MOUTH DAILY. DO NOT HANDLE IF PREGNANT. DO NOT SPLIT, CRUSH, OR CHEW. REVIEW MEDICATION GUIDE., Disp: 30 tablet, Rfl: 11    oxygen (O2) gas therapy, Inhale 1 each continuously. (Patient taking differently: Inhale 1 each continuously. 3liters during day/night), Disp: , Rfl:      potassium citrate CR (Urocit-K-15) 15 mEq ER tablet, Take 1 tablet (15 mEq) by mouth 2 times daily (morning and late afternoon). Do not crush, chew, or split., Disp: 180 tablet, Rfl: 3    riociguat (Adempas) 2.5 mg tablet, Take 1 tablet (2.5 mg) by mouth 3 times a day., Disp: 90 tablet, Rfl: 3    sertraline (Zoloft) 100 mg tablet, Take 2 tablets (200 mg) by mouth once daily., Disp: , Rfl:     simvastatin (Zocor) 80 mg tablet, TAKE 1 TABLET BY MOUTH ONCE DAILY AT BEDTIME, Disp: 30 tablet, Rfl: 10    fluticasone (Flonase) 50 mcg/actuation nasal spray, Administer 1 spray into each nostril once daily. Shake gently. Before first use, prime pump. After use, clean tip and replace cap. (Patient not taking: Reported on 3/14/2025), Disp: 16 g, Rfl: 11    hydrALAZINE (Apresoline) 25 mg tablet, Take 1 tablet (25 mg) by mouth 2 times a day., Disp: 180 tablet, Rfl: 2    oxybutynin (Ditropan) 5 mg tablet, Take 1 tablet (5 mg) by mouth 3 times a day as needed (stent pain). (Patient not taking: Reported on 3/14/2025), Disp: 90 tablet, Rfl: 0    tamsulosin (Flomax) 0.4 mg 24 hr capsule, Take 1 capsule (0.4 mg) by mouth once daily. (Patient not taking: Reported on 3/27/2025), Disp: 10 capsule, Rfl: 0    trospium (Sanctura) 20 mg tablet, TAKE 1 TABLET BY MOUTH TWICE DAILY, Disp: 60 tablet, Rfl: 10    Vitals  /73   Pulse 71   Wt 130 kg (287 lb)   SpO2 (!) 89%   BMI 52.49 kg/m²     Physical Exam  Constitutional:       General: She is not in acute distress.     Appearance: She is obese.   HENT:      Head: Normocephalic.      Nose: Nose normal.   Eyes:      General: No scleral icterus.  Cardiovascular:      Rate and Rhythm: Normal rate.   Pulmonary:      Effort: Pulmonary effort is normal.      Comments: On supplemental oxygen  Abdominal:      General: There is no distension.      Palpations: Abdomen is soft.   Skin:     Coloration: Skin is not jaundiced.   Neurological:      Mental Status: She is alert and oriented to  "person, place, and time. Mental status is at baseline.   Psychiatric:         Mood and Affect: Mood normal.           Labs:  No results found for: \"AFP\"No results found for: \"ASMAB\", \"MITOAB\"No results found for: \"FRANCISCO\"No results found for: \"ASMAB\", \"MITOAB\"  Lab Results   Component Value Date    QOFJIHKR37 1,680 (H) 10/05/2023   No results found for: \"HEPCAB\"No results found for: \"HEPATOT\", \"HEPAIGM\", \"HEPBCIGM\", \"HEPBCAB\", \"HBEAG\", \"HEPCAB\"No results found for: \"HIV1X2\"  Lab Results   Component Value Date    IRON 98 08/16/2024    TIBC 332 08/16/2024    FERRITIN 88 02/13/2023     Lab Results   Component Value Date    INR 1.0 12/31/2024    INR 1.0 12/29/2024    INR 1.0 10/06/2022    PROTIME 11.6 12/31/2024    PROTIME 11.7 12/29/2024    PROTIME 11.5 10/06/2022     Lab Results   Component Value Date    TSH 1.24 12/15/2022       Radiology  Esophagogastroduodenoscopy (EGD)  Addendum: Impression   No obvious cause to explain dysphagia.   The upper third of the esophagus, middle third of the esophagus, lower  third of the esophagus and Z-line appeared normal. Performed random biopsy  to rule out eosinophilic esophagitis.   Moderate erythematous, nodular mucosa in the antrum; performed cold  forceps biopsy   Performed forceps biopsies to rule out H. pylori   The cardia, fundus of the stomach and body of the stomach appeared  normal.   The duodenal bulb, 1st part of the duodenum and 2nd part of the duodenum  appeared normal.     Findings   The upper third of the esophagus, middle third of the esophagus, lower  third of the esophagus and Z-line appeared normal. Z-line is 40 cm from  the incisors. Performed random biopsy using biopsy forceps to rule out  eosinophilic esophagitis.   Moderate, patchy erythematous and nodular mucosa in the antrum; no  bleeding was identified; performed cold forceps biopsy   Performed forceps biopsies to rule out H. pylori   The cardia, fundus of the stomach and body of the stomach appeared  " normal.   The duodenal bulb, 1st part of the duodenum and 2nd part of the duodenum  appeared normal.     Recommendation   Await pathology results    Continue Omeprazole   Avoid NSAIDs   Proceed with colonoscopy to immediately follow           Indication   Dyspepsia, Dysphagia, unspecified type     Staff   Staff Role    Mary Kate Leon MD Proceduralist      Medications   See Anesthesia Record.      Preprocedure   A history and physical has been performed, and patient medication  allergies have been reviewed. The patient's tolerance of previous  anesthesia has been reviewed. The risks and benefits of the procedure and  the sedation options and risks were discussed with the patient. All  questions were answered and informed consent obtained.     Details of the Procedure   The patient underwent monitored anesthesia care, which was administered by  an anesthesia professional. The patient's blood pressure, ECG, ETCO2,  heart rate, level of consciousness, oxygen and respirations were monitored  throughout the procedure. The scope was introduced through the mouth and  advanced to the second part of the duodenum. Retroflexion was performed in  the cardia. Prior to the procedure, the patient's H. Pylori status was  unknown. The patient experienced no blood loss. The procedure was not  difficult. The patient tolerated the procedure well. There were no  apparent adverse events.      Events   Procedure Events    Event Event Time    ENDO SCOPE IN TIME 9/5/2024  8:53 AM    ENDO SCOPE OUT TIME 9/5/2024  8:58 AM      Specimens   ID Type Source Tests Collected by Time    1 : ANTRUM AND BODY BIOPSY Tissue STOMACH ANTRUM BIOPSY SURGICAL PATHOLOGY  EXAM Mary Kate Leon MD 9/5/2024 0855    2 : RULE OUT EOE Tissue ESOPHAGUS MID BIOPSY SURGICAL PATHOLOGY EXAM  Mary Kate Leon MD 9/5/2024 0857      Procedure Location   [facility] [place]   [address]   [facility] [address]    [phone]     Referring Provider   Mary Kate Leon MD     Procedure Provider   Mary Kate Leon MD     Impression   No obvious cause to explain dysphagia.   The upper third of the esophagus, middle third of the esophagus, lower  third of the esophagus and Z-line appeared normal. Performed random biopsy  to rule out eosinophilic esophagitis.   Moderate erythematous, nodular mucosa in the antrum; performed cold  forceps biopsy   Performed forceps biopsies to rule out H. pylori   The cardia, fundus of the stomach and body of the stomach appeared  normal.   The duodenal bulb, 1st part of the duodenum and 2nd part of the duodenum  appeared normal.     Findings   The upper third of the esophagus, middle third of the esophagus, lower  third of the esophagus and Z-line appeared normal. Z-line is 40 cm from  the incisors. Performed random biopsy using biopsy forceps to rule out  eosinophilic esophagitis.   Moderate, patchy erythematous and nodular mucosa in the antrum; no  bleeding was identified; performed cold forceps biopsy   Performed forceps biopsies to rule out H. pylori   The cardia, fundus of the stomach and body of the stomach appeared  normal.   The duodenal bulb, 1st part of the duodenum and 2nd part of the duodenum  appeared normal.     Recommendation   Await pathology results    Proceed with colonoscopy to immediately follow           Indication   Dyspepsia, Dysphagia, unspecified type     Staff   Staff Role    Mary Kate Leon MD Proceduralist      Medications   See Anesthesia Record.      Preprocedure   A history and physical has been performed, and patient medication  allergies have been reviewed. The patient's tolerance of previous  anesthesia has been reviewed. The risks and benefits of the procedure and  the sedation options and risks were discussed with the patient. All  questions were answered and informed consent obtained.     Details of the Procedure   The patient underwent monitored  anesthesia care, which was administered by  an anesthesia professional. The patient's blood pressure, ECG, ETCO2,  heart rate, level of consciousness, oxygen and respirations were monitored  throughout the procedure. The scope was introduced through the mouth and  advanced to the second part of the duodenum. Retroflexion was performed in  the cardia. Prior to the procedure, the patient's H. Pylori status was  unknown. The patient experienced no blood loss. The procedure was not  difficult. The patient tolerated the procedure well. There were no  apparent adverse events.      Events   Procedure Events    Event Event Time    ENDO SCOPE IN TIME 9/5/2024  8:53 AM    ENDO SCOPE OUT TIME 9/5/2024  8:58 AM      Specimens   ID Type Source Tests Collected by Time    1 : ANTRUM AND BODY BIOPSY Tissue STOMACH ANTRUM BIOPSY SURGICAL PATHOLOGY  EXAM Mary Kate Leon MD 9/5/2024 0855    2 : RULE OUT EOE Tissue ESOPHAGUS MID BIOPSY SURGICAL PATHOLOGY EXAM  Mary Kate Leon MD 9/5/2024 0857      Procedure Location   [facility] [place]   [address] [place] [address]   [phone]     Referring Provider   Mary Kate Leon MD     Procedure Provider   Mary Kate Leon MD  Narrative: Table formatting from the original result was not included.  Impression  The upper third of the esophagus, middle third of the esophagus, lower   third of the esophagus and Z-line appeared normal. Performed random biopsy   to rule out eosinophilic esophagitis.  Moderate erythematous, nodular mucosa in the antrum; performed cold   forceps biopsy  Performed forceps biopsies to rule out H. pylori  The cardia, fundus of the stomach and body of the stomach appeared normal.  The duodenal bulb, 1st part of the duodenum and 2nd part of the duodenum   appeared normal.    Findings  The upper third of the esophagus, middle third of the esophagus, lower   third of the esophagus and Z-line appeared normal.  Z-line is 40 cm from   the incisors. Performed random biopsy using biopsy forceps to rule out   eosinophilic esophagitis.  Moderate, patchy erythematous and nodular mucosa in the antrum; no   bleeding was identified; performed cold forceps biopsy  Performed forceps biopsies to rule out H. pylori  The cardia, fundus of the stomach and body of the stomach appeared normal.  The duodenal bulb, 1st part of the duodenum and 2nd part of the duodenum   appeared normal.    Recommendation  Await pathology results   Proceed with colonoscopy to immediately follow         Indication  Dyspepsia, Dysphagia, unspecified type    Staff  Staff Role   Mary Kate Leon MD Proceduralist     Medications  See Anesthesia Record.     Preprocedure  A history and physical has been performed, and patient medication   allergies have been reviewed. The patient's tolerance of previous   anesthesia has been reviewed. The risks and benefits of the procedure and   the sedation options and risks were discussed with the patient. All   questions were answered and informed consent obtained.    Details of the Procedure  The patient underwent monitored anesthesia care, which was administered by   an anesthesia professional. The patient's blood pressure, ECG, ETCO2,   heart rate, level of consciousness, oxygen and respirations were monitored   throughout the procedure. The scope was introduced through the mouth and   advanced to the second part of the duodenum. Retroflexion was performed in   the cardia. Prior to the procedure, the patient's H. Pylori status was   unknown. The patient experienced no blood loss. The procedure was not   difficult. The patient tolerated the procedure well. There were no   apparent adverse events.     Events  Procedure Events   Event Event Time   ENDO SCOPE IN TIME 9/5/2024  8:53 AM   ENDO SCOPE OUT TIME 9/5/2024  8:58 AM     Specimens  ID Type Source Tests Collected by Time   1 : ANTRUM AND BODY BIOPSY Tissue STOMACH  ANTRUM BIOPSY SURGICAL PATHOLOGY   EXAM Mary Kate Leon MD 9/5/2024 0855   2 : RULE OUT EOE Tissue ESOPHAGUS MID BIOPSY SURGICAL PATHOLOGY EXAM   Mary Kate Leon MD 9/5/2024 0857     Procedure Location  [facility] [place]  [address] [place] [id number] [phone]    Referring Provider  Mary Kate Leon MD    Procedure Provider  Mary Kate Leon MD  Colonoscopy Screening; High Risk Patient; Hx of tubular adenoma  Table formatting from the original result was not included.  Impression  Scattered diverticulosis of mild severity in the ascending colon,   transverse colon, descending colon and sigmoid colon  Subcentimeter polyp in the sigmoid colon was removed with cold forceps   biopsy  There was scattered fibrous material throughout the colon which limited   exam  Medium hemorrhoids    Findings  Multiple small and large, scattered diverticula of mild severity in the   ascending colon, transverse colon, descending colon and sigmoid colon  One 2 mm polyp in the sigmoid colon; performed cold forceps biopsy  There was scattered fibrous material throughout the colon which limited   exam  External medium hemorrhoids observed during digital rectal exam and   retroflexion; no bleeding was identified       Recommendation  Await pathology results   Follow up with me in clinic, due: 10/5/2024   Repeat colonoscopy in 5 years, due: 9/4/2029   Repeat colonoscopy for polyp surveillance with extended prep and emphasis   on low residue diet in 5 years based on overall health given age >65       Indication  Tubular adenoma of colon    Staff  Staff Role   Mary Kate Leon MD Proceduralist     Medications  See Anesthesia Record.     Preprocedure  A history and physical has been performed, and patient medication   allergies have been reviewed. The patient's tolerance of previous   anesthesia has been reviewed. The risks and benefits of the procedure and   the sedation options  and risks were discussed with the patient. All   questions were answered and informed consent obtained.    Details of the Procedure  The patient underwent monitored anesthesia care, which was administered by   an anesthesia professional. The patient's blood pressure, ECG, ETCO2,   heart rate, level of consciousness, oxygen and respirations were monitored   throughout the procedure. A digital rectal exam was performed. A perianal   exam was performed. The scope was introduced through the anus and advanced   to the cecum. Retroflexion was performed in the rectum. The quality of   bowel preparation was evaluated using the [place] Bowel Preparation Scale   with scores of: right colon = 2, transverse colon = 3, left colon = 2. The   total BBPS score was 7. Bowel prep was adequate. The patient experienced   no blood loss. The procedure was not difficult. The patient tolerated the   procedure well. There were no apparent adverse events.     Events  Procedure Events   Event Event Time   ENDO SCOPE IN TIME 9/5/2024  8:53 AM   ENDO SCOPE OUT TIME 9/5/2024  8:58 AM   ENDO SCOPE IN TIME 9/5/2024  9:05 AM   ENDO CECUM REACHED 9/5/2024  9:14 AM   ENDO SCOPE OUT TIME 9/5/2024  9:32 AM     Specimens  ID Type Source Tests Collected by Time   1 : ANTRUM AND BODY BIOPSY Tissue STOMACH ANTRUM BIOPSY SURGICAL PATHOLOGY   EXAM Mary Kate Leon MD 9/5/2024 0855   2 : RULE OUT EOE Tissue ESOPHAGUS MID BIOPSY SURGICAL PATHOLOGY EXAM   Mary Kate Leon MD 9/5/2024 0857   3 : SIGMOID POLPY Tissue COLON - SIGMOID POLYP SURGICAL PATHOLOGY EXAM   Mary Kate Leon MD 9/5/2024 0931     Procedure Location  [facility] [place]  [address] [facility] [id number] [phone]    Referring Provider  Mary Kate Leon MD    Procedure Provider  Mary Kate Leon MD      A/P   Farida was seen today for follow-up.  Diagnoses and all orders for this visit:  Dysphagia, unspecified type (Primary)  -     SLP  eval and treat; Future  -     Follow Up In Gastroenterology; Future  Chronic idiopathic constipation  -     Follow Up In Gastroenterology; Future  Tubular adenoma of colon     Problem List Items Addressed This Visit             ICD-10-CM    Chronic idiopathic constipation K59.04     - c.w amitiza 8mg BID  - Miralax and Prunes prn  - water intake and physical activity as tolerated         Relevant Orders    Follow Up In Gastroenterology    Dysphagia - Primary R13.10     Chronic intermittent dysphagia, esophagram w mild esophageal dysmotility. EGD normal including bx   - discussed further w.up with manometry  - patient declines this testing at this time which is reasonable given medical co-morbids  - ensure proper mastication  - c/w PPI  - referral to SLP for modifications to limit dysphagia         Relevant Orders    SLP eval and treat    Follow Up In Gastroenterology    Tubular adenoma of colon D12.6     Last colonoscopy 2018 w one tubular adenoma, report and path reviewed. Colon 2024 with one hyperplastic polyp  - recall 5 yrs based on overall health

## 2025-03-28 ENCOUNTER — APPOINTMENT (OUTPATIENT)
Dept: GASTROENTEROLOGY | Facility: CLINIC | Age: 65
End: 2025-03-28
Payer: COMMERCIAL

## 2025-03-28 ASSESSMENT — ENCOUNTER SYMPTOMS
ABDOMINAL DISTENTION: 0
COLOR CHANGE: 0
CHILLS: 0
FEVER: 0
SLEEP DISTURBANCE: 0
UNEXPECTED WEIGHT CHANGE: 0
ANAL BLEEDING: 0
HEADACHES: 0
VOMITING: 0
CONFUSION: 0
BLOOD IN STOOL: 0
CONSTIPATION: 1
NAUSEA: 0
RECTAL PAIN: 0
SHORTNESS OF BREATH: 0
TROUBLE SWALLOWING: 1
SPEECH DIFFICULTY: 0
ABDOMINAL PAIN: 0
LIGHT-HEADEDNESS: 0
DIARRHEA: 0
DIZZINESS: 0

## 2025-03-28 NOTE — ASSESSMENT & PLAN NOTE
Chronic intermittent dysphagia, esophagram w mild esophageal dysmotility. EGD normal including bx   - discussed further w.up with manometry  - patient declines this testing at this time which is reasonable given medical co-morbids  - ensure proper mastication  - c/w PPI  - referral to SLP for modifications to limit dysphagia

## 2025-03-28 NOTE — ASSESSMENT & PLAN NOTE
- c.w amitiza 8mg BID  - Miralax and Prunes prn  - water intake and physical activity as tolerated

## 2025-03-29 NOTE — ASSESSMENT & PLAN NOTE
Last colonoscopy 2018 w one tubular adenoma, report and path reviewed. Colon 2024 with one hyperplastic polyp  - recall 5 yrs based on overall health

## 2025-04-09 ENCOUNTER — APPOINTMENT (OUTPATIENT)
Dept: PODIATRY | Facility: CLINIC | Age: 65
End: 2025-04-09
Payer: COMMERCIAL

## 2025-04-09 ENCOUNTER — OFFICE VISIT (OUTPATIENT)
Dept: PULMONOLOGY | Facility: CLINIC | Age: 65
End: 2025-04-09
Payer: COMMERCIAL

## 2025-04-09 VITALS
SYSTOLIC BLOOD PRESSURE: 127 MMHG | DIASTOLIC BLOOD PRESSURE: 77 MMHG | RESPIRATION RATE: 22 BRPM | OXYGEN SATURATION: 92 % | HEART RATE: 70 BPM

## 2025-04-09 DIAGNOSIS — I27.0 IDIOPATHIC PAH (PULMONARY ARTERIAL HYPERTENSION) (MULTI): Primary | ICD-10-CM

## 2025-04-09 DIAGNOSIS — G47.33 OBSTRUCTIVE SLEEP APNEA SYNDROME: ICD-10-CM

## 2025-04-09 DIAGNOSIS — J45.909 ASTHMA, UNSPECIFIED ASTHMA SEVERITY, UNSPECIFIED WHETHER COMPLICATED, UNSPECIFIED WHETHER PERSISTENT (HHS-HCC): ICD-10-CM

## 2025-04-09 DIAGNOSIS — J96.11 CHRONIC RESPIRATORY FAILURE WITH HYPOXIA: ICD-10-CM

## 2025-04-09 DIAGNOSIS — R06.02 SOB (SHORTNESS OF BREATH): ICD-10-CM

## 2025-04-09 PROCEDURE — 1160F RVW MEDS BY RX/DR IN RCRD: CPT | Performed by: INTERNAL MEDICINE

## 2025-04-09 PROCEDURE — 1159F MED LIST DOCD IN RCRD: CPT | Performed by: INTERNAL MEDICINE

## 2025-04-09 PROCEDURE — 3074F SYST BP LT 130 MM HG: CPT | Performed by: INTERNAL MEDICINE

## 2025-04-09 PROCEDURE — 99214 OFFICE O/P EST MOD 30 MIN: CPT | Performed by: INTERNAL MEDICINE

## 2025-04-09 PROCEDURE — 1126F AMNT PAIN NOTED NONE PRSNT: CPT | Performed by: INTERNAL MEDICINE

## 2025-04-09 PROCEDURE — 1157F ADVNC CARE PLAN IN RCRD: CPT | Performed by: INTERNAL MEDICINE

## 2025-04-09 PROCEDURE — 1036F TOBACCO NON-USER: CPT | Performed by: INTERNAL MEDICINE

## 2025-04-09 PROCEDURE — 3078F DIAST BP <80 MM HG: CPT | Performed by: INTERNAL MEDICINE

## 2025-04-09 RX ORDER — FUROSEMIDE 20 MG/1
20 TABLET ORAL DAILY
Qty: 7 TABLET | Refills: 0 | Status: SHIPPED | OUTPATIENT
Start: 2025-04-09 | End: 2025-04-16

## 2025-04-09 ASSESSMENT — ENCOUNTER SYMPTOMS: DEPRESSION: 0

## 2025-04-09 ASSESSMENT — PAIN SCALES - GENERAL: PAINLEVEL_OUTOF10: 0-NO PAIN

## 2025-04-09 NOTE — PROGRESS NOTES
Subjective   Patient ID: Farida Traylor is a 64 y.o. female who presents for Annual Exam.    PAP 2-4-24 NEG NEG; 12-11-20 NEG always normal  MAMMO 2-29-24  DEXA 2-29-24, no FH  COLON 9/2024 one polyp, 5 years.     Has seen Dr. Arredondo. S/P Botox and taking Trospium.     Having knee problems, getting xray today.     has kidney stones. Recent stent in bladder.     CVA 2 1.2 years ago. no VB since removal of polyp.     Not using Estrace cream anymore.     Pulmonary rehab for COPD. On O2, pulmonary hypertension. A little worse. No h/o systemic steroids.     Has yeast under panus and in groin. Not using nystatin anymore. Uses aloe vera. Using hair dryer.     Lives alone. Boyfriend down the street.      Review of Systems   Respiratory:  Positive for shortness of breath.    Gastrointestinal:  Positive for constipation.   Genitourinary:  Positive for urgency.   Musculoskeletal:  Positive for back pain.   Psychiatric/Behavioral:          Anxiety  depression   All other systems reviewed and are negative.      Objective   Constitutional: Alert and in no acute distress. Well developed, well nourished.  Neck: no neck asymmetry. Supple and thyroid not enlarged and there were no palpable thyroid nodules.  Chest: Breasts normal appearance, no nipple discharge and no skin changes and palpation of breasts and axillae: no palpable mass and no axillary lymphadenopathy.  Abdomen: soft nontender; no abdominal mass palpated, no organomegaly and no hernias.  Genitourinary: external genitalia: normal, no inguinal lymphadenopathy, Bartholin's urethral and Del Carmen's glands: normal, urethra: normal and bladder: normal on palpation.  Vagina: normal.  Cervix: normal.  Uterus: normal.  Exam limited by habitus.  Right adnexa/parametria: normal.  Left adnexa/parametria: normal.  Skin: normal skin color and pigmentation, normal skin turgor and no rash.  Psychiatric: alert and oriented x 3, affect normal to patient baseline and mood appropriate.      Assessment/Plan   -no pap  -rhett-kristine  -Fibroid uterus, little smaller last scan.  -Nystatin cream again for under panus.

## 2025-04-09 NOTE — PROGRESS NOTES
Subjective   Patient ID: Farida Traylor is a 64 y.o. female who presents for Lung Eval (PAH).  h/o PAH, complex sleep apnea and asthma here for follow up visit. Pt currently on 3L. Recently had kidney stones.   No fevers, chills, rare cough.  No palpitations, presyncopal symptoms, LE edema is the same.  ET is a 200 yards but limited by knee pain.   Wearing her ASV every night.        Review of Systems   Constitutional:  Negative for chills and fever.   Respiratory: Negative.  Negative for cough.    Cardiovascular: Negative.    Gastrointestinal: Negative.    Musculoskeletal:  Positive for arthralgias.   Neurological: Negative.    Psychiatric/Behavioral: Negative.     All other systems reviewed and are negative.      Objective   Physical Exam  Vitals reviewed.   Constitutional:       Appearance: Normal appearance.   HENT:      Head: Normocephalic and atraumatic.   Eyes:      Extraocular Movements: Extraocular movements intact.   Cardiovascular:      Rate and Rhythm: Normal rate and regular rhythm.      Heart sounds: Normal heart sounds.   Pulmonary:      Effort: Pulmonary effort is normal.      Breath sounds: Normal breath sounds.   Abdominal:      Palpations: Abdomen is soft.      Tenderness: There is no abdominal tenderness.   Musculoskeletal:      Cervical back: Normal range of motion.      Right lower le+ Pitting Edema present.      Left lower le+ Pitting Edema present.   Skin:     General: Skin is warm.   Neurological:      General: No focal deficit present.      Mental Status: She is alert and oriented to person, place, and time. Mental status is at baseline.   Psychiatric:         Mood and Affect: Mood normal.         Behavior: Behavior normal.         Assessment/Plan   Problem List Items Addressed This Visit       Idiopathic PAH (pulmonary arterial hypertension) (Multi) - Primary     Pulmonary arterial hypertension - WHO Functional Class II/III. Idiopathic. Cont macitentan and riociguat. Letitia  started 7/2021. Lasix as needed when weight increases. Plan for follow-up RHC in a couple weeks.  Will have pt take lasix 20mg daily x 7days prior to RHC.     RHC: 1/2024 mPAP 31 PAOP 10 PVR 3.1 CO 6.7  11/2022 mPAP 31, PAOP 11, PVR 2.6 CO 7.7 4/2021 mPAP 35, PAOP 2, PVR 4.5 CO 7.4 10/2019 mPAP 31   TTE:  12/2024 RVSP 50 peak TRV 3.43 Mildly dilated RV, normal function. 1/2023 mildly enlarged RV 1/2022 54.5 10/2020 62.1 11/2018 43.6  6MWT: 3/2024 240m 4/2023 180m 1/2022 150m 6/2021 231.7m 1/2021 140m 11/2019 250m          Chronic respiratory failure with hypoxia     Cont 2L at night w/ ASV. Cont 3 L O2 during day         Obstructive sleep apnea syndrome     Complex sleep apnea - Cont ASV.  Pt wears for 100% of nights for [id number] w/ AHI 1.1.         Asthma     NL PFT 3/2024. controlled on Advair, singulair and proair. Cont Flonase.           Other Visit Diagnoses       SOB (shortness of breath)        Relevant Medications    furosemide (Lasix) 20 mg tablet    Other Relevant Orders    CBC (Completed)    Protime-INR (Completed)          RTC in 3 months    Time Spent  Prep time on day of patient encounter: 10 minutes  Time spent directly with patient, family or caregiver: 15 minutes  Additional Time Spent on Patient Care Activities: 0 minutes  Documentation Time: 5 minutes  Other Time Spent: 0 minutes  Total: 30 minutes        Richy Raman MD 04/10/25 9:00 PM

## 2025-04-09 NOTE — H&P (VIEW-ONLY)
Subjective   Patient ID: Farida Traylor is a 64 y.o. female who presents for Lung Eval (PAH).  h/o PAH, complex sleep apnea and asthma here for follow up visit. Pt currently on 3L. Recently had kidney stones.   No fevers, chills, rare cough.  No palpitations, presyncopal symptoms, LE edema is the same.  ET is a 200 yards but limited by knee pain.   Wearing her ASV every night.        Review of Systems   Constitutional:  Negative for chills and fever.   Respiratory: Negative.  Negative for cough.    Cardiovascular: Negative.    Gastrointestinal: Negative.    Musculoskeletal:  Positive for arthralgias.   Neurological: Negative.    Psychiatric/Behavioral: Negative.     All other systems reviewed and are negative.      Objective   Physical Exam  Vitals reviewed.   Constitutional:       Appearance: Normal appearance.   HENT:      Head: Normocephalic and atraumatic.   Eyes:      Extraocular Movements: Extraocular movements intact.   Cardiovascular:      Rate and Rhythm: Normal rate and regular rhythm.      Heart sounds: Normal heart sounds.   Pulmonary:      Effort: Pulmonary effort is normal.      Breath sounds: Normal breath sounds.   Abdominal:      Palpations: Abdomen is soft.      Tenderness: There is no abdominal tenderness.   Musculoskeletal:      Cervical back: Normal range of motion.      Right lower le+ Pitting Edema present.      Left lower le+ Pitting Edema present.   Skin:     General: Skin is warm.   Neurological:      General: No focal deficit present.      Mental Status: She is alert and oriented to person, place, and time. Mental status is at baseline.   Psychiatric:         Mood and Affect: Mood normal.         Behavior: Behavior normal.         Assessment/Plan   Problem List Items Addressed This Visit       Idiopathic PAH (pulmonary arterial hypertension) (Multi) - Primary     Pulmonary arterial hypertension - WHO Functional Class II/III. Idiopathic. Cont macitentan and riociguat. Letitia  started 7/2021. Lasix as needed when weight increases. Plan for follow-up RHC in a couple weeks.  Will have pt take lasix 20mg daily x 7days prior to RHC.     RHC: 1/2024 mPAP 31 PAOP 10 PVR 3.1 CO 6.7  11/2022 mPAP 31, PAOP 11, PVR 2.6 CO 7.7 4/2021 mPAP 35, PAOP 2, PVR 4.5 CO 7.4 10/2019 mPAP 31   TTE:  12/2024 RVSP 50 peak TRV 3.43 Mildly dilated RV, normal function. 1/2023 mildly enlarged RV 1/2022 54.5 10/2020 62.1 11/2018 43.6  6MWT: 3/2024 240m 4/2023 180m 1/2022 150m 6/2021 231.7m 1/2021 140m 11/2019 250m          Chronic respiratory failure with hypoxia     Cont 2L at night w/ ASV. Cont 3 L O2 during day         Obstructive sleep apnea syndrome     Complex sleep apnea - Cont ASV.  Pt wears for 100% of nights for [id number] w/ AHI 1.1.         Asthma     NL PFT 3/2024. controlled on Advair, singulair and proair. Cont Flonase.           Other Visit Diagnoses       SOB (shortness of breath)        Relevant Medications    furosemide (Lasix) 20 mg tablet    Other Relevant Orders    CBC (Completed)    Protime-INR (Completed)          RTC in 3 months    Time Spent  Prep time on day of patient encounter: 10 minutes  Time spent directly with patient, family or caregiver: 15 minutes  Additional Time Spent on Patient Care Activities: 0 minutes  Documentation Time: 5 minutes  Other Time Spent: 0 minutes  Total: 30 minutes        Richy Raman MD 04/10/25 9:00 PM

## 2025-04-10 ENCOUNTER — APPOINTMENT (OUTPATIENT)
Dept: OBSTETRICS AND GYNECOLOGY | Facility: CLINIC | Age: 65
End: 2025-04-10
Payer: COMMERCIAL

## 2025-04-10 ENCOUNTER — HOSPITAL ENCOUNTER (OUTPATIENT)
Dept: RADIOLOGY | Facility: HOSPITAL | Age: 65
Discharge: HOME | End: 2025-04-10
Payer: COMMERCIAL

## 2025-04-10 VITALS
DIASTOLIC BLOOD PRESSURE: 82 MMHG | WEIGHT: 288 LBS | SYSTOLIC BLOOD PRESSURE: 134 MMHG | HEIGHT: 61 IN | BODY MASS INDEX: 54.37 KG/M2

## 2025-04-10 DIAGNOSIS — N20.2 CALCULUS OF KIDNEY AND URETER: ICD-10-CM

## 2025-04-10 DIAGNOSIS — Z12.31 ENCOUNTER FOR SCREENING MAMMOGRAM FOR BREAST CANCER: Primary | ICD-10-CM

## 2025-04-10 DIAGNOSIS — Z01.419 WELL WOMAN EXAM WITH ROUTINE GYNECOLOGICAL EXAM: ICD-10-CM

## 2025-04-10 DIAGNOSIS — N89.8 VAGINAL ITCHING: ICD-10-CM

## 2025-04-10 LAB
ERYTHROCYTE [DISTWIDTH] IN BLOOD BY AUTOMATED COUNT: 13.9 % (ref 11–15)
HCT VFR BLD AUTO: 45.6 % (ref 35–45)
HGB BLD-MCNC: 14.9 G/DL (ref 11.7–15.5)
INR PPP: 1
MCH RBC QN AUTO: 29.2 PG (ref 27–33)
MCHC RBC AUTO-ENTMCNC: 32.7 G/DL (ref 32–36)
MCV RBC AUTO: 89.2 FL (ref 80–100)
PLATELET # BLD AUTO: 251 THOUSAND/UL (ref 140–400)
PMV BLD REES-ECKER: 10 FL (ref 7.5–12.5)
PROTHROMBIN TIME: 10.7 SEC (ref 9–11.5)
RBC # BLD AUTO: 5.11 MILLION/UL (ref 3.8–5.1)
WBC # BLD AUTO: 6.5 THOUSAND/UL (ref 3.8–10.8)

## 2025-04-10 PROCEDURE — 1160F RVW MEDS BY RX/DR IN RCRD: CPT | Performed by: OBSTETRICS & GYNECOLOGY

## 2025-04-10 PROCEDURE — 3079F DIAST BP 80-89 MM HG: CPT | Performed by: OBSTETRICS & GYNECOLOGY

## 2025-04-10 PROCEDURE — 74018 RADEX ABDOMEN 1 VIEW: CPT

## 2025-04-10 PROCEDURE — 1157F ADVNC CARE PLAN IN RCRD: CPT | Performed by: OBSTETRICS & GYNECOLOGY

## 2025-04-10 PROCEDURE — 3008F BODY MASS INDEX DOCD: CPT | Performed by: OBSTETRICS & GYNECOLOGY

## 2025-04-10 PROCEDURE — 99397 PER PM REEVAL EST PAT 65+ YR: CPT | Performed by: OBSTETRICS & GYNECOLOGY

## 2025-04-10 PROCEDURE — 1159F MED LIST DOCD IN RCRD: CPT | Performed by: OBSTETRICS & GYNECOLOGY

## 2025-04-10 PROCEDURE — 1036F TOBACCO NON-USER: CPT | Performed by: OBSTETRICS & GYNECOLOGY

## 2025-04-10 PROCEDURE — 3075F SYST BP GE 130 - 139MM HG: CPT | Performed by: OBSTETRICS & GYNECOLOGY

## 2025-04-10 RX ORDER — NYSTATIN 100000 U/G
CREAM TOPICAL 2 TIMES DAILY
Qty: 30 G | Refills: 1 | Status: SHIPPED | OUTPATIENT
Start: 2025-04-10 | End: 2026-04-10

## 2025-04-10 ASSESSMENT — ENCOUNTER SYMPTOMS
COUGH: 0
GASTROINTESTINAL NEGATIVE: 1
NEUROLOGICAL NEGATIVE: 1
CONSTIPATION: 1
PSYCHIATRIC NEGATIVE: 1
FEVER: 0
BACK PAIN: 1
SHORTNESS OF BREATH: 1
ARTHRALGIAS: 1
RESPIRATORY NEGATIVE: 1
CHILLS: 0
CARDIOVASCULAR NEGATIVE: 1

## 2025-04-11 ENCOUNTER — APPOINTMENT (OUTPATIENT)
Dept: CARDIOLOGY | Facility: HOSPITAL | Age: 65
End: 2025-04-11
Payer: COMMERCIAL

## 2025-04-11 DIAGNOSIS — J96.11 CHRONIC RESPIRATORY FAILURE WITH HYPOXIA: ICD-10-CM

## 2025-04-11 NOTE — ASSESSMENT & PLAN NOTE
Pulmonary arterial hypertension - WHO Functional Class II/III. Idiopathic. Cont macitentan and riociguat. Letitia started 7/2021. Lasix as needed when weight increases. Plan for follow-up RHC in a couple weeks.  Will have pt take lasix 20mg daily x 7days prior to RHC.     RHC: 1/2024 mPAP 31 PAOP 10 PVR 3.1 CO 6.7  11/2022 mPAP 31, PAOP 11, PVR 2.6 CO 7.7 4/2021 mPAP 35, PAOP 2, PVR 4.5 CO 7.4 10/2019 mPAP 31   TTE:  12/2024 RVSP 50 peak TRV 3.43 Mildly dilated RV, normal function. 1/2023 mildly enlarged RV 1/2022 54.5 10/2020 62.1 11/2018 43.6  6MWT: 3/2024 240m 4/2023 180m 1/2022 150m 6/2021 231.7m 1/2021 140m 11/2019 250m

## 2025-04-15 ENCOUNTER — APPOINTMENT (OUTPATIENT)
Dept: PODIATRY | Facility: CLINIC | Age: 65
End: 2025-04-15
Payer: COMMERCIAL

## 2025-04-15 DIAGNOSIS — M79.671 FOOT PAIN, BILATERAL: Primary | ICD-10-CM

## 2025-04-15 DIAGNOSIS — B35.1 ONYCHOMYCOSIS: ICD-10-CM

## 2025-04-15 DIAGNOSIS — I83.11 VARICOSE VEINS OF BOTH LOWER EXTREMITIES WITH INFLAMMATION: ICD-10-CM

## 2025-04-15 DIAGNOSIS — M79.672 FOOT PAIN, BILATERAL: Primary | ICD-10-CM

## 2025-04-15 DIAGNOSIS — L60.1 ONYCHOLYSIS: ICD-10-CM

## 2025-04-15 DIAGNOSIS — B35.3 TINEA PEDIS OF BOTH FEET: ICD-10-CM

## 2025-04-15 DIAGNOSIS — I83.12 VARICOSE VEINS OF BOTH LOWER EXTREMITIES WITH INFLAMMATION: ICD-10-CM

## 2025-04-15 PROCEDURE — 99212 OFFICE O/P EST SF 10 MIN: CPT | Performed by: PODIATRIST

## 2025-04-15 PROCEDURE — 1159F MED LIST DOCD IN RCRD: CPT | Performed by: PODIATRIST

## 2025-04-15 PROCEDURE — 1160F RVW MEDS BY RX/DR IN RCRD: CPT | Performed by: PODIATRIST

## 2025-04-15 PROCEDURE — 1157F ADVNC CARE PLAN IN RCRD: CPT | Performed by: PODIATRIST

## 2025-04-15 PROCEDURE — 1036F TOBACCO NON-USER: CPT | Performed by: PODIATRIST

## 2025-04-15 NOTE — PROGRESS NOTES
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    Acquired plantar porokeratosis    Acute upper respiratory infection, unspecified 01/14/2020    URI, acute    Allergic rhinitis due to animal (cat) (dog) hair and dander 01/02/2020    Allergic rhinitis due to dogs    Allergic rhinitis due to pollen 01/02/2020    Allergic rhinitis due to pollen    Allergic rhinitis due to pollen 02/26/2018    Seasonal allergic rhinitis due to pollen    Allergy status to unspecified drugs, medicaments and biological substances 06/30/2015    History of allergy    Anemia, unspecified 07/23/2018    Normocytic anemia    Anxiety disorder due to known physiological condition 06/05/2013    Anxiety disorder due to medical condition    Anxiety disorder, unspecified 01/28/2016    Anxiety    Asymptomatic varicose veins of bilateral lower extremities 07/16/2019    Varicose veins of legs    Atypical squamous cells of undetermined significance on cytologic smear of cervix (ASC-US) 06/26/2013    Pap smear abnormality of cervix with ASCUS favoring benign    Candidiasis, unspecified 12/10/2019    Yeast infection    Cellulitis of left finger 07/31/2018    Paronychia of finger of left hand    Changes in skin texture 03/09/2021    Cracked skin    Contusion of left lesser toe(s) with damage to nail, initial encounter 02/22/2018    Subungual contusion of toenail of left foot    Contusion of right hand, sequela 08/04/2020    Traumatic ecchymosis of hand, right, sequela    Corns and callosities 05/25/2021    Callus of foot    Disorder of pigmentation, unspecified 09/28/2016    Atypical pigmented skin lesion    Disorder of the  skin and subcutaneous tissue, unspecified 08/27/2020    Lesion of subcutaneous tissue    Dorsalgia, unspecified 09/23/2021    Acute back pain    Dorsalgia, unspecified 01/29/2019    Costovertebral angle tenderness    Encounter for general adult medical examination without abnormal findings 06/08/2020    Medicare annual wellness visit, subsequent    Encounter for gynecological examination (general) (routine) without abnormal findings 12/14/2021    Encounter for gynecological examination    Encounter for gynecological examination (general) (routine) without abnormal findings 12/11/2020    Encounter for gynecological examination    Encounter for other screening for malignant neoplasm of breast     Breast cancer screening    Encounter for screening for malignant neoplasm of cervix     Encounter for screening for malignant neoplasm of cervix    Encounter for screening for malignant neoplasm of cervix 11/04/2014    Screening for malignant neoplasm of cervix    Encounter for screening for malignant neoplasm of cervix     Cervical cancer screening    Hepatomegaly, not elsewhere classified 04/21/2021    Liver mass, right lobe    History of falling 12/28/2018    Status post fall    Inappropriate diet and eating habits 05/13/2021    Inappropriate diet and eating habits    Irregular menstruation, unspecified     Irregular periods/menstrual cycles    Localized edema 07/29/2015    Pedal edema    Localized swelling, mass and lump, left lower limb 09/11/2020    Lump of left thigh    Localized swelling, mass and lump, left lower limb 09/24/2020    Mass of left thigh    Localized swelling, mass and lump, unspecified lower limb 09/24/2020    Mass of thigh    Melanocytic nevi, unspecified 09/10/2019    Numerous skin moles    Multiple births, unspecified, all liveborn     Multiple births, all liveborn    Other allergic rhinitis 01/02/2020    Allergic rhinitis due to dust mite    Other allergic rhinitis 01/02/2020    Allergic rhinitis  due to mold    Other conditions influencing health status 06/26/2013    Uterine Rupture    Other conditions influencing health status     Normal colonoscopy    Other conditions influencing health status 02/05/2019    History of burning on urination    Other conditions influencing health status 12/14/2021    History of cough    Other conditions influencing health status 02/14/2019    History of dyspareunia    Other conditions influencing health status 01/14/2020    History of cough    Other conditions influencing health status 06/05/2013    Leiomyomatous Degeneration Of The Uterus    Other conditions influencing health status 06/05/2013    Viremia    Other hypertrophic disorders of the skin 07/29/2015    Skin tag    Other shoulder lesions, right shoulder 11/11/2019    Tendinitis of right rotator cuff    Other specified disorders of bone, thigh 09/09/2020    Pain of left femur    Other specified noninflammatory disorders of vagina 03/12/2019    Vaginal dryness    Other specified postprocedural states 05/04/2017    History of arthroscopic knee surgery    Other specified soft tissue disorders 10/08/2020    Soft tissue mass    Other specified soft tissue disorders 08/04/2020    Swelling of right hand    Pain in left thigh 08/27/2020    Pain of left thigh    Pain in right foot 03/09/2021    Right foot pain    Pain in right hip 12/28/2018    Right hip pain    Pain in right knee 03/12/2021    Bilateral knee pain    Pain in right knee 03/19/2021    Right knee pain    Pain in right leg 05/25/2021    Bilateral pain of leg and foot    Pain in right leg 01/12/2022    Bilateral leg and foot pain    Pain in unspecified joint     Joint pain    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 02/22/2016    History of tetralogy of Fallot    Personal history of contraception     Personal history of contraceptive use    Personal history of diseases of the skin and subcutaneous tissue 12/22/2020     History of ingrown nail    Personal history of malignant neoplasm of other parts of uterus     History of malignant neoplasm of endometrium    Personal history of other (healed) physical injury and trauma 03/01/2017    History of sprain of ankle    Personal history of other benign neoplasm 09/10/2019    History of other benign neoplasm    Personal history of other benign neoplasm 05/06/2014    History of uterine leiomyoma    Personal history of other benign neoplasm 12/11/2020    History of uterine leiomyoma    Personal history of other diseases of the circulatory system 04/14/2021    History of atrial fibrillation    Personal history of other diseases of the circulatory system 04/14/2021    History of atrial fibrillation    Personal history of other diseases of the circulatory system 04/14/2021    History of atrial fibrillation    Personal history of other diseases of the circulatory system 02/01/2017    History of hypertension    Personal history of other diseases of the circulatory system 04/14/2021    History of hypotension    Personal history of other diseases of the digestive system 03/24/2021    History of gastroesophageal reflux (GERD)    Personal history of other diseases of the female genital tract 12/28/2016    History of postmenopausal bleeding    Personal history of other diseases of the female genital tract 05/19/2022    History of postmenopausal bleeding    Personal history of other diseases of the female genital tract     History of vaginal discharge    Personal history of other diseases of the female genital tract     Vaginal delivery    Personal history of other diseases of the musculoskeletal system and connective tissue     Personal history of arthritis    Personal history of other diseases of the musculoskeletal system and connective tissue 11/18/2019    History of muscle spasm    Personal history of other diseases of the respiratory system 05/27/2021    History of asthma    Personal history  of other diseases of the respiratory system 04/12/2019    History of acute bronchitis    Personal history of other diseases of the respiratory system 01/04/2020    History of bronchitis    Personal history of other endocrine, nutritional and metabolic disease 02/01/2017    History of hyperlipidemia    Personal history of other endocrine, nutritional and metabolic disease 04/18/2016    History of obesity    Personal history of other endocrine, nutritional and metabolic disease 05/26/2020    History of thyroid nodule    Personal history of other infectious and parasitic diseases     History of varicella    Personal history of other medical treatment 12/11/2020    History of screening mammography    Personal history of other medical treatment 12/14/2021    History of screening mammography    Personal history of other medical treatment     History of mammogram    Personal history of other specified conditions 08/21/2019    History of gross hematuria    Personal history of other specified conditions 02/02/2017    History of weight gain    Personal history of other specified conditions 12/07/2020    History of chest pain    Personal history of other specified conditions 04/14/2021    History of palpitations    Personal history of other specified conditions 12/05/2014    History of vertigo    Personal history of other specified conditions 08/25/2021    History of exertional chest pain    Personal history of other specified conditions     History of shortness of breath    Personal history of other specified conditions     H/O heartburn    Personal history of other specified conditions 01/12/2018    History of urinary frequency    Personal history of transient ischemic attack (TIA), and 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 12/30/2015    History of TIA (transient ischemic attack)    Personal history of urinary (tract) infections 09/02/2021    History of recurrent cystitis    Personal history of urinary (tract)  infections 05/23/2019    History of cystitis    Personal history of urinary (tract) infections 09/02/2021    History of recurrent urinary tract infection    Personal history of urinary calculi 09/12/2019    History of renal calculi    Polyphagia 05/13/2021    Polyphagia    Primary central sleep apnea 10/21/2017    Central sleep apnea    Pure hypercholesterolemia, unspecified     High cholesterol    Residual hemorrhoidal skin tags 07/06/2017    External hemorrhoid    Slurred speech 01/02/2015    Slurred speech    Tinea pedis 05/25/2021    Tinea pedis of right foot    Unspecified abdominal pain 04/13/2021    Abdominal pain, acute    Unspecified injury of right wrist, hand and finger(s), sequela 08/04/2020    Hand trauma, right, sequela    Unspecified internal derangement of unspecified knee 03/01/2017    Internal derangement of knee    Unspecified symptoms and signs involving the genitourinary system 12/16/2021    UTI symptoms    Unspecified symptoms and signs involving the genitourinary system 02/05/2019    UTI symptoms    Unspecified symptoms and signs involving the genitourinary system 09/03/2020    UTI symptoms    Urinary tract infection, site not specified 01/17/2020    Acute urinary tract infection    Urinary tract infection, site not specified 01/10/2022    Acute UTI    Xerosis cutis 09/10/2019    Dry skin dermatitis       Medications and Allergies have been reviewed.    Review Of Systems:  GENERAL: No weight loss, malaise or fevers.  HEENT: Negative for frequent or significant headaches,   RESPIRATORY: Negative for cough, wheezing or shortness of breath.  CARDIOVASCULAR: Negative for chest pain, leg swelling or palpitations.    Examination of Both Lower Extremities:   Objective:   Vasc: DP and PT pulses are palpable bilateral 2/4.  CFT is less than 3 seconds bilateral.  Skin temperature is warm to cool proximal to distal bilateral.  No hair growth noted. Varicosities noted    Neuro:Gross sensation intact  bl    Derm: Nails 1-5 bilateral are intact, thick and discolored   Debris noted HPK and fissure to medial hallux IPJ.   No SOI noted.     Ortho: Muscle strength is 5/5 for all pedal groups tested.       1. Varicose veins of both lower extremities with inflammation        2. Foot pain, bilateral        3. Tinea pedis of both feet        4. Onychomycosis        5. Callus          Patient educated on proper  foot care.  Debrided nails  Keep trimmed and filed down  Discussed oral and topical AF. Deferred treatment at this time  Debrided hpk tissue  Keep filed down  Fu prn  Patient was in agreement to this plan. All questions answered.      Brionna Douglas DPM  [phone]  [facility] [address]  Fax: [phone]

## 2025-04-16 ENCOUNTER — APPOINTMENT (OUTPATIENT)
Dept: UROLOGY | Facility: CLINIC | Age: 65
End: 2025-04-16
Payer: COMMERCIAL

## 2025-04-16 DIAGNOSIS — N20.2 CALCULUS OF KIDNEY AND URETER: Primary | ICD-10-CM

## 2025-04-16 PROCEDURE — 1160F RVW MEDS BY RX/DR IN RCRD: CPT | Performed by: SURGERY

## 2025-04-16 PROCEDURE — 1157F ADVNC CARE PLAN IN RCRD: CPT | Performed by: SURGERY

## 2025-04-16 PROCEDURE — 99212 OFFICE O/P EST SF 10 MIN: CPT | Performed by: SURGERY

## 2025-04-16 PROCEDURE — 1159F MED LIST DOCD IN RCRD: CPT | Performed by: SURGERY

## 2025-04-16 PROCEDURE — 1036F TOBACCO NON-USER: CPT | Performed by: SURGERY

## 2025-04-16 PROCEDURE — 1126F AMNT PAIN NOTED NONE PRSNT: CPT | Performed by: SURGERY

## 2025-04-16 ASSESSMENT — PAIN SCALES - GENERAL: PAINLEVEL_OUTOF10: 0-NO PAIN

## 2025-04-16 NOTE — PROGRESS NOTES
Subjective   Patient ID: Farida Traylor is a 64 y.o. female who presents for Follow-up.      HPI  She underwent right ureteroscopy with lithotripsy several weeks ago.  She had a large right distal ureteral stone.  Since then she is felt much improved.  Her flank pain has resolved.  She has no voiding issues.  She denies any hematuria.                Objective   Physical Exam  Obese, nasal cannula oxygen  Breathing comfortably  Abdomen soft, ND, NT                  Assessment/Plan   Problem List Items Addressed This Visit    None  Visit Diagnoses         Codes      Calculus of kidney and ureter    -  Primary N20.2    Relevant Orders    XR abdomen 1 view             I reviewed her x-ray today.  She has no new urinary tract stones.  I only see pelvic phleboliths.  She will remain on hydrochlorothiazide.  She will return in 6 months with a repeat x-ray.            Kanika Navarro MD 04/16/25 11:48 AM

## 2025-04-17 ENCOUNTER — EVALUATION (OUTPATIENT)
Dept: SPEECH THERAPY | Facility: HOSPITAL | Age: 65
End: 2025-04-17
Payer: COMMERCIAL

## 2025-04-17 DIAGNOSIS — R13.10 DYSPHAGIA, UNSPECIFIED TYPE: Primary | ICD-10-CM

## 2025-04-17 PROCEDURE — 92610 EVALUATE SWALLOWING FUNCTION: CPT | Mod: GN

## 2025-04-17 ASSESSMENT — PATIENT HEALTH QUESTIONNAIRE - PHQ9
7. TROUBLE CONCENTRATING ON THINGS, SUCH AS READING THE NEWSPAPER OR WATCHING TELEVISION: SEVERAL DAYS
SUM OF ALL RESPONSES TO PHQ9 QUESTIONS 1 AND 2: 4
SUM OF ALL RESPONSES TO PHQ QUESTIONS 1-9: 9
4. FEELING TIRED OR HAVING LITTLE ENERGY: MORE THAN HALF THE DAYS
1. LITTLE INTEREST OR PLEASURE IN DOING THINGS: MORE THAN HALF THE DAYS
2. FEELING DOWN, DEPRESSED OR HOPELESS: MORE THAN HALF THE DAYS
6. FEELING BAD ABOUT YOURSELF - OR THAT YOU ARE A FAILURE OR HAVE LET YOURSELF OR YOUR FAMILY DOWN: MORE THAN HALF THE DAYS
3. TROUBLE FALLING OR STAYING ASLEEP OR SLEEPING TOO MUCH: NOT AT ALL
8. MOVING OR SPEAKING SO SLOWLY THAT OTHER PEOPLE COULD HAVE NOTICED. OR THE OPPOSITE, BEING SO FIGETY OR RESTLESS THAT YOU HAVE BEEN MOVING AROUND A LOT MORE THAN USUAL: NOT AT ALL
9. THOUGHTS THAT YOU WOULD BE BETTER OFF DEAD, OR OF HURTING YOURSELF: NOT AT ALL
10. IF YOU CHECKED OFF ANY PROBLEMS, HOW DIFFICULT HAVE THESE PROBLEMS MADE IT FOR YOU TO DO YOUR WORK, TAKE CARE OF THINGS AT HOME, OR GET ALONG WITH OTHER PEOPLE: SOMEWHAT DIFFICULT
5. POOR APPETITE OR OVEREATING: NOT AT ALL

## 2025-04-17 ASSESSMENT — ENCOUNTER SYMPTOMS
DEPRESSION: 1
LOSS OF SENSATION IN FEET: 0
OCCASIONAL FEELINGS OF UNSTEADINESS: 0

## 2025-04-17 ASSESSMENT — PAIN SCALES - GENERAL: PAINLEVEL_OUTOF10: 0 - NO PAIN

## 2025-04-17 ASSESSMENT — PAIN - FUNCTIONAL ASSESSMENT: PAIN_FUNCTIONAL_ASSESSMENT: 0-10

## 2025-04-17 NOTE — LETTER
April 17, 2025    Mary Kate Leon MD  [address] [facility] [place] [address]    Patient: Farida Traylor   YOB: 1960   Date of Visit: 4/17/2025       Dear Mary Kate Leon MD  [address] [facility] [place] [address]    The attached plan of care is being sent to you because your patient’s medical reimbursement requires that you certify the plan of care. Your signature is required to allow uninterrupted insurance coverage.      You may indicate your approval by signing below and faxing this form back to us at Dept Fax: [phone].    Please call Dept: [phone] with any questions or concerns.    Thank you for this referral,        JOSE ALBERTO Garcia  [address] [address]  [phone]    Payer: Payor: [place] [employer] DUAL COMPLETE / Plan: [employer] DUAL COMPLETE / Product Type: *No Product type* /                                                                         Date:     Dear JOSE ALBERTO Garcia,     Re: Ms. Farida Traylor, MRN:[id number]    I certify that I have reviewed the attached plan of care and it is medically necessary for Ms. Farida Traylor (1960) who is under my care.          ______________________________________                    _________________  Provider name and credentials                                           Date and time                                                                                           Plan of Care 4/17/25   Effective from: 4/17/2025  Effective to: 7/16/2025    Plan ID: [id number]                Participants as of Finalize on 4/17/2025      Name Type Comments Contact Info    Mary Kate Leon MD Referring Provider  [phone]    JOSE ALBERTO Garcia Speech Language Pathologist  [phone]           Last Plan Note       Author: Glenis E Riccardo, SLP Status: Signed Last edited: 4/17/2025  1:45 PM         Outpatient Speech-Language  Pathology Clinical Swallow Evaluation       Patient Name: Farida Traylor  MRN: [id number]  : 1960  Today's Date: 25     Time Calculation  Start Time: 1345  Stop Time: 1430  Time Calculation (min): 45 min    Recommendations:  Solid consistency: Regular (IDDSI level 7)  Liquid consistency: Thin (IDDSI 0)  Medication administration: Whole in thin liquid  Reflux Precautions:  Seated upright at 90 degrees for meals  Slow rate  Add moisture to solids  Alternate between liquids and solids  Remain upright 2-3 hrs following intake  Smaller, more frequent meals/day  Sleep with head of bed elevated (45 degrees)  MBSS: Yes  Specialty Referral: Continue to follow with GI for reflux management     Assessment:  Pt presents with functional oral phase and suspected pharyngeal dysphagia upon completion of CSE. Given suspected laryngeal dysfunction and airway compromise, pt is at a low  risk of developing pulmonary complications associated with suspected aspiration given the identified risk factors and prognostic factors. Given the nature of pt's complaints, suspect that current swallow complaints are esophageal in nature. However, given chronic throat clearing, MBSS is indicated to definitively r/o pharyngeal dysphagia.         Plan:   Skilled speech therapy services are indicated to provide training and instruction regarding the use of compensatory swallow strategies, for pt/caregiver education in order to reduce risk of aspiration, dehydration and malnutrition. , to assess tolerance of diet . Follow-up OP ST services pending results of MBSS.   Inpatient/Swing Bed or Outpatient: Outpatient  SLP Frequency: PRN until discharge  Duration: 12 weeks  Discussed POC: Patient, Physician  Discussed Risks/Benefits: Yes, Patient, Physician  Patient/Caregiver Agreeable: Yes   ST OK to Discharge: No; d/t the following barriers: further testing indicated (MBSS)     Impressions:  Current Diet: Regular (IDDSI level 7); Thin (IDDSI  "0)  Pt Status: A/O x 4  Cognitive Status: Answers yes/no questions appropriately and Follows 1-2 steps commands accurately  Pt positioning: Upright in chair  Relevant imaging results: EGD 9/2024 - \"No obvious cause to explain dysphagia.   The upper third of the esophagus, middle third of the esophagus, lower  third of the esophagus and Z-line appeared normal. Performed random biopsy  to rule out eosinophilic esophagitis.\"  Temperature: afebrile  Respiratory Status: Room air; however pt reports that she wears 3L O2 via NC at home normally  Cranial Nerve Exam: grossly WFL  Oral [facility] Exam: Dentition: , Edentulous, Mucosa: , red/dry, free of obvious lesions, and Compromised oral health/hygiene  [place] Swallow Protocol: FAIL - immediate throat clear  Textures Administered: 3 0z Water - [place] Swallow, Sips Water (x5), Pudding  (3 tsp), and Saltine Cracker  (1 packet)   Clinical Observations: Oral phase - No anterior loss of liquids or solids. Timely and adequate mastication of regular solids. No oral residuals observed. Pharyngeal phase - Noted immediate throat clear following 3 oz water test. Noted immediate throat clear following 1/3 trials of pudding and 2/5 trials of saltine cracker. However, SLP noted and pt endorsed chronic dry throat clearing prior to start of PO trials and following completion of PO trials. Chronic throat clear/cough making it difficult to discern if swallow safety/efficiency is compromised. MBSS indicated to definitely r/o dysphagia.   Risk factors for Aspiration PNA: Age (>65), Polypharmacy (>5), Hx of dysphagia/reflux, and Poor oral health  Prognostic factors that mitigate risk: Stable immune status, Stable respiratory status, and Mobility    Outcome Measures:   Functional Oral Intake Scale  Functional Oral Intake Scale: Level 7        total oral diet with no restrictions     Eat-10  EAT 10  My swallowing problem has caused me to lose weight.: 0  My swallowing problem interferes with my ability to " "go out for meals.: 0  Swallowing liquids takes extra effort.: 0  Swallowing solids takes extra effort.: 3  Swallowing pills takes extra effort.: 0  Swallowing is painful: 0  The pleasure of eating is affected by my swallowing.: 0  When I swallow food sticks in my throat.: 1  I cough when I eat.: 0  Swallowing is stressful: 0  EAT-10 TOTAL SCORE:: 4        Goals:   Short Term Goals:  Pt will independently implement safe swallow strategies to improve esophageal clearance as measured by pt report in 90% of therapeutic trials with SLP.    GOAL START: 4/17/2025  TIMEFRAME: 3 Months  Re-Eval date: 7/16/2025   Status: Goal initiated this date   Progress this date: n/a    Pt will participate in MBSS in order to further assess safety and efficiency of swallow, inform diet recommendations and determine if additional OP ST services are indicated.   GOAL START: 4/17/2025  TIMEFRAME: 3 Months  Re-Eval date: 7/16/2025   Status: Goal initiated this date   Progress this date: n/a       Long Term Goals:  Patient will tolerate current diet without overt difficulty in 90% of opportunities in order to maintain adequate nutrition and hydration.   GOAL START: 4/17/2025  TIMEFRAME: 6 Months  Re-Eval date: 10/14/2025   Status: Goal initiated this date   Progress this date: n/a       General Information:  Chief Complaint: Farida is a 64 yr female presenting to [facility] for initial evaluation with this SLP with concerns for dysphagia. Pt complains of dry mouth, as well as difficulty swallowing solids. Pt reports that bread, meats, and other solids \"get stuck\" while gesturing to her sternum. Pt endorses that globus sensation is resolved by taking a drink. Pt denies difficulty with liquids and medications. Pt was referred to SLP by GI following completion of EGD which indicated no clear cause for GERD, previous esophagram noted mild esophageal dysmotility.  SLP Received On: 04/17/25  Patient Class: Outpatient   Living Environment: " "Home  Ordering Physician: Mary Kate Leno MD  Reason for Referral: Suspected oral/pharyngeal dysphagia, r/o aspiration, determine if MBSS needed  Referred By: Mary Kate Leon MD  Past Medical History Relevant to Rehab: CVA, GERD, dementia, asthma, chronic respiratory failure, MARLON,     Date of Onset: 04/17/25  Date of Order: 04/17/25  [place] Diet: regular/thin  Current Diet : regular/thin  Payor: [employer] [facility] Complete    Pain:   Pain Assessment: 0-10  0-10 (Numeric) Pain Score: 0 - No pain       Treatment  Treatment provided: No      Education:  Learner Patient   Barriers to Learning None   Method Verbal and Written - \"Swallowing Guidelines\" posted at patient's bedside   Education - Topic ST provided patient education regarding role of ST, purpose of assessment, clinical impressions, goals of treatment, and plan of care. Patient full comprehension, consistent with cognitive status. Education will be reinforced.    Outcome    Verbalized understanding, Verbalized agreement, Education Needs: , Continued instruction, Review/reinforcement, Education Goals: , and Meets goals/outcomes                  Current Participants as of 4/17/2025      Name Type Comments Contact Info    Mary Kate Leon MD Referring Provider  [phone]    Signature pending    Glenis Gu, SLP Speech Language Pathologist  [phone]    Electronically signed by Glenis Gu, SLP at 4/17/2025 1502 EDT        "

## 2025-04-17 NOTE — PROGRESS NOTES
Outpatient Speech-Language Pathology Clinical Swallow Evaluation       Patient Name: Farida Traylor  MRN: [id number]  : 1960  Today's Date: 25     Time Calculation  Start Time: 1345  Stop Time: 1430  Time Calculation (min): 45 min    Recommendations:  Solid consistency: Regular (IDDSI level 7)  Liquid consistency: Thin (IDDSI 0)  Medication administration: Whole in thin liquid  Reflux Precautions:  Seated upright at 90 degrees for meals  Slow rate  Add moisture to solids  Alternate between liquids and solids  Remain upright 2-3 hrs following intake  Smaller, more frequent meals/day  Sleep with head of bed elevated (45 degrees)  MBSS: Yes  Specialty Referral: Continue to follow with GI for reflux management     Assessment:  Pt presents with functional oral phase and suspected pharyngeal dysphagia upon completion of CSE. Given suspected laryngeal dysfunction and airway compromise, pt is at a low  risk of developing pulmonary complications associated with suspected aspiration given the identified risk factors and prognostic factors. Given the nature of pt's complaints, suspect that current swallow complaints are esophageal in nature. However, given chronic throat clearing, MBSS is indicated to definitively r/o pharyngeal dysphagia.         Plan:   Skilled speech therapy services are indicated to provide training and instruction regarding the use of compensatory swallow strategies, for pt/caregiver education in order to reduce risk of aspiration, dehydration and malnutrition. , to assess tolerance of diet . Follow-up OP ST services pending results of MBSS.   Inpatient/Swing Bed or Outpatient: Outpatient  SLP Frequency: PRN until discharge  Duration: 12 weeks  Discussed POC: Patient, Physician  Discussed Risks/Benefits: Yes, Patient, Physician  Patient/Caregiver Agreeable: Yes   ST OK to Discharge: No; d/t the following barriers: further testing indicated (MBSS)     Impressions:  Current Diet: Regular  "(IDDSI level 7); Thin (IDDSI 0)  Pt Status: A/O x 4  Cognitive Status: Answers yes/no questions appropriately and Follows 1-2 steps commands accurately  Pt positioning: Upright in chair  Relevant imaging results: EGD 9/2024 - \"No obvious cause to explain dysphagia.   The upper third of the esophagus, middle third of the esophagus, lower  third of the esophagus and Z-line appeared normal. Performed random biopsy  to rule out eosinophilic esophagitis.\"  Temperature: afebrile  Respiratory Status: Room air; however pt reports that she wears 3L O2 via NC at home normally  Cranial Nerve Exam: grossly WFL  Oral [facility] Exam: Dentition: , Edentulous, Mucosa: , red/dry, free of obvious lesions, and Compromised oral health/hygiene  [place] Swallow Protocol: FAIL - immediate throat clear  Textures Administered: 3 0z Water - [place] Swallow, Sips Water (x5), Pudding  (3 tsp), and Saltine Cracker  (1 packet)   Clinical Observations: Oral phase - No anterior loss of liquids or solids. Timely and adequate mastication of regular solids. No oral residuals observed. Pharyngeal phase - Noted immediate throat clear following 3 oz water test. Noted immediate throat clear following 1/3 trials of pudding and 2/5 trials of saltine cracker. However, SLP noted and pt endorsed chronic dry throat clearing prior to start of PO trials and following completion of PO trials. Chronic throat clear/cough making it difficult to discern if swallow safety/efficiency is compromised. MBSS indicated to definitely r/o dysphagia.   Risk factors for Aspiration PNA: Age (>65), Polypharmacy (>5), Hx of dysphagia/reflux, and Poor oral health  Prognostic factors that mitigate risk: Stable immune status, Stable respiratory status, and Mobility    Outcome Measures:   Functional Oral Intake Scale  Functional Oral Intake Scale: Level 7        total oral diet with no restrictions     Eat-10  EAT 10  My swallowing problem has caused me to lose weight.: 0  My swallowing problem " "interferes with my ability to go out for meals.: 0  Swallowing liquids takes extra effort.: 0  Swallowing solids takes extra effort.: 3  Swallowing pills takes extra effort.: 0  Swallowing is painful: 0  The pleasure of eating is affected by my swallowing.: 0  When I swallow food sticks in my throat.: 1  I cough when I eat.: 0  Swallowing is stressful: 0  EAT-10 TOTAL SCORE:: 4        Goals:   Short Term Goals:  Pt will independently implement safe swallow strategies to improve esophageal clearance as measured by pt report in 90% of therapeutic trials with SLP.    GOAL START: 4/17/2025  TIMEFRAME: 3 Months  Re-Eval date: 7/16/2025   Status: Goal initiated this date   Progress this date: n/a    Pt will participate in MBSS in order to further assess safety and efficiency of swallow, inform diet recommendations and determine if additional OP ST services are indicated.   GOAL START: 4/17/2025  TIMEFRAME: 3 Months  Re-Eval date: 7/16/2025   Status: Goal initiated this date   Progress this date: n/a       Long Term Goals:  Patient will tolerate current diet without overt difficulty in 90% of opportunities in order to maintain adequate nutrition and hydration.   GOAL START: 4/17/2025  TIMEFRAME: 6 Months  Re-Eval date: 10/14/2025   Status: Goal initiated this date   Progress this date: n/a       General Information:  Chief Complaint: Farida is a 64 yr female presenting to [facility] for initial evaluation with this SLP with concerns for dysphagia. Pt complains of dry mouth, as well as difficulty swallowing solids. Pt reports that bread, meats, and other solids \"get stuck\" while gesturing to her sternum. Pt endorses that globus sensation is resolved by taking a drink. Pt denies difficulty with liquids and medications. Pt was referred to SLP by GI following completion of EGD which indicated no clear cause for GERD, previous esophagram noted mild esophageal dysmotility.  SLP Received On: 04/17/25  Patient Class: Outpatient " "  Living Environment: Home  Ordering Physician: Mary Kate Leon MD  Reason for Referral: Suspected oral/pharyngeal dysphagia, r/o aspiration, determine if MBSS needed  Referred By: Mary Kate Leon MD  Past Medical History Relevant to Rehab: CVA, GERD, dementia, asthma, chronic respiratory failure, MARLON,     Date of Onset: 04/17/25  Date of Order: 04/17/25  [place] Diet: regular/thin  Current Diet : regular/thin  Payor: [employer] Dual Complete    Pain:   Pain Assessment: 0-10  0-10 (Numeric) Pain Score: 0 - No pain       Treatment  Treatment provided: No      Education:  Learner Patient   Barriers to Learning None   Method Verbal and Written - \"Swallowing Guidelines\" posted at patient's bedside   Education - Topic ST provided patient education regarding role of ST, purpose of assessment, clinical impressions, goals of treatment, and plan of care. Patient full comprehension, consistent with cognitive status. Education will be reinforced.    Outcome    Verbalized understanding, Verbalized agreement, Education Needs: , Continued instruction, Review/reinforcement, Education Goals: , and Meets goals/outcomes           "

## 2025-04-18 ENCOUNTER — HOSPITAL ENCOUNTER (OUTPATIENT)
Dept: RADIOLOGY | Facility: HOSPITAL | Age: 65
Discharge: HOME | End: 2025-04-18
Payer: COMMERCIAL

## 2025-04-18 ENCOUNTER — APPOINTMENT (OUTPATIENT)
Dept: ORTHOPEDIC SURGERY | Facility: CLINIC | Age: 65
End: 2025-04-18
Payer: COMMERCIAL

## 2025-04-18 DIAGNOSIS — M25.562 LEFT KNEE PAIN, UNSPECIFIED CHRONICITY: ICD-10-CM

## 2025-04-18 PROCEDURE — 20610 DRAIN/INJ JOINT/BURSA W/O US: CPT | Performed by: ORTHOPAEDIC SURGERY

## 2025-04-18 PROCEDURE — 1036F TOBACCO NON-USER: CPT | Performed by: ORTHOPAEDIC SURGERY

## 2025-04-18 PROCEDURE — 73564 X-RAY EXAM KNEE 4 OR MORE: CPT | Mod: 50

## 2025-04-18 PROCEDURE — 1157F ADVNC CARE PLAN IN RCRD: CPT | Performed by: ORTHOPAEDIC SURGERY

## 2025-04-18 PROCEDURE — 99204 OFFICE O/P NEW MOD 45 MIN: CPT | Performed by: ORTHOPAEDIC SURGERY

## 2025-04-18 PROCEDURE — 1125F AMNT PAIN NOTED PAIN PRSNT: CPT | Performed by: ORTHOPAEDIC SURGERY

## 2025-04-18 PROCEDURE — 1159F MED LIST DOCD IN RCRD: CPT | Performed by: ORTHOPAEDIC SURGERY

## 2025-04-18 PROCEDURE — 1160F RVW MEDS BY RX/DR IN RCRD: CPT | Performed by: ORTHOPAEDIC SURGERY

## 2025-04-18 RX ORDER — TRIAMCINOLONE ACETONIDE 40 MG/ML
2.5 INJECTION, SUSPENSION INTRA-ARTICULAR; INTRAMUSCULAR
Status: COMPLETED | OUTPATIENT
Start: 2025-04-18 | End: 2025-04-18

## 2025-04-18 RX ADMIN — TRIAMCINOLONE ACETONIDE 2.5 MG: 40 INJECTION, SUSPENSION INTRA-ARTICULAR; INTRAMUSCULAR at 11:33

## 2025-04-18 ASSESSMENT — PAIN SCALES - GENERAL: PAINLEVEL_OUTOF10: 2

## 2025-04-18 ASSESSMENT — PAIN - FUNCTIONAL ASSESSMENT: PAIN_FUNCTIONAL_ASSESSMENT: 0-10

## 2025-04-18 NOTE — PROGRESS NOTES
This is a consultation from Dr. Jelena Romo DO for   Chief Complaint   Patient presents with    Left Knee - Pain       This is a 64 y.o. female who presents for evaluation of bilateral knee pain.  Patient states she has had knee pain for many years, actually saw for that for this over 4 years ago.  She had injections around then which were helpful.  In the last few months she has had return of her symptoms exacerbation of her pain, sharp pain over the medial knee on both sides worse with walking and weightbearing.  Left is worse than right.  She uses a cane and a walker sometimes.  No numbness no tingling no fevers no chills no shooting pain down the leg.    Physical Exam    There has been no interval change in this patient's past medical, surgical, medications, allergies, family history or social history since the most recent visit to a provider within our department. 14 point review of systems was performed, reviewed, and negative except for pertinent positives documented in the history of present illness.     Constitutional: well developed, well nourished female in no acute distress  Psychiatric: normal mood, appropriate affect  Eyes: sclera anicteric  HENT: normocephalic/atraumatic  CV: regular rate and rhythm   Respiratory: non labored breathing  Integumentary: no rash  Neurological: moves all extremities    Bilateral knee exam: skin intact no lacerations or abrations. no effusion.  Tender medial joint line. negative log roll negative patellar grind. ROM 0-120. stable to varus and valgus stress at 0 and 30 degrees. negative lachman negative posterior drawer negative hellen. 5/5 ehl/fhl/gs/ta. silt s/s/sp/dp/t. 2+ dp/pt        Imaging:  Xrays were ordered by me, they were reviewed and independently interpreted by me today, they show degenerative disease bilateral knees    L Inj/Asp: bilateral knee on 4/18/2025 11:33 AM  Indications: pain and joint swelling  Details: 22 G needle, anterolateral  approach  Medications (Right): 2.5 mg triamcinolone acetonide 40 mg/mL  Medications (Left): 2.5 mg triamcinolone acetonide 40 mg/mL    Discussion:  I discussed the conservative treatment options for knee osteoarthritis including but not limited to physical therapy, oral NSAIDS, activity and lifestyle modification, and corticosteroid injections. Pt has elected to undergo a cortisone injection today. I have explained the risk and benefits of an injection including the possibility of joint infection, bleeding, damage to cartilage, allergic reaction. Patient verbalized understanding and gave verbal consent wishes to proceed with a intra-articular cortisone injection for their knee.    Procedure:  After discussing the risk and benefits of the procedure, we proceeded with an intra-articular bilateral knee injection. We discussed the risks and benefits and potential morbidity related to the treatment, and to the prescription medication administered in the injection    With the patient's informed verbal consent, the bilateral knees were prepped in standard sterile fashion with Chlorhexidine. The skin was then anesthetized with ethyl chloride spray and cleaned again with Chlorhexidine. The bilateral knees were then apirated/injected with a prefilled 20-gauge syringe of 40 mg Kenalog + 4 ml Lidocaine using the lateral approach without complications.  The patient tolerated this well and felt immediate initial relief of symptoms. A bandaid was applied and the patient ambulated out of the clinic on ther own accord without difficulty. Patient was instructed to avoid physical activity for 24-48 hours to prevent the knees from swelling and may ice the knees as tolerated. Patient should contact the office if any signs of of infection appear: redness, fever, chills, drainage, swelling or warmth to the knees.  Pt understands that the injections can be repeated no sooner than 3 months.      Procedure, treatment alternatives, risks and  benefits explained, specific risks discussed. Consent was given by the patient. Immediately prior to procedure a time out was called to verify the correct patient, procedure, equipment, support staff and site/side marked as required. Patient was prepped and draped in the usual sterile fashion.             Impression/Plan: This is a 64 y.o. female with bilateral knee arthritis.  I had an in depth discussion with the patient regarding treatment options for arthritis and their relative risks and benefits. We reviewed surgical and nonsurgical option for treatment. Treatments include anti inflammatory medications, physical therapy, weight loss, activity modification, use of assistive devices, injection therapies. We discussed current prescriptions and risks and benefits of continuation of prescription medication as apporpriate. We discussed that arthritis is often progressive over time, an in end stage arthritis surgical interventions can be considered, including arthroplasty. All questions were answered and the patient voiced their understanding.  Continue injections, not a candidate for arthroplasty because of her BMI.  I will see her back as needed    BMI Readings from Last 1 Encounters:   04/10/25 54.42 kg/m²      Lab Results   Component Value Date    CREATININE 0.82 02/26/2025     Tobacco Use: Low Risk  (4/18/2025)    Patient History     Smoking Tobacco Use: Never     Smokeless Tobacco Use: Never     Passive Exposure: Not on file      MELD 3.0: 7 at 1/2/2025  6:48 AM  MELD-Na: 6 at 1/2/2025  6:48 AM  Calculated from:  Serum Creatinine: 0.8 mg/dL (Using min of 1 mg/dL) at 1/2/2025  6:48 AM  Serum Sodium: 142 mmol/L (Using max of 137 mmol/L) at 1/2/2025  6:48 AM  Total Bilirubin: 0.4 mg/dL (Using min of 1 mg/dL) at 12/31/2024  6:38 AM  Serum Albumin: 3.9 g/dL (Using max of 3.5 g/dL) at 1/2/2025  6:48 AM  INR(ratio): 1 at 12/31/2024  6:38 AM  Age at listing (hypothetical): 64 years  Sex: Female at 1/2/2025  6:48 AM      "  Lab Results   Component Value Date    HGBA1C 5.5 12/15/2022     No results found for: \"STAPHMRSASCR\"  "

## 2025-04-22 DIAGNOSIS — I27.21 PULMONARY ARTERIAL HYPERTENSION (MULTI): ICD-10-CM

## 2025-04-27 NOTE — PROGRESS NOTES
Virtual or Telephone Consent    While technically available, the patient was unable or unwilling to consent to connect via audio/video telehealth technology; therefore, I performed this visit using a real-time audio only connection between Farida Traylor & Sanford Arredondo MD.  Verbal consent was requested and obtained from Farida Traylor on this date, 05/01/25 for a telehealth visit and the patient's location was confirmed at the time of the visit.    HISTORY OF PRESENT ILLNESS:  Farida Traylor is a 64 y.o. female who presents today for a virtual follow up visit. She is doing okay regarding her bladder.           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), Acute upper respiratory infection, unspecified (01/14/2020), Allergic rhinitis due to animal (cat) (dog) hair and dander (01/02/2020), Allergic rhinitis due to pollen (01/02/2020), Allergic rhinitis due to pollen (02/26/2018), Allergy status to unspecified drugs, medicaments and biological substances (06/30/2015), Anemia, unspecified (07/23/2018), Anxiety, Anxiety disorder due to known physiological condition (06/05/2013), Anxiety disorder, unspecified (01/28/2016), Asthma, Asymptomatic varicose veins of bilateral lower extremities (07/16/2019), Atypical squamous cells of undetermined significance on cytologic smear of cervix (ASC-US) (06/26/2013), Candidiasis, unspecified (12/10/2019), Cellulitis of left finger (07/31/2018), Changes in skin texture (03/09/2021), Class 3 severe obesity with body mass index (BMI) of 50.0 to 59.9 in adult (02/26/2025), Contusion of left lesser toe(s) with damage to nail, initial encounter (02/22/2018), Contusion of right hand, sequela (08/04/2020), Corns and callosities (05/25/2021), Depression, Disorder of pigmentation, unspecified (09/28/2016), Disorder of the skin and subcutaneous tissue, unspecified (08/27/2020), Dorsalgia, unspecified  (09/23/2021), Dorsalgia, unspecified (01/29/2019), Encounter for general adult medical examination without abnormal findings (06/08/2020), Encounter for gynecological examination (general) (routine) without abnormal findings (12/14/2021), Encounter for gynecological examination (general) (routine) without abnormal findings (12/11/2020), Encounter for other screening for malignant neoplasm of breast, Encounter for screening for malignant neoplasm of cervix, Encounter for screening for malignant neoplasm of cervix (11/04/2014), Encounter for screening for malignant neoplasm of cervix, GERD (gastroesophageal reflux disease), Heart murmur, Hepatomegaly, not elsewhere classified (04/21/2021), History of falling (12/28/2018), Hypertension, Inappropriate diet and eating habits (05/13/2021), Irregular menstruation, unspecified, Localized edema (07/29/2015), Localized swelling, mass and lump, left lower limb (09/11/2020), Localized swelling, mass and lump, left lower limb (09/24/2020), Melanocytic nevi, unspecified (09/10/2019), Multiple births, unspecified, all liveborn ([facility]), Nephrolithiasis (02/14/2025), Obesity, Other allergic rhinitis (01/02/2020), Other allergic rhinitis (01/02/2020), Other conditions influencing health status (06/26/2013), Other conditions influencing health status, Other conditions influencing health status (02/05/2019), Other conditions influencing health status (12/14/2021), Other conditions influencing health status (02/14/2019), Other conditions influencing health status (01/14/2020), Other conditions influencing health status (06/05/2013), Other conditions influencing health status (06/05/2013), Other hypertrophic disorders of the skin (07/29/2015), Other shoulder lesions, right shoulder (11/11/2019), Other specified disorders of bone, thigh (09/09/2020), Other specified noninflammatory disorders of vagina (03/12/2019), Other specified postprocedural states (05/04/2017), Other specified soft  tissue disorders (10/08/2020), Other specified soft tissue disorders (08/04/2020), Oxygen dependent, Pain in left thigh (08/27/2020), Pain in right foot (03/09/2021), Pain in right hip (12/28/2018), Pain in right knee (03/12/2021), Pain in right knee (03/19/2021), Pain in right leg (05/25/2021), Pain in right leg (01/12/2022), Pain in unspecified joint, 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 (02/22/2016), Personal history of contraception, Personal history of diseases of the skin and subcutaneous tissue (12/22/2020), Personal history of malignant neoplasm of other parts of uterus, Personal history of other (healed) physical injury and trauma (03/01/2017), Personal history of other benign neoplasm (09/10/2019), Personal history of other benign neoplasm (05/06/2014), Personal history of other benign neoplasm (12/11/2020), Personal history of other diseases of the circulatory system (04/14/2021), Personal history of other diseases of the circulatory system (04/14/2021), Personal history of other diseases of the circulatory system (04/14/2021), Personal history of other diseases of the circulatory system (02/01/2017), Personal history of other diseases of the circulatory system (04/14/2021), Personal history of other diseases of the digestive system (03/24/2021), Personal history of other diseases of the female genital tract (12/28/2016), Personal history of other diseases of the female genital tract (05/19/2022), Personal history of other diseases of the female genital tract, Personal history of other diseases of the female genital tract, Personal history of other diseases of the musculoskeletal system and connective tissue, Personal history of other diseases of the musculoskeletal system and connective tissue (11/18/2019), Personal history of other diseases of the respiratory system (05/27/2021), Personal history of other diseases of the respiratory system  (04/12/2019), Personal history of other diseases of the respiratory system (01/04/2020), Personal history of other endocrine, nutritional and metabolic disease (02/01/2017), Personal history of other endocrine, nutritional and metabolic disease (04/18/2016), Personal history of other endocrine, nutritional and metabolic disease (05/26/2020), Personal history of other infectious and parasitic diseases, Personal history of other medical treatment (12/11/2020), Personal history of other medical treatment (12/14/2021), Personal history of other medical treatment, Personal history of other specified conditions (08/21/2019), Personal history of other specified conditions (02/02/2017), Personal history of other specified conditions (12/07/2020), Personal history of other specified conditions (04/14/2021), Personal history of other specified conditions (12/05/2014), Personal history of other specified conditions (08/25/2021), Personal history of other specified conditions, Personal history of other specified conditions, Personal history of other specified conditions (01/12/2018), Personal history of transient ischemic attack (TIA), and 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 (12/30/2015), Personal history of urinary (tract) infections (09/02/2021), Personal history of urinary (tract) infections (05/23/2019), Personal history of urinary (tract) infections (09/02/2021), Personal history of urinary calculi (09/12/2019), Polyphagia (05/13/2021), Primary central sleep apnea (10/21/2017), Pulmonary hypertension (Multi), Pure hypercholesterolemia, unspecified, Residual hemorrhoidal skin tags (07/06/2017), Shortness of breath, Sleep apnea, Slurred speech (01/02/2015), TIA (transient ischemic attack), Tinea pedis (05/25/2021), Unspecified abdominal pain (04/13/2021), Unspecified injury of right wrist, hand and finger(s), sequela (08/04/2020), Unspecified internal derangement of unspecified knee (03/01/2017), Unspecified  symptoms and signs involving the genitourinary system (12/16/2021), Unspecified symptoms and signs involving the genitourinary system (02/05/2019), Unspecified symptoms and signs involving the genitourinary system (09/03/2020), Urinary incontinence, Urinary tract infection, Urinary tract infection, site not specified (01/17/2020), Urinary tract infection, site not specified (01/10/2022), and Xerosis cutis (09/10/2019).    Surgical History  She has a past surgical history that includes Other surgical history (07/31/2013); Other surgical history (09/10/2019); Other surgical history (11/30/2018); Mouth surgery (11/04/2014); Knee surgery (05/04/2017); Other surgical history (06/08/2020); MR angio head wo IV contrast (02/09/2016); Cardiac catheterization (Right, 01/04/2024); Cardiac catheterization; Toenail excision; Colonoscopy; and Ureteral stent placement.     Social History  She reports that she has never smoked. She has never used smokeless tobacco. She reports that she does not currently use alcohol. She reports that she does not use drugs.    Family History  Family History[1]     Allergies  Grass pollen, Other, Pollen extracts, and Tree and shrub pollen      A comprehensive 10+ review of systems was negative except for: see hpi                  Assessment:  64 -year-old with recurrent UTIs, genitourinary syndrome menopause, and urinary urge incontinence, presenting for a virtual follow-up visit.     Alyce/dysuria:   -continue estradiol   -Patient is taking methenamine          RYAN:  -Failed myrbetriq   -Patient is currently taking trospium  -s/p Botox, 5/26/22, 5/25/2023, 2/8/24, 12/12/24 ,100 units,   -Doing well     F/up in 3 mos    I spent a total of 20 minutes speaking with the patient on the telephone     All questions and concerns were answered and addressed.  The patient expressed understanding and agrees with the plan.     Sanford Arredondo MD    Scribe Attestation  By signing my name below, I, Zina Fuentes,  Scribe   attest that this documentation has been prepared under the direction and in the presence of Sanford Arredondo MD.         [1]   Family History  Problem Relation Name Age of Onset    Hypertension Mother chapis serna     Breast cancer Mother chapis serna     Other (cardiac disorder) Mother chapis serna     Cardiomyopathy Mother chapis serna     Diabetes Mother chapis serna     Other (chronic kidney disease) Mother chapis serna     Cancer Mother chapis serna     Alcohol abuse Father joel serna     Lung disease Father joel serna     Stroke Brother dorian serna     Brain Aneurysm Brother dorian serna

## 2025-04-28 ENCOUNTER — HOSPITAL ENCOUNTER (OUTPATIENT)
Facility: HOSPITAL | Age: 65
Setting detail: OUTPATIENT SURGERY
Discharge: HOME | End: 2025-04-28
Attending: INTERNAL MEDICINE | Admitting: INTERNAL MEDICINE
Payer: COMMERCIAL

## 2025-04-28 ENCOUNTER — APPOINTMENT (OUTPATIENT)
Dept: UROLOGY | Facility: CLINIC | Age: 65
End: 2025-04-28
Payer: COMMERCIAL

## 2025-04-28 VITALS
RESPIRATION RATE: 14 BRPM | DIASTOLIC BLOOD PRESSURE: 67 MMHG | WEIGHT: 288.8 LBS | OXYGEN SATURATION: 95 % | BODY MASS INDEX: 54.57 KG/M2 | SYSTOLIC BLOOD PRESSURE: 110 MMHG | HEART RATE: 66 BPM

## 2025-04-28 DIAGNOSIS — N32.81 OAB (OVERACTIVE BLADDER): ICD-10-CM

## 2025-04-28 DIAGNOSIS — N39.0 RECURRENT UTI: Primary | ICD-10-CM

## 2025-04-28 DIAGNOSIS — N39.3 SUI (STRESS URINARY INCONTINENCE, FEMALE): ICD-10-CM

## 2025-04-28 DIAGNOSIS — I27.0 PRIMARY PULMONARY HYPERTENSION (MULTI): ICD-10-CM

## 2025-04-28 DIAGNOSIS — I27.21 PULMONARY ARTERIAL HYPERTENSION (MULTI): ICD-10-CM

## 2025-04-28 PROCEDURE — 2720000007 HC OR 272 NO HCPCS: Performed by: INTERNAL MEDICINE

## 2025-04-28 PROCEDURE — 93503 INSERT/PLACE HEART CATHETER: CPT | Mod: 59 | Performed by: INTERNAL MEDICINE

## 2025-04-28 PROCEDURE — 76942 ECHO GUIDE FOR BIOPSY: CPT | Performed by: INTERNAL MEDICINE

## 2025-04-28 PROCEDURE — 93451 RIGHT HEART CATH: CPT | Performed by: INTERNAL MEDICINE

## 2025-04-28 PROCEDURE — 2500000005 HC RX 250 GENERAL PHARMACY W/O HCPCS: Performed by: INTERNAL MEDICINE

## 2025-04-28 PROCEDURE — 99213 OFFICE O/P EST LOW 20 MIN: CPT | Performed by: STUDENT IN AN ORGANIZED HEALTH CARE EDUCATION/TRAINING PROGRAM

## 2025-04-28 PROCEDURE — 7100000010 HC PHASE TWO TIME - EACH INCREMENTAL 1 MINUTE: Performed by: INTERNAL MEDICINE

## 2025-04-28 PROCEDURE — 2500000004 HC RX 250 GENERAL PHARMACY W/ HCPCS (ALT 636 FOR OP/ED): Performed by: INTERNAL MEDICINE

## 2025-04-28 PROCEDURE — 7100000009 HC PHASE TWO TIME - INITIAL BASE CHARGE: Performed by: INTERNAL MEDICINE

## 2025-04-28 PROCEDURE — C1894 INTRO/SHEATH, NON-LASER: HCPCS | Performed by: INTERNAL MEDICINE

## 2025-04-28 PROCEDURE — C1751 CATH, INF, PER/CENT/MIDLINE: HCPCS | Performed by: INTERNAL MEDICINE

## 2025-04-28 PROCEDURE — 1157F ADVNC CARE PLAN IN RCRD: CPT | Performed by: STUDENT IN AN ORGANIZED HEALTH CARE EDUCATION/TRAINING PROGRAM

## 2025-04-28 RX ORDER — LIDOCAINE HYDROCHLORIDE 20 MG/ML
INJECTION, SOLUTION INFILTRATION; PERINEURAL AS NEEDED
Status: DISCONTINUED | OUTPATIENT
Start: 2025-04-28 | End: 2025-04-28 | Stop reason: HOSPADM

## 2025-04-28 RX ADMIN — Medication 3 L/MIN: at 07:05

## 2025-04-28 ASSESSMENT — PAIN - FUNCTIONAL ASSESSMENT
PAIN_FUNCTIONAL_ASSESSMENT: 0-10

## 2025-04-28 ASSESSMENT — PAIN SCALES - GENERAL
PAINLEVEL_OUTOF10: 0 - NO PAIN

## 2025-04-28 NOTE — Clinical Note
Closure device placed in the right internal jugular vein. Site closed by manually applied. Sheath pulled by Kassi GUZMAN(R) Patient c/o sore throat, body aches, runny nose, no fever, symptoms started on Sat. She said both of her children were seen at peds office with same symptoms and tested positive for strep.   She wants to know if you would call in abx for her or if she would need to be seen

## 2025-04-29 NOTE — SIGNIFICANT EVENT
Cath Lab Procedure Call Back  Procedure Date: _4/28/2025______________   Procedure Performed:_RHC___________________  Physician:_Peng______________  Spoke with:_____________________________  Date/Time Contacted: 1st attempt: _04/29/2025__ _11:58__ 2nd attempt: _________ ___:____  Phone#:_______________ Unable to reach/Left message:____Y________  Access Site: Pain______Bleeding______Bruised______Infection______WNL______  Dressing Removal Date:______________ Anticoagulation Post Intervention_________  Satisfied with Care:________________ D/C Instructions adequate_______________  Follow Up Appointment:_____________________ Length of Call:__________________  Comments:______________________________________________________________________________________________________________________________________________

## 2025-04-30 ENCOUNTER — HOSPITAL ENCOUNTER (OUTPATIENT)
Dept: RADIOLOGY | Facility: HOSPITAL | Age: 65
Discharge: HOME | End: 2025-04-30
Payer: COMMERCIAL

## 2025-04-30 DIAGNOSIS — R13.10 DYSPHAGIA, UNSPECIFIED TYPE: ICD-10-CM

## 2025-04-30 PROCEDURE — 92611 MOTION FLUOROSCOPY/SWALLOW: CPT | Mod: GN

## 2025-04-30 PROCEDURE — 74230 X-RAY XM SWLNG FUNCJ C+: CPT

## 2025-04-30 PROCEDURE — 2500000005 HC RX 250 GENERAL PHARMACY W/O HCPCS: Performed by: STUDENT IN AN ORGANIZED HEALTH CARE EDUCATION/TRAINING PROGRAM

## 2025-04-30 RX ADMIN — BARIUM SULFATE 5 ML: 400 SUSPENSION ORAL at 14:25

## 2025-04-30 RX ADMIN — BARIUM SULFATE 70 ML: 400 SUSPENSION ORAL at 14:24

## 2025-04-30 RX ADMIN — BARIUM SULFATE 15 ML: 400 PASTE ORAL at 14:24

## 2025-04-30 RX ADMIN — BARIUM SULFATE 90 ML: 0.81 POWDER, FOR SUSPENSION ORAL at 14:23

## 2025-04-30 NOTE — PROGRESS NOTES
Speech-Language Pathology    Outpatient Modified Barium Swallow Study    Patient Name: Farida Traylor  MRN: [id number]  : 1960  Today's Date: 25  Time Calculation  Start Time: 1330  Stop Time: 1358  Time Calculation (min): 28 min      Modified Barium Swallow Study completed. Informed verbal consent obtained prior to completion of exam. The study was completed per protocol with various liquid barium consistencies, pudding, solids and a 13mm barium tablet. The patient was unable to tolerate repositioning to allow for AP view of the esophagus. Esophageal screening view was obtained in the lateral projection. A 1.9 cm or .75 inch (outer diameter) ring was placed on the chin in order to complete objective measurements during swallowing. The anatomic structures and function of the oropharynx, larynx, hypopharynx and cervical esophagus were evaluated.    SLP: Suyapa Acevedo SLP   Contact info: [employer] secure chat      Reason for Referral: Dysphagia  Patient Hx: Clinical swallowing evaluation in outpatient clinic on 25 with recommendation for outpatient Modified barium swallow. Failed Lidia swallow protocol.   Respiratory Status: O2 via NC  Current diet: regular, thin    Pain:  Pain Scale: 0-10  Ratin      DIET RECOMMENDATIONS:     Per the results of today's MBSS, patient to continue baseline diet of regular/ easy to chew consistencies and thin liquids following swallow strategies listed below:    STRATEGIES:  Upright posture for oral intake  Reflux precautions      SLP PLAN:  Skilled SLP Services: No further skilled SLP intervention is warranted for dysphagia at this time.    Education Provided: Results and recommendations per MBSS, with video review; recommendations and POC at this time. Verbal understanding and agreement given on all accounts.     Treatment Provided Today: n/a    Additional Medical Consults Suggested:   - No new disciplines indicated    Repeat Study: Per MD or treating SLP        Mechanics of the Swallow Summary:  ORAL PHASE:  Lip Closure - Interlabial escape/no progression to anterior lip   Tongue Control During Bolus Hold - Cohesive bolus between tongue to palatal seal   Bolus prep/mastication - Disorganized mastication with solid pieces of bolus unchewed - edentulous status interfered with best mastication  Bolus transport/lingual motion - Brisk tongue motion for A-P movement of the bolus   Oral residue - Residue collection on oral structure     PHARYNGEAL PHASE:  Initiation of pharyngeal swallow - Bolus head at pit of pyriforms   Soft palate elevation - No bolus between soft palate/pharyngeal wall   Laryngeal elevation - Complete superior movement of thyroid cartilage with contact of arytenoids to epiglottic petiole   Anterior hyoid excursion - Partial anterior movement   Epiglottic movement - Complete inversion    Laryngeal vestibule closure - Complete - no air/contrast in laryngeal vestibule   Pharyngeal stripping wave - Complete  Pharyngeal contraction (A/P view) - Not tested       Pharyngoesophageal segment opening - Partial distension/partial duration with partial obstruction of flow of bolus   Tongue base retraction - Trace column of contrast or air between tongue base and pharyngeal wall   Pharyngeal residue - Trace residue within or on the pharyngeal structures     ESOPHAGEAL PHASE:  Esophageal clearance - Esophageal retention with retrograde flow below the pharyngoesophageal segment       SLP Impressions with Severity Rating:   Pt presents with oral and pharyngeal phases of the swallow within functional limits. No laryngeal penetration. No aspiration. Trace/ mild oral and pharyngeal residue cleared with independent and cued re swallow.     *Of note: The A-P bolus follow-through is not intended to be utilized as a diagnostic assessment of the esophagus, rather a tool to observe the biomechanical aspects of the swallow continuum and to inform the need for further evaluation by  medical specialists, as applicable.     Strategies attempted- double swallow and liquid rinse improved oral/ pharyngeal and esophageal clearance.       OUTCOME MEASURES:  Functional Oral Intake Scale  Functional Oral Intake Scale: Level 7        total oral diet with no restrictions       Eating Assessment Tool (EAT-10)   0=No problem, 1=Mild problem, 2=Mild to moderate problem, 3=Moderate problem, 4=Severe problem    EAT 10  My swallowing problem has caused me to lose weight.: 0  My swallowing problem interferes with my ability to go out for meals.: 0  Swallowing liquids takes extra effort.: 3  Swallowing solids takes extra effort.: 3  Swallowing pills takes extra effort.: 2  Swallowing is painful: 2  The pleasure of eating is affected by my swallowing.: 2  When I swallow food sticks in my throat.: 3  I cough when I eat.: 0  Swallowing is stressful: 3  EAT-10 TOTAL SCORE:: 18    A total score of 3 or above may indicate difficulty with swallowing safely and/or efficiently      Rosenbek's Penetration Aspiration Scale  Thin Liquids: 1. NO ASPIRATION & NO PENETRATION - no aspiration, contrast does not enter airway  Ranlo Thick Liquids: 1. NO ASPIRATION & NO PENETRATION - no aspiration, contrast does not enter airway  Honey Thick Liquids: 1. NO ASPIRATION & NO PENETRATION - no aspiration, contrast does not enter airway  Puree: 1. NO ASPIRATION & NO PENETRATION - no aspiration, contrast does not enter airway  Solids: 1. NO ASPIRATION & NO PENETRATION - no aspiration, contrast does not enter airway

## 2025-05-01 DIAGNOSIS — I27.0 IDIOPATHIC PAH (PULMONARY ARTERIAL HYPERTENSION) (MULTI): ICD-10-CM

## 2025-05-05 ENCOUNTER — EVALUATION (OUTPATIENT)
Dept: PHYSICAL THERAPY | Facility: HOSPITAL | Age: 65
End: 2025-05-05
Payer: COMMERCIAL

## 2025-05-05 DIAGNOSIS — R26.89 OTHER ABNORMALITIES OF GAIT AND MOBILITY: Primary | ICD-10-CM

## 2025-05-05 PROCEDURE — 97161 PT EVAL LOW COMPLEX 20 MIN: CPT | Mod: GP | Performed by: PHYSICAL THERAPIST

## 2025-05-05 PROCEDURE — 97110 THERAPEUTIC EXERCISES: CPT | Mod: GP | Performed by: PHYSICAL THERAPIST

## 2025-05-05 RX ORDER — MACITENTAN 10 MG/1
TABLET, FILM COATED ORAL
Qty: 30 TABLET | Refills: 11 | Status: SHIPPED | OUTPATIENT
Start: 2025-05-05

## 2025-05-05 ASSESSMENT — ENCOUNTER SYMPTOMS
LOSS OF SENSATION IN FEET: 0
DEPRESSION: 1
OCCASIONAL FEELINGS OF UNSTEADINESS: 1

## 2025-05-05 ASSESSMENT — PAIN DESCRIPTION - DESCRIPTORS: DESCRIPTORS: DULL

## 2025-05-05 ASSESSMENT — PAIN - FUNCTIONAL ASSESSMENT: PAIN_FUNCTIONAL_ASSESSMENT: 0-10

## 2025-05-05 ASSESSMENT — PAIN SCALES - GENERAL: PAINLEVEL_OUTOF10: 2

## 2025-05-05 NOTE — PROGRESS NOTES
Physical Therapy  Physical Therapy Evaluation and Treatment    Patient Name: Farida Traylor  MRN: [id number]  Today's Date: 5/5/2025  Time Calculation  Start Time: 1138  Stop Time: 1215  Time Calculation (min): 37 min    Today's Charges  PT Evaluation Time Entry  PT Evaluation (Low) Time Entry: 27  PT Therapeutic Procedures Time Entry  Therapeutic Exercise Time Entry: 10                   Insurance:  Visit number: 1 of 24  Visit Limit: MN  Co-Pay: $0  Authorization info: no auth required  Payor: [employer] DUAL COMPLETE / Plan: [employer] DUAL COMPLETE / Product Type: *No Product type* /     Current Problem  1. Other abnormalities of gait and mobility  Referral to Physical Therapy    Follow Up In Physical Therapy        Problem List Items Addressed This Visit           ICD-10-CM    Other abnormalities of gait and mobility - Primary R26.89    Relevant Orders    Follow Up In Physical Therapy       General:  General  Reason for Referral: balance issues  Referred By: Wilma DODSON  Past Medical History Relevant to Rehab: carotid artery occlusion, pulmonary arterial hypertension, morbid obesity, depression, opiate dependence, ADHD, anxiety, low back pain, primary OA of bilateral knees, fall, left calf pain, vascular dementia with depression, lumbar canal stenosis, DJD, amnesia, aphasia, CVA, TIA, tremor    Precautions:   Precautions  STEADI Fall Risk Score (The score of 4 or more indicates an increased risk of falling): 6  Medical Precautions: Fall precautions    Medical History Form: Reviewed (scanned into chart)    Subjective:   Subjective   Chief Complaint: Patient is a 64 year old female who presents to clinic with gait and balance problems. Patient has a prior medical history including: carotid artery occlusion, pulmonary arterial hypertension, morbid obesity, depression, opiate dependence, ADHD, anxiety, low back pain, primary OA of bilateral knees, fall, left calf pain, vascular dementia with  depression, lumbar canal stenosis, DJD, amnesia, aphasia, CVA, TIA, tremor. Patient states that she has not had any falls in the past year.  Onset Date: 4/23/2025  SANTOS: Chronic    Current Condition:   Same    Pain:  Pain Assessment: 0-10  0-10 (Numeric) Pain Score: 2  Pain Type: Chronic pain  Pain Location: Back  Pain Orientation: Right, Left, Lower  Pain Descriptors: Dull  Highest: 10/10 pain  Lowest: 2/10 pain  Aggravating Factors:  Standing and Bending Forward  Relieving Factors:  Rest, Heat, and Sitting    Relevant Information (PMH & Previous Tests/Imaging):   Bilateral knee xray 4/18/2025:  FINDINGS:  No fracture or dislocation is evident.  No knee effusion is evident. There is moderate bilateral medial femorotibial mild bilateral lateral femorotibial and patellofemoral degenerative change. No soft tissue gas or radiopaque foreign body is evident.    Previous Interventions/Treatments: Physical Therapy and Injections    Prior Level of Function (PLOF)  Patient previously independent with all ADLs  Exercise/Physical Activity: walk, house work   Work/School: retired   Hobbies: word search, computer    Patients Living Environment: Reviewed and no concern    Primary Language: English    Patient's Goal(s) for Therapy: Get her balance better.    Red Flags: Do you have any of the following? No  Fever/chills, unexplained weight changes, dizziness/fainting, unexplained change in bowel or bladder functions, unexplained malaise or muscle weakness, night pain/sweats, numbness or tingling    Objective:  Objective     Functional Rating Scale  LEFS   /80: 49    Lumbar AROM  Lumbar flexion: (60°): mod impairment  Lumbar extension (25°): mod impairment  Lumbar rotation right (30°): WFL  Lumbar rotation left (30°): WFL  Lumbar sidebend right (25°): min impairment  Lumbar sidebend left (25°): min impairment    Hip PROM  R hip flexion: (125°): min impairment (limited by body habitus)  L hip flexion: (125°): min  "impairment (limited by body habitus)  R hip abduction: (45°): WFL  L hip abduction: (45°): WFL  R hip ER: (45°): WFL  L hip ER: (45°): WFL  R hip IR: (45°): WFL  L hip IR: (45°): WFL    Specific Lower Extremity MMT  R Iliopsoas: (5/5): 4+/5  L Iliopsoas: (5/5): 4+/5  R Gluteals (sidelying): (5/5): 4/5  L Gluteals (sidelying): (5/5): 4/5  R knee flexion: (5/5): 5/5  L knee flexion: (5/5): 5/5  R knee extension: (5/5): 5/5  L knee extension: (5/5): 5/5    Flexibility  R hamstrings: min impairment  L hamstrings: min impairment    Knee AROM  Knee AROM WFL: yes    Knee MMT  Knee MMT WFL: yes      Functional Screening    Posture: shoulder rounded forward    Lower Extremity Functional Movements  Transfers: Independent  Gait: Trendelenburg gait pattern, slow, flexed posture  Assistive Device: no device  DL Squat: 21.32 sec without use of bilateral upper extremities   SL Squat: not tested   Balance  Feet Together: 30 sec eyes open and closed   Tandem: not tested     Flexibility: impaired hamstring flexibility      Special Tests    See balance above.    Outcome Measures:  Other Measures  5x Sit to Stand: 21.38 seconds  Lower Extremity Funtional Score (LEFS): 49/80     Treatment Performed:  Therapeutic Exercise  Therapeutic Exercise Performed: Yes  Therapeutic Exercise Activity 1: STS x5  Therapeutic Exercise Activity 2: LTR x5  Therapeutic Exercise Activity 3: figure 4 piriformis stretch 2x20\"  Therapeutic Exercise Activity 4: bridge x5    Balance/Neuromuscular Re-Education  Balance/Neuromuscular Re-Education Activity Performed: Yes  Balance/Neuromuscular Re-Education Activity 1: NBOS EO/EC x30\" each         EDUCATION:   Individual(s) Educated: patient   Education Provided: Home exercise program, plan of care, activity modifications, pain management, and injury pathology  Handout(s) Provided: Scanned into chart  Home Program: Access Code: [email or web address]  URL: [email or web address]  Date: 05/05/2025  Prepared " by: Reagan Garber    Exercises  - Supine Bridge  - 1-2 x daily - 10 reps  - Supine Lower Trunk Rotation  - 1-2 x daily - 10 reps  - Supine Transversus Abdominis Bracing - Hands on Ground  - 1-2 x daily - 10 reps - 3 seconds hold  - Supine Figure 4 Piriformis Stretch  - 1-2 x daily - 3 reps - 20 seconds hold  - Supine Hip Adduction Isometric with Ball  - 1-2 x daily - 10 reps - 3 seconds hold    Risk and Benefits Discussed with Patient/Caregiver/Other: Yes   Patient/Caregiver Demonstrated Understanding: Yes   Plan of Care Discussed and Agreed Upon: Yes   Patient Response to Education: Patient/Caregiver verbalized understanding of information, Patient/Caregiver performed return demonstration of exercises/activities, and Patient/Caregiver asked appropriate questions    Assessment: Patient presents with impaired strength, balance, gait, and range of motion/flexibility resulting in limited participation in pain-free ADLs and inability to perform at their prior level of function. Patient was unable to participate in further balance activities due to fatigue and complaints of low back pain from standing. Patient demonstrated good ability to perform home exercise program with verbal cues for proper technique. Skilled PT warranted to address the above stated impairments, so the patient can perform FA's without increased pain or difficulty.    PT Assessment Results: Decreased strength, Decreased range of motion, Impaired balance, Decreased mobility, Obesity, Pain  Rehab Prognosis: Good    Complexity: low    Plan:  Treatment/Interventions: Education/ Instruction, Gait training, Manual therapy, Neuromuscular re-education, Therapeutic activities, Therapeutic exercises  PT Plan: Skilled PT  PT Frequency: 2 times per week  Duration: 12 weeks  Onset Date: 04/23/25  Certification Period Start Date: 05/05/25  Certification Period End Date: 07/28/25  Number of Treatments Authorized: 1 of 24  Rehab Potential: Good  Plan of Care  Agreement: Patient    Goals: Set and discussed today  Active       PT Problem       Patient will improve active range of motion in deficit areas for ADL completion.          Start:  05/05/25    Expected End:  07/28/25              Patient will improve strength in deficit areas so patient can work with less pain.         Start:  05/05/25    Expected End:  07/28/25            Patient will stand >30 minutes without pain to cook a meal.        Start:  05/05/25    Expected End:  07/28/25            Patient will demonstrate independence in home program for support of progression       Start:  05/05/25    Expected End:  05/26/25            Patient will report pain of no more than 2/10 demonstrating a reduction of overall pain       Start:  05/05/25    Expected End:  05/26/25            Patient will show a significant change in LEFS (49 to 58) patient reported outcome tool to demonstrate subjective imporovement       Start:  05/05/25    Expected End:  07/28/25            Patient will perform 5 Times Sit to  15 seconds or less.       Start:  05/05/25    Expected End:  07/28/25                    Plan of care was developed with input and agreement by the patient    Ambulatory Screenings Summary       Screening  Frequency  Date Last Completed   Spiritual and Cultural Beliefs   Screening  each visit or episode of care 5/5/2025   Falls Risk Screening  every ambulatory visit 5/5/2025 11:41 AM   Pain Screening  annually at primary care visit  4/16/2025   Domestic Violence screening  annually at primary care visit 5/5/2025   Elder Abuse Screening  annually at primary care visit 7/8/2024   Depression Screening  annually in the primary care setting 4/17/2025   Suicide Risk Screening  annually in the primary care setting 4/28/2025   Nutrition and Food Insecurity   Screening  at least annually at primary care visit  12/31/2024   Key Learner  annually in the primary care setting 5/5/2025   Drug Screen  4/28/2025  7:12 AM    Alcohol Screen  4/28/2025  7:12 AM   Advance Directive  1/6/2025         Reagan Garber, PT

## 2025-05-06 ENCOUNTER — OFFICE VISIT (OUTPATIENT)
Dept: PRIMARY CARE | Facility: CLINIC | Age: 65
End: 2025-05-06
Payer: COMMERCIAL

## 2025-05-06 VITALS
DIASTOLIC BLOOD PRESSURE: 74 MMHG | SYSTOLIC BLOOD PRESSURE: 132 MMHG | HEART RATE: 73 BPM | TEMPERATURE: 96.9 F | OXYGEN SATURATION: 90 %

## 2025-05-06 DIAGNOSIS — M54.50 LUMBAR BACK PAIN: Primary | ICD-10-CM

## 2025-05-06 DIAGNOSIS — M47.819 FACET ARTHROPATHY: ICD-10-CM

## 2025-05-06 PROCEDURE — 3078F DIAST BP <80 MM HG: CPT | Performed by: FAMILY MEDICINE

## 2025-05-06 PROCEDURE — 3075F SYST BP GE 130 - 139MM HG: CPT | Performed by: FAMILY MEDICINE

## 2025-05-06 PROCEDURE — 1159F MED LIST DOCD IN RCRD: CPT | Performed by: FAMILY MEDICINE

## 2025-05-06 PROCEDURE — 99214 OFFICE O/P EST MOD 30 MIN: CPT | Performed by: FAMILY MEDICINE

## 2025-05-06 RX ORDER — CYCLOBENZAPRINE HCL 10 MG
10 TABLET ORAL NIGHTLY
Qty: 30 TABLET | Refills: 1 | Status: SHIPPED | OUTPATIENT
Start: 2025-05-06 | End: 2025-07-05

## 2025-05-06 RX ORDER — KETOROLAC TROMETHAMINE 10 MG/1
10 TABLET, FILM COATED ORAL 4 TIMES DAILY PRN
Qty: 20 TABLET | Refills: 0 | Status: SHIPPED | OUTPATIENT
Start: 2025-05-06 | End: 2025-05-11

## 2025-05-06 ASSESSMENT — PATIENT HEALTH QUESTIONNAIRE - PHQ9
SUM OF ALL RESPONSES TO PHQ9 QUESTIONS 1 AND 2: 0
1. LITTLE INTEREST OR PLEASURE IN DOING THINGS: NOT AT ALL
2. FEELING DOWN, DEPRESSED OR HOPELESS: NOT AT ALL

## 2025-05-06 NOTE — PROGRESS NOTES
Subjective   Patient ID: Farida Traylor is a 64 y.o. female who presents for Pain (LOWER BACK/HIP/THINKING ARTHRITIS/STARTED SATURDAY).  HPI KNOWN FACET OSTEOARTHROPATHY IN LUMBAR SPINE FROM CT IN 2023     Review of Systems    Objective Blood pressure 132/74, pulse 73, temperature 36.1 °C (96.9 °F), SpO2 90%.    Physical Exam    Assessment/Plan   Problem List Items Addressed This Visit       Lumbar back pain - Primary    Relevant Medications    ketorolac (Toradol) 10 mg tablet     Other Visit Diagnoses         Facet arthropathy        Relevant Medications    ketorolac (Toradol) 10 mg tablet    cyclobenzaprine (Flexeril) 10 mg tablet    Other Relevant Orders    Referral to Pain Medicine               Jelena Romo DO      respiratory distress.      Breath sounds: Normal breath sounds. No wheezing.   Abdominal:      General: There is no distension.      Palpations: Abdomen is soft.      Tenderness: There is no abdominal tenderness.   Musculoskeletal:         General: No deformity. Normal range of motion.   Skin:     General: Skin is warm and dry.      Capillary Refill: Capillary refill takes less than 2 seconds.   Neurological:      General: No focal deficit present.      Mental Status: She is alert and oriented to person, place, and time.      Sensory: Sensory deficit present.      Coordination: Coordination abnormal.      Gait: Gait abnormal.      Deep Tendon Reflexes: Reflexes normal.   Psychiatric:         Mood and Affect: Mood normal.         Assessment/Plan   Problem List Items Addressed This Visit       Lumbar back pain - Primary    Relevant Medications    ketorolac (Toradol) 10 mg tablet     Other Visit Diagnoses         Facet arthropathy        Relevant Medications    ketorolac (Toradol) 10 mg tablet    cyclobenzaprine (Flexeril) 10 mg tablet    Other Relevant Orders    Referral to Pain Medicine               Jelena Romo DO

## 2025-05-09 ENCOUNTER — DOCUMENTATION (OUTPATIENT)
Dept: PHYSICAL THERAPY | Facility: HOSPITAL | Age: 65
End: 2025-05-09
Payer: COMMERCIAL

## 2025-05-09 ENCOUNTER — APPOINTMENT (OUTPATIENT)
Dept: PHYSICAL THERAPY | Facility: HOSPITAL | Age: 65
End: 2025-05-09
Payer: COMMERCIAL

## 2025-05-09 NOTE — PROGRESS NOTES
Physical Therapy                 Therapy Communication Note    Patient Name: Farida Traylor  MRN: [id number]  Department:   Room: Room/bed info not found  Today's Date: 5/9/2025     Discipline: Physical Therapy          Missed Visit Reason:  r/s     Missed Time: Cancel    Comment:no reason given

## 2025-05-12 ENCOUNTER — APPOINTMENT (OUTPATIENT)
Dept: PHYSICAL THERAPY | Facility: HOSPITAL | Age: 65
End: 2025-05-12
Payer: COMMERCIAL

## 2025-05-12 ASSESSMENT — ENCOUNTER SYMPTOMS
CONSTIPATION: 0
FLANK PAIN: 0
SINUS PRESSURE: 0
RHINORRHEA: 0
DYSPHORIC MOOD: 1
ARTHRALGIAS: 0
SLEEP DISTURBANCE: 0
MYALGIAS: 0
BLOOD IN STOOL: 0
NUMBNESS: 0
BACK PAIN: 1
SORE THROAT: 0
PALPITATIONS: 0
UNEXPECTED WEIGHT CHANGE: 0
NAUSEA: 0
SHORTNESS OF BREATH: 0
NERVOUS/ANXIOUS: 0
APPETITE CHANGE: 0
ACTIVITY CHANGE: 1
JOINT SWELLING: 0
FEVER: 0
EYE DISCHARGE: 0
VOMITING: 0
WEAKNESS: 0
DYSURIA: 0
HEADACHES: 0
EYE ITCHING: 0
WHEEZING: 0
DIARRHEA: 0
HEMATURIA: 0
LIGHT-HEADEDNESS: 0
ABDOMINAL PAIN: 0
COUGH: 0
DIZZINESS: 0

## 2025-05-15 ENCOUNTER — HOSPITAL ENCOUNTER (OUTPATIENT)
Dept: RADIOLOGY | Facility: HOSPITAL | Age: 65
Discharge: HOME | End: 2025-05-15
Payer: COMMERCIAL

## 2025-05-15 VITALS — HEIGHT: 61 IN | WEIGHT: 288 LBS | BODY MASS INDEX: 54.37 KG/M2

## 2025-05-15 DIAGNOSIS — Z12.31 ENCOUNTER FOR SCREENING MAMMOGRAM FOR BREAST CANCER: ICD-10-CM

## 2025-05-15 PROCEDURE — 77067 SCR MAMMO BI INCL CAD: CPT

## 2025-05-15 PROCEDURE — 77067 SCR MAMMO BI INCL CAD: CPT | Performed by: RADIOLOGY

## 2025-05-15 PROCEDURE — 77063 BREAST TOMOSYNTHESIS BI: CPT | Performed by: RADIOLOGY

## 2025-05-19 ENCOUNTER — OFFICE VISIT (OUTPATIENT)
Dept: URGENT CARE | Facility: URGENT CARE | Age: 65
End: 2025-05-19
Payer: COMMERCIAL

## 2025-05-19 VITALS
DIASTOLIC BLOOD PRESSURE: 74 MMHG | OXYGEN SATURATION: 95 % | HEART RATE: 68 BPM | BODY MASS INDEX: 54.32 KG/M2 | SYSTOLIC BLOOD PRESSURE: 128 MMHG | WEIGHT: 287.7 LBS | RESPIRATION RATE: 22 BRPM | HEIGHT: 61 IN | TEMPERATURE: 98.6 F

## 2025-05-19 DIAGNOSIS — J01.90 ACUTE SINUSITIS, RECURRENCE NOT SPECIFIED, UNSPECIFIED LOCATION: ICD-10-CM

## 2025-05-19 DIAGNOSIS — R05.1 ACUTE COUGH: Primary | ICD-10-CM

## 2025-05-19 LAB
POC CORONAVIRUS SARS-COV-2 PCR: NEGATIVE
POC HUMAN RHINOVIRUS PCR: NEGATIVE
POC INFLUENZA A VIRUS PCR: NEGATIVE
POC INFLUENZA B VIRUS PCR: NEGATIVE
POC RESPIRATORY SYNCYTIAL VIRUS PCR: NEGATIVE

## 2025-05-19 ASSESSMENT — ENCOUNTER SYMPTOMS
SHORTNESS OF BREATH: 1
FEVER: 0
DIAPHORESIS: 0
RHINORRHEA: 1
SORE THROAT: 0
CHILLS: 0
COUGH: 1

## 2025-05-19 NOTE — PATIENT INSTRUCTIONS
take antihistamine during the day, ex zyrtec, claritin, etc, for nasal congestion/runny nose.    Continue flonase nasal spray    May also use saline nasal spray, for nasal congestion/runny nose.    take mucinex extra strength expectorant.     follow up with primary care if no improvement in 3 - 4 days.

## 2025-05-19 NOTE — PROGRESS NOTES
"Subjective   Patient ID: Farida Traylor is a 64 y.o. female. They present today with a chief complaint of Cough (Unproductive cough 4 days).    History of Present Illness  Dry cough, nasal congestion/rhinorrhea x 4 days.    Hx of environmental allergies.     Denies fever, chills, sweats, chest pain, ear pain, sore throat.     Using flonase    Hx pulmonary HTN, uses O2 3L constant, chronic SOB    expectorant      Cough  Associated symptoms include rhinorrhea and shortness of breath. Pertinent negatives include no chest pain, chills, ear pain, fever or sore throat.       Past Medical History  Allergies as of 05/19/2025 - Reviewed 05/19/2025   Allergen Reaction Noted    Grass pollen Other 12/01/2023    Other Unknown 10/09/2023    Pollen extracts Unknown 10/09/2023    Tree and shrub pollen Other 12/01/2023       Prescriptions Prior to Admission[1]     Medical History[2]    Surgical History[3]     reports that she has never smoked. She has never used smokeless tobacco. She reports that she does not currently use alcohol. She reports that she does not use drugs.    Review of Systems  Review of Systems   Constitutional:  Negative for chills, diaphoresis and fever.   HENT:  Positive for congestion and rhinorrhea. Negative for ear pain and sore throat.    Respiratory:  Positive for cough and shortness of breath.    Cardiovascular:  Negative for chest pain.   All other systems reviewed and are negative.                                 Objective    Vitals:    05/19/25 1231   BP: 128/74   Pulse: 68   Resp: 22   Temp: 37 °C (98.6 °F)   TempSrc: Oral   SpO2: 95%   Weight: 130 kg (287 lb 11.2 oz)   Height: 1.549 m (5' 1\")     No LMP recorded. Patient is postmenopausal.    Physical Exam  Vitals and nursing note reviewed.   Constitutional:       General: She is not in acute distress.  HENT:      Nose: Congestion and rhinorrhea present.   Cardiovascular:      Rate and Rhythm: Normal rate and regular rhythm.      Heart sounds: " Normal heart sounds.   Pulmonary:      Effort: Pulmonary effort is normal.      Breath sounds: Normal breath sounds.   Neurological:      Mental Status: She is alert.         Procedures    Point of Care Test & Imaging Results from this visit  No results found for this visit on 05/19/25.   Imaging  No results found.    Cardiology, Vascular, and Other Imaging  No other imaging results found for the past 2 days      Diagnostic study results (if any) were reviewed by Lynsey Stewart PA-C.    Assessment/Plan   Allergies, medications, history, and pertinent labs/EKGs/Imaging reviewed by Lynsey Stewart PA-C.     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    Acquired plantar porokeratosis    Acute upper respiratory infection, unspecified 01/14/2020    URI, acute    Allergic rhinitis due to animal (cat) (dog) hair and dander 01/02/2020    Allergic rhinitis due to dogs    Allergic rhinitis due to pollen 01/02/2020    Allergic rhinitis due to pollen    Allergic rhinitis due to pollen 02/26/2018    Seasonal allergic rhinitis due to pollen    Allergy status to unspecified drugs, medicaments and biological substances 06/30/2015    History of allergy    Anemia, unspecified 07/23/2018    Normocytic anemia    Anxiety     Anxiety disorder due to known physiological condition 06/05/2013    Anxiety disorder due to medical condition    Anxiety disorder, unspecified 01/28/2016    Anxiety    Asthma     Asymptomatic varicose veins of bilateral lower extremities 07/16/2019    Varicose veins of legs    Atypical squamous cells of undetermined  significance on cytologic smear of cervix (ASC-US) 06/26/2013    Pap smear abnormality of cervix with ASCUS favoring benign    Candidiasis, unspecified 12/10/2019    Yeast infection    Cellulitis of left finger 07/31/2018    Paronychia of finger of left hand    Changes in skin texture 03/09/2021    Cracked skin    Class 3 severe obesity with body mass index (BMI) of 50.0 to 59.9 in adult 02/26/2025    53.53 kg/m²    Contusion of left lesser toe(s) with damage to nail, initial encounter 02/22/2018    Subungual contusion of toenail of left foot    Contusion of right hand, sequela 08/04/2020    Traumatic ecchymosis of hand, right, sequela    Corns and callosities 05/25/2021    Callus of foot    Coronary artery disease     Depression     Disorder of pigmentation, unspecified 09/28/2016    Atypical pigmented skin lesion    Disorder of the skin and subcutaneous tissue, unspecified 08/27/2020    Lesion of subcutaneous tissue    Dorsalgia, unspecified 09/23/2021    Acute back pain    Dorsalgia, unspecified 01/29/2019    Costovertebral angle tenderness    Encounter for general adult medical examination without abnormal findings 06/08/2020    Medicare annual wellness visit, subsequent    Encounter for gynecological examination (general) (routine) without abnormal findings 12/14/2021    Encounter for gynecological examination    Encounter for gynecological examination (general) (routine) without abnormal findings 12/11/2020    Encounter for gynecological examination    Encounter for other screening for malignant neoplasm of breast     Breast cancer screening    Encounter for screening for malignant neoplasm of cervix     Encounter for screening for malignant neoplasm of cervix    Encounter for screening for malignant neoplasm of cervix 11/04/2014    Screening for malignant neoplasm of cervix    Encounter for screening for malignant neoplasm of cervix     Cervical cancer screening    GERD (gastroesophageal reflux disease)      Heart murmur     Hepatomegaly, not elsewhere classified 04/21/2021    Liver mass, right lobe    History of falling 12/28/2018    Status post fall    Hypertension     Inappropriate diet and eating habits 05/13/2021    Inappropriate diet and eating habits    Irregular menstruation, unspecified     Irregular periods/menstrual cycles    Localized edema 07/29/2015    Pedal edema    Localized swelling, mass and lump, left lower limb 09/11/2020    Lump of left thigh    Localized swelling, mass and lump, left lower limb 09/24/2020    Mass of left thigh    Melanocytic nevi, unspecified 09/10/2019    Numerous skin moles    Multiple births, unspecified, all liveborn ([facility])     Multiple births, all liveborn    Nephrolithiasis 02/14/2025    Bilateral    Obesity     Other allergic rhinitis 01/02/2020    Allergic rhinitis due to dust mite    Other allergic rhinitis 01/02/2020    Allergic rhinitis due to mold    Other conditions influencing health status 06/26/2013    Uterine Rupture    Other conditions influencing health status     Normal colonoscopy    Other conditions influencing health status 02/05/2019    History of burning on urination    Other conditions influencing health status 12/14/2021    History of cough    Other conditions influencing health status 02/14/2019    History of dyspareunia    Other conditions influencing health status 01/14/2020    History of cough    Other conditions influencing health status 06/05/2013    Leiomyomatous Degeneration Of The Uterus    Other conditions influencing health status 06/05/2013    Viremia    Other hypertrophic disorders of the skin 07/29/2015    Skin tag    Other shoulder lesions, right shoulder 11/11/2019    Tendinitis of right rotator cuff    Other specified disorders of bone, thigh 09/09/2020    Pain of left femur    Other specified noninflammatory disorders of vagina 03/12/2019    Vaginal dryness    Other specified postprocedural states 05/04/2017    History of arthroscopic  knee surgery    Other specified soft tissue disorders 10/08/2020    Soft tissue mass    Other specified soft tissue disorders 08/04/2020    Swelling of right hand    Oxygen dependent     Pain in left thigh 08/27/2020    Pain of left thigh    Pain in right foot 03/09/2021    Right foot pain    Pain in right hip 12/28/2018    Right hip pain    Pain in right knee 03/12/2021    Bilateral knee pain    Pain in right knee 03/19/2021    Right knee pain    Pain in right leg 05/25/2021    Bilateral pain of leg and foot    Pain in right leg 01/12/2022    Bilateral leg and foot pain    Pain in unspecified joint     Joint pain    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 02/22/2016    History of tetralogy of Fallot    Personal history of contraception     Personal history of contraceptive use    Personal history of diseases of the skin and subcutaneous tissue 12/22/2020    History of ingrown nail    Personal history of malignant neoplasm of other parts of uterus     History of malignant neoplasm of endometrium    Personal history of other (healed) physical injury and trauma 03/01/2017    History of sprain of ankle    Personal history of other benign neoplasm 09/10/2019    History of other benign neoplasm    Personal history of other benign neoplasm 05/06/2014    History of uterine leiomyoma    Personal history of other benign neoplasm 12/11/2020    History of uterine leiomyoma    Personal history of other diseases of the circulatory system 04/14/2021    History of atrial fibrillation    Personal history of other diseases of the circulatory system 04/14/2021    History of atrial fibrillation    Personal history of other diseases of the circulatory system 04/14/2021    History of atrial fibrillation    Personal history of other diseases of the circulatory system 02/01/2017    History of hypertension    Personal history of other diseases of the circulatory system 04/14/2021    History  of hypotension    Personal history of other diseases of the digestive system 03/24/2021    History of gastroesophageal reflux (GERD)    Personal history of other diseases of the female genital tract 12/28/2016    History of postmenopausal bleeding    Personal history of other diseases of the female genital tract 05/19/2022    History of postmenopausal bleeding    Personal history of other diseases of the female genital tract     History of vaginal discharge    Personal history of other diseases of the female genital tract     Vaginal delivery    Personal history of other diseases of the musculoskeletal system and connective tissue     Personal history of arthritis    Personal history of other diseases of the musculoskeletal system and connective tissue 11/18/2019    History of muscle spasm    Personal history of other diseases of the respiratory system 05/27/2021    History of asthma    Personal history of other diseases of the respiratory system 04/12/2019    History of acute bronchitis    Personal history of other diseases of the respiratory system 01/04/2020    History of bronchitis    Personal history of other endocrine, nutritional and metabolic disease 02/01/2017    History of hyperlipidemia    Personal history of other endocrine, nutritional and metabolic disease 04/18/2016    History of obesity    Personal history of other endocrine, nutritional and metabolic disease 05/26/2020    History of thyroid nodule    Personal history of other infectious and parasitic diseases     History of varicella    Personal history of other medical treatment 12/11/2020    History of screening mammography    Personal history of other medical treatment 12/14/2021    History of screening mammography    Personal history of other medical treatment     History of mammogram    Personal history of other specified conditions 08/21/2019    History of gross hematuria    Personal history of other specified conditions 02/02/2017    History  of weight gain    Personal history of other specified conditions 12/07/2020    History of chest pain    Personal history of other specified conditions 04/14/2021    History of palpitations    Personal history of other specified conditions 12/05/2014    History of vertigo    Personal history of other specified conditions 08/25/2021    History of exertional chest pain    Personal history of other specified conditions     History of shortness of breath    Personal history of other specified conditions     H/O heartburn    Personal history of other specified conditions 01/12/2018    History of urinary frequency    Personal history of transient ischemic attack (TIA), and 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 12/30/2015    History of TIA (transient ischemic attack)    Personal history of urinary (tract) infections 09/02/2021    History of recurrent cystitis    Personal history of urinary (tract) infections 05/23/2019    History of cystitis    Personal history of urinary (tract) infections 09/02/2021    History of recurrent urinary tract infection    Personal history of urinary calculi 09/12/2019    History of renal calculi    Polyphagia 05/13/2021    Polyphagia    Primary central sleep apnea 10/21/2017    Central sleep apnea    Pulmonary hypertension (Multi)     Pure hypercholesterolemia, unspecified     High cholesterol    Residual hemorrhoidal skin tags 07/06/2017    External hemorrhoid    Shortness of breath     Sleep apnea     cpap with oxygen blow in    Slurred speech 01/02/2015    Slurred speech    Stroke (Multi)     TIA (transient ischemic attack)     Tinea pedis 05/25/2021    Tinea pedis of right foot    Unspecified abdominal pain 04/13/2021    Abdominal pain, acute    Unspecified injury of right wrist, hand and finger(s), sequela 08/04/2020    Hand trauma, right, sequela    Unspecified internal derangement of unspecified knee 03/01/2017    Internal derangement of knee    Unspecified symptoms and signs  involving the genitourinary system 12/16/2021    UTI symptoms    Unspecified symptoms and signs involving the genitourinary system 02/05/2019    UTI symptoms    Unspecified symptoms and signs involving the genitourinary system 09/03/2020    UTI symptoms    Urinary incontinence     Urinary tract infection     Urinary tract infection, site not specified 01/17/2020    Acute urinary tract infection    Urinary tract infection, site not specified 01/10/2022    Acute UTI    Xerosis cutis 09/10/2019    Dry skin dermatitis   [3]   Past Surgical History:  Procedure Laterality Date    CARDIAC CATHETERIZATION Right 01/04/2024    Procedure: Right Heart Cath;  Surgeon: Richy Raman MD;  Location: [facility];  Service: Cardiovascular;  Laterality: Right;    CARDIAC CATHETERIZATION      CARDIAC CATHETERIZATION Right 4/28/2025    Procedure: RHC;  Surgeon: Richy Raman MD;  Location: [facility];  Service: Cardiovascular;  Laterality: Right;    COLONOSCOPY      KNEE SURGERY  05/04/2017    Knee Surgery    MOUTH SURGERY  11/04/2014    Oral Surgery Tooth Extraction    MR HEAD ANGIO WO IV CONTRAST  02/09/2016    MR HEAD ANGIO WO IV CONTRAST [facility]    OTHER SURGICAL HISTORY  07/31/2013    Wrist Carpectomy    OTHER SURGICAL HISTORY  09/10/2019    [place] tooth extraction    OTHER SURGICAL HISTORY  11/30/2018    Complete colonoscopy    OTHER SURGICAL HISTORY  06/08/2020    Thyroid biopsy    TOENAIL EXCISION      URETERAL STENT PLACEMENT

## 2025-05-20 ENCOUNTER — TELEPHONE (OUTPATIENT)
Dept: URGENT CARE | Facility: URGENT CARE | Age: 65
End: 2025-05-20

## 2025-05-20 ENCOUNTER — APPOINTMENT (OUTPATIENT)
Dept: PRIMARY CARE | Facility: CLINIC | Age: 65
End: 2025-05-20
Payer: COMMERCIAL

## 2025-05-21 DIAGNOSIS — I27.0 IDIOPATHIC PAH (PULMONARY ARTERIAL HYPERTENSION) (MULTI): ICD-10-CM

## 2025-05-22 ENCOUNTER — TELEMEDICINE CLINICAL SUPPORT (OUTPATIENT)
Dept: PRIMARY CARE | Facility: CLINIC | Age: 65
End: 2025-05-22
Payer: COMMERCIAL

## 2025-05-22 ENCOUNTER — APPOINTMENT (OUTPATIENT)
Facility: CLINIC | Age: 65
End: 2025-05-22
Payer: COMMERCIAL

## 2025-05-22 VITALS
RESPIRATION RATE: 17 BRPM | DIASTOLIC BLOOD PRESSURE: 71 MMHG | OXYGEN SATURATION: 96 % | HEIGHT: 61 IN | SYSTOLIC BLOOD PRESSURE: 134 MMHG | HEART RATE: 67 BPM | TEMPERATURE: 97.7 F | WEIGHT: 286.6 LBS | BODY MASS INDEX: 54.11 KG/M2

## 2025-05-22 DIAGNOSIS — M47.819 FACET ARTHROPATHY: ICD-10-CM

## 2025-05-22 DIAGNOSIS — M43.06 LUMBAR SPONDYLOLYSIS: Primary | ICD-10-CM

## 2025-05-22 PROCEDURE — 1160F RVW MEDS BY RX/DR IN RCRD: CPT | Performed by: STUDENT IN AN ORGANIZED HEALTH CARE EDUCATION/TRAINING PROGRAM

## 2025-05-22 PROCEDURE — 1125F AMNT PAIN NOTED PAIN PRSNT: CPT | Performed by: STUDENT IN AN ORGANIZED HEALTH CARE EDUCATION/TRAINING PROGRAM

## 2025-05-22 PROCEDURE — 99204 OFFICE O/P NEW MOD 45 MIN: CPT | Performed by: STUDENT IN AN ORGANIZED HEALTH CARE EDUCATION/TRAINING PROGRAM

## 2025-05-22 PROCEDURE — 1036F TOBACCO NON-USER: CPT | Performed by: STUDENT IN AN ORGANIZED HEALTH CARE EDUCATION/TRAINING PROGRAM

## 2025-05-22 PROCEDURE — 1159F MED LIST DOCD IN RCRD: CPT | Performed by: STUDENT IN AN ORGANIZED HEALTH CARE EDUCATION/TRAINING PROGRAM

## 2025-05-22 PROCEDURE — 3075F SYST BP GE 130 - 139MM HG: CPT | Performed by: STUDENT IN AN ORGANIZED HEALTH CARE EDUCATION/TRAINING PROGRAM

## 2025-05-22 PROCEDURE — 3008F BODY MASS INDEX DOCD: CPT | Performed by: STUDENT IN AN ORGANIZED HEALTH CARE EDUCATION/TRAINING PROGRAM

## 2025-05-22 PROCEDURE — G2211 COMPLEX E/M VISIT ADD ON: HCPCS | Performed by: STUDENT IN AN ORGANIZED HEALTH CARE EDUCATION/TRAINING PROGRAM

## 2025-05-22 PROCEDURE — 3078F DIAST BP <80 MM HG: CPT | Performed by: STUDENT IN AN ORGANIZED HEALTH CARE EDUCATION/TRAINING PROGRAM

## 2025-05-22 RX ORDER — MELOXICAM 7.5 MG/1
7.5 TABLET ORAL DAILY
Qty: 30 TABLET | Refills: 0 | Status: SHIPPED | OUTPATIENT
Start: 2025-05-22 | End: 2025-06-21

## 2025-05-22 RX ORDER — DIAZEPAM 5 MG/1
5 TABLET ORAL ONCE AS NEEDED
OUTPATIENT
Start: 2025-05-22

## 2025-05-22 ASSESSMENT — PAIN SCALES - GENERAL: PAINLEVEL_OUTOF10: 4

## 2025-05-22 NOTE — PROGRESS NOTES
Subjective   Patient ID: Farida Traylor is a 64 y.o. female who presents for Back Pain and New Patient Visit (Pt referred here by PCP. Pt complains of back pain, stating that pain has gotten progressively worse  over the past few months. ).  HPI    Ms Farida Traylor is a 64 year old female who presents as a new patient visit with complaints of lower back pain with out radicular symptoms.  She has a past medical history of pulmonary hypertension currently on 3 L of nasal cannula.  Additionally she presents to the office in a wheelchair because she has pain in her lower back when she walks.    The patient states that her pain is located in her lower back in a bandlike distribution straight across the lower back without radicular symptoms into her lower extremities.  She states that she can walk around at home for short distance however she gets cramping and aching in her legs when she walks and therefore cannot walk far.  When she sits down this pain goes away.  If ever she is walking and she bends forward she feels better.  The pain in general is rated at a 8 or 9 out of 10 and at best a 2 out of 10.  It is dull and aching in her lower back and she frequently experiences heaviness in her lower extremities.    She has previously participated in physical therapy however has not performed in some time.  She currently takes Tylenol for her pain.  She has been doing these measures for approximately 6 to 8 weeks already.    OARRS:  No data recorded  I have personally reviewed the OARRS report for Farida Traylor. I have considered the risks of abuse, dependence, addiction and diversion      Review of Systems     Current Outpatient Medications   Medication Instructions    albuterol 90 mcg/actuation inhaler INHALE 1 TO 2 PUFFS BY MOUTH EVERY 4 TO 6 HOURS AS NEEDED    aspirin 325 mg, Daily    buPROPion SR (Wellbutrin SR) 150 mg 12 hr tablet TAKE 1 TABLET BY MOUTH EVERY MORNING AND AFTERNOON    busPIRone (Buspar) 30  mg tablet 1 tablet, 2 times daily    calcium carbonate 430 mg calcium (1,000 mg) tablet,chewable 1 tablet, Daily    cholecalciferol (VITAMIN D3) 5,000 Units, Daily    cyanocobalamin (VITAMIN B-12) 1,000 mcg, Daily    cyclobenzaprine (FLEXERIL) 10 mg, oral, Nightly    ferrous sulfate 325 mg, Daily with breakfast    fluticasone propion-salmeteroL (Advair Diskus) 250-50 mcg/dose diskus inhaler INHALE ONE (1) PUFF BY MOUTH TWICE DAILY. RINSE MOUTH OUT AFTER EACH USE.    furosemide (LASIX) 20 mg, oral, Daily    hydrALAZINE (APRESOLINE) 25 mg, oral, 2 times daily    hydroCHLOROthiazide (HYDRODIURIL) 25 mg, oral, Daily    hydrOXYzine pamoate (Vistaril) 50 mg capsule TAKE 3 CAPSULES BY MOUTH EVERY NIGHT AT BEDTIME AND TAKE 1 CAPSULE BY MOUTH EVERY MORNING    lubiprostone (AMITIZA) 8 mcg, oral, 2 times daily (morning and late afternoon)    memantine (NAMENDA) 10 mg, 2 times daily    methenamine hippurate (HIPREX) 1 g, oral, 2 times daily    montelukast (SINGULAIR) 10 mg, oral, Daily    nystatin (Mycostatin) cream Topical, 2 times daily    omeprazole (PRILOSEC) 40 mg, oral, Daily before breakfast    Opsumit 10 mg tablet tablet TAKE 1 TABLET BY MOUTH 1 TIME A DAY. DO NOT HANDLE IF PREGNANT. DO NOT SPLIT, CRUSH, OR CHEW. REVIEW MEDICATION GUIDE    oxygen (O2) gas therapy 1 each, inhalation, Continuous    potassium citrate CR (Urocit-K-15) 15 mEq ER tablet 15 mEq, oral, 2 times daily (morning and late afternoon), Do not crush, chew, or split.    riociguat (ADEMPAS) 2.5 mg, oral, 3 times daily    sertraline (Zoloft) 100 mg tablet 2 tablets, Daily    simvastatin (ZOCOR) 80 mg, oral, Nightly    trospium (SANCTURA) 20 mg, oral, 2 times daily        Medical History[1]     Surgical History[2]     Family History[3]     RX Allergies[4]     No results found for this or any previous visit from the past 1000 days.      Objective     Vitals:    05/22/25 1359   BP: 134/71   Pulse: 67   Resp: 17   Temp: 36.5 °C (97.7 °F)   SpO2: 96%      Pain  Score:   4        Physical Exam    GENERAL EXAM  Vital Signs: Vital signs to include heart rate, respiration rate, blood pressure, and temperature were reviewed.  General Appearance:  Awake, alert, healthy appearing, well developed, No acute distress.  Head: Normocephalic without evidence of head injury.  Neck: The appearance of the neck was normal without swelling with a midline trachea.  Eyes: The eyelids and eyebrows exhibited no abnormalities.  Pupils were not pin-point.  Sclera was without icterus.  Lungs: Respiration rhythm and depth was normal.  Respiratory movements were normal without labored breathing.  Cardiovascular: No peripheral edema was present.    Neurological: Patient was oriented to time, place, and person.  Speech was normal.  Balance, gait, and stance were unremarkable.    Psychiatric: Appearance was normal with appropriate dress.  Mood was euthymic and affect was normal.  Skin: Affected regions were without ecchymosis or skin lesions.    Physical exam as above except:            Assessment/Plan   Problem List Items Addressed This Visit    None  Visit Diagnoses         Codes      Facet arthropathy     M47.819        ASSESSMENT  Ms Farida Traylor is a 64 year old female who presents as a new patient visit with complaints of lower back pain with out radicular symptoms.  She has a past medical history of pulmonary hypertension currently on 3 L of nasal cannula.  Additionally she presents to the office in a wheelchair because she has pain in her lower back when she walks.    The patient states that her pain is located in her lower back in a bandlike distribution straight across the lower back without radicular symptoms into her lower extremities.  She states that she can walk around at home for short distance however she gets cramping and aching in her legs when she walks and therefore cannot walk far.  When she sits down this pain goes away.  If ever she is walking and she bends forward she feels  better.  The pain in general is rated at a 8 or 9 out of 10 and at best a 2 out of 10.  It is dull and aching in her lower back and she frequently experiences heaviness in her lower extremities.    She has previously participated in physical therapy however has not performed in some time.  She currently takes Tylenol for her pain.  She has been doing these measures for approximately 6 to 8 weeks already.    Imagin2017  Grade 1 anterolisthesis of L4-5, L5-S1  Discogenic changes noted    Physical Exam:  Pain to palpation of the paraspinal musculature  Decreased pinprick sensation lower extremities at the level of the feet in all dermatomal levels  Reflexes 2+  Muscle strength 5 out of 5    Previous Interventions, date, and resultant relief:  None    Diagnosis  Lumbar stenosis with neurogenic claudication  Lumbar facet joint arthropathy    PLAN  -The patient may benefit from a lumbar epidural steroid injection at L5-S1.  She is not currently on blood thinning medication  - The patient if she does not experience pain relief would benefit from a lumbar MRI to delineate cause of pain  - She continues to have bandlike distribution pain along her lower back after the injection she would be a candidate for radiofrequency ablation after 2 successful diagnostic blocks  -Meloxicam 7.5 mg daily as needed, advised not to go provide with this           This note was generated with the aid of dictation software, there may be typos despite my attempts at proofreading.        [1]   Past Medical History:  Diagnosis Date    Acquired keratosis (keratoderma) palmaris et plantaris 2022    Acquired plantar porokeratosis    Acute upper respiratory infection, unspecified 2020    URI, acute    Allergic rhinitis due to animal (cat) (dog) hair and dander 2020    Allergic rhinitis due to dogs    Allergic rhinitis due to pollen 2020    Allergic rhinitis due to pollen    Allergic rhinitis due to pollen 2018     Seasonal allergic rhinitis due to pollen    Allergy status to unspecified drugs, medicaments and biological substances 06/30/2015    History of allergy    Anemia, unspecified 07/23/2018    Normocytic anemia    Anxiety     Anxiety disorder due to known physiological condition 06/05/2013    Anxiety disorder due to medical condition    Anxiety disorder, unspecified 01/28/2016    Anxiety    Asthma     Asymptomatic varicose veins of bilateral lower extremities 07/16/2019    Varicose veins of legs    Atypical squamous cells of undetermined significance on cytologic smear of cervix (ASC-US) 06/26/2013    Pap smear abnormality of cervix with ASCUS favoring benign    Candidiasis, unspecified 12/10/2019    Yeast infection    Cellulitis of left finger 07/31/2018    Paronychia of finger of left hand    Changes in skin texture 03/09/2021    Cracked skin    Class 3 severe obesity with body mass index (BMI) of 50.0 to 59.9 in adult 02/26/2025    53.53 kg/m²    Contusion of left lesser toe(s) with damage to nail, initial encounter 02/22/2018    Subungual contusion of toenail of left foot    Contusion of right hand, sequela 08/04/2020    Traumatic ecchymosis of hand, right, sequela    Corns and callosities 05/25/2021    Callus of foot    Coronary artery disease     Depression     Disorder of pigmentation, unspecified 09/28/2016    Atypical pigmented skin lesion    Disorder of the skin and subcutaneous tissue, unspecified 08/27/2020    Lesion of subcutaneous tissue    Dorsalgia, unspecified 09/23/2021    Acute back pain    Dorsalgia, unspecified 01/29/2019    Costovertebral angle tenderness    Encounter for general adult medical examination without abnormal findings 06/08/2020    Medicare annual wellness visit, subsequent    Encounter for gynecological examination (general) (routine) without abnormal findings 12/14/2021    Encounter for gynecological examination    Encounter for gynecological examination (general) (routine)  without abnormal findings 12/11/2020    Encounter for gynecological examination    Encounter for other screening for malignant neoplasm of breast     Breast cancer screening    Encounter for screening for malignant neoplasm of cervix     Encounter for screening for malignant neoplasm of cervix    Encounter for screening for malignant neoplasm of cervix 11/04/2014    Screening for malignant neoplasm of cervix    Encounter for screening for malignant neoplasm of cervix     Cervical cancer screening    GERD (gastroesophageal reflux disease)     Heart murmur     Hepatomegaly, not elsewhere classified 04/21/2021    Liver mass, right lobe    History of falling 12/28/2018    Status post fall    Hypertension     Inappropriate diet and eating habits 05/13/2021    Inappropriate diet and eating habits    Irregular menstruation, unspecified     Irregular periods/menstrual cycles    Localized edema 07/29/2015    Pedal edema    Localized swelling, mass and lump, left lower limb 09/11/2020    Lump of left thigh    Localized swelling, mass and lump, left lower limb 09/24/2020    Mass of left thigh    Melanocytic nevi, unspecified 09/10/2019    Numerous skin moles    Multiple births, unspecified, all liveborn ([facility])     Multiple births, all liveborn    Nephrolithiasis 02/14/2025    Bilateral    Obesity     Other allergic rhinitis 01/02/2020    Allergic rhinitis due to dust mite    Other allergic rhinitis 01/02/2020    Allergic rhinitis due to mold    Other conditions influencing health status 06/26/2013    Uterine Rupture    Other conditions influencing health status     Normal colonoscopy    Other conditions influencing health status 02/05/2019    History of burning on urination    Other conditions influencing health status 12/14/2021    History of cough    Other conditions influencing health status 02/14/2019    History of dyspareunia    Other conditions influencing health status 01/14/2020    History of cough    Other  conditions influencing health status 06/05/2013    Leiomyomatous Degeneration Of The Uterus    Other conditions influencing health status 06/05/2013    Viremia    Other hypertrophic disorders of the skin 07/29/2015    Skin tag    Other shoulder lesions, right shoulder 11/11/2019    Tendinitis of right rotator cuff    Other specified disorders of bone, thigh 09/09/2020    Pain of left femur    Other specified noninflammatory disorders of vagina 03/12/2019    Vaginal dryness    Other specified postprocedural states 05/04/2017    History of arthroscopic knee surgery    Other specified soft tissue disorders 10/08/2020    Soft tissue mass    Other specified soft tissue disorders 08/04/2020    Swelling of right hand    Oxygen dependent     Pain in left thigh 08/27/2020    Pain of left thigh    Pain in right foot 03/09/2021    Right foot pain    Pain in right hip 12/28/2018    Right hip pain    Pain in right knee 03/12/2021    Bilateral knee pain    Pain in right knee 03/19/2021    Right knee pain    Pain in right leg 05/25/2021    Bilateral pain of leg and foot    Pain in right leg 01/12/2022    Bilateral leg and foot pain    Pain in unspecified joint     Joint pain    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 02/22/2016    History of tetralogy of Fallot    Personal history of contraception     Personal history of contraceptive use    Personal history of diseases of the skin and subcutaneous tissue 12/22/2020    History of ingrown nail    Personal history of malignant neoplasm of other parts of uterus     History of malignant neoplasm of endometrium    Personal history of other (healed) physical injury and trauma 03/01/2017    History of sprain of ankle    Personal history of other benign neoplasm 09/10/2019    History of other benign neoplasm    Personal history of other benign neoplasm 05/06/2014    History of uterine leiomyoma    Personal history of other benign neoplasm  12/11/2020    History of uterine leiomyoma    Personal history of other diseases of the circulatory system 04/14/2021    History of atrial fibrillation    Personal history of other diseases of the circulatory system 04/14/2021    History of atrial fibrillation    Personal history of other diseases of the circulatory system 04/14/2021    History of atrial fibrillation    Personal history of other diseases of the circulatory system 02/01/2017    History of hypertension    Personal history of other diseases of the circulatory system 04/14/2021    History of hypotension    Personal history of other diseases of the digestive system 03/24/2021    History of gastroesophageal reflux (GERD)    Personal history of other diseases of the female genital tract 12/28/2016    History of postmenopausal bleeding    Personal history of other diseases of the female genital tract 05/19/2022    History of postmenopausal bleeding    Personal history of other diseases of the female genital tract     History of vaginal discharge    Personal history of other diseases of the female genital tract     Vaginal delivery    Personal history of other diseases of the musculoskeletal system and connective tissue     Personal history of arthritis    Personal history of other diseases of the musculoskeletal system and connective tissue 11/18/2019    History of muscle spasm    Personal history of other diseases of the respiratory system 05/27/2021    History of asthma    Personal history of other diseases of the respiratory system 04/12/2019    History of acute bronchitis    Personal history of other diseases of the respiratory system 01/04/2020    History of bronchitis    Personal history of other endocrine, nutritional and metabolic disease 02/01/2017    History of hyperlipidemia    Personal history of other endocrine, nutritional and metabolic disease 04/18/2016    History of obesity    Personal history of other endocrine, nutritional and metabolic  disease 05/26/2020    History of thyroid nodule    Personal history of other infectious and parasitic diseases     History of varicella    Personal history of other medical treatment 12/11/2020    History of screening mammography    Personal history of other medical treatment 12/14/2021    History of screening mammography    Personal history of other medical treatment     History of mammogram    Personal history of other specified conditions 08/21/2019    History of gross hematuria    Personal history of other specified conditions 02/02/2017    History of weight gain    Personal history of other specified conditions 12/07/2020    History of chest pain    Personal history of other specified conditions 04/14/2021    History of palpitations    Personal history of other specified conditions 12/05/2014    History of vertigo    Personal history of other specified conditions 08/25/2021    History of exertional chest pain    Personal history of other specified conditions     History of shortness of breath    Personal history of other specified conditions     H/O heartburn    Personal history of other specified conditions 01/12/2018    History of urinary frequency    Personal history of transient ischemic attack (TIA), and 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 12/30/2015    History of TIA (transient ischemic attack)    Personal history of urinary (tract) infections 09/02/2021    History of recurrent cystitis    Personal history of urinary (tract) infections 05/23/2019    History of cystitis    Personal history of urinary (tract) infections 09/02/2021    History of recurrent urinary tract infection    Personal history of urinary calculi 09/12/2019    History of renal calculi    Polyphagia 05/13/2021    Polyphagia    Primary central sleep apnea 10/21/2017    Central sleep apnea    Pulmonary hypertension (Multi)     Pure hypercholesterolemia, unspecified     High cholesterol    Residual hemorrhoidal skin tags 07/06/2017     External hemorrhoid    Shortness of breath     Sleep apnea     cpap with oxygen blow in    Slurred speech 01/02/2015    Slurred speech    Stroke (Multi)     TIA (transient ischemic attack)     Tinea pedis 05/25/2021    Tinea pedis of right foot    Unspecified abdominal pain 04/13/2021    Abdominal pain, acute    Unspecified injury of right wrist, hand and finger(s), sequela 08/04/2020    Hand trauma, right, sequela    Unspecified internal derangement of unspecified knee 03/01/2017    Internal derangement of knee    Unspecified symptoms and signs involving the genitourinary system 12/16/2021    UTI symptoms    Unspecified symptoms and signs involving the genitourinary system 02/05/2019    UTI symptoms    Unspecified symptoms and signs involving the genitourinary system 09/03/2020    UTI symptoms    Urinary incontinence     Urinary tract infection     Urinary tract infection, site not specified 01/17/2020    Acute urinary tract infection    Urinary tract infection, site not specified 01/10/2022    Acute UTI    Xerosis cutis 09/10/2019    Dry skin dermatitis   [2]   Past Surgical History:  Procedure Laterality Date    CARDIAC CATHETERIZATION Right 01/04/2024    Procedure: Right Heart Cath;  Surgeon: Richy Raman MD;  Location: [facility];  Service: Cardiovascular;  Laterality: Right;    CARDIAC CATHETERIZATION      CARDIAC CATHETERIZATION Right 04/28/2025    Procedure: RHC;  Surgeon: Richy Raman MD;  Location: [facility];  Service: Cardiovascular;  Laterality: Right;    COLONOSCOPY      KNEE SURGERY Left 05/04/2017    Knee Surgery    MOUTH SURGERY  11/04/2014    Oral Surgery Tooth Extraction    MR HEAD ANGIO WO IV CONTRAST  02/09/2016    MR HEAD ANGIO WO IV CONTRAST [facility]    OTHER SURGICAL HISTORY Bilateral 07/31/2013    Wrist Carpectomy    OTHER SURGICAL HISTORY  09/10/2019    [place] tooth extraction    OTHER SURGICAL HISTORY  11/30/2018    Complete colonoscopy    OTHER  SURGICAL HISTORY  06/08/2020    Thyroid biopsy    TOENAIL EXCISION      URETERAL STENT PLACEMENT     [3]   Family History  Problem Relation Name Age of Onset    Hypertension Mother chapis serna     Breast cancer Mother chapis serna 80 - 89    Other (cardiac disorder) Mother chapis serna     Cardiomyopathy Mother chapis serna     Diabetes Mother chapis serna     Other (chronic kidney disease) Mother chapis serna     Cancer Mother chapis serna     Alcohol abuse Father joel serna     Lung disease Father joel daphne     Stroke Brother dorian daphne     Brain Aneurysm Brother dorian serna    [4]   Allergies  Allergen Reactions    Grass Pollen Other    Other Unknown    Pollen Extracts Unknown    Tree And Shrub Pollen Other

## 2025-05-27 ENCOUNTER — APPOINTMENT (OUTPATIENT)
Dept: PRIMARY CARE | Facility: CLINIC | Age: 65
End: 2025-05-27
Payer: COMMERCIAL

## 2025-05-27 VITALS
BODY MASS INDEX: 54.61 KG/M2 | TEMPERATURE: 98 F | WEIGHT: 289 LBS | SYSTOLIC BLOOD PRESSURE: 122 MMHG | HEART RATE: 71 BPM | OXYGEN SATURATION: 94 % | DIASTOLIC BLOOD PRESSURE: 64 MMHG

## 2025-05-27 DIAGNOSIS — J40 SINOBRONCHITIS: Primary | ICD-10-CM

## 2025-05-27 DIAGNOSIS — I48.11 LONGSTANDING PERSISTENT ATRIAL FIBRILLATION (MULTI): ICD-10-CM

## 2025-05-27 DIAGNOSIS — J44.1 CHRONIC OBSTRUCTIVE PULMONARY DISEASE WITH ACUTE EXACERBATION (MULTI): ICD-10-CM

## 2025-05-27 DIAGNOSIS — J32.9 SINOBRONCHITIS: Primary | ICD-10-CM

## 2025-05-27 PROCEDURE — 99214 OFFICE O/P EST MOD 30 MIN: CPT | Performed by: FAMILY MEDICINE

## 2025-05-27 PROCEDURE — 1159F MED LIST DOCD IN RCRD: CPT | Performed by: FAMILY MEDICINE

## 2025-05-27 PROCEDURE — 3074F SYST BP LT 130 MM HG: CPT | Performed by: FAMILY MEDICINE

## 2025-05-27 PROCEDURE — 3078F DIAST BP <80 MM HG: CPT | Performed by: FAMILY MEDICINE

## 2025-05-27 RX ORDER — DOXYCYCLINE 100 MG/1
100 CAPSULE ORAL 2 TIMES DAILY
Qty: 14 CAPSULE | Refills: 0 | Status: SHIPPED | OUTPATIENT
Start: 2025-05-27 | End: 2025-06-03

## 2025-05-27 ASSESSMENT — ENCOUNTER SYMPTOMS
HEMATURIA: 0
WEAKNESS: 0
CONSTIPATION: 0
RHINORRHEA: 0
BLOOD IN STOOL: 0
SLEEP DISTURBANCE: 0
CHILLS: 1
WHEEZING: 1
SINUS PRESSURE: 0
FEVER: 0
FLANK PAIN: 0
FATIGUE: 1
NERVOUS/ANXIOUS: 0
ACTIVITY CHANGE: 0
NAUSEA: 0
MYALGIAS: 0
VOMITING: 0
PALPITATIONS: 0
DYSPHORIC MOOD: 0
DYSURIA: 0
DIARRHEA: 0
NUMBNESS: 0
ABDOMINAL PAIN: 0
HEADACHES: 0
UNEXPECTED WEIGHT CHANGE: 0
LIGHT-HEADEDNESS: 0
EYE DISCHARGE: 0
EYE ITCHING: 0
APPETITE CHANGE: 0
SHORTNESS OF BREATH: 1
SORE THROAT: 0
ARTHRALGIAS: 0
DIZZINESS: 0
JOINT SWELLING: 0
COUGH: 1

## 2025-05-27 NOTE — PROGRESS NOTES
"Subjective   Patient ID: Farida Traylor is a 64 y.o. female who presents for chest congestion (X1.5 weeks.  Seen at  in [place] and given Mucinex an allergy tab and nasal spray.).  HPI SE ABOVE     Review of Systems   Constitutional:  Positive for chills and fatigue. Negative for activity change, appetite change, fever and unexpected weight change.   HENT:  Positive for congestion. Negative for ear pain, postnasal drip, rhinorrhea, sinus pressure and sore throat.    Eyes:  Negative for discharge, itching and visual disturbance.   Respiratory:  Positive for cough, shortness of breath and wheezing.         02 DEPENDENT COPD   CHILLS AND FEELS UNWELL   MEDS FROM ER \"NOT ENOUGH\"   Cardiovascular:  Negative for chest pain, palpitations and leg swelling.   Gastrointestinal:  Negative for abdominal pain, blood in stool, constipation, diarrhea, nausea and vomiting.   Endocrine: Negative for cold intolerance, heat intolerance and polyuria.   Genitourinary:  Negative for dysuria, flank pain and hematuria.   Musculoskeletal:  Negative for arthralgias, gait problem, joint swelling and myalgias.   Skin:  Negative for rash.   Allergic/Immunologic: Negative for environmental allergies and food allergies.   Neurological:  Negative for dizziness, syncope, weakness, light-headedness, numbness and headaches.   Psychiatric/Behavioral:  Negative for dysphoric mood and sleep disturbance. The patient is not nervous/anxious.            4/28/2025     8:45 AM 4/28/2025     8:55 AM 5/6/2025     1:51 PM 5/15/2025    10:52 AM 5/19/2025    12:31 PM 5/22/2025     1:59 PM 5/27/2025    11:12 AM   Vitals   Systolic 99 110 132  128 134 122   Diastolic 80 67 74  74 71 64   BP Location      Left arm    Heart Rate 69 66 73  68 67 71   Temp   36.1 °C (96.9 °F)  37 °C (98.6 °F) 36.5 °C (97.7 °F) 36.7 °C (98 °F)   Resp 12 14 22 17    Height    1.549 m (5' 1\") 1.549 m (5' 1\") 1.549 m (5' 1\")    Weight (lb)   -- 288 287.7 286.6 289   BMI    54.42 " "kg/m2 54.36 kg/m2 54.15 kg/m2 54.61 kg/m2   BSA (m2)    2.37 m2 2.37 m2 2.37 m2 2.37 m2   Visit Report   Report  Report Report Report       Body mass index is 54.61 kg/m².      Objective   Physical Exam  Vitals and nursing note reviewed.   Constitutional:       Appearance: She is obese. She is ill-appearing.   HENT:      Head: Normocephalic.      Mouth/Throat:      Mouth: Mucous membranes are dry.   Cardiovascular:      Rate and Rhythm: Normal rate and regular rhythm.      Pulses: Normal pulses.      Heart sounds: Normal heart sounds. No murmur heard.     No friction rub. No gallop.   Pulmonary:      Effort: Respiratory distress present.      Breath sounds: Wheezing, rhonchi and rales present.   Abdominal:      General: There is no distension.      Tenderness: There is no abdominal tenderness.   Musculoskeletal:         General: No deformity. Normal range of motion.   Skin:     General: Skin is warm and dry.      Capillary Refill: Capillary refill takes less than 2 seconds.   Neurological:      General: No focal deficit present.      Mental Status: She is alert and oriented to person, place, and time.   Psychiatric:         Mood and Affect: Mood normal.         Assessment/Plan   Problem List Items Addressed This Visit       Obesity, morbid (more than 100 lbs over ideal weight or BMI > 40) (Multi)    ENCOURAGE WEIGHT LOSS AND DAILY ACTIVITY   \"CAN'T TOO SOB\"         Sinobronchitis - Primary    Relevant Medications    doxycycline (Vibramycin) 100 mg capsule    Chronic obstructive pulmonary disease with acute exacerbation (Multi)    SHE IS ON 02 AND SATURATION 94%  SOB AND COUGH  FOLLOWS WITH PULMONOLOGY          Longstanding persistent atrial fibrillation (Multi)    STABLE FOLLOWS WITH CARDIOLOGY                Jelena Romo DO     "

## 2025-05-29 DIAGNOSIS — I27.0 IDIOPATHIC PAH (PULMONARY ARTERIAL HYPERTENSION) (MULTI): ICD-10-CM

## 2025-06-03 LAB
ERYTHROCYTE [DISTWIDTH] IN BLOOD BY AUTOMATED COUNT: 13.8 % (ref 11–15)
HCT VFR BLD AUTO: 44.4 % (ref 35–45)
HGB BLD-MCNC: 14.2 G/DL (ref 11.7–15.5)
MCH RBC QN AUTO: 29.1 PG (ref 27–33)
MCHC RBC AUTO-ENTMCNC: 32 G/DL (ref 32–36)
MCV RBC AUTO: 91 FL (ref 80–100)
PLATELET # BLD AUTO: 252 THOUSAND/UL (ref 140–400)
PMV BLD REES-ECKER: 9.5 FL (ref 7.5–12.5)
RBC # BLD AUTO: 4.88 MILLION/UL (ref 3.8–5.1)
WBC # BLD AUTO: 6.1 THOUSAND/UL (ref 3.8–10.8)

## 2025-06-06 ENCOUNTER — OFFICE VISIT (OUTPATIENT)
Dept: CARDIOLOGY | Facility: HOSPITAL | Age: 65
End: 2025-06-06
Payer: COMMERCIAL

## 2025-06-06 VITALS
OXYGEN SATURATION: 93 % | SYSTOLIC BLOOD PRESSURE: 137 MMHG | WEIGHT: 290.13 LBS | HEART RATE: 67 BPM | BODY MASS INDEX: 54.82 KG/M2 | DIASTOLIC BLOOD PRESSURE: 63 MMHG

## 2025-06-06 DIAGNOSIS — I27.21 PULMONARY ARTERIAL HYPERTENSION (MULTI): ICD-10-CM

## 2025-06-06 DIAGNOSIS — I27.21 PAH (PULMONARY ARTERY HYPERTENSION) (MULTI): ICD-10-CM

## 2025-06-06 DIAGNOSIS — I10 HYPERTENSION, UNSPECIFIED TYPE: ICD-10-CM

## 2025-06-06 DIAGNOSIS — E78.5 HYPERLIPIDEMIA, UNSPECIFIED HYPERLIPIDEMIA TYPE: ICD-10-CM

## 2025-06-06 DIAGNOSIS — R07.89 OTHER CHEST PAIN: Primary | ICD-10-CM

## 2025-06-06 PROCEDURE — 3078F DIAST BP <80 MM HG: CPT | Performed by: NURSE PRACTITIONER

## 2025-06-06 PROCEDURE — 1159F MED LIST DOCD IN RCRD: CPT | Performed by: NURSE PRACTITIONER

## 2025-06-06 PROCEDURE — 99212 OFFICE O/P EST SF 10 MIN: CPT | Performed by: NURSE PRACTITIONER

## 2025-06-06 PROCEDURE — G2211 COMPLEX E/M VISIT ADD ON: HCPCS | Performed by: NURSE PRACTITIONER

## 2025-06-06 PROCEDURE — 93005 ELECTROCARDIOGRAM TRACING: CPT | Performed by: NURSE PRACTITIONER

## 2025-06-06 PROCEDURE — 3075F SYST BP GE 130 - 139MM HG: CPT | Performed by: NURSE PRACTITIONER

## 2025-06-06 PROCEDURE — 99214 OFFICE O/P EST MOD 30 MIN: CPT | Performed by: NURSE PRACTITIONER

## 2025-06-06 PROCEDURE — 1036F TOBACCO NON-USER: CPT | Performed by: NURSE PRACTITIONER

## 2025-06-06 ASSESSMENT — ENCOUNTER SYMPTOMS
DIZZINESS: 1
MYALGIAS: 1
ENDOCRINE NEGATIVE: 1
ALLERGIC/IMMUNOLOGIC NEGATIVE: 1
GASTROINTESTINAL NEGATIVE: 1
PSYCHIATRIC NEGATIVE: 1
DYSPNEA ON EXERTION: 1
SHORTNESS OF BREATH: 1
HEMATOLOGIC/LYMPHATIC NEGATIVE: 1
EYES NEGATIVE: 1

## 2025-06-06 NOTE — PROGRESS NOTES
Referred by Dr. Lopez ref. provider found for No chief complaint on file.     History Of Present Illness:    Farida Traylor is a very pleasant 65 year old female with a history of PAH, MARLON and HTN, she is here for a follow up visit. The patient is seen in collaboration with Dr. Faulkner. Mrs. Garza breathing ok, currently wearing oxygen all the time. Occasional chest pain, feels like a dull ache. Recently admitted kidney stone.  Denies chest pain or heart palpitations.  Complains of migraines    Review of Systems   HENT: Negative.     Eyes: Negative.    Cardiovascular:  Positive for dyspnea on exertion.   Respiratory:  Positive for shortness of breath.    Endocrine: Negative.    Hematologic/Lymphatic: Negative.    Skin: Negative.    Musculoskeletal:  Positive for muscle weakness and myalgias.   Gastrointestinal: Negative.    Neurological:  Positive for dizziness.   Psychiatric/Behavioral: Negative.     Allergic/Immunologic: Negative.       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), Acute upper respiratory infection, unspecified (01/14/2020), Allergic rhinitis due to animal (cat) (dog) hair and dander (01/02/2020), Allergic rhinitis due to pollen (01/02/2020), Allergic rhinitis due to pollen (02/26/2018), Allergy status to unspecified drugs, medicaments and biological substances (06/30/2015), Anemia, unspecified (07/23/2018), Anxiety, Anxiety disorder due to known physiological condition (06/05/2013), Anxiety disorder, unspecified (01/28/2016), Asthma, Asymptomatic varicose veins of bilateral lower extremities (07/16/2019), Atypical squamous cells of undetermined significance on cytologic smear of cervix (ASC-US) (06/26/2013), Candidiasis, unspecified (12/10/2019), Cellulitis of left finger (07/31/2018), Changes in skin texture (03/09/2021), Class 3 severe obesity with body mass index (BMI) of 50.0 to 59.9 in adult (02/26/2025),  Contusion of left lesser toe(s) with damage to nail, initial encounter (02/22/2018), Contusion of right hand, sequela (08/04/2020), Corns and callosities (05/25/2021), Coronary artery disease, Depression, Disorder of pigmentation, unspecified (09/28/2016), Disorder of the skin and subcutaneous tissue, unspecified (08/27/2020), Dorsalgia, unspecified (09/23/2021), Dorsalgia, unspecified (01/29/2019), Encounter for general adult medical examination without abnormal findings (06/08/2020), Encounter for gynecological examination (general) (routine) without abnormal findings (12/14/2021), Encounter for gynecological examination (general) (routine) without abnormal findings (12/11/2020), Encounter for other screening for malignant neoplasm of breast, Encounter for screening for malignant neoplasm of cervix, Encounter for screening for malignant neoplasm of cervix (11/04/2014), Encounter for screening for malignant neoplasm of cervix, GERD (gastroesophageal reflux disease), Heart murmur, Hepatomegaly, not elsewhere classified (04/21/2021), History of falling (12/28/2018), Hypertension, Inappropriate diet and eating habits (05/13/2021), Irregular menstruation, unspecified, Localized edema (07/29/2015), Localized swelling, mass and lump, left lower limb (09/11/2020), Localized swelling, mass and lump, left lower limb (09/24/2020), Melanocytic nevi, unspecified (09/10/2019), Multiple births, unspecified, all liveborn [facility]), Nephrolithiasis (02/14/2025), Obesity, Other allergic rhinitis (01/02/2020), Other allergic rhinitis (01/02/2020), Other conditions influencing health status (06/26/2013), Other conditions influencing health status, Other conditions influencing health status (02/05/2019), Other conditions influencing health status (12/14/2021), Other conditions influencing health status (02/14/2019), Other conditions influencing health status (01/14/2020), Other conditions influencing health status (06/05/2013), Other  conditions influencing health status (06/05/2013), Other hypertrophic disorders of the skin (07/29/2015), Other shoulder lesions, right shoulder (11/11/2019), Other specified disorders of bone, thigh (09/09/2020), Other specified noninflammatory disorders of vagina (03/12/2019), Other specified postprocedural states (05/04/2017), Other specified soft tissue disorders (10/08/2020), Other specified soft tissue disorders (08/04/2020), Oxygen dependent, Pain in left thigh (08/27/2020), Pain in right foot (03/09/2021), Pain in right hip (12/28/2018), Pain in right knee (03/12/2021), Pain in right knee (03/19/2021), Pain in right leg (05/25/2021), Pain in right leg (01/12/2022), Pain in unspecified joint, 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 (02/22/2016), Personal history of contraception, Personal history of diseases of the skin and subcutaneous tissue (12/22/2020), Personal history of malignant neoplasm of other parts of uterus, Personal history of other (healed) physical injury and trauma (03/01/2017), Personal history of other benign neoplasm (09/10/2019), Personal history of other benign neoplasm (05/06/2014), Personal history of other benign neoplasm (12/11/2020), Personal history of other diseases of the circulatory system (04/14/2021), Personal history of other diseases of the circulatory system (04/14/2021), Personal history of other diseases of the circulatory system (04/14/2021), Personal history of other diseases of the circulatory system (02/01/2017), Personal history of other diseases of the circulatory system (04/14/2021), Personal history of other diseases of the digestive system (03/24/2021), Personal history of other diseases of the female genital tract (12/28/2016), Personal history of other diseases of the female genital tract (05/19/2022), Personal history of other diseases of the female genital tract, Personal history of other diseases of the female  genital tract, Personal history of other diseases of the musculoskeletal system and connective tissue, Personal history of other diseases of the musculoskeletal system and connective tissue (11/18/2019), Personal history of other diseases of the respiratory system (05/27/2021), Personal history of other diseases of the respiratory system (04/12/2019), Personal history of other diseases of the respiratory system (01/04/2020), Personal history of other endocrine, nutritional and metabolic disease (02/01/2017), Personal history of other endocrine, nutritional and metabolic disease (04/18/2016), Personal history of other endocrine, nutritional and metabolic disease (05/26/2020), Personal history of other infectious and parasitic diseases, Personal history of other medical treatment (12/11/2020), Personal history of other medical treatment (12/14/2021), Personal history of other medical treatment, Personal history of other specified conditions (08/21/2019), Personal history of other specified conditions (02/02/2017), Personal history of other specified conditions (12/07/2020), Personal history of other specified conditions (04/14/2021), Personal history of other specified conditions (12/05/2014), Personal history of other specified conditions (08/25/2021), Personal history of other specified conditions, Personal history of other specified conditions, Personal history of other specified conditions (01/12/2018), Personal history of transient ischemic attack (TIA), and 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 (12/30/2015), Personal history of urinary (tract) infections (09/02/2021), Personal history of urinary (tract) infections (05/23/2019), Personal history of urinary (tract) infections (09/02/2021), Personal history of urinary calculi (09/12/2019), Polyphagia (05/13/2021), Primary central sleep apnea (10/21/2017), Pulmonary hypertension (Multi), Pure hypercholesterolemia, unspecified, Residual hemorrhoidal skin tags  (07/06/2017), Shortness of breath, Sleep apnea, Slurred speech (01/02/2015), Stroke (Multi), TIA (transient ischemic attack), Tinea pedis (05/25/2021), Unspecified abdominal pain (04/13/2021), Unspecified injury of right wrist, hand and finger(s), sequela (08/04/2020), Unspecified internal derangement of unspecified knee (03/01/2017), Unspecified symptoms and signs involving the genitourinary system (12/16/2021), Unspecified symptoms and signs involving the genitourinary system (02/05/2019), Unspecified symptoms and signs involving the genitourinary system (09/03/2020), Urinary incontinence, Urinary tract infection, Urinary tract infection, site not specified (01/17/2020), Urinary tract infection, site not specified (01/10/2022), and Xerosis cutis (09/10/2019).    Past Surgical History:  She has a past surgical history that includes Other surgical history (Bilateral, 07/31/2013); Other surgical history (09/10/2019); Other surgical history (11/30/2018); Mouth surgery (11/04/2014); Knee surgery (Left, 05/04/2017); Other surgical history (06/08/2020); MR angio head wo IV contrast (02/09/2016); Cardiac catheterization (Right, 01/04/2024); Cardiac catheterization; Toenail excision; Colonoscopy; Ureteral stent placement; and Cardiac catheterization (Right, 04/28/2025).      Social History:  She reports that she has never smoked. She has never used smokeless tobacco. She reports that she does not currently use alcohol. She reports that she does not use drugs.    Family History:  Family History   Problem Relation Name Age of Onset    Hypertension Mother chapis worthingtontimmyvaleriy     Breast cancer Mother chapis serna 80 - 89    Other (cardiac disorder) Mother chapis worthingtontimmyvaleriy     Cardiomyopathy Mother chapis serna     Diabetes Mother chapis serna     Other (chronic kidney disease) Mother chapis serna     Cancer Mother chapis serna     Alcohol abuse Father joel andertimmyvaleriy     Lung disease Father joel andertimmyvaleriy     Stroke Brother dorian  daphne     Brain Aneurysm Brother dorian serna         Allergies:  Grass pollen, Other, Pollen extracts, and Tree and shrub pollen    Outpatient Medications:  Current Outpatient Medications   Medication Instructions    albuterol 90 mcg/actuation inhaler INHALE 1 TO 2 PUFFS BY MOUTH EVERY 4 TO 6 HOURS AS NEEDED    aspirin 325 mg, Daily    buPROPion SR (Wellbutrin SR) 150 mg 12 hr tablet TAKE 1 TABLET BY MOUTH EVERY MORNING AND AFTERNOON    busPIRone (Buspar) 30 mg tablet 1 tablet, 2 times daily    calcium carbonate 430 mg calcium (1,000 mg) tablet,chewable 1 tablet, Daily    cholecalciferol (VITAMIN D3) 5,000 Units, Daily    cyanocobalamin (VITAMIN B-12) 1,000 mcg, Daily    cyclobenzaprine (FLEXERIL) 10 mg, oral, Nightly    ferrous sulfate 325 mg, Daily with breakfast    fluticasone propion-salmeteroL (Advair Diskus) 250-50 mcg/dose diskus inhaler INHALE ONE (1) PUFF BY MOUTH TWICE DAILY. RINSE MOUTH OUT AFTER EACH USE.    hydrALAZINE (APRESOLINE) 25 mg, oral, 2 times daily    hydroCHLOROthiazide (HYDRODIURIL) 25 mg, oral, Daily    hydrOXYzine pamoate (Vistaril) 50 mg capsule TAKE 3 CAPSULES BY MOUTH EVERY NIGHT AT BEDTIME AND TAKE 1 CAPSULE BY MOUTH EVERY MORNING    lubiprostone (AMITIZA) 8 mcg, oral, 2 times daily (morning and late afternoon)    meloxicam (MOBIC) 7.5 mg, oral, Daily    memantine (NAMENDA) 10 mg, 2 times daily    methenamine hippurate (HIPREX) 1 g, oral, 2 times daily    montelukast (SINGULAIR) 10 mg, oral, Daily    omeprazole (PRILOSEC) 40 mg, oral, Daily before breakfast    Opsumit 10 mg tablet tablet TAKE 1 TABLET BY MOUTH 1 TIME A DAY. DO NOT HANDLE IF PREGNANT. DO NOT SPLIT, CRUSH, OR CHEW. REVIEW MEDICATION GUIDE    oxygen (O2) gas therapy 1 each, inhalation, Continuous    potassium citrate CR (Urocit-K-15) 15 mEq ER tablet 15 mEq, oral, 2 times daily (morning and late afternoon), Do not crush, chew, or split.    riociguat (ADEMPAS) 2.5 mg, oral, 3 times daily    sertraline (Zoloft) 100 mg  tablet 2 tablets, Daily    simvastatin (ZOCOR) 80 mg, oral, Nightly    trospium (SANCTURA) 20 mg, oral, 2 times daily        Last Recorded Vitals:  There were no vitals filed for this visit.      Physical Exam:  Physical Exam  Vitals reviewed.   HENT:      Head: Normocephalic.      Nose: Nose normal.   Eyes:      Pupils: Pupils are equal, round, and reactive to light.   Cardiovascular:      Rate and Rhythm: Normal rate and regular rhythm with a 2/6 CARILTO   Pulmonary:      Effort: Pulmonary effort is normal.      Breath sounds: diminished in the bases   Abdominal:      General: Abdomen is flat.      Palpations: Abdomen is soft.   Musculoskeletal:         General: Normal range of motion.      Cervical back: Normal range of motion.   Skin:     General: Skin is warm and dry.   Neurological:      General: No focal deficit present.      Mental Status: He is alert and oriented to person, place, and time.   Psychiatric:         Mood and Affect: Mood normal.            Last Labs:  CBC -  Lab Results   Component Value Date    WBC 6.1 06/02/2025    HGB 14.2 06/02/2025    HCT 44.4 06/02/2025    MCV 91.0 06/02/2025     06/02/2025       CMP -  Lab Results   Component Value Date    CALCIUM 8.9 02/26/2025    PHOS 4.0 01/02/2025    PROT 6.2 (L) 02/26/2025    ALBUMIN 3.9 02/26/2025    AST 14 02/26/2025    ALT 14 02/26/2025    ALKPHOS 54 02/26/2025    BILITOT 0.3 02/26/2025       LIPID PANEL -   Lab Results   Component Value Date    CHOL 177 08/16/2024    TRIG 134 08/16/2024    HDL 55.9 08/16/2024    CHHDL 3.2 08/16/2024    LDLF 94 12/15/2022    VLDL 27 08/16/2024    NHDL 121 08/16/2024       RENAL FUNCTION PANEL -   Lab Results   Component Value Date    GLUCOSE 103 (H) 02/26/2025     02/26/2025    K 4.3 02/26/2025     02/26/2025    CO2 30 02/26/2025    ANIONGAP 11 02/26/2025    BUN 11 02/26/2025    CREATININE 0.82 02/26/2025    CALCIUM 8.9 02/26/2025    PHOS 4.0 01/02/2025    ALBUMIN 3.9 02/26/2025        Lab Results    Component Value Date     (H) 12/29/2024    HGBA1C 5.5 12/15/2022       Last Cardiology Tests:  ECG:  EKG independently reviewed from showed sinus rhythm with a first degree AV block heart rate 60 bpm  Echo:  Echocardiogram 12/2024  1. The left ventricular systolic function is normal, with a Giron's biplane calculated ejection fraction of 65%.   2. Subtle D shaped septum in systole suggesting right ventricular pressure overload.   3. There is normal right ventricular global systolic function.   4. Although RV dimensions are normal by measurement, visually the RV appears mildly dilated.   5. The left atrium is mildly dilated.   6. The right atrium is moderately dilated.   7. Mildly elevated right ventricular systolic pressure.    Echocardiogram 1/18/2023  1. Left ventricular systolic function is normal with a 60-65% estimated ejection fraction.  2. Technically difficult study precludes definitive valvular assessment, albeit no gross abnormalities evident.  3. There is no evidence of a patent foramen ovale.    Echocardiogram 10/7/2020   1. The left ventricular systolic function is hyperdynamic with a 65-70%  estimated ejection fraction.  2. Moderately enlarged right ventricle.  3. There is mild to moderate tricuspid regurgitation.  4. Moderately elevated pulmonary artery pressure, RVSP 62 mmHg.  Echo 11/2018:   1. Suboptimal image quality due to body habitus.   2. The left ventricular systolic function is normal with a 60-65% estimated  ejection fraction.   3. RV and RA appear mildly dilated.   4. Mildly elevated RVSP.   Ejection Fractions  LVEF 60-65%    Cath:  Right heart cath 10/11/2022  1. Normal cardiac output.  2. MPAP 31, PAOP 11, PVR 2.6, CO 7.7.  3. Pulmonary arterial hypertension.      Right heart cath 4/12/2021  1. Normal cardiac output.  2. RA 15 mmHg, RV 35/15, PA 44/26 (35 mmHg), PAOP 2 mmHg.  3. Thermo CO 7.4 l/min, CI 3.4 l/min/m2.  4. PVR 4.5.  5. Pulmonary arterial hypertension.   Stress  Test:  Stress test 5/2019:  1. Normal myocardial perfusion imaging in response to pharmacologic  stress, no evidence of ischemia or prior infarct.  2. Well-maintained left ventricular function, estimated to be greater  than 65%.  Cardiac Imaging:  Zio monitor 6/3/2019   sinus rhythm first degree AV block   12 SVT   max heart rate 182 bpm   min heart rate 42 bpm   average heart rate 60 bpm     Assessment/Plan   Very pleasant 65 year-old female with PAH, asthma, sleep apnea, and HTN, complains of intermittent chest pain. She continues to do well from a cardiac standpoint. Breathing has been stable. She currently is seeing pulmonary for her pulmonary hypertension. Blood pressure and heart rate are well controlled today.     Plan   -continue Hydralazine 50 mg twice a day   -continue Aspirin 81 mg daily and Simvastatin 80 mg daily   -follow up in November   -call with any questions         Hortencia Ferraro, ANNETTE-CNP

## 2025-06-10 ENCOUNTER — TREATMENT (OUTPATIENT)
Dept: PHYSICAL THERAPY | Facility: HOSPITAL | Age: 65
End: 2025-06-10
Payer: COMMERCIAL

## 2025-06-10 DIAGNOSIS — R26.89 OTHER ABNORMALITIES OF GAIT AND MOBILITY: Primary | ICD-10-CM

## 2025-06-10 DIAGNOSIS — N39.0 RECURRENT URINARY TRACT INFECTION: ICD-10-CM

## 2025-06-10 PROCEDURE — 97110 THERAPEUTIC EXERCISES: CPT | Mod: GP | Performed by: PHYSICAL THERAPIST

## 2025-06-10 ASSESSMENT — PAIN - FUNCTIONAL ASSESSMENT: PAIN_FUNCTIONAL_ASSESSMENT: 0-10

## 2025-06-10 ASSESSMENT — PAIN SCALES - GENERAL: PAINLEVEL_OUTOF10: 0 - NO PAIN

## 2025-06-10 NOTE — PROGRESS NOTES
Physical Therapy Reassessment and Treatment    Patient Name: Farida Traylor  MRN: [id number]  Today's Date: 6/10/2025  Time Calculation  Start Time: 1348  Stop Time: 1422 (patient needed to leave early)  Time Calculation (min): 34 min        PT Therapeutic Procedures Time Entry  Therapeutic Exercise Time Entry: 34                Insurance:  Visit Limit: [place]  Date Range: no auth  Co-Pay: $0    Current Problem  1. Other abnormalities of gait and mobility  Follow Up In Physical Therapy        Problem List Items Addressed This Visit           ICD-10-CM    Other abnormalities of gait and mobility - Primary R26.89       General  Reason for Referral: balance issues  Referred By: Wilma DODSON  Past Medical History Relevant to Rehab: carotid artery occlusion, pulmonary arterial hypertension, morbid obesity, depression, opiate dependence, ADHD, anxiety, low back pain, primary OA of bilateral knees, fall, left calf pain, vascular dementia with depression, lumbar canal stenosis, DJD, amnesia, aphasia, CVA, TIA, tremor    Subjective   Current Condition:   Better  Patient reports that she has had to miss physical therapy due to being sick. Patient states that she fell stepping off of a curb last week.    Performing HEP?: No    Precautions  Precautions  Medical Precautions: Fall precautions  Rehab Fall Risk: Low: Age 65 or older and Moderate: Fall within last 6 months    This patient is identified as a moderate fall risk based on the highest level factors present.    Outpatient Rehab Fall Risk Interventions:  Moderate Fall Risk Interventions: Moderate Fall Risk Interventions: Fall prevention education provided and Optimize clinic environment ensuring safe and accessible pathways    Pain  Pain Assessment: 0-10  0-10 (Numeric) Pain Score: 0 - No pain (3/10 at the worst)  Pain Type: Chronic pain  Pain Location: Back  Pain Orientation: Lower    Objective   Lumbar Spine  Functional Rating Scale  LEFS   /80: 44    Lumbar AROM  Lumbar  "flexion: (60°): mod impairment  Lumbar extension (25°): mod impairment  Lumbar rotation right (30°): WFL  Lumbar rotation left (30°): WFL  Lumbar sidebend right (25°): min impairment  Lumbar sidebend left (25°): min impairment    Hip PROM  R hip flexion: (125°): min impairment  L hip flexion: (125°): min impairment  R hip abduction: (45°): WFL  L hip abduction: (45°): WFL  R hip ER: (45°): WFL  L hip ER: (45°): WFL  R hip IR: (45°): WFL  L hip IR: (45°): WFL    Specific Lower Extremity MMT  R Iliopsoas: (5/5): 4+/5  L Iliopsoas: (5/5): 4+/5  R Gluteals (sidelying): (5/5): 4/5  L Gluteals (sidelying): (5/5): 4/5  R knee flexion: (5/5): 5/5  L knee flexion: (5/5): 5/5  R knee extension: (5/5): 5/5  L knee extension: (5/5): 5/5    Special Tests  SAHRA: (Negative): + ble    Flexibility  R hamstrings: min impairment  L hamstrings: min impairment    Knee AROM  Knee AROM WFL: yes    Knee MMT  Knee MMT WFL: yes    Flexibility  Flexibility Comment: impaired bilateral piriformis    Outcome Measures:  Other Measures  5x Sit to Stand: 15.32 seconds  Lower Extremity Funtional Score (LEFS): 44    Patient scored 15.32 seconds on 5x STS (5x Sit to Stand) indicating: >15 seconds considered higher risk of falls.     Treatments:  Therapeutic Exercise  Therapeutic Exercise Performed: Yes  Therapeutic Exercise Activity 1: STS x5  Therapeutic Exercise Activity 2: LTR x10  Therapeutic Exercise Activity 3: figure 4 piriformis stretch 3x20\"  Therapeutic Exercise Activity 4: bridge 2x5  Therapeutic Exercise Activity 5: Nu-Step lvl3 x5'         EDUCATION:   Individual(s) Educated: Patient  Education Provided: yes  Handout(s) Provided: Scanned into chart  Home Program: re-issued home exercise program   Risk and Benefits Discussed with Patient/Caregiver/Other: Yes   Patient/Caregiver Demonstrated Understanding: Yes   Patient Response to Education: Patient/Caregiver verbalized understanding of information, Patient/Caregiver performed return " demonstration of exercises/activities, and Patient/Caregiver asked appropriate questions    Assessment: Today's session focused on strength and flexibility. Patient demonstrated good tolerance to session this date. They are demonstrating fair progress in skilled rehabilitation at this time, though they are still limited by decreased muscle performance, pain, and impaired balance/gait. Patient returns for follow up to improve her balance and gait. Patient demonstrated good tolerance to exercises without complaints of increased pain or loss of balance. Patient's frequency will be decreased to 1x/wk at this time. Patient continues to be a good candidate for skilled PT in order to further address listed impairments. Updated HEP to reflect today's session. All questions answered.         PT Assessment  PT Assessment Results: Decreased strength, Decreased range of motion, Impaired balance, Decreased mobility, Obesity, Pain  Rehab Prognosis: Good  Evaluation/Treatment Tolerance: Patient tolerated treatment well    Plan: Continue with POC. Progress exercises as tolerated to improve patient's balance, gait, and functional mobility.  OP PT Plan  Treatment/Interventions: Education/ Instruction, Gait training, Manual therapy, Neuromuscular re-education, Therapeutic activities, Therapeutic exercises  PT Plan: Skilled PT  PT Frequency: 1 time per week  Duration: 6 weeks  Onset Date: 04/23/25  Certification Period Start Date: 05/05/25  Certification Period End Date: 07/28/25  Number of Treatments Authorized: 2 of  8  Rehab Potential: Good  Plan of Care Agreement: Patient    Goals:  Active       PT Problem       Patient will improve active range of motion in deficit areas for ADL completion.    (Not Progressing)       Start:  05/05/25    Expected End:  07/28/25              Patient will improve strength in deficit areas so patient can work with less pain.   (Not Progressing)       Start:  05/05/25    Expected End:  07/28/25             Patient will stand >30 minutes without pain to cook a meal.  (Not Progressing)       Start:  05/05/25    Expected End:  07/28/25         Goal Note       Due to back and bilateral knee pain.              Patient will demonstrate independence in home program for support of progression (Not Progressing)       Start:  05/05/25    Expected End:  07/28/25            Patient will report pain of no more than 2/10 demonstrating a reduction of overall pain (Progressing)       Start:  05/05/25    Expected End:  07/28/25            Patient will show a significant change in LEFS (49 to 58) patient reported outcome tool to demonstrate subjective imporovement (Not Progressing)       Start:  05/05/25    Expected End:  07/28/25            Patient will perform 5 Times Sit to  15 seconds or less. (Not Progressing)       Start:  05/05/25    Expected End:  07/28/25                     Reagan Garber, PT

## 2025-06-11 DIAGNOSIS — K21.9 GERD WITHOUT ESOPHAGITIS: ICD-10-CM

## 2025-06-11 RX ORDER — OMEPRAZOLE 40 MG/1
40 CAPSULE, DELAYED RELEASE ORAL
Qty: 90 CAPSULE | Refills: 1 | Status: SHIPPED | OUTPATIENT
Start: 2025-06-11 | End: 2026-06-11

## 2025-06-11 RX ORDER — METHENAMINE HIPPURATE 1000 MG/1
1 TABLET ORAL 2 TIMES DAILY
Qty: 60 TABLET | Refills: 11 | Status: SHIPPED | OUTPATIENT
Start: 2025-06-11

## 2025-06-16 DIAGNOSIS — I27.0 IDIOPATHIC PAH (PULMONARY ARTERIAL HYPERTENSION) (MULTI): ICD-10-CM

## 2025-06-18 ENCOUNTER — TREATMENT (OUTPATIENT)
Dept: PHYSICAL THERAPY | Facility: HOSPITAL | Age: 65
End: 2025-06-18
Payer: COMMERCIAL

## 2025-06-18 DIAGNOSIS — R26.89 OTHER ABNORMALITIES OF GAIT AND MOBILITY: ICD-10-CM

## 2025-06-18 PROCEDURE — 97112 NEUROMUSCULAR REEDUCATION: CPT | Mod: GP,CQ

## 2025-06-18 PROCEDURE — 97110 THERAPEUTIC EXERCISES: CPT | Mod: GP,CQ

## 2025-06-18 ASSESSMENT — PAIN SCALES - GENERAL: PAINLEVEL_OUTOF10: 5 - MODERATE PAIN

## 2025-06-18 ASSESSMENT — PAIN - FUNCTIONAL ASSESSMENT: PAIN_FUNCTIONAL_ASSESSMENT: 0-10

## 2025-06-18 NOTE — PROGRESS NOTES
"  Physical Therapy Treatment    Patient Name: Farida Traylor  MRN: [id number]  Encounter Date: 6/18/2025  Time Calculation  Start Time: 1435  Stop Time: 1515  Time Calculation (min): 40 min        PT Therapeutic Procedures Time Entry  Therapeutic Exercise Time Entry: 30  Neuromuscular Re-Education Time Entry: 10     Current Problem  Problem List Items Addressed This Visit           ICD-10-CM    Other abnormalities of gait and mobility R26.89     1. Other abnormalities of gait and mobility  Follow Up In Physical Therapy          General  Reason for Referral: balance issues  Referred By: Wilma DODSON  Past Medical History Relevant to Rehab: carotid artery occlusion, pulmonary arterial hypertension, morbid obesity, depression, opiate dependence, ADHD, anxiety, low back pain, primary OA of bilateral knees, fall, left calf pain, vascular dementia with depression, lumbar canal stenosis, DJD, amnesia, aphasia, CVA, TIA, tremor  Preferred Learning Style: written  General Comment: Pt reports 5/10 pain L knee medial aspect with pain worse the last week or so after getting over illness.    Subjective   Current Condition:   Same  Patient reports feeling same type of pain in L knee medial aspect contributing to difficulty with walking and balance. Reports increased pain transitioning sit to stand and lower surfaces.     Performing HEP?: Yes    Precautions  Precautions  Medical Precautions: Fall precautions  Pain  Pain Assessment: 0-10  0-10 (Numeric) Pain Score: 5 - Moderate pain  Pain Type: Chronic pain  Pain Location: Knee  Pain Orientation: Left    Objective     Treatments:    Therapeutic Exercise  Therapeutic Exercise Performed: Yes  Therapeutic Exercise Activity 1: 20x LAQ #2  Therapeutic Exercise Activity 2: LTR x10  Therapeutic Exercise Activity 3: figure 4 piriformis stretch 3x20\"  Therapeutic Exercise Activity 4: bridge 2x5  Therapeutic Exercise Activity 5: Nu-Step lvl3 x5'  Therapeutic Exercise Activity 6: 20x Supine " QS  Therapeutic Exercise Activity 7: 15x Bridge  Therapeutic Exercise Activity 8: 20x hamstring curl GTB  Therapeutic Exercise Activity 9: 3x30 sec hamstring curls off step b le  Therapeutic Exercise Activity 10: 4inch fwd step up 15x R/L    Balance/Neuromuscular Re-Education  Balance/Neuromuscular Re-Education Activity Performed: Yes  Balance/Neuromuscular Re-Education Activity 1: 2x30 sec wide bela AIREX pad  Balance/Neuromuscular Re-Education Activity 2: 2x30 sec NBOS airex pad  Balance/Neuromuscular Re-Education Activity 3: tandem stance airex pad 2sec R LE fwd, 7 sec L Le fwd      EDUCATION:   Individual(s) Educated: Patient  Education Provided: added hamstring and calf stretching to HEP   Handout(s) Provided: Scanned into chart  Home Program: 3x30 sec each le    Risk and Benefits Discussed with Patient/Caregiver/Other: Yes   Patient/Caregiver Demonstrated Understanding: Yes   Patient Response to Education: Patient/Caregiver verbalized understanding of information, Patient/Caregiver performed return demonstration of exercises/activities, and Patient/Caregiver asked appropriate questions    Assessment: Pt required speed cues x 2 as well as proper hip and knee neutral control to reduce ER compensations and reduced knee valgus compensations during step up and downs. Longer sitting rest breaks required during standing stretching and balance challenges with limited to 5 min with SOB and pain in L knee and back contributing to need to sit.     PT Assessment  PT Assessment Results: Decreased strength, Decreased range of motion, Impaired balance, Decreased mobility, Obesity, Pain  Rehab Prognosis: Good  Evaluation/Treatment Tolerance: Patient limited by pain    Plan: Continue with POC. Continue with core stability, LE strengthening, ROM, flexibility, and balance, so the patient can perform FA's without increased pain or difficulty.    OP PT Plan  Treatment/Interventions: Education/ Instruction, Gait training, Manual  therapy, Neuromuscular re-education, Therapeutic activities, Therapeutic exercises  PT Plan: Skilled PT  PT Frequency: 1 time per week  Duration: 6 weeks  Onset Date: 04/23/25  Certification Period Start Date: 05/05/25  Certification Period End Date: 07/28/25  Number of Treatments Authorized: 3 of  8  Rehab Potential: Good  Plan of Care Agreement: Patient  Payor: [employer] / Plan: [employer] / Product Type: *No Product type* /     Visit count this episode: 3 visits    Goals:  Active       PT Problem       Patient will improve active range of motion in deficit areas for ADL completion.    (Progressing)       Start:  05/05/25    Expected End:  07/28/25              Patient will improve strength in deficit areas so patient can work with less pain.   (Progressing)       Start:  05/05/25    Expected End:  07/28/25            Patient will stand >30 minutes without pain to cook a meal.  (Progressing)       Start:  05/05/25    Expected End:  07/28/25            Patient will demonstrate independence in home program for support of progression (Progressing)       Start:  05/05/25    Expected End:  07/28/25         Goal Note       Patient will demonstrate independence in home program for support of progression              Patient will report pain of no more than 2/10 demonstrating a reduction of overall pain (Progressing)       Start:  05/05/25    Expected End:  07/28/25            Patient will show a significant change in LEFS (49 to 58) patient reported outcome tool to demonstrate subjective imporovement (Progressing)       Start:  05/05/25    Expected End:  07/28/25            Patient will perform 5 Times Sit to  15 seconds or less. (Progressing)       Start:  05/05/25    Expected End:  07/28/25                     Jeanna Velazquez PTA

## 2025-06-19 LAB
ERYTHROCYTE [DISTWIDTH] IN BLOOD BY AUTOMATED COUNT: 14.3 % (ref 11–15)
HCT VFR BLD AUTO: 50.1 % (ref 35–45)
HGB BLD-MCNC: 16.2 G/DL (ref 11.7–15.5)
MCH RBC QN AUTO: 29 PG (ref 27–33)
MCHC RBC AUTO-ENTMCNC: 32.3 G/DL (ref 32–36)
MCV RBC AUTO: 89.8 FL (ref 80–100)
PLATELET # BLD AUTO: 277 THOUSAND/UL (ref 140–400)
PMV BLD REES-ECKER: 9.6 FL (ref 7.5–12.5)
RBC # BLD AUTO: 5.58 MILLION/UL (ref 3.8–5.1)
WBC # BLD AUTO: 6.7 THOUSAND/UL (ref 3.8–10.8)

## 2025-06-20 DIAGNOSIS — I27.0 IDIOPATHIC PAH (PULMONARY ARTERIAL HYPERTENSION) (MULTI): ICD-10-CM

## 2025-06-23 LAB
ATRIAL RATE: 60 BPM
P AXIS: 61 DEGREES
P OFFSET: 162 MS
P ONSET: 111 MS
PR INTERVAL: 216 MS
Q ONSET: 219 MS
QRS COUNT: 10 BEATS
QRS DURATION: 120 MS
QT INTERVAL: 478 MS
QTC CALCULATION(BAZETT): 478 MS
QTC FREDERICIA: 478 MS
R AXIS: 49 DEGREES
T AXIS: 74 DEGREES
T OFFSET: 458 MS
VENTRICULAR RATE: 60 BPM

## 2025-06-25 ENCOUNTER — DOCUMENTATION (OUTPATIENT)
Dept: PHYSICAL THERAPY | Facility: HOSPITAL | Age: 65
End: 2025-06-25
Payer: COMMERCIAL

## 2025-06-25 ENCOUNTER — APPOINTMENT (OUTPATIENT)
Dept: PHYSICAL THERAPY | Facility: HOSPITAL | Age: 65
End: 2025-06-25
Payer: COMMERCIAL

## 2025-06-25 NOTE — PROGRESS NOTES
Physical Therapy                 Therapy Communication Note    Patient Name: Farida Traylor  MRN: [id number]  Department:   Room: Room/bed info not found  Today's Date: 6/25/2025     Discipline: Physical Therapy    Missed Visit:   cx    Missed Visit Reason:  no reason given     Missed Time: Cancel    Comment:

## 2025-06-27 ENCOUNTER — TREATMENT (OUTPATIENT)
Dept: PHYSICAL THERAPY | Facility: HOSPITAL | Age: 65
End: 2025-06-27
Payer: COMMERCIAL

## 2025-06-27 DIAGNOSIS — R26.89 OTHER ABNORMALITIES OF GAIT AND MOBILITY: ICD-10-CM

## 2025-06-27 PROCEDURE — 97112 NEUROMUSCULAR REEDUCATION: CPT | Mod: GP,CQ

## 2025-06-27 PROCEDURE — 97110 THERAPEUTIC EXERCISES: CPT | Mod: GP,CQ

## 2025-06-27 ASSESSMENT — PAIN - FUNCTIONAL ASSESSMENT: PAIN_FUNCTIONAL_ASSESSMENT: 0-10

## 2025-06-27 ASSESSMENT — PAIN SCALES - GENERAL: PAINLEVEL_OUTOF10: 5 - MODERATE PAIN

## 2025-06-27 NOTE — PROGRESS NOTES
"  Physical Therapy Treatment    Patient Name: Farida Traylor  MRN: [id number]  Today's Date: 6/27/2025  Time Calculation  Start Time: 1332  Stop Time: 1411  Time Calculation (min): 39 min     PT Modalities Time Entry  Infrared Time Entry: 31  PT Therapeutic Procedures Time Entry  Neuromuscular Re-Education Time Entry: 8                Current Problem  1. Other abnormalities of gait and mobility  Follow Up In Physical Therapy          General  Reason for Referral: balance issues  Referred By: Wilma DODSON  Past Medical History Relevant to Rehab: carotid artery occlusion, pulmonary arterial hypertension, morbid obesity, depression, opiate dependence, ADHD, anxiety, low back pain, primary OA of bilateral knees, fall, left calf pain, vascular dementia with depression, lumbar canal stenosis, DJD, amnesia, aphasia, CVA, TIA, tremor  Preferred Learning Style: written    Subjective   Current Condition:   Same  Patient reports no recent falls. States she is having pain injections for her LBP soon.     Performing HEP?: Yes    Precautions  Precautions  Medical Precautions: Fall precautions  Pain  Pain Assessment: 0-10  0-10 (Numeric) Pain Score: 5 - Moderate pain  Pain Type: Chronic pain  Pain Location: Knee  Pain Orientation: Left    Objective         Treatments:    Therapeutic Exercise  Therapeutic Exercise Performed: Yes  Therapeutic Exercise Activity 1: 20x LAQ #2  Therapeutic Exercise Activity 2: LTR x10  Therapeutic Exercise Activity 3: figure 4 piriformis stretch 3x20\"  Therapeutic Exercise Activity 4: bridge 2x5  Therapeutic Exercise Activity 5: Nu-Step lvl3 x5'  Therapeutic Exercise Activity 7: 15x Bridge  Therapeutic Exercise Activity 8: 20x hamstring curl GTB  Therapeutic Exercise Activity 9: 3x30 sec hamstring curls off step b le  Therapeutic Exercise Activity 10: 4inch fwd step up 15x R/L    Balance/Neuromuscular Re-Education  Balance/Neuromuscular Re-Education Activity Performed: Yes  Balance/Neuromuscular " "Re-Education Activity 2: NBOS with head turns 30\"  Balance/Neuromuscular Re-Education Activity 3: 1/2 tandem Airex 27\" L, 30\" R             EDUCATION:   Individual(s) Educated: Patient  Education Provided: HEP  Risk and Benefits Discussed with Patient/Caregiver/Other: Yes   Patient/Caregiver Demonstrated Understanding: Yes   Patient Response to Education: Patient/Caregiver verbalized understanding of information    Assessment: Patient requires rest periods throughout session d/t c/o fatigue, SOB and LBP. She was able to complete strengthening exercises without c/o increased pain in the L knee. Attempted tandem stance on compliant surface however hold time limited to <8\" each, so modified to 1/2 tandem for now.   PT Assessment  PT Assessment Results: Decreased strength, Decreased range of motion, Impaired balance, Decreased mobility, Obesity, Pain  Rehab Prognosis: Good    Plan: Continue to progress current POC as tolerated to facilitate ability to perform functional activities.   OP PT Plan  Treatment/Interventions: Education/ Instruction, Gait training, Manual therapy, Neuromuscular re-education, Therapeutic activities, Therapeutic exercises  PT Plan: Skilled PT  PT Frequency: 1 time per week  Duration: 6 weeks  Onset Date: 04/23/25  Certification Period Start Date: 05/05/25  Certification Period End Date: 07/28/25  Number of Treatments Authorized: 4 of 8  Rehab Potential: Good  Plan of Care Agreement: Patient    Goals:  Active       PT Problem       Patient will improve active range of motion in deficit areas for ADL completion.    (Progressing)       Start:  05/05/25    Expected End:  07/28/25              Patient will improve strength in deficit areas so patient can work with less pain.   (Progressing)       Start:  05/05/25    Expected End:  07/28/25            Patient will stand >30 minutes without pain to cook a meal.  (Progressing)       Start:  05/05/25    Expected End:  07/28/25            Patient will " demonstrate independence in home program for support of progression (Progressing)       Start:  05/05/25    Expected End:  07/28/25         Goal Note       Patient will demonstrate independence in home program for support of progression              Patient will report pain of no more than 2/10 demonstrating a reduction of overall pain (Progressing)       Start:  05/05/25    Expected End:  07/28/25            Patient will show a significant change in LEFS (49 to 58) patient reported outcome tool to demonstrate subjective imporovement (Progressing)       Start:  05/05/25    Expected End:  07/28/25            Patient will perform 5 Times Sit to  15 seconds or less. (Progressing)       Start:  05/05/25    Expected End:  07/28/25                     Yannick Merlos, PTA

## 2025-07-01 ENCOUNTER — TREATMENT (OUTPATIENT)
Dept: PHYSICAL THERAPY | Facility: HOSPITAL | Age: 65
End: 2025-07-01
Payer: COMMERCIAL

## 2025-07-01 DIAGNOSIS — R26.89 OTHER ABNORMALITIES OF GAIT AND MOBILITY: ICD-10-CM

## 2025-07-01 PROCEDURE — 97112 NEUROMUSCULAR REEDUCATION: CPT | Mod: GP,CQ

## 2025-07-01 PROCEDURE — 97110 THERAPEUTIC EXERCISES: CPT | Mod: GP,CQ

## 2025-07-01 ASSESSMENT — PAIN - FUNCTIONAL ASSESSMENT: PAIN_FUNCTIONAL_ASSESSMENT: 0-10

## 2025-07-01 ASSESSMENT — PAIN SCALES - GENERAL: PAINLEVEL_OUTOF10: 3

## 2025-07-01 NOTE — PROGRESS NOTES
Physical Therapy Treatment    Patient Name: Farida Traylor  MRN: [id number]  Today's Date: 7/1/2025  Time Calculation  Start Time: 1433  Stop Time: 1512  Time Calculation (min): 39 min        PT Therapeutic Procedures Time Entry  Therapeutic Exercise Time Entry: 30  Neuromuscular Re-Education Time Entry: 9                Current Problem  1. Other abnormalities of gait and mobility  Follow Up In Physical Therapy          General  Reason for Referral: balance issues  Referred By: Wilma DODSON  Past Medical History Relevant to Rehab: carotid artery occlusion, pulmonary arterial hypertension, morbid obesity, depression, opiate dependence, ADHD, anxiety, low back pain, primary OA of bilateral knees, fall, left calf pain, vascular dementia with depression, lumbar canal stenosis, DJD, amnesia, aphasia, CVA, TIA, tremor  Preferred Learning Style: written    Subjective   Current Condition:   Same  Patient reports she is limited with standing and walking d/t LBP. Patient reporting no recent falls.     Performing HEP?: Yes    Precautions  Precautions  Medical Precautions: Fall precautions  Pain  Pain Assessment: 0-10  0-10 (Numeric) Pain Score: 3  Pain Location: Back (and bilateral knees)    Objective         Treatments:    Therapeutic Exercise  Therapeutic Exercise Performed: Yes  Therapeutic Exercise Activity 1: LAQ #2 2x10  Therapeutic Exercise Activity 4: seated hip flexion Blue x20 R/L  Therapeutic Exercise Activity 5: Nu-Step lvl3 x5'  Therapeutic Exercise Activity 6: seated Hip ABD x20  Therapeutic Exercise Activity 7: Standing Hip ABD and Extension x10 R/L  Therapeutic Exercise Activity 8: 20x hamstring curl Blue  Therapeutic Exercise Activity 9: 3x30 sec hamstring curls off step b le  Therapeutic Exercise Activity 10: 4inch fwd step up 10x R/L    Balance/Neuromuscular Re-Education  Balance/Neuromuscular Re-Education Activity Performed: Yes  Balance/Neuromuscular Re-Education Activity 1: Airex 1/2 tandem  "30\"  Balance/Neuromuscular Re-Education Activity 2: Airex shoulder width DUC with head turns 30\"                           EDUCATION:   Individual(s) Educated: Patient  Education Provided: HEP   Risk and Benefits Discussed with Patient/Caregiver/Other: Yes   Patient/Caregiver Demonstrated Understanding: Yes   Patient Response to Education: Patient/Caregiver verbalized understanding of information    Assessment: Patient requires rest periods to recover from increased bilateral knee and back pain with standing exercises, then is able to resume. Patient c/o increased knee pain with balance on compliant surface, so limit to 30 second hold times.   PT Assessment  PT Assessment Results: Decreased strength, Decreased range of motion, Impaired balance, Decreased mobility, Obesity, Pain  Rehab Prognosis: Good    Plan: Continue to progress current POC as tolerated to facilitate ability to perform functional activities.   OP PT Plan  Treatment/Interventions: Education/ Instruction, Gait training, Manual therapy, Neuromuscular re-education, Therapeutic activities, Therapeutic exercises  PT Plan: Skilled PT  PT Frequency: 1 time per week  Duration: 6 weeks  Onset Date: 04/23/25  Certification Period Start Date: 05/05/25  Certification Period End Date: 07/28/25  Number of Treatments Authorized: 5 of 8  Rehab Potential: Good  Plan of Care Agreement: Patient    Goals:  Active       PT Problem       Patient will improve active range of motion in deficit areas for ADL completion.    (Progressing)       Start:  05/05/25    Expected End:  07/28/25              Patient will improve strength in deficit areas so patient can work with less pain.   (Progressing)       Start:  05/05/25    Expected End:  07/28/25            Patient will stand >30 minutes without pain to cook a meal.  (Progressing)       Start:  05/05/25    Expected End:  07/28/25            Patient will demonstrate independence in home program for support of progression " (Progressing)       Start:  05/05/25    Expected End:  07/28/25         Goal Note       Patient will demonstrate independence in home program for support of progression              Patient will report pain of no more than 2/10 demonstrating a reduction of overall pain (Progressing)       Start:  05/05/25    Expected End:  07/28/25            Patient will show a significant change in LEFS (49 to 58) patient reported outcome tool to demonstrate subjective imporovement (Progressing)       Start:  05/05/25    Expected End:  07/28/25            Patient will perform 5 Times Sit to  15 seconds or less. (Progressing)       Start:  05/05/25    Expected End:  07/28/25                     Yannick Merlos, PTA

## 2025-07-09 ENCOUNTER — APPOINTMENT (OUTPATIENT)
Dept: RADIOLOGY | Facility: HOSPITAL | Age: 65
End: 2025-07-09
Payer: COMMERCIAL

## 2025-07-09 ENCOUNTER — TREATMENT (OUTPATIENT)
Dept: PHYSICAL THERAPY | Facility: HOSPITAL | Age: 65
End: 2025-07-09
Payer: COMMERCIAL

## 2025-07-09 ENCOUNTER — HOSPITAL ENCOUNTER (EMERGENCY)
Facility: HOSPITAL | Age: 65
Discharge: HOME | End: 2025-07-09
Payer: COMMERCIAL

## 2025-07-09 VITALS
SYSTOLIC BLOOD PRESSURE: 146 MMHG | HEIGHT: 61 IN | OXYGEN SATURATION: 94 % | WEIGHT: 290 LBS | RESPIRATION RATE: 18 BRPM | HEART RATE: 59 BPM | TEMPERATURE: 98.3 F | DIASTOLIC BLOOD PRESSURE: 55 MMHG | BODY MASS INDEX: 54.75 KG/M2

## 2025-07-09 DIAGNOSIS — N30.01 ACUTE CYSTITIS WITH HEMATURIA: ICD-10-CM

## 2025-07-09 DIAGNOSIS — M54.50 ACUTE RIGHT-SIDED LOW BACK PAIN WITHOUT SCIATICA: Primary | ICD-10-CM

## 2025-07-09 DIAGNOSIS — N20.0 KIDNEY STONE: ICD-10-CM

## 2025-07-09 DIAGNOSIS — R26.89 OTHER ABNORMALITIES OF GAIT AND MOBILITY: Primary | ICD-10-CM

## 2025-07-09 LAB
ALBUMIN SERPL BCP-MCNC: 4 G/DL (ref 3.4–5)
ALP SERPL-CCNC: 57 U/L (ref 33–136)
ALT SERPL W P-5'-P-CCNC: 14 U/L (ref 7–45)
ANION GAP SERPL CALC-SCNC: 14 MMOL/L (ref 10–20)
APPEARANCE UR: ABNORMAL
AST SERPL W P-5'-P-CCNC: 17 U/L (ref 9–39)
BASOPHILS # BLD AUTO: 0.02 X10*3/UL (ref 0–0.1)
BASOPHILS NFR BLD AUTO: 0.4 %
BILIRUB SERPL-MCNC: 0.4 MG/DL (ref 0–1.2)
BILIRUB UR STRIP.AUTO-MCNC: NEGATIVE MG/DL
BUN SERPL-MCNC: 14 MG/DL (ref 6–23)
CALCIUM SERPL-MCNC: 8.9 MG/DL (ref 8.6–10.3)
CHLORIDE SERPL-SCNC: 105 MMOL/L (ref 98–107)
CO2 SERPL-SCNC: 26 MMOL/L (ref 21–32)
COLOR UR: YELLOW
CREAT SERPL-MCNC: 0.91 MG/DL (ref 0.5–1.05)
EGFRCR SERPLBLD CKD-EPI 2021: 69 ML/MIN/1.73M*2
EOSINOPHIL # BLD AUTO: 0.18 X10*3/UL (ref 0–0.7)
EOSINOPHIL NFR BLD AUTO: 3.2 %
ERYTHROCYTE [DISTWIDTH] IN BLOOD BY AUTOMATED COUNT: 14.7 % (ref 11.5–14.5)
GLUCOSE SERPL-MCNC: 128 MG/DL (ref 74–99)
GLUCOSE UR STRIP.AUTO-MCNC: NORMAL MG/DL
HCT VFR BLD AUTO: 48.6 % (ref 36–46)
HGB BLD-MCNC: 16.5 G/DL (ref 12–16)
HYALINE CASTS #/AREA URNS AUTO: ABNORMAL /LPF
IMM GRANULOCYTES # BLD AUTO: 0.07 X10*3/UL (ref 0–0.7)
IMM GRANULOCYTES NFR BLD AUTO: 1.2 % (ref 0–0.9)
KETONES UR STRIP.AUTO-MCNC: NEGATIVE MG/DL
LEUKOCYTE ESTERASE UR QL STRIP.AUTO: ABNORMAL
LYMPHOCYTES # BLD AUTO: 1.38 X10*3/UL (ref 1.2–4.8)
LYMPHOCYTES NFR BLD AUTO: 24.5 %
MCH RBC QN AUTO: 30.3 PG (ref 26–34)
MCHC RBC AUTO-ENTMCNC: 34 G/DL (ref 32–36)
MCV RBC AUTO: 89 FL (ref 80–100)
MONOCYTES # BLD AUTO: 0.46 X10*3/UL (ref 0.1–1)
MONOCYTES NFR BLD AUTO: 8.2 %
MUCOUS THREADS #/AREA URNS AUTO: ABNORMAL /LPF
NEUTROPHILS # BLD AUTO: 3.52 X10*3/UL (ref 1.2–7.7)
NEUTROPHILS NFR BLD AUTO: 62.5 %
NITRITE UR QL STRIP.AUTO: NEGATIVE
NRBC BLD-RTO: 0.4 /100 WBCS (ref 0–0)
PH UR STRIP.AUTO: 6.5 [PH]
PLATELET # BLD AUTO: 199 X10*3/UL (ref 150–450)
POTASSIUM SERPL-SCNC: 3.6 MMOL/L (ref 3.5–5.3)
PROT SERPL-MCNC: 6.6 G/DL (ref 6.4–8.2)
PROT UR STRIP.AUTO-MCNC: ABNORMAL MG/DL
RBC # BLD AUTO: 5.44 X10*6/UL (ref 4–5.2)
RBC # UR STRIP.AUTO: ABNORMAL MG/DL
RBC #/AREA URNS AUTO: >20 /HPF
SODIUM SERPL-SCNC: 141 MMOL/L (ref 136–145)
SP GR UR STRIP.AUTO: 1.04
SQUAMOUS #/AREA URNS AUTO: ABNORMAL /HPF
TRANS CELLS #/AREA UR COMP ASSIST: ABNORMAL /HPF
UROBILINOGEN UR STRIP.AUTO-MCNC: NORMAL MG/DL
WBC # BLD AUTO: 5.6 X10*3/UL (ref 4.4–11.3)
WBC #/AREA URNS AUTO: >50 /HPF

## 2025-07-09 PROCEDURE — 2500000005 HC RX 250 GENERAL PHARMACY W/O HCPCS: Performed by: EMERGENCY MEDICINE

## 2025-07-09 PROCEDURE — 97110 THERAPEUTIC EXERCISES: CPT | Mod: GP | Performed by: PHYSICAL THERAPIST

## 2025-07-09 PROCEDURE — 81003 URINALYSIS AUTO W/O SCOPE: CPT | Performed by: EMERGENCY MEDICINE

## 2025-07-09 PROCEDURE — 2500000004 HC RX 250 GENERAL PHARMACY W/ HCPCS (ALT 636 FOR OP/ED): Mod: JZ,TB

## 2025-07-09 PROCEDURE — 72131 CT LUMBAR SPINE W/O DYE: CPT

## 2025-07-09 PROCEDURE — 99284 EMERGENCY DEPT VISIT MOD MDM: CPT | Mod: 25

## 2025-07-09 PROCEDURE — 36415 COLL VENOUS BLD VENIPUNCTURE: CPT | Performed by: EMERGENCY MEDICINE

## 2025-07-09 PROCEDURE — 96374 THER/PROPH/DIAG INJ IV PUSH: CPT

## 2025-07-09 PROCEDURE — 87086 URINE CULTURE/COLONY COUNT: CPT | Mod: GENLAB | Performed by: EMERGENCY MEDICINE

## 2025-07-09 PROCEDURE — 80053 COMPREHEN METABOLIC PANEL: CPT | Performed by: EMERGENCY MEDICINE

## 2025-07-09 PROCEDURE — 72131 CT LUMBAR SPINE W/O DYE: CPT | Mod: FOREIGN READ | Performed by: RADIOLOGY

## 2025-07-09 PROCEDURE — 74176 CT ABD & PELVIS W/O CONTRAST: CPT

## 2025-07-09 PROCEDURE — 85025 COMPLETE CBC W/AUTO DIFF WBC: CPT | Performed by: EMERGENCY MEDICINE

## 2025-07-09 PROCEDURE — 74176 CT ABD & PELVIS W/O CONTRAST: CPT | Mod: FOREIGN READ | Performed by: RADIOLOGY

## 2025-07-09 RX ORDER — KETOROLAC TROMETHAMINE 15 MG/ML
15 INJECTION, SOLUTION INTRAMUSCULAR; INTRAVENOUS ONCE
Status: COMPLETED | OUTPATIENT
Start: 2025-07-09 | End: 2025-07-09

## 2025-07-09 RX ORDER — CEPHALEXIN 500 MG/1
500 CAPSULE ORAL 2 TIMES DAILY
Qty: 20 CAPSULE | Refills: 0 | Status: SHIPPED | OUTPATIENT
Start: 2025-07-09 | End: 2025-07-19

## 2025-07-09 RX ADMIN — KETOROLAC TROMETHAMINE 15 MG: 15 INJECTION, SOLUTION INTRAMUSCULAR; INTRAVENOUS at 17:42

## 2025-07-09 RX ADMIN — Medication 2 L/MIN: at 16:53

## 2025-07-09 RX ADMIN — Medication 2 L/MIN: at 19:02

## 2025-07-09 ASSESSMENT — PAIN DESCRIPTION - LOCATION
LOCATION: BACK
LOCATION: BACK

## 2025-07-09 ASSESSMENT — PAIN DESCRIPTION - DIRECTION: RADIATING_TOWARDS: STOMACH

## 2025-07-09 ASSESSMENT — PAIN DESCRIPTION - PAIN TYPE
TYPE: CHRONIC PAIN
TYPE: ACUTE PAIN

## 2025-07-09 ASSESSMENT — PAIN - FUNCTIONAL ASSESSMENT
PAIN_FUNCTIONAL_ASSESSMENT: 0-10

## 2025-07-09 ASSESSMENT — PAIN SCALES - GENERAL
PAINLEVEL_OUTOF10: 3
PAINLEVEL_OUTOF10: 0 - NO PAIN
PAINLEVEL_OUTOF10: 3
PAINLEVEL_OUTOF10: 10 - WORST POSSIBLE PAIN

## 2025-07-09 ASSESSMENT — PAIN DESCRIPTION - DESCRIPTORS: DESCRIPTORS: ACHING

## 2025-07-09 ASSESSMENT — PAIN DESCRIPTION - ORIENTATION
ORIENTATION: RIGHT
ORIENTATION: LOWER

## 2025-07-09 ASSESSMENT — PAIN DESCRIPTION - FREQUENCY: FREQUENCY: CONSTANT/CONTINUOUS

## 2025-07-09 NOTE — ED TRIAGE NOTES
"Pt ambulatory to ED c/o R sided back pain that radiates into her stomach, pt states she has a procedure scheduled on Friday for her back but cannot wait because of the pain, pt is unsure why she is getting the procedure done, states she has hx of arthritis in her back and gets injections, pt is supposed to wear 3L NC continuously but did not drive here with the oxygen, pt states \"I don't know what's going on I just need my back figured out.\"   "

## 2025-07-09 NOTE — ED PROVIDER NOTES
HPI   Chief Complaint   Patient presents with    Back Pain       Patient is a 65-year-old female with significant history of COPD chronically on 3 L, TIA, hypertension, hyperlipidemia presents to the ED for right sided lower back pain that radiates into her right abdomen.  Patient denies any abdominal surgeries.  Patient states she has had kidney stones in the past is unsure if this feels similar.  Patient denies any dysuria or hematuria incontinence of bladder or bowel.  Patient states she did have diarrhea today endorses 2 episodes.  Patient denies any melena or hematochezia.  Patient states she has history of arthritis in her back and is scheduled for injections in her back in 2 days.  Patient denies any injury or falls.  Denies any numbness or tingling.  Patient denies any fever chills congestion cough chest pain shortness of breath.  Patient states she was feeling nauseous but denies any nausea currently.  Denies any tobacco alcohol or street drug abuse.              Patient History   Medical History[1]  Surgical History[2]  Family History[3]  Social History[4]    Physical Exam   ED Triage Vitals [07/09/25 1639]   Temperature Heart Rate Respirations BP   36.5 °C (97.7 °F) 74 18 150/75      Pulse Ox Temp Source Heart Rate Source Patient Position   (!) 87 % Temporal -- --      BP Location FiO2 (%)     -- --       Physical Exam  Cardiovascular:      Rate and Rhythm: Normal rate and regular rhythm.      Pulses: Normal pulses.   Pulmonary:      Effort: Pulmonary effort is normal.      Breath sounds: No wheezing or rales.   Abdominal:      Palpations: Abdomen is soft.      Tenderness: There is no abdominal tenderness. There is no right CVA tenderness, left CVA tenderness, guarding or rebound.   Musculoskeletal:         General: Tenderness present.      Comments: Pain on palpation to the right paraspinous lumbar region no mid spinous tenderness.   Neurological:      General: No focal deficit present.      Mental  Status: She is alert and oriented to person, place, and time. Mental status is at baseline.           ED Course & MDM   Diagnoses as of 07/09/25 1851   Acute right-sided low back pain without sciatica   Kidney stone   Acute cystitis with hematuria                 No data recorded     Shimon Coma Scale Score: 15 (07/09/25 1711 : [place] Baca LPN)                           Medical Decision Making  Medical Decision Making:  Patient presented as described in HPI. Patient case including ROS, PE, and treatment and plan discussed with ED attending if attached as cosigner. Due to patients presentation orders completed include as documented.  Patient presents to the ED for lower back pain radiating to the abdomen.  Patient states she is not feeling nauseous currently.  Patient is nontoxic-appearing abdomen is soft and nontender, no CVA tenderness no mid spinous tenderness.  Pain on palpation to the right paraspinous lumbar region.  Patient given Toradol here.  Pending labs and imaging.  Patient has a UTI.  No leukocytosis rest of labs unremarkable.  CT L-spine shows no acute lumbar pathology CT abdomen pelvis shows a 12 mm calculus at the right renal pelvis no hydronephrosis or obstructive uropathy.  Patient educated his findings.  Patient discharged home with antibiotics educated on follow-up with her doctor Friday for the procedure.  Educated on any worsening symptoms to return.  Patient josé stable on discharge.  Patient was advised to follow up with PCP or recommended provider in 2-3 days for another evaluation and exam. I advised patient/guardian to return or go to closest emergency room immediately if symptoms change, get worse, new symptoms develop prior to follow up. If there is no improvement in symptoms in the next 24 hours they are advised to return for further evaluation and exam. I also explained the plan and treatment course. Patient/guardian is in agreement with plan, treatment course, and follow up and  states verbally that they will comply.        Patient care discussed with: N/A  Social Determinants affecting care: N/A    Final diagnosis and disposition as below.  See CI    Homegoing. I discussed the differential; results and discharge plan with the patient and/or family/friend/caregiver if present.  I emphasized the importance of follow-up with the physician I referred them to in the timeframe recommended.  I explained reasons for the patient to return to the Emergency Department. They agreed that if they feel their condition is worsening or if they have any other concern they should call 911 immediately for further assistance. I gave the patient an opportunity to ask all questions they had and answered all of them accordingly. They understand return precautions and discharge instructions. The patient and/or family/friend/caregiver expressed understanding verbally and that they would comply.       Disposition:  Discharge      This note has been transcribed using voice recognition and may contain grammatical errors, misplaced words, incorrect words, incorrect phrases or other errors.        Labs Reviewed   CBC WITH AUTO DIFFERENTIAL - Abnormal       Result Value    WBC 5.6      nRBC 0.4 (*)     RBC 5.44 (*)     Hemoglobin 16.5 (*)     Hematocrit 48.6 (*)     MCV 89      MCH 30.3      MCHC 34.0      RDW 14.7 (*)     Platelets 199      Neutrophils % 62.5      Immature Granulocytes %, Automated 1.2 (*)     Lymphocytes % 24.5      Monocytes % 8.2      Eosinophils % 3.2      Basophils % 0.4      Neutrophils Absolute 3.52      Immature Granulocytes Absolute, Automated 0.07      Lymphocytes Absolute 1.38      Monocytes Absolute 0.46      Eosinophils Absolute 0.18      Basophils Absolute 0.02     COMPREHENSIVE METABOLIC PANEL - Abnormal    Glucose 128 (*)     Sodium 141      Potassium 3.6      Chloride 105      Bicarbonate 26      Anion Gap 14      Urea Nitrogen 14      Creatinine 0.91      eGFR 69      Calcium 8.9       Albumin 4.0      Alkaline Phosphatase 57      Total Protein 6.6      AST 17      Bilirubin, Total 0.4      ALT 14     URINALYSIS WITH REFLEX CULTURE AND MICROSCOPIC - Abnormal    Color, Urine Yellow      Appearance, Urine Turbid (*)     Specific Gravity, Urine 1.036 (*)     pH, Urine 6.5      Protein, Urine 70 (1+) (*)     Glucose, Urine Normal      Blood, Urine 1.0 (3+) (*)     Ketones, Urine NEGATIVE      Bilirubin, Urine NEGATIVE      Urobilinogen, Urine Normal      Nitrite, Urine NEGATIVE      Leukocyte Esterase, Urine 500 Parker/uL (*)    MICROSCOPIC ONLY, URINE - Abnormal    WBC, Urine >50 (*)     RBC, Urine >20 (*)     Squamous Epithelial Cells, Urine 1-9 (SPARSE)      Transitional Epithelial Cells, Urine 1-2 (FEW)      Mucus, Urine 2+      Hyaline Casts, Urine 1+ (*)    URINE CULTURE   URINALYSIS WITH REFLEX CULTURE AND MICROSCOPIC    Narrative:     The following orders were created for panel order Urinalysis with Reflex Culture and Microscopic.  Procedure                               Abnormality         Status                     ---------                               -----------         ------                     Urinalysis with Reflex C...[[id number]]  Abnormal            Final result               Extra Urine Gray Tube[[id number]]                            In process                   Please view results for these tests on the individual orders.   EXTRA URINE GRAY TUBE      CT lumbar spine wo IV contrast   Final Result   No acute lumbar spine pathology identified.  Multilevel age-related   osteoarthritic changes as described.   Indeterminate exophytic 2.2 cm lesion arising from the lower pole the   right kidney.  Nonemergent renal ultrasound is recommended for further   evaluation.   Nonobstructing 1.3 cm calculus in the right renal pelvis.   Additional chronic changes as detailed in the body of the report.   Signed by Joselito Armas      CT abdomen pelvis wo IV contrast   Final Result   1.  12 mm calculus  at the right renal pelvis.   2.  No evidence hydronephrosis or obstructive uropathy.   3.  Stable right hepatic hemangioma.   4.  Colonic diverticulosis with no evidence of acute diverticulitis.   5.  Fat-containing ventral abdominal wall periumbilical hernia.   Signed by Marty Kelley MD         Procedure  Procedures       [1]   Past Medical History:  Diagnosis Date    Acquired keratosis (keratoderma) palmaris et plantaris 01/12/2022    Acquired plantar porokeratosis    Acute upper respiratory infection, unspecified 01/14/2020    URI, acute    Allergic rhinitis due to animal (cat) (dog) hair and dander 01/02/2020    Allergic rhinitis due to dogs    Allergic rhinitis due to pollen 01/02/2020    Allergic rhinitis due to pollen    Allergic rhinitis due to pollen 02/26/2018    Seasonal allergic rhinitis due to pollen    Allergy status to unspecified drugs, medicaments and biological substances 06/30/2015    History of allergy    Anemia, unspecified 07/23/2018    Normocytic anemia    Anxiety     Anxiety disorder due to known physiological condition 06/05/2013    Anxiety disorder due to medical condition    Anxiety disorder, unspecified 01/28/2016    Anxiety    Asthma     Asymptomatic varicose veins of bilateral lower extremities 07/16/2019    Varicose veins of legs    Atypical squamous cells of undetermined significance on cytologic smear of cervix (ASC-US) 06/26/2013    Pap smear abnormality of cervix with ASCUS favoring benign    Candidiasis, unspecified 12/10/2019    Yeast infection    Cellulitis of left finger 07/31/2018    Paronychia of finger of left hand    Changes in skin texture 03/09/2021    Cracked skin    Class 3 severe obesity with body mass index (BMI) of 50.0 to 59.9 in adult 02/26/2025    53.53 kg/m²    Contusion of left lesser toe(s) with damage to nail, initial encounter 02/22/2018    Subungual contusion of toenail of left foot    Contusion of right hand, sequela 08/04/2020    Traumatic ecchymosis  of hand, right, sequela    Corns and callosities 05/25/2021    Callus of foot    Coronary artery disease     Depression     Disorder of pigmentation, unspecified 09/28/2016    Atypical pigmented skin lesion    Disorder of the skin and subcutaneous tissue, unspecified 08/27/2020    Lesion of subcutaneous tissue    Dorsalgia, unspecified 09/23/2021    Acute back pain    Dorsalgia, unspecified 01/29/2019    Costovertebral angle tenderness    Encounter for general adult medical examination without abnormal findings 06/08/2020    Medicare annual wellness visit, subsequent    Encounter for gynecological examination (general) (routine) without abnormal findings 12/14/2021    Encounter for gynecological examination    Encounter for gynecological examination (general) (routine) without abnormal findings 12/11/2020    Encounter for gynecological examination    Encounter for other screening for malignant neoplasm of breast     Breast cancer screening    Encounter for screening for malignant neoplasm of cervix     Encounter for screening for malignant neoplasm of cervix    Encounter for screening for malignant neoplasm of cervix 11/04/2014    Screening for malignant neoplasm of cervix    Encounter for screening for malignant neoplasm of cervix     Cervical cancer screening    GERD (gastroesophageal reflux disease)     Heart murmur     Hepatomegaly, not elsewhere classified 04/21/2021    Liver mass, right lobe    History of falling 12/28/2018    Status post fall    Hypertension     Inappropriate diet and eating habits 05/13/2021    Inappropriate diet and eating habits    Irregular menstruation, unspecified     Irregular periods/menstrual cycles    Localized edema 07/29/2015    Pedal edema    Localized swelling, mass and lump, left lower limb 09/11/2020    Lump of left thigh    Localized swelling, mass and lump, left lower limb 09/24/2020    Mass of left thigh    Low back pain     Melanocytic nevi, unspecified 09/10/2019     Numerous skin moles    Multiple births, unspecified, all liveborn ([facility]     Multiple births, all liveborn    Nephrolithiasis 02/14/2025    Bilateral    Obesity     Osteoarthritis     Other allergic rhinitis 01/02/2020    Allergic rhinitis due to dust mite    Other allergic rhinitis 01/02/2020    Allergic rhinitis due to mold    Other conditions influencing health status 06/26/2013    Uterine Rupture    Other conditions influencing health status     Normal colonoscopy    Other conditions influencing health status 02/05/2019    History of burning on urination    Other conditions influencing health status 12/14/2021    History of cough    Other conditions influencing health status 02/14/2019    History of dyspareunia    Other conditions influencing health status 01/14/2020    History of cough    Other conditions influencing health status 06/05/2013    Leiomyomatous Degeneration Of The Uterus    Other conditions influencing health status 06/05/2013    Viremia    Other hypertrophic disorders of the skin 07/29/2015    Skin tag    Other shoulder lesions, right shoulder 11/11/2019    Tendinitis of right rotator cuff    Other specified disorders of bone, thigh 09/09/2020    Pain of left femur    Other specified noninflammatory disorders of vagina 03/12/2019    Vaginal dryness    Other specified postprocedural states 05/04/2017    History of arthroscopic knee surgery    Other specified soft tissue disorders 10/08/2020    Soft tissue mass    Other specified soft tissue disorders 08/04/2020    Swelling of right hand    Oxygen dependent     Pain in left thigh 08/27/2020    Pain of left thigh    Pain in right foot 03/09/2021    Right foot pain    Pain in right hip 12/28/2018    Right hip pain    Pain in right knee 03/12/2021    Bilateral knee pain    Pain in right knee 03/19/2021    Right knee pain    Pain in right leg 05/25/2021    Bilateral pain of leg and foot    Pain in right leg 01/12/2022    Bilateral leg and foot pain     Pain in unspecified joint     Joint pain    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 02/22/2016    History of tetralogy of Fallot    Personal history of contraception     Personal history of contraceptive use    Personal history of diseases of the skin and subcutaneous tissue 12/22/2020    History of ingrown nail    Personal history of malignant neoplasm of other parts of uterus     History of malignant neoplasm of endometrium    Personal history of other (healed) physical injury and trauma 03/01/2017    History of sprain of ankle    Personal history of other benign neoplasm 09/10/2019    History of other benign neoplasm    Personal history of other benign neoplasm 05/06/2014    History of uterine leiomyoma    Personal history of other benign neoplasm 12/11/2020    History of uterine leiomyoma    Personal history of other diseases of the circulatory system 04/14/2021    History of atrial fibrillation    Personal history of other diseases of the circulatory system 04/14/2021    History of atrial fibrillation    Personal history of other diseases of the circulatory system 04/14/2021    History of atrial fibrillation    Personal history of other diseases of the circulatory system 02/01/2017    History of hypertension    Personal history of other diseases of the circulatory system 04/14/2021    History of hypotension    Personal history of other diseases of the digestive system 03/24/2021    History of gastroesophageal reflux (GERD)    Personal history of other diseases of the female genital tract 12/28/2016    History of postmenopausal bleeding    Personal history of other diseases of the female genital tract 05/19/2022    History of postmenopausal bleeding    Personal history of other diseases of the female genital tract     History of vaginal discharge    Personal history of other diseases of the female genital tract     Vaginal delivery    Personal history of other  diseases of the musculoskeletal system and connective tissue     Personal history of arthritis    Personal history of other diseases of the musculoskeletal system and connective tissue 11/18/2019    History of muscle spasm    Personal history of other diseases of the respiratory system 05/27/2021    History of asthma    Personal history of other diseases of the respiratory system 04/12/2019    History of acute bronchitis    Personal history of other diseases of the respiratory system 01/04/2020    History of bronchitis    Personal history of other endocrine, nutritional and metabolic disease 02/01/2017    History of hyperlipidemia    Personal history of other endocrine, nutritional and metabolic disease 04/18/2016    History of obesity    Personal history of other endocrine, nutritional and metabolic disease 05/26/2020    History of thyroid nodule    Personal history of other infectious and parasitic diseases     History of varicella    Personal history of other medical treatment 12/11/2020    History of screening mammography    Personal history of other medical treatment 12/14/2021    History of screening mammography    Personal history of other medical treatment     History of mammogram    Personal history of other specified conditions 08/21/2019    History of gross hematuria    Personal history of other specified conditions 02/02/2017    History of weight gain    Personal history of other specified conditions 12/07/2020    History of chest pain    Personal history of other specified conditions 04/14/2021    History of palpitations    Personal history of other specified conditions 12/05/2014    History of vertigo    Personal history of other specified conditions 08/25/2021    History of exertional chest pain    Personal history of other specified conditions     History of shortness of breath    Personal history of other specified conditions     H/O heartburn    Personal history of other specified conditions  01/12/2018    History of urinary frequency    Personal history of transient ischemic attack (TIA), and 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 12/30/2015    History of TIA (transient ischemic attack)    Personal history of urinary (tract) infections 09/02/2021    History of recurrent cystitis    Personal history of urinary (tract) infections 05/23/2019    History of cystitis    Personal history of urinary (tract) infections 09/02/2021    History of recurrent urinary tract infection    Personal history of urinary calculi 09/12/2019    History of renal calculi    Polyphagia 05/13/2021    Polyphagia    Primary central sleep apnea 10/21/2017    Central sleep apnea    Pulmonary hypertension (Multi)     Pure hypercholesterolemia, unspecified     High cholesterol    Residual hemorrhoidal skin tags 07/06/2017    External hemorrhoid    Shortness of breath     Sleep apnea     cpap with oxygen blow in    Slurred speech 01/02/2015    Slurred speech    Stroke (Multi)     TIA (transient ischemic attack)     Tinea pedis 05/25/2021    Tinea pedis of right foot    Unspecified abdominal pain 04/13/2021    Abdominal pain, acute    Unspecified injury of right wrist, hand and finger(s), sequela 08/04/2020    Hand trauma, right, sequela    Unspecified internal derangement of unspecified knee 03/01/2017    Internal derangement of knee    Unspecified symptoms and signs involving the genitourinary system 12/16/2021    UTI symptoms    Unspecified symptoms and signs involving the genitourinary system 02/05/2019    UTI symptoms    Unspecified symptoms and signs involving the genitourinary system 09/03/2020    UTI symptoms    Urinary incontinence     Urinary tract infection     Urinary tract infection, site not specified 01/17/2020    Acute urinary tract infection    Urinary tract infection, site not specified 01/10/2022    Acute UTI    Xerosis cutis 09/10/2019    Dry skin dermatitis   [2]   Past Surgical History:  Procedure Laterality  Date    CARDIAC CATHETERIZATION Right 01/04/2024    Procedure: Right Heart Cath;  Surgeon: Richy Raman MD;  Location: [facility];  Service: Cardiovascular;  Laterality: Right;    CARDIAC CATHETERIZATION      CARDIAC CATHETERIZATION Right 04/28/2025    Procedure: RHC;  Surgeon: Richy Raman MD;  Location: [facility];  Service: Cardiovascular;  Laterality: Right;    COLONOSCOPY      KNEE SURGERY Left 05/04/2017    Knee Surgery    MOUTH SURGERY  11/04/2014    Oral Surgery Tooth Extraction    MR HEAD ANGIO WO IV CONTRAST  02/09/2016    MR HEAD ANGIO WO IV CONTRAST [facility]    OTHER SURGICAL HISTORY Bilateral 07/31/2013    Wrist Carpectomy    OTHER SURGICAL HISTORY  09/10/2019    [place] tooth extraction    OTHER SURGICAL HISTORY  11/30/2018    Complete colonoscopy    OTHER SURGICAL HISTORY  06/08/2020    Thyroid biopsy    TOENAIL EXCISION      URETERAL STENT PLACEMENT     [3]   Family History  Problem Relation Name Age of Onset    Hypertension Mother chapis barraganvaleriy     Breast cancer Mother chapis salinasnitza 80 - 89    Other (cardiac disorder) Mother chapis salinasnitza     Cardiomyopathy Mother chapis barraganvaleriy     Diabetes Mother chapis salinasnitza     Other (chronic kidney disease) Mother chapis serna     Cancer Mother chapis salinasnitza     Alcohol abuse Father joel salinasnitza     Lung disease Father joel salinasnitza     Stroke Brother dorian barraganvaleriy     Brain Aneurysm Brother dorian barraganvaleriy    [4]   Social History  Tobacco Use    Smoking status: Never    Smokeless tobacco: Never   Vaping Use    Vaping status: Never Used   Substance Use Topics    Alcohol use: Not Currently     Comment: Occasional    Drug use: Never        Debra Ely PA-C  07/09/25 0037

## 2025-07-09 NOTE — PROGRESS NOTES
Physical Therapy Reassessment and Treatment    Patient Name: Farida Traylor  MRN: [id number]  Today's Date: 7/9/2025  Time Calculation  Start Time: 1433  Stop Time: 1514  Time Calculation (min): 41 min        PT Therapeutic Procedures Time Entry  Therapeutic Exercise Time Entry: 41                Insurance:  Visit Limit: MN  Date Range: no auth  Co-Pay: $0    Current Problem  1. Other abnormalities of gait and mobility  Follow Up In Physical Therapy    Follow Up In Physical Therapy        Problem List Items Addressed This Visit           ICD-10-CM    Other abnormalities of gait and mobility - Primary R26.89    Relevant Orders    Follow Up In Physical Therapy       General  Reason for Referral: balance issues  Referred By: Wilma DODSON  Past Medical History Relevant to Rehab: carotid artery occlusion, pulmonary arterial hypertension, morbid obesity, depression, opiate dependence, ADHD, anxiety, low back pain, primary OA of bilateral knees, fall, left calf pain, vascular dementia with depression, lumbar canal stenosis, DJD, amnesia, aphasia, CVA, TIA, tremor  Preferred Learning Style: written    Subjective   Current Condition:   Same  Patient reports a lot of pain last night (11/10 pain) after a lot of walking. Patient utilized sports cream and a heating pad which helped decrease her pain, but the pain returned when getting up again.     Performing HEP?: Partially    Precautions  Precautions  Medical Precautions: Fall precautions  Pain  Pain Assessment: 0-10  0-10 (Numeric) Pain Score: 3  Pain Type: Chronic pain  Pain Location: Hip  Pain Orientation: Right    Objective   Lumbar Spine  Functional Rating Scale  LEFS   /80: 54    Lumbar AROM  Lumbar flexion: (60°): min impairment  Lumbar extension (25°): min impairment  Lumbar rotation right (30°): WFL  Lumbar rotation left (30°): WFL  Lumbar sidebend right (25°): WFL  Lumbar sidebend left (25°): WFL    Hip PROM  R hip flexion: (125°): WFL  L hip flexion: (125°):  "WFL  R hip abduction: (45°): WFL  L hip abduction: (45°): WFL  R hip ER: (45°): WFL  L hip ER: (45°): WFL  R hip IR: (45°): WFL  L hip IR: (45°): WFL    Specific Lower Extremity MMT  R Iliopsoas: (5/5): 4/5  L Iliopsoas: (5/5): 4/5  R Gluteals (sidelying): (5/5): 4+/5  L Gluteals (sidelying): (5/5): 4+/5  R knee flexion: (5/5): 5/5  L knee flexion: (5/5): 5/5  R knee extension: (5/5): 5/5  L knee extension: (5/5): 5/5    Flexibility  R hamstrings: mod impairment  L hamstrings: min impairment    Knee AROM  Knee AROM WFL: yes    Knee MMT  Knee MMT WFL: yes    Outcome Measures:  Other Measures  5x Sit to Stand: 16.28 seconds  Patient scored 16.28 seconds on 5x STS (5x Sit to Stand) indicating: >15 seconds considered higher risk of falls.   Patient scored 14.57 seconds on TUG (Timed Up and Go) indicating: >14 considered high risk for falls .     Treatments:  Therapeutic Exercise  Therapeutic Exercise Performed: Yes  Therapeutic Exercise Activity 1: LAQ #2 2x10  Therapeutic Exercise Activity 4: seated hip flexion Blue x20 R/L  Therapeutic Exercise Activity 6: seated Hip ABD x20  Therapeutic Exercise Activity 9: 3x30 sec hamstring curls off step b le  Therapeutic Exercise Activity 11: seated sidebending with overhead reach 3x10\"  Therapeutic Exercise Activity 12: hamstring stretch 3x20\" sitting        EDUCATION:   Individual(s) Educated: Patient  Education Provided: yes  Handout(s) Provided: Scanned into chart  Home Program: reviewed home exercise program   Risk and Benefits Discussed with Patient/Caregiver/Other: Yes   Patient/Caregiver Demonstrated Understanding: Yes   Patient Response to Education: Patient/Caregiver verbalized understanding of information, Patient/Caregiver performed return demonstration of exercises/activities, and Patient/Caregiver asked appropriate questions    Assessment: Today's session focused on strength, flexibility, and balance. Patient demonstrated good tolerance to session this date. They " are demonstrating good progress in skilled rehabilitation at this time, though they are still limited by pain and impaired balance/gait. Patient was encouraged to perform her home exercise program more regularly. Patient verbalized understanding. Patient's mobility appears to be partially limited by right sided low back pain. Per 5 Times Sit to Stand and the TUG, patient is a high fall risk and is appropriate to participate in balance activities. Patient continues to be a good candidate for skilled PT in order to further address listed impairments. All questions answered.     PT Assessment  PT Assessment Results: Decreased strength, Decreased range of motion, Impaired balance, Decreased mobility, Obesity, Pain  Rehab Prognosis: Good  Evaluation/Treatment Tolerance: Patient tolerated treatment well    Plan: Continue with POC. Progress exercises to improve functional mobility without increased low back pain.    OP PT Plan  Treatment/Interventions: Education/ Instruction, Gait training, Manual therapy, Neuromuscular re-education, Therapeutic activities, Therapeutic exercises  PT Plan: Skilled PT  PT Frequency: 1 time per week  Duration: 6 weeks  Onset Date: 04/23/25  Certification Period Start Date: 05/05/25  Certification Period End Date: 08/20/25  Number of Treatments Authorized: 6 of 12  Rehab Potential: Good  Plan of Care Agreement: Patient    Goals:  Active       PT Problem       Patient will improve active range of motion in deficit areas for ADL completion.    (Met)       Start:  05/05/25    Expected End:  07/28/25    Resolved:  07/09/25           Patient will improve strength in deficit areas so patient can work with less pain.   (Progressing)       Start:  05/05/25    Expected End:  08/20/25            Patient will stand >30 minutes without pain to cook a meal.  (Not Progressing)       Start:  05/05/25    Expected End:  08/20/25            Patient will demonstrate independence in home program for support of  progression (Met)       Start:  05/05/25    Expected End:  07/28/25    Resolved:  07/09/25         Patient will report pain of no more than 2/10 demonstrating a reduction of overall pain (Progressing)       Start:  05/05/25    Expected End:  08/20/25            Patient will show a significant change in LEFS (49 to 58) patient reported outcome tool to demonstrate subjective imporovement (Progressing)       Start:  05/05/25    Expected End:  08/20/25            Patient will perform 5 Times Sit to  15 seconds or less. (Not Progressing)       Start:  05/05/25    Expected End:  08/20/25                Patient will improve score on TUG to <10 seconds.       Start:  07/09/25    Expected End:  08/20/25                     Reagan Garber, PT

## 2025-07-10 LAB — HOLD SPECIMEN: NORMAL

## 2025-07-11 ENCOUNTER — APPOINTMENT (OUTPATIENT)
Dept: PAIN MEDICINE | Facility: HOSPITAL | Age: 65
End: 2025-07-11
Payer: COMMERCIAL

## 2025-07-11 DIAGNOSIS — N39.0 RECURRENT UTI: ICD-10-CM

## 2025-07-11 DIAGNOSIS — N20.2 CALCULUS OF KIDNEY AND URETER: ICD-10-CM

## 2025-07-11 LAB — BACTERIA UR CULT: NORMAL

## 2025-07-12 LAB
ERYTHROCYTE [DISTWIDTH] IN BLOOD BY AUTOMATED COUNT: 14.6 % (ref 11–15)
HCT VFR BLD AUTO: 50 % (ref 35–45)
HGB BLD-MCNC: 16 G/DL (ref 11.7–15.5)
MCH RBC QN AUTO: 29.6 PG (ref 27–33)
MCHC RBC AUTO-ENTMCNC: 32 G/DL (ref 32–36)
MCV RBC AUTO: 92.6 FL (ref 80–100)
PLATELET # BLD AUTO: 222 THOUSAND/UL (ref 140–400)
PMV BLD REES-ECKER: 9.8 FL (ref 7.5–12.5)
RBC # BLD AUTO: 5.4 MILLION/UL (ref 3.8–5.1)
WBC # BLD AUTO: 4.9 THOUSAND/UL (ref 3.8–10.8)

## 2025-07-14 ENCOUNTER — OFFICE VISIT (OUTPATIENT)
Dept: PULMONOLOGY | Facility: CLINIC | Age: 65
End: 2025-07-14
Payer: COMMERCIAL

## 2025-07-14 VITALS
OXYGEN SATURATION: 93 % | SYSTOLIC BLOOD PRESSURE: 124 MMHG | RESPIRATION RATE: 16 BRPM | DIASTOLIC BLOOD PRESSURE: 74 MMHG | WEIGHT: 286.5 LBS | BODY MASS INDEX: 54.13 KG/M2 | HEART RATE: 73 BPM

## 2025-07-14 DIAGNOSIS — G47.33 OBSTRUCTIVE SLEEP APNEA SYNDROME: ICD-10-CM

## 2025-07-14 DIAGNOSIS — I27.0 IDIOPATHIC PAH (PULMONARY ARTERIAL HYPERTENSION) (MULTI): ICD-10-CM

## 2025-07-14 DIAGNOSIS — J96.11 CHRONIC RESPIRATORY FAILURE WITH HYPOXIA: Primary | ICD-10-CM

## 2025-07-14 DIAGNOSIS — J45.909 ASTHMA, UNSPECIFIED ASTHMA SEVERITY, UNSPECIFIED WHETHER COMPLICATED, UNSPECIFIED WHETHER PERSISTENT (HHS-HCC): ICD-10-CM

## 2025-07-14 PROCEDURE — 1159F MED LIST DOCD IN RCRD: CPT | Performed by: INTERNAL MEDICINE

## 2025-07-14 PROCEDURE — 1036F TOBACCO NON-USER: CPT | Performed by: INTERNAL MEDICINE

## 2025-07-14 PROCEDURE — 1160F RVW MEDS BY RX/DR IN RCRD: CPT | Performed by: INTERNAL MEDICINE

## 2025-07-14 PROCEDURE — 99212 OFFICE O/P EST SF 10 MIN: CPT

## 2025-07-14 PROCEDURE — 99215 OFFICE O/P EST HI 40 MIN: CPT | Performed by: INTERNAL MEDICINE

## 2025-07-14 PROCEDURE — 3078F DIAST BP <80 MM HG: CPT | Performed by: INTERNAL MEDICINE

## 2025-07-14 PROCEDURE — 1126F AMNT PAIN NOTED NONE PRSNT: CPT | Performed by: INTERNAL MEDICINE

## 2025-07-14 PROCEDURE — 3074F SYST BP LT 130 MM HG: CPT | Performed by: INTERNAL MEDICINE

## 2025-07-14 RX ORDER — SOTATERCEPT-CSRK 45 MG
90 KIT SUBCUTANEOUS
COMMUNITY

## 2025-07-14 ASSESSMENT — ENCOUNTER SYMPTOMS
SHORTNESS OF BREATH: 0
CHEST TIGHTNESS: 0
DEPRESSION: 0

## 2025-07-14 ASSESSMENT — PAIN SCALES - GENERAL: PAINLEVEL_OUTOF10: 0-NO PAIN

## 2025-07-14 NOTE — PROGRESS NOTES
Subjective   Patient ID: Farida Traylor is a 65 y.o. female who presents to the office today for a PAH, MARLON, asthma follow-up visit.    HPI  Patient reports doing fine. States that her breathing has been fine and her current regimen has been working well for her. States that she requires 3L O2 at night and during the day. Reports that she has been on 3L for a while now and there has been no change in her O2 requirement.     Review of Systems   Respiratory:  Negative for chest tightness and shortness of breath.    Cardiovascular:  Negative for chest pain.     Objective   Physical Exam  Vitals reviewed.   Constitutional:       General: She is not in acute distress.     Appearance: She is not ill-appearing.   HENT:      Head: Normocephalic and atraumatic.   Cardiovascular:      Rate and Rhythm: Normal rate and regular rhythm.      Heart sounds: No murmur heard.  Pulmonary:      Effort: Pulmonary effort is normal.      Breath sounds: Normal breath sounds.      Comments: On 3L O2 via NC  Musculoskeletal:      Right lower leg: No swelling.      Left lower leg: No swelling.   Skin:     General: Skin is warm and dry.   Neurological:      Mental Status: She is alert.       Assessment/Plan   Idiopathic PAH Functional Class III - Stable  :: Winerevair initial dose 40 mg, target dose 90  :: Hemoglobin, hematocrit, platelet have been stable   RHC   April 2025: mPAP 36 PAOP 4 PVR 2.6 CO 7.1 CI 3.2.   Jan 2024: mPAP 31 PAOP 10 PVR 3.1 CO 6.7   Nov 2022: mPAP 31, PAOP 11, PVR 2.6 CO 7.7  Apri 2021: mPAP 35, PAOP 2, PVR 4.5 CO 7.4  Oct 2019: mPAP 31   Plan:  - Continue w/ macitentan, riociguat 2.5 mg, winerevair 90 mg  - Check CBC before next dose of winrevair in 3 weeks to monitor for polycythemia; CBC ordered    Chronic Respiratory Failure w/ Hypoxia - Stable  :: On 3L during the day and at night  Plan:  - Continue w/ oxygen therapy    Asthma - stable  :: PFT from 3/2024 is normal  Plan:  - Continue w/ Singulair, Advair,  Proair    Obstructive Sleep Apnea - Stable  Plan:  - Continue BiPAP at night    Follow up in 3 months.    Renea Houston,  07/14/25 4:48 PM

## 2025-07-15 ENCOUNTER — APPOINTMENT (OUTPATIENT)
Dept: PRIMARY CARE | Facility: CLINIC | Age: 65
End: 2025-07-15
Payer: COMMERCIAL

## 2025-07-15 NOTE — ASSESSMENT & PLAN NOTE
Cont 2L at night w/ ASV. Cont 3 L O2 during day   Quality 130: Documentation Of Current Medications In The Medical Record: Current Medications Documented Quality 431: Preventive Care And Screening: Unhealthy Alcohol Use - Screening: Unhealthy alcohol use screening not performed, reason not otherwise specified Detail Level: Detailed Quality 226: Preventive Care And Screening: Tobacco Use: Screening And Cessation Intervention: Patient screened for tobacco use and is an ex/non-smoker

## 2025-07-15 NOTE — ASSESSMENT & PLAN NOTE
Pulmonary arterial hypertension - WHO Functional Class II/III. Idiopathic. Cont macitentan and riociguat. Winrevair started a couple months ago due to worsening hemodynamics.  Repeat CBC before next dose.   Lasix as needed when weight increases. Consider 6MWT for therapy response next visit.     RHC: 4/2025 MPAP 36 PAOP 4 PVR 2.6 CO 7.1 1/2024 mPAP 31 PAOP 10 PVR 3.1 CO 6.7  11/2022 mPAP 31, PAOP 11, PVR 2.6 CO 7.7 4/2021 mPAP 35, PAOP 2, PVR 4.5 CO 7.4 10/2019 mPAP 31   TTE:  12/2024 RVSP 50 peak TRV 3.43 Mildly dilated RV, normal function. 1/2023 mildly enlarged RV 1/2022 54.5 10/2020 62.1 11/2018 43.6  6MWT: 3/2024 240m 4/2023 180m 1/2022 150m 6/2021 231.7m 1/2021 140m 11/2019 250m

## 2025-07-17 ENCOUNTER — TREATMENT (OUTPATIENT)
Dept: PHYSICAL THERAPY | Facility: HOSPITAL | Age: 65
End: 2025-07-17
Payer: COMMERCIAL

## 2025-07-17 DIAGNOSIS — R26.89 OTHER ABNORMALITIES OF GAIT AND MOBILITY: ICD-10-CM

## 2025-07-17 PROCEDURE — 97112 NEUROMUSCULAR REEDUCATION: CPT | Mod: GP,CQ

## 2025-07-17 PROCEDURE — 97110 THERAPEUTIC EXERCISES: CPT | Mod: GP,CQ

## 2025-07-17 ASSESSMENT — PAIN - FUNCTIONAL ASSESSMENT: PAIN_FUNCTIONAL_ASSESSMENT: 0-10

## 2025-07-17 ASSESSMENT — PAIN SCALES - GENERAL: PAINLEVEL_OUTOF10: 2

## 2025-07-17 NOTE — PROGRESS NOTES
"  Physical Therapy Treatment    Patient Name: Farida Traylor  MRN: [id number]  Today's Date: 7/17/2025  Time Calculation  Start Time: 1303  Stop Time: 1341  Time Calculation (min): 38 min        PT Therapeutic Procedures Time Entry  Therapeutic Exercise Time Entry: 28  Neuromuscular Re-Education Time Entry: 10                Current Problem  1. Other abnormalities of gait and mobility  Follow Up In Physical Therapy          General  Reason for Referral: balance issues  Referred By: Wilma DODSON  Past Medical History Relevant to Rehab: carotid artery occlusion, pulmonary arterial hypertension, morbid obesity, depression, opiate dependence, ADHD, anxiety, low back pain, primary OA of bilateral knees, fall, left calf pain, vascular dementia with depression, lumbar canal stenosis, DJD, amnesia, aphasia, CVA, TIA, tremor  Preferred Learning Style: written    Subjective   Current Condition:   Same  Patient reports no recent falls. Decreased pain in the R hip compared to previous session.     Performing HEP?: Yes    Precautions  Precautions  Medical Precautions: Fall precautions  Pain  Pain Assessment: 0-10  0-10 (Numeric) Pain Score: 2    Objective         Treatments:    Therapeutic Exercise  Therapeutic Exercise Performed: Yes  Therapeutic Exercise Activity 1: LAQ #2 2x10  Therapeutic Exercise Activity 4: seated hip flexion Blue x20 R/L  Therapeutic Exercise Activity 5: Nu-Step lvl3 x5'  Therapeutic Exercise Activity 8: Lateral step ups 4\"  Therapeutic Exercise Activity 9: 3x30 sec hamstring curls off step b le  Therapeutic Exercise Activity 10: 4inch fwd step up 10x R/L  Therapeutic Exercise Activity 11: seated sidebending with overhead reach 3x10\"  Therapeutic Exercise Activity 12: hamstring stretch 3x20\" sitting    Balance/Neuromuscular Re-Education  Balance/Neuromuscular Re-Education Activity Performed: Yes  Balance/Neuromuscular Re-Education Activity 1: Step and reach x10 R/L  Balance/Neuromuscular Re-Education " Activity 2: lateral step and reach x10 R/L  Balance/Neuromuscular Re-Education Activity 3: Airex feet together with head turns  Balance/Neuromuscular Re-Education Activity 4: Retro walk carrying 5# KB 15'x2 with each                       EDUCATION:   Individual(s) Educated: Patient  Education Provided:   Risk and Benefits Discussed with Patient/Caregiver/Other: Yes   Patient/Caregiver Demonstrated Understanding: Yes   Patient Response to Education: Patient/Caregiver verbalized understanding of information    Assessment: Patient requires seated rest period between each exercise d/t fatigue and SOB. Patient demonstrated mild path deviation with retro walk, however, no LOB.   PT Assessment  PT Assessment Results: Decreased strength, Decreased range of motion, Impaired balance, Decreased mobility, Obesity, Pain  Rehab Prognosis: Good    Plan: Continue to progress current POC as tolerated to facilitate ability to perform functional activities.   OP PT Plan  Treatment/Interventions: Education/ Instruction, Gait training, Manual therapy, Neuromuscular re-education, Therapeutic activities, Therapeutic exercises  PT Plan: Skilled PT  PT Frequency: 1 time per week  Duration: 6 weeks  Onset Date: 04/23/25  Certification Period Start Date: 05/05/25  Certification Period End Date: 08/20/25  Number of Treatments Authorized: 7 of 12  Rehab Potential: Good  Plan of Care Agreement: Patient    Goals:  Active       PT Problem       Patient will improve active range of motion in deficit areas for ADL completion.    (Met)       Start:  05/05/25    Expected End:  07/28/25    Resolved:  07/09/25           Patient will improve strength in deficit areas so patient can work with less pain.   (Progressing)       Start:  05/05/25    Expected End:  08/20/25            Patient will stand >30 minutes without pain to cook a meal.  (Not Progressing)       Start:  05/05/25    Expected End:  08/20/25            Patient will demonstrate independence  in home program for support of progression (Met)       Start:  05/05/25    Expected End:  07/28/25    Resolved:  07/09/25         Patient will report pain of no more than 2/10 demonstrating a reduction of overall pain (Progressing)       Start:  05/05/25    Expected End:  08/20/25            Patient will show a significant change in LEFS (49 to 58) patient reported outcome tool to demonstrate subjective imporovement (Progressing)       Start:  05/05/25    Expected End:  08/20/25            Patient will perform 5 Times Sit to  15 seconds or less. (Not Progressing)       Start:  05/05/25    Expected End:  08/20/25                Patient will improve score on TUG to <10 seconds.       Start:  07/09/25    Expected End:  08/20/25                     Yannick Merlos, PTA

## 2025-07-21 ENCOUNTER — TREATMENT (OUTPATIENT)
Dept: PHYSICAL THERAPY | Facility: HOSPITAL | Age: 65
End: 2025-07-21
Payer: COMMERCIAL

## 2025-07-21 DIAGNOSIS — R26.89 OTHER ABNORMALITIES OF GAIT AND MOBILITY: ICD-10-CM

## 2025-07-21 PROCEDURE — 97110 THERAPEUTIC EXERCISES: CPT | Mod: GP,CQ

## 2025-07-21 PROCEDURE — 97112 NEUROMUSCULAR REEDUCATION: CPT | Mod: GP,CQ

## 2025-07-21 ASSESSMENT — PAIN SCALES - GENERAL: PAINLEVEL_OUTOF10: 4

## 2025-07-21 ASSESSMENT — PAIN - FUNCTIONAL ASSESSMENT: PAIN_FUNCTIONAL_ASSESSMENT: 0-10

## 2025-07-21 NOTE — PROGRESS NOTES
"  Physical Therapy Treatment    Patient Name: Farida Traylor  MRN: [id number]  Today's Date: 7/21/2025  Time Calculation  Start Time: 1434  Stop Time: 1513  Time Calculation (min): 39 min        PT Therapeutic Procedures Time Entry  Therapeutic Exercise Time Entry: 31  Neuromuscular Re-Education Time Entry: 8                Current Problem  1. Other abnormalities of gait and mobility  Follow Up In Physical Therapy          General  Reason for Referral: balance issues  Referred By: Wilma DODSON  Past Medical History Relevant to Rehab: carotid artery occlusion, pulmonary arterial hypertension, morbid obesity, depression, opiate dependence, ADHD, anxiety, low back pain, primary OA of bilateral knees, fall, left calf pain, vascular dementia with depression, lumbar canal stenosis, DJD, amnesia, aphasia, CVA, TIA, tremor  Preferred Learning Style: written    Subjective   Current Condition:   Better  Patient reports she is doing good, but her knees hurt today    Performing HEP?: Yes    Precautions  Precautions  Medical Precautions: Fall precautions    Pain  Pain Assessment: 0-10  0-10 (Numeric) Pain Score: 4    Objective   Weakness, difficulty walking    Treatments:    Therapeutic Exercise  Therapeutic Exercise Performed: Yes  Therapeutic Exercise Activity 1: LAQ #2 2x10  Therapeutic Exercise Activity 2: seated golf swing x 10 ea. with 2# ball  Therapeutic Exercise Activity 4: seated hip flexion Blue x20 R/L  Therapeutic Exercise Activity 5: Nu-Step lvl3 x5'  Therapeutic Exercise Activity 8: lateral step up with heel touchy eccentric x 10 ea.  Therapeutic Exercise Activity 9: 3x30 sec hamstring curls off step b le  Therapeutic Exercise Activity 10: 4inch fwd step up 10x R/L  Therapeutic Exercise Activity 11: seated sidebending with overhead reach 3x10\"  Therapeutic Exercise Activity 12: hamstring stretch 3x20\" sitting    Balance/Neuromuscular Re-Education  Balance/Neuromuscular Re-Education Activity Performed: " Yes  Balance/Neuromuscular Re-Education Activity 1: Step and reach x10 R/L  Balance/Neuromuscular Re-Education Activity 2: lateral step and reach x10 R/L  Balance/Neuromuscular Re-Education Activity 3: Airex feet together with head turns  Balance/Neuromuscular Re-Education Activity 4: Retro walk carrying 5# KB 15'x2 with each  Assessment: Pt. Able to complete all exercises without much difficulty. Pt. Did require multiple rest breaks during exercises to catch her breath, but completed everything. Pt. Did struggle with step and reach exercises and partially lost balance, but was able to self correct.  PT Assessment  PT Assessment Results: Decreased strength, Decreased range of motion, Impaired balance, Decreased mobility, Obesity, Pain  Rehab Prognosis: Good  Evaluation/Treatment Tolerance: Patient tolerated treatment well    Plan:continue with PT POC with focus on continued strengthening in B Les and core for return to normal functional activities with improved independence.  OP PT Plan  Treatment/Interventions: Education/ Instruction, Gait training, Manual therapy, Neuromuscular re-education, Therapeutic activities, Therapeutic exercises  PT Plan: Skilled PT  PT Frequency: 1 time per week  Duration: 6 weeks  Onset Date: 04/23/25  Certification Period Start Date: 05/05/25  Certification Period End Date: 08/20/25  Number of Treatments Authorized: 8 of 12  Rehab Potential: Good  Plan of Care Agreement: Patient    Goals:  Active       PT Problem       Patient will improve active range of motion in deficit areas for ADL completion.    (Met)       Start:  05/05/25    Expected End:  07/28/25    Resolved:  07/09/25           Patient will improve strength in deficit areas so patient can work with less pain.   (Progressing)       Start:  05/05/25    Expected End:  08/20/25            Patient will stand >30 minutes without pain to cook a meal.  (Not Progressing)       Start:  05/05/25    Expected End:  08/20/25             Patient will demonstrate independence in home program for support of progression (Met)       Start:  05/05/25    Expected End:  07/28/25    Resolved:  07/09/25         Patient will report pain of no more than 2/10 demonstrating a reduction of overall pain (Progressing)       Start:  05/05/25    Expected End:  08/20/25            Patient will show a significant change in LEFS (49 to 58) patient reported outcome tool to demonstrate subjective imporovement (Progressing)       Start:  05/05/25    Expected End:  08/20/25            Patient will perform 5 Times Sit to  15 seconds or less. (Not Progressing)       Start:  05/05/25    Expected End:  08/20/25                Patient will improve score on TUG to <10 seconds.       Start:  07/09/25    Expected End:  08/20/25                     Johanna Mckay, PTA

## 2025-07-25 ENCOUNTER — HOSPITAL ENCOUNTER (OUTPATIENT)
Dept: PAIN MEDICINE | Facility: HOSPITAL | Age: 65
Discharge: HOME | End: 2025-07-25
Payer: COMMERCIAL

## 2025-07-25 DIAGNOSIS — M43.06 LUMBAR SPONDYLOLYSIS: ICD-10-CM

## 2025-07-25 RX ORDER — DIAZEPAM 5 MG/1
5 TABLET ORAL ONCE AS NEEDED
Status: DISCONTINUED | OUTPATIENT
Start: 2025-07-25 | End: 2025-07-26 | Stop reason: HOSPADM

## 2025-07-28 ENCOUNTER — APPOINTMENT (OUTPATIENT)
Dept: UROLOGY | Facility: CLINIC | Age: 65
End: 2025-07-28
Payer: COMMERCIAL

## 2025-07-29 ENCOUNTER — APPOINTMENT (OUTPATIENT)
Dept: PHYSICAL THERAPY | Facility: HOSPITAL | Age: 65
End: 2025-07-29
Payer: COMMERCIAL

## 2025-07-31 ENCOUNTER — HOSPITAL ENCOUNTER (OUTPATIENT)
Dept: RADIOLOGY | Facility: HOSPITAL | Age: 65
Discharge: HOME | End: 2025-07-31
Payer: COMMERCIAL

## 2025-07-31 DIAGNOSIS — N20.2 CALCULUS OF KIDNEY AND URETER: ICD-10-CM

## 2025-07-31 PROCEDURE — 74018 RADEX ABDOMEN 1 VIEW: CPT | Performed by: RADIOLOGY

## 2025-07-31 PROCEDURE — 74018 RADEX ABDOMEN 1 VIEW: CPT

## 2025-08-01 ENCOUNTER — APPOINTMENT (OUTPATIENT)
Dept: RADIOLOGY | Facility: HOSPITAL | Age: 65
End: 2025-08-01
Payer: COMMERCIAL

## 2025-08-01 ENCOUNTER — HOSPITAL ENCOUNTER (OUTPATIENT)
Dept: PAIN MEDICINE | Facility: HOSPITAL | Age: 65
Discharge: HOME | End: 2025-08-01
Payer: COMMERCIAL

## 2025-08-01 VITALS
HEIGHT: 61 IN | BODY MASS INDEX: 54 KG/M2 | OXYGEN SATURATION: 94 % | SYSTOLIC BLOOD PRESSURE: 144 MMHG | RESPIRATION RATE: 20 BRPM | HEART RATE: 64 BPM | WEIGHT: 286 LBS | TEMPERATURE: 97.2 F | DIASTOLIC BLOOD PRESSURE: 72 MMHG

## 2025-08-01 DIAGNOSIS — M48.062 LUMBAR STENOSIS WITH NEUROGENIC CLAUDICATION: Primary | ICD-10-CM

## 2025-08-01 LAB
ERYTHROCYTE [DISTWIDTH] IN BLOOD BY AUTOMATED COUNT: 14.4 % (ref 11–15)
HCT VFR BLD AUTO: 51.1 % (ref 35–45)
HGB BLD-MCNC: 17 G/DL (ref 11.7–15.5)
MCH RBC QN AUTO: 29.9 PG (ref 27–33)
MCHC RBC AUTO-ENTMCNC: 33.3 G/DL (ref 32–36)
MCV RBC AUTO: 90 FL (ref 80–100)
PLATELET # BLD AUTO: 227 THOUSAND/UL (ref 140–400)
PMV BLD REES-ECKER: 9.5 FL (ref 7.5–12.5)
RBC # BLD AUTO: 5.68 MILLION/UL (ref 3.8–5.1)
WBC # BLD AUTO: 5.9 THOUSAND/UL (ref 3.8–10.8)

## 2025-08-01 PROCEDURE — 62323 NJX INTERLAMINAR LMBR/SAC: CPT | Performed by: STUDENT IN AN ORGANIZED HEALTH CARE EDUCATION/TRAINING PROGRAM

## 2025-08-01 PROCEDURE — 2500000004 HC RX 250 GENERAL PHARMACY W/ HCPCS (ALT 636 FOR OP/ED): Mod: JW | Performed by: STUDENT IN AN ORGANIZED HEALTH CARE EDUCATION/TRAINING PROGRAM

## 2025-08-01 PROCEDURE — 7100000009 HC PHASE TWO TIME - INITIAL BASE CHARGE

## 2025-08-01 PROCEDURE — 7100000010 HC PHASE TWO TIME - EACH INCREMENTAL 1 MINUTE

## 2025-08-01 PROCEDURE — 2550000001 HC RX 255 CONTRASTS: Performed by: STUDENT IN AN ORGANIZED HEALTH CARE EDUCATION/TRAINING PROGRAM

## 2025-08-01 RX ORDER — LIDOCAINE HYDROCHLORIDE 10 MG/ML
INJECTION, SOLUTION EPIDURAL; INFILTRATION; INTRACAUDAL; PERINEURAL AS NEEDED
Status: COMPLETED | OUTPATIENT
Start: 2025-08-01 | End: 2025-08-01

## 2025-08-01 RX ORDER — METHYLPREDNISOLONE ACETATE 40 MG/ML
INJECTION, SUSPENSION INTRA-ARTICULAR; INTRALESIONAL; INTRAMUSCULAR; SOFT TISSUE AS NEEDED
Status: COMPLETED | OUTPATIENT
Start: 2025-08-01 | End: 2025-08-01

## 2025-08-01 RX ORDER — LIDOCAINE HYDROCHLORIDE 5 MG/ML
INJECTION, SOLUTION INFILTRATION; INTRAVENOUS AS NEEDED
Status: COMPLETED | OUTPATIENT
Start: 2025-08-01 | End: 2025-08-01

## 2025-08-01 RX ADMIN — METHYLPREDNISOLONE ACETATE 40 MG: 40 INJECTION, SUSPENSION INTRA-ARTICULAR; INTRALESIONAL; INTRAMUSCULAR; SOFT TISSUE at 11:55

## 2025-08-01 RX ADMIN — LIDOCAINE HYDROCHLORIDE 4 ML: 10 INJECTION, SOLUTION EPIDURAL; INFILTRATION; INTRACAUDAL; PERINEURAL at 11:54

## 2025-08-01 RX ADMIN — LIDOCAINE HYDROCHLORIDE 5 ML: 5 INJECTION, SOLUTION INFILTRATION at 11:55

## 2025-08-01 RX ADMIN — IOHEXOL 3 ML: 240 INJECTION, SOLUTION INTRATHECAL; INTRAVASCULAR; INTRAVENOUS; ORAL at 11:55

## 2025-08-01 ASSESSMENT — PAIN - FUNCTIONAL ASSESSMENT
PAIN_FUNCTIONAL_ASSESSMENT: 0-10
PAIN_FUNCTIONAL_ASSESSMENT: 0-10

## 2025-08-01 ASSESSMENT — PAIN SCALES - GENERAL
PAINLEVEL_OUTOF10: 0 - NO PAIN
PAINLEVEL_OUTOF10: 1

## 2025-08-01 ASSESSMENT — ENCOUNTER SYMPTOMS
FEVER: 0
BACK PAIN: 1
SHORTNESS OF BREATH: 0

## 2025-08-01 NOTE — DISCHARGE INSTRUCTIONS
Discharge Instructions:   ° Keep Band-Aid on for the next 24 hours.    ° No showering/bathing for the next 24 hours.    ° You may notice soreness or increased pain in the area of your injection, which may continue for the first 48 hours.    ° You should resume any medications and your regular diet after the procedure.  ° You may resume regular daily activity but should avoid strenuous activity the day of the procedure.  Some of the side affects you may experience from the steroids are:  ° Insomnia (inability to sleep)  ° Increased sweating  ° Headaches  ° Increased fluid retention (swelling of your extremities)  ° Increase appetite  ° Face flushing  ° If you are a diabetic, your blood sugars may go up.  Closely monitor your diet.  Your blood sugar should return to normal in a few days.  Complications:   ° Complications are rare with the most common being temporary increase pain near the injection site. You can apply ice to affected area on the day of the procedure.   ° If the discomfort persists, apply moist heat to the area. Serious complications are very uncommon but may include bleeding, infection or nerve damage.   ° If severe pain, fever, redness or swelling near the injection site, have someone take you to the nearest emergency room to be evaluated for procedure complications or infection.  Expectations:   ° Local anesthetics wear off in several hours but duration of relief varies from individual to individual.     If you have any questions, please call the office at [phone].    If this is an emergency, call [phone] or go to your nearest hospital.

## 2025-08-01 NOTE — H&P
"History Of Present Illness  Farida Traylor is a 65 y.o. female presenting with back pain, progressively worse, for months. Lower back, dull and achy, does not radiate. Mod-severe. She gets cramping and aching in legs after walking too long. Has tried PT with minimal relief. She is in wheelchair and on 3 L O2 at baseline for pulm HTN. She denies new issues.     Past Medical History  Medical History[1]    Surgical History  Surgical History[2]     Social History  She reports that she has never smoked. She has never used smokeless tobacco. She reports that she does not currently use alcohol. She reports that she does not use drugs.    Family History  Family History[3]     Allergies  Grass pollen, Other, Pollen extracts, and Tree and shrub pollen    Review of Systems   Constitutional:  Negative for fever.   Respiratory:  Negative for shortness of breath.    Cardiovascular:  Negative for chest pain.   Musculoskeletal:  Positive for back pain.   All other systems reviewed and are negative.       Physical Exam  General: Well developed, awake/alert/oriented x3, no distress, alert and cooperative.    Skin: Warm and dry    Eyes: EOMI    Head/neck: No apparent injury    Cardiac: Regular rate and rhythm, no murmurs    Respiratory: Clear to auscultation bilaterally    GI: Nontender, nondistended    Extremities: No edema or deformity    MSK:  Pain to palpation of the paraspinal musculature  Decreased  sensation lower extremities at the level of the feet in all dermatomal levels    Neuro: Normal gait, AxO    Psych: Appropriate mood              Last Recorded Vitals  Blood pressure 153/63, pulse (!) 37, temperature 37 °C (98.6 °F), temperature source Temporal, resp. rate 22, height (!) 1.549 m (5' 1\"), weight 130 kg (286 lb), SpO2 95%.    Relevant Results  2.06.2017  Grade 1 anterolisthesis of L4-5, L5-S1  Discogenic changes noted     Assessment & Plan  Lumbar stenosis with neurogenic claudication      Lumbar MARIANO - " local    Patient did not stop here 325 mg ASA we discussed postponing the procedure however she did stop 2 days ago the risks were discussed she elects to proceed.     I spent 15 minutes in the professional and overall care of this patient.      Morro Weiss PA-C         [1]   Past Medical History:  Diagnosis Date    Acquired keratosis (keratoderma) palmaris et plantaris 01/12/2022    Acquired plantar porokeratosis    Acute upper respiratory infection, unspecified 01/14/2020    URI, acute    Allergic rhinitis due to animal (cat) (dog) hair and dander 01/02/2020    Allergic rhinitis due to dogs    Allergic rhinitis due to pollen 01/02/2020    Allergic rhinitis due to pollen    Allergic rhinitis due to pollen 02/26/2018    Seasonal allergic rhinitis due to pollen    Allergy status to unspecified drugs, medicaments and biological substances 06/30/2015    History of allergy    Anemia, unspecified 07/23/2018    Normocytic anemia    Anxiety     Anxiety disorder due to known physiological condition 06/05/2013    Anxiety disorder due to medical condition    Anxiety disorder, unspecified 01/28/2016    Anxiety    Asthma     Asymptomatic varicose veins of bilateral lower extremities 07/16/2019    Varicose veins of legs    Atypical squamous cells of undetermined significance on cytologic smear of cervix (ASC-US) 06/26/2013    Pap smear abnormality of cervix with ASCUS favoring benign    Candidiasis, unspecified 12/10/2019    Yeast infection    Cellulitis of left finger 07/31/2018    Paronychia of finger of left hand    Changes in skin texture 03/09/2021    Cracked skin    Class 3 severe obesity with body mass index (BMI) of 50.0 to 59.9 in adult 02/26/2025    53.53 kg/m²    Contusion of left lesser toe(s) with damage to nail, initial encounter 02/22/2018    Subungual contusion of toenail of left foot    Contusion of right hand, sequela 08/04/2020    Traumatic ecchymosis of hand, right, sequela    Corns and callosities  05/25/2021    Callus of foot    Coronary artery disease     Depression     Disorder of pigmentation, unspecified 09/28/2016    Atypical pigmented skin lesion    Disorder of the skin and subcutaneous tissue, unspecified 08/27/2020    Lesion of subcutaneous tissue    Dorsalgia, unspecified 09/23/2021    Acute back pain    Dorsalgia, unspecified 01/29/2019    Costovertebral angle tenderness    Encounter for general adult medical examination without abnormal findings 06/08/2020    Medicare annual wellness visit, subsequent    Encounter for gynecological examination (general) (routine) without abnormal findings 12/14/2021    Encounter for gynecological examination    Encounter for gynecological examination (general) (routine) without abnormal findings 12/11/2020    Encounter for gynecological examination    Encounter for other screening for malignant neoplasm of breast     Breast cancer screening    Encounter for screening for malignant neoplasm of cervix     Encounter for screening for malignant neoplasm of cervix    Encounter for screening for malignant neoplasm of cervix 11/04/2014    Screening for malignant neoplasm of cervix    Encounter for screening for malignant neoplasm of cervix     Cervical cancer screening    GERD (gastroesophageal reflux disease)     Heart murmur     Hepatomegaly, not elsewhere classified 04/21/2021    Liver mass, right lobe    History of falling 12/28/2018    Status post fall    Hypertension     Inappropriate diet and eating habits 05/13/2021    Inappropriate diet and eating habits    Irregular menstruation, unspecified     Irregular periods/menstrual cycles    Localized edema 07/29/2015    Pedal edema    Localized swelling, mass and lump, left lower limb 09/11/2020    Lump of left thigh    Localized swelling, mass and lump, left lower limb 09/24/2020    Mass of left thigh    Low back pain     Melanocytic nevi, unspecified 09/10/2019    Numerous skin moles    Multiple births, unspecified,  all liveborn ([facility])     Multiple births, all liveborn    Nephrolithiasis 02/14/2025    Bilateral    Obesity     Osteoarthritis     Other allergic rhinitis 01/02/2020    Allergic rhinitis due to dust mite    Other allergic rhinitis 01/02/2020    Allergic rhinitis due to mold    Other conditions influencing health status 06/26/2013    Uterine Rupture    Other conditions influencing health status     Normal colonoscopy    Other conditions influencing health status 02/05/2019    History of burning on urination    Other conditions influencing health status 12/14/2021    History of cough    Other conditions influencing health status 02/14/2019    History of dyspareunia    Other conditions influencing health status 01/14/2020    History of cough    Other conditions influencing health status 06/05/2013    Leiomyomatous Degeneration Of The Uterus    Other conditions influencing health status 06/05/2013    Viremia    Other hypertrophic disorders of the skin 07/29/2015    Skin tag    Other shoulder lesions, right shoulder 11/11/2019    Tendinitis of right rotator cuff    Other specified disorders of bone, thigh 09/09/2020    Pain of left femur    Other specified noninflammatory disorders of vagina 03/12/2019    Vaginal dryness    Other specified postprocedural states 05/04/2017    History of arthroscopic knee surgery    Other specified soft tissue disorders 10/08/2020    Soft tissue mass    Other specified soft tissue disorders 08/04/2020    Swelling of right hand    Oxygen dependent     Pain in left thigh 08/27/2020    Pain of left thigh    Pain in right foot 03/09/2021    Right foot pain    Pain in right hip 12/28/2018    Right hip pain    Pain in right knee 03/12/2021    Bilateral knee pain    Pain in right knee 03/19/2021    Right knee pain    Pain in right leg 05/25/2021    Bilateral pain of leg and foot    Pain in right leg 01/12/2022    Bilateral leg and foot pain    Pain in unspecified joint     Joint pain     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 02/22/2016    History of tetralogy of Fallot    Personal history of contraception     Personal history of contraceptive use    Personal history of diseases of the skin and subcutaneous tissue 12/22/2020    History of ingrown nail    Personal history of malignant neoplasm of other parts of uterus     History of malignant neoplasm of endometrium    Personal history of other (healed) physical injury and trauma 03/01/2017    History of sprain of ankle    Personal history of other benign neoplasm 09/10/2019    History of other benign neoplasm    Personal history of other benign neoplasm 05/06/2014    History of uterine leiomyoma    Personal history of other benign neoplasm 12/11/2020    History of uterine leiomyoma    Personal history of other diseases of the circulatory system 04/14/2021    History of atrial fibrillation    Personal history of other diseases of the circulatory system 04/14/2021    History of atrial fibrillation    Personal history of other diseases of the circulatory system 04/14/2021    History of atrial fibrillation    Personal history of other diseases of the circulatory system 02/01/2017    History of hypertension    Personal history of other diseases of the circulatory system 04/14/2021    History of hypotension    Personal history of other diseases of the digestive system 03/24/2021    History of gastroesophageal reflux (GERD)    Personal history of other diseases of the female genital tract 12/28/2016    History of postmenopausal bleeding    Personal history of other diseases of the female genital tract 05/19/2022    History of postmenopausal bleeding    Personal history of other diseases of the female genital tract     History of vaginal discharge    Personal history of other diseases of the female genital tract     Vaginal delivery    Personal history of other diseases of the musculoskeletal system and  connective tissue     Personal history of arthritis    Personal history of other diseases of the musculoskeletal system and connective tissue 11/18/2019    History of muscle spasm    Personal history of other diseases of the respiratory system 05/27/2021    History of asthma    Personal history of other diseases of the respiratory system 04/12/2019    History of acute bronchitis    Personal history of other diseases of the respiratory system 01/04/2020    History of bronchitis    Personal history of other endocrine, nutritional and metabolic disease 02/01/2017    History of hyperlipidemia    Personal history of other endocrine, nutritional and metabolic disease 04/18/2016    History of obesity    Personal history of other endocrine, nutritional and metabolic disease 05/26/2020    History of thyroid nodule    Personal history of other infectious and parasitic diseases     History of varicella    Personal history of other medical treatment 12/11/2020    History of screening mammography    Personal history of other medical treatment 12/14/2021    History of screening mammography    Personal history of other medical treatment     History of mammogram    Personal history of other specified conditions 08/21/2019    History of gross hematuria    Personal history of other specified conditions 02/02/2017    History of weight gain    Personal history of other specified conditions 12/07/2020    History of chest pain    Personal history of other specified conditions 04/14/2021    History of palpitations    Personal history of other specified conditions 12/05/2014    History of vertigo    Personal history of other specified conditions 08/25/2021    History of exertional chest pain    Personal history of other specified conditions     History of shortness of breath    Personal history of other specified conditions     H/O heartburn    Personal history of other specified conditions 01/12/2018    History of urinary frequency     Personal history of transient ischemic attack (TIA), and 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 12/30/2015    History of TIA (transient ischemic attack)    Personal history of urinary (tract) infections 09/02/2021    History of recurrent cystitis    Personal history of urinary (tract) infections 05/23/2019    History of cystitis    Personal history of urinary (tract) infections 09/02/2021    History of recurrent urinary tract infection    Personal history of urinary calculi 09/12/2019    History of renal calculi    Polyphagia 05/13/2021    Polyphagia    Primary central sleep apnea 10/21/2017    Central sleep apnea    Pulmonary hypertension (Multi)     Pure hypercholesterolemia, unspecified     High cholesterol    Residual hemorrhoidal skin tags 07/06/2017    External hemorrhoid    Shortness of breath     Sleep apnea     cpap with oxygen blow in    Slurred speech 01/02/2015    Slurred speech    Stroke (Multi)     TIA (transient ischemic attack)     Tinea pedis 05/25/2021    Tinea pedis of right foot    Unspecified abdominal pain 04/13/2021    Abdominal pain, acute    Unspecified injury of right wrist, hand and finger(s), sequela 08/04/2020    Hand trauma, right, sequela    Unspecified internal derangement of unspecified knee 03/01/2017    Internal derangement of knee    Unspecified symptoms and signs involving the genitourinary system 12/16/2021    UTI symptoms    Unspecified symptoms and signs involving the genitourinary system 02/05/2019    UTI symptoms    Unspecified symptoms and signs involving the genitourinary system 09/03/2020    UTI symptoms    Urinary incontinence     Urinary tract infection     Urinary tract infection, site not specified 01/17/2020    Acute urinary tract infection    Urinary tract infection, site not specified 01/10/2022    Acute UTI    Xerosis cutis 09/10/2019    Dry skin dermatitis   [2]   Past Surgical History:  Procedure Laterality Date    CARDIAC CATHETERIZATION Right 01/04/2024     Procedure: Right Heart Cath;  Surgeon: Richy Raman MD;  Location: [facility];  Service: Cardiovascular;  Laterality: Right;    CARDIAC CATHETERIZATION      CARDIAC CATHETERIZATION Right 04/28/2025    Procedure: RHC;  Surgeon: Richy Raman MD;  Location: [facility];  Service: Cardiovascular;  Laterality: Right;    COLONOSCOPY      KNEE SURGERY Left 05/04/2017    Knee Surgery    MOUTH SURGERY  11/04/2014    Oral Surgery Tooth Extraction    MR HEAD ANGIO WO IV CONTRAST  02/09/2016    MR HEAD ANGIO WO IV CONTRAST [facility]    NEPHRECTOMY      OTHER SURGICAL HISTORY Bilateral 07/31/2013    Wrist Carpectomy    OTHER SURGICAL HISTORY  09/10/2019    [place] tooth extraction    OTHER SURGICAL HISTORY  11/30/2018    Complete colonoscopy    OTHER SURGICAL HISTORY  06/08/2020    Thyroid biopsy    TOENAIL EXCISION      URETERAL STENT PLACEMENT     [3]   Family History  Problem Relation Name Age of Onset    Hypertension Mother chapis serna     Breast cancer Mother chapis serna 80 - 89    Other (cardiac disorder) Mother chapis serna     Cardiomyopathy Mother chapis serna     Diabetes Mother chapis serna     Other (chronic kidney disease) Mother chapis daphne     Cancer Mother chapis serna     Alcohol abuse Father joel senra     Lung disease Father joel serna     Stroke Brother dorian serna     Brain Aneurysm Brother dorian serna

## 2025-08-04 ENCOUNTER — APPOINTMENT (OUTPATIENT)
Dept: PHYSICAL THERAPY | Facility: HOSPITAL | Age: 65
End: 2025-08-04
Payer: COMMERCIAL

## 2025-08-04 DIAGNOSIS — R26.89 OTHER ABNORMALITIES OF GAIT AND MOBILITY: ICD-10-CM

## 2025-08-04 PROCEDURE — 97112 NEUROMUSCULAR REEDUCATION: CPT | Mod: GP,CQ

## 2025-08-04 PROCEDURE — 97110 THERAPEUTIC EXERCISES: CPT | Mod: GP,CQ

## 2025-08-04 ASSESSMENT — PAIN SCALES - GENERAL: PAINLEVEL_OUTOF10: 0 - NO PAIN

## 2025-08-04 ASSESSMENT — PAIN - FUNCTIONAL ASSESSMENT: PAIN_FUNCTIONAL_ASSESSMENT: 0-10

## 2025-08-04 NOTE — PROGRESS NOTES
Physical Therapy Treatment    Patient Name: Farida Traylor  MRN: [id number]  Today's Date: 8/4/2025  Time Calculation  Start Time: 1257  Stop Time: 1340  Time Calculation (min): 43 min        PT Therapeutic Procedures Time Entry  Therapeutic Exercise Time Entry: 31  Neuromuscular Re-Education Time Entry: 12                Current Problem  1. Other abnormalities of gait and mobility  Follow Up In Physical Therapy          General  Reason for Referral: balance issues  Referred By: Wilma DODSON  Past Medical History Relevant to Rehab: carotid artery occlusion, pulmonary arterial hypertension, morbid obesity, depression, opiate dependence, ADHD, anxiety, low back pain, primary OA of bilateral knees, fall, left calf pain, vascular dementia with depression, lumbar canal stenosis, DJD, amnesia, aphasia, CVA, TIA, tremor  Preferred Learning Style: written    Subjective   Current Condition:   Better  Patient reports she had gotten a shot in her back on Friday, and she is feeling no pain in her LB today.    Performing HEP?: Yes    Precautions  Precautions  Medical Precautions: Fall precautions    Pain  Pain Assessment: 0-10  0-10 (Numeric) Pain Score: 0 - No pain    Objective   Lumbar Spine  Observation   Pt. Noted increased energy today.  Lumbar AROM   Lacks rotation due to tightness and soft tissue approximation.  Treatments:    Therapeutic Exercise  Therapeutic Exercise Performed: Yes  Therapeutic Exercise Activity 1: LAQ #2 2x10  Therapeutic Exercise Activity 2: seated golf swing x 10 ea. with 2# ball  Therapeutic Exercise Activity 3: STS x 15 with 2# ball  Therapeutic Exercise Activity 4: seated hip flexion Blue x20 R/L  Therapeutic Exercise Activity 5: Nu-Step lvl3 x5'  Therapeutic Exercise Activity 6: HS curls x 15 red  Therapeutic Exercise Activity 7: side stepping with red 8 ft x 6  Therapeutic Exercise Activity 8: lateral step up with heel touch eccentric x 10 ea.  Therapeutic Exercise Activity 9: 3x30 sec  "hamstring curls off step b le  Therapeutic Exercise Activity 10: 4inch fwd step up 10x R/L  Therapeutic Exercise Activity 11: seated sidebending with overhead reach 3x10\"  Therapeutic Exercise Activity 12: hamstring stretch 3x20\" sitting  Therapeutic Exercise Activity 13: hip flex/ext red x 10 ea.    Balance/Neuromuscular Re-Education  Balance/Neuromuscular Re-Education Activity Performed: Yes  Balance/Neuromuscular Re-Education Activity 1: Step and reach x10 R/L  Balance/Neuromuscular Re-Education Activity 2: lateral step and reach x10 R/L  Balance/Neuromuscular Re-Education Activity 3: Retro walk carrying 5# KB 15'x2 with each  Balance/Neuromuscular Re-Education Activity 4: Retro walk carrying 5# KB 15'x2 with each  Balance/Neuromuscular Re-Education Activity 5: high stepping 8 ft. x 4 no UEs    Assessment: Pt. Able to complete added exercises today without c/o pain, and noted less shortness of breath; However, she did require rest breaks after a two exercises in a row.  Pt reported no pain at conclusion of therapy.  PT Assessment  PT Assessment Results: Decreased strength, Decreased range of motion, Impaired balance, Decreased mobility, Obesity, Pain  Rehab Prognosis: Good  Evaluation/Treatment Tolerance: Patient tolerated treatment well    Plan:continue with PT POC with focus on strengthening of core and Les and balance activities to improve overall balance and gait.  OP PT Plan  Treatment/Interventions: Education/ Instruction, Gait training, Manual therapy, Neuromuscular re-education, Therapeutic activities, Therapeutic exercises  PT Plan: Skilled PT  PT Frequency: 1 time per week  Duration: 6 weeks  Onset Date: 04/23/25  Certification Period Start Date: 05/05/25  Certification Period End Date: 08/20/25  Number of Treatments Authorized: 9 of 12  Rehab Potential: Good  Plan of Care Agreement: Patient    Goals:  Active       PT Problem       Patient will improve active range of motion in deficit areas for ADL " completion.    (Met)       Start:  05/05/25    Expected End:  07/28/25    Resolved:  07/09/25           Patient will improve strength in deficit areas so patient can work with less pain.   (Progressing)       Start:  05/05/25    Expected End:  08/20/25            Patient will stand >30 minutes without pain to cook a meal.  (Not Progressing)       Start:  05/05/25    Expected End:  08/20/25            Patient will demonstrate independence in home program for support of progression (Met)       Start:  05/05/25    Expected End:  07/28/25    Resolved:  07/09/25         Patient will report pain of no more than 2/10 demonstrating a reduction of overall pain (Progressing)       Start:  05/05/25    Expected End:  08/20/25            Patient will show a significant change in LEFS (49 to 58) patient reported outcome tool to demonstrate subjective imporovement (Progressing)       Start:  05/05/25    Expected End:  08/20/25            Patient will perform 5 Times Sit to  15 seconds or less. (Not Progressing)       Start:  05/05/25    Expected End:  08/20/25                Patient will improve score on TUG to <10 seconds.       Start:  07/09/25    Expected End:  08/20/25                     Johanna Mckay PTA

## 2025-08-05 ENCOUNTER — OFFICE VISIT (OUTPATIENT)
Dept: UROLOGY | Facility: CLINIC | Age: 65
End: 2025-08-05
Payer: COMMERCIAL

## 2025-08-05 ENCOUNTER — APPOINTMENT (OUTPATIENT)
Dept: PRIMARY CARE | Facility: CLINIC | Age: 65
End: 2025-08-05
Payer: COMMERCIAL

## 2025-08-05 DIAGNOSIS — N20.0 KIDNEY CALCULI: Primary | ICD-10-CM

## 2025-08-05 PROCEDURE — 99213 OFFICE O/P EST LOW 20 MIN: CPT | Performed by: SURGERY

## 2025-08-05 PROCEDURE — 1036F TOBACCO NON-USER: CPT | Performed by: SURGERY

## 2025-08-05 PROCEDURE — 1159F MED LIST DOCD IN RCRD: CPT | Performed by: SURGERY

## 2025-08-05 PROCEDURE — 1160F RVW MEDS BY RX/DR IN RCRD: CPT | Performed by: SURGERY

## 2025-08-05 PROCEDURE — 1126F AMNT PAIN NOTED NONE PRSNT: CPT | Performed by: SURGERY

## 2025-08-05 ASSESSMENT — PAIN SCALES - GENERAL: PAINLEVEL_OUTOF10: 0-NO PAIN

## 2025-08-05 NOTE — PROGRESS NOTES
Subjective   Patient ID: Farida Traylor is a 65 y.o. female who presents for Follow-up.    HPI  She was in the ER last month with right-sided flank pain.  A CT scan done revealed an 11 mm calculus in the right renal pelvis.  She does have a known history of kidney stones.  She had lithotripsy in March of this year.  Her pain is intermittent.  She has no voiding complaints.  She does have significant medical issues.  She is currently on oxygen at home.              Objective   Physical Exam  Well nourished  Breathing comfortably  Abdomen obese, soft, ND, NT              Assessment/Plan   Problem List Items Addressed This Visit    None  Visit Diagnoses         Codes      Kidney calculi    -  Primary N20.0             I reviewed her CT scan and her KUB today.  She does have a large calculus in the right renal pelvis.  This ultimately needs treatment.  We discussed options including ESWL, and ureteroscopy.  She would like to attempt the least invasive option.  I will go ahead and schedule her for a right ESWL.            Kanika Navarro MD 08/05/25 11:50 AM

## 2025-08-07 ENCOUNTER — APPOINTMENT (OUTPATIENT)
Facility: CLINIC | Age: 65
End: 2025-08-07
Payer: COMMERCIAL

## 2025-08-08 ENCOUNTER — APPOINTMENT (OUTPATIENT)
Dept: PRIMARY CARE | Facility: CLINIC | Age: 65
End: 2025-08-08
Payer: COMMERCIAL

## 2025-08-08 DIAGNOSIS — E78.2 MODERATE MIXED HYPERLIPIDEMIA NOT REQUIRING STATIN THERAPY: ICD-10-CM

## 2025-08-08 DIAGNOSIS — J96.11 CHRONIC RESPIRATORY FAILURE WITH HYPOXIA: ICD-10-CM

## 2025-08-11 ENCOUNTER — TELEPHONE (OUTPATIENT)
Dept: UROLOGY | Facility: CLINIC | Age: 65
End: 2025-08-11
Payer: COMMERCIAL

## 2025-08-12 ENCOUNTER — APPOINTMENT (OUTPATIENT)
Dept: PRIMARY CARE | Facility: CLINIC | Age: 65
End: 2025-08-12
Payer: COMMERCIAL

## 2025-08-12 DIAGNOSIS — I27.0 IDIOPATHIC PAH (PULMONARY ARTERIAL HYPERTENSION) (MULTI): ICD-10-CM

## 2025-08-12 RX ORDER — SIMVASTATIN 80 MG/1
80 TABLET, FILM COATED ORAL NIGHTLY
Qty: 30 TABLET | Refills: 11 | Status: SHIPPED | OUTPATIENT
Start: 2025-08-12

## 2025-08-13 ENCOUNTER — APPOINTMENT (OUTPATIENT)
Dept: PREADMISSION TESTING | Facility: HOSPITAL | Age: 65
End: 2025-08-13
Payer: COMMERCIAL

## 2025-08-14 ENCOUNTER — DOCUMENTATION (OUTPATIENT)
Dept: PHYSICAL THERAPY | Facility: HOSPITAL | Age: 65
End: 2025-08-14
Payer: COMMERCIAL

## 2025-08-14 ENCOUNTER — APPOINTMENT (OUTPATIENT)
Dept: PHYSICAL THERAPY | Facility: HOSPITAL | Age: 65
End: 2025-08-14
Payer: COMMERCIAL

## 2025-08-14 RX ORDER — ALBUTEROL SULFATE 90 UG/1
INHALANT RESPIRATORY (INHALATION)
Qty: 6.7 G | Refills: 11 | Status: SHIPPED | OUTPATIENT
Start: 2025-08-14

## 2025-08-18 ENCOUNTER — APPOINTMENT (OUTPATIENT)
Dept: PREADMISSION TESTING | Facility: HOSPITAL | Age: 65
End: 2025-08-18
Payer: COMMERCIAL

## 2025-08-21 ENCOUNTER — OFFICE VISIT (OUTPATIENT)
Facility: CLINIC | Age: 65
End: 2025-08-21
Payer: COMMERCIAL

## 2025-08-21 ENCOUNTER — APPOINTMENT (OUTPATIENT)
Facility: CLINIC | Age: 65
End: 2025-08-21
Payer: COMMERCIAL

## 2025-08-21 VITALS
HEART RATE: 70 BPM | OXYGEN SATURATION: 93 % | DIASTOLIC BLOOD PRESSURE: 66 MMHG | SYSTOLIC BLOOD PRESSURE: 123 MMHG | RESPIRATION RATE: 17 BRPM | BODY MASS INDEX: 54.56 KG/M2 | HEIGHT: 61 IN | TEMPERATURE: 98.1 F | WEIGHT: 289 LBS

## 2025-08-21 DIAGNOSIS — M47.816 LUMBAR SPONDYLOSIS: Primary | ICD-10-CM

## 2025-08-21 PROCEDURE — 1125F AMNT PAIN NOTED PAIN PRSNT: CPT | Performed by: STUDENT IN AN ORGANIZED HEALTH CARE EDUCATION/TRAINING PROGRAM

## 2025-08-21 PROCEDURE — 99213 OFFICE O/P EST LOW 20 MIN: CPT | Performed by: STUDENT IN AN ORGANIZED HEALTH CARE EDUCATION/TRAINING PROGRAM

## 2025-08-21 PROCEDURE — 1036F TOBACCO NON-USER: CPT | Performed by: STUDENT IN AN ORGANIZED HEALTH CARE EDUCATION/TRAINING PROGRAM

## 2025-08-21 PROCEDURE — 3078F DIAST BP <80 MM HG: CPT | Performed by: STUDENT IN AN ORGANIZED HEALTH CARE EDUCATION/TRAINING PROGRAM

## 2025-08-21 PROCEDURE — 1160F RVW MEDS BY RX/DR IN RCRD: CPT | Performed by: STUDENT IN AN ORGANIZED HEALTH CARE EDUCATION/TRAINING PROGRAM

## 2025-08-21 PROCEDURE — 1159F MED LIST DOCD IN RCRD: CPT | Performed by: STUDENT IN AN ORGANIZED HEALTH CARE EDUCATION/TRAINING PROGRAM

## 2025-08-21 PROCEDURE — G2211 COMPLEX E/M VISIT ADD ON: HCPCS | Performed by: STUDENT IN AN ORGANIZED HEALTH CARE EDUCATION/TRAINING PROGRAM

## 2025-08-21 PROCEDURE — 3074F SYST BP LT 130 MM HG: CPT | Performed by: STUDENT IN AN ORGANIZED HEALTH CARE EDUCATION/TRAINING PROGRAM

## 2025-08-21 PROCEDURE — 3008F BODY MASS INDEX DOCD: CPT | Performed by: STUDENT IN AN ORGANIZED HEALTH CARE EDUCATION/TRAINING PROGRAM

## 2025-08-21 RX ORDER — SOTATERCEPT-CSRK 90 MG
45 KIT SUBCUTANEOUS
COMMUNITY
Start: 2025-08-13

## 2025-08-21 ASSESSMENT — PAIN SCALES - GENERAL: PAINLEVEL_OUTOF10: 1

## 2025-08-22 ENCOUNTER — PRE-ADMISSION TESTING (OUTPATIENT)
Dept: PREADMISSION TESTING | Facility: HOSPITAL | Age: 65
End: 2025-08-22
Payer: COMMERCIAL

## 2025-08-22 VITALS
RESPIRATION RATE: 16 BRPM | SYSTOLIC BLOOD PRESSURE: 134 MMHG | HEIGHT: 61 IN | HEART RATE: 70 BPM | WEIGHT: 289 LBS | DIASTOLIC BLOOD PRESSURE: 64 MMHG | TEMPERATURE: 97.5 F | BODY MASS INDEX: 54.56 KG/M2 | OXYGEN SATURATION: 93 %

## 2025-08-22 LAB
ERYTHROCYTE [DISTWIDTH] IN BLOOD BY AUTOMATED COUNT: 15.1 % (ref 11–15)
HCT VFR BLD AUTO: 51.3 % (ref 35–45)
HGB BLD-MCNC: 16.7 G/DL (ref 11.7–15.5)
MCH RBC QN AUTO: 30.3 PG (ref 27–33)
MCHC RBC AUTO-ENTMCNC: 32.6 G/DL (ref 32–36)
MCV RBC AUTO: 92.9 FL (ref 80–100)
PLATELET # BLD AUTO: 203 THOUSAND/UL (ref 140–400)
PMV BLD REES-ECKER: 9.6 FL (ref 7.5–12.5)
RBC # BLD AUTO: 5.52 MILLION/UL (ref 3.8–5.1)
WBC # BLD AUTO: 6.2 THOUSAND/UL (ref 3.8–10.8)

## 2025-08-22 PROCEDURE — 99203 OFFICE O/P NEW LOW 30 MIN: CPT | Performed by: NURSE PRACTITIONER

## 2025-08-22 ASSESSMENT — ENCOUNTER SYMPTOMS
NEUROLOGICAL NEGATIVE: 1
EYES NEGATIVE: 1
DYSURIA: 1
NERVOUS/ANXIOUS: 1
ALLERGIC/IMMUNOLOGIC NEGATIVE: 1
DEPRESSION: 1
STIFFNESS: 1
HEMATURIA: 1
FREQUENCY: 1
CARDIOVASCULAR NEGATIVE: 1
HEMATOLOGIC/LYMPHATIC NEGATIVE: 1
SHORTNESS OF BREATH: 1
ENDOCRINE NEGATIVE: 1
SLEEP DISTURBANCES DUE TO BREATHING: 1
BACK PAIN: 1
GASTROINTESTINAL NEGATIVE: 1
FLANK PAIN: 1

## 2025-08-22 ASSESSMENT — DUKE ACTIVITY SCORE INDEX (DASI)
CAN YOU DO YARD WORK LIKE RAKING LEAVES, WEEDING OR PUSHING A MOWER: NO
CAN YOU WALK INDOORS, SUCH AS AROUND YOUR HOUSE: YES
CAN YOU DO MODERATE WORK AROUND THE HOUSE LIKE VACUUMING, SWEEPING FLOORS OR CARRYING GROCERIES: YES
CAN YOU TAKE CARE OF YOURSELF (EAT, DRESS, BATHE, OR USE TOILET): YES
CAN YOU CLIMB A FLIGHT OF STAIRS OR WALK UP A HILL: NO
CAN YOU PARTICIPATE IN MODERATE RECREATIONAL ACTIVITIES LIKE GOLF, BOWLING, DANCING, DOUBLES TENNIS OR THROWING A BASEBALL OR FOOTBALL: NO
CAN YOU PARTICIPATE IN STRENOUS SPORTS LIKE SWIMMING, SINGLES TENNIS, FOOTBALL, BASKETBALL, OR SKIING: NO
CAN YOU WALK A BLOCK OR TWO ON LEVEL GROUND: NO
CAN YOU HAVE SEXUAL RELATIONS: YES
CAN YOU DO HEAVY WORK AROUND THE HOUSE LIKE SCRUBBING FLOORS OR LIFTING AND MOVING HEAVY FURNITURE: NO
CAN YOU DO LIGHT WORK AROUND THE HOUSE LIKE DUSTING OR WASHING DISHES: YES
DASI METS SCORE: 4.7
CAN YOU RUN A SHORT DISTANCE: NO
TOTAL_SCORE: 15.95

## 2025-08-22 ASSESSMENT — PAIN SCALES - GENERAL: PAINLEVEL_OUTOF10: 0 - NO PAIN

## 2025-08-22 ASSESSMENT — PAIN - FUNCTIONAL ASSESSMENT: PAIN_FUNCTIONAL_ASSESSMENT: 0-10

## 2025-08-25 DIAGNOSIS — I27.0 IDIOPATHIC PAH (PULMONARY ARTERIAL HYPERTENSION) (MULTI): ICD-10-CM

## 2025-08-27 ENCOUNTER — PHARMACY VISIT (OUTPATIENT)
Dept: PHARMACY | Facility: CLINIC | Age: 65
End: 2025-08-27
Payer: COMMERCIAL

## 2025-08-27 ENCOUNTER — ANESTHESIA (OUTPATIENT)
Dept: OPERATING ROOM | Facility: HOSPITAL | Age: 65
End: 2025-08-27
Payer: COMMERCIAL

## 2025-08-27 ENCOUNTER — HOSPITAL ENCOUNTER (OUTPATIENT)
Facility: HOSPITAL | Age: 65
Setting detail: OUTPATIENT SURGERY
Discharge: HOME | End: 2025-08-27
Attending: SURGERY | Admitting: SURGERY
Payer: COMMERCIAL

## 2025-08-27 ENCOUNTER — ANESTHESIA EVENT (OUTPATIENT)
Dept: OPERATING ROOM | Facility: HOSPITAL | Age: 65
End: 2025-08-27
Payer: COMMERCIAL

## 2025-08-27 ENCOUNTER — APPOINTMENT (OUTPATIENT)
Dept: RADIOLOGY | Facility: HOSPITAL | Age: 65
End: 2025-08-27
Payer: COMMERCIAL

## 2025-08-27 VITALS
HEIGHT: 61 IN | DIASTOLIC BLOOD PRESSURE: 52 MMHG | TEMPERATURE: 97.2 F | HEART RATE: 77 BPM | OXYGEN SATURATION: 97 % | BODY MASS INDEX: 54.53 KG/M2 | WEIGHT: 288.8 LBS | RESPIRATION RATE: 12 BRPM | SYSTOLIC BLOOD PRESSURE: 109 MMHG

## 2025-08-27 DIAGNOSIS — N20.1 CALCULUS OF URETER: Primary | ICD-10-CM

## 2025-08-27 DIAGNOSIS — N20.0 KIDNEY CALCULI: ICD-10-CM

## 2025-08-27 PROCEDURE — 3600000003 HC OR TIME - INITIAL BASE CHARGE - PROCEDURE LEVEL THREE: Performed by: SURGERY

## 2025-08-27 PROCEDURE — 2500000004 HC RX 250 GENERAL PHARMACY W/ HCPCS (ALT 636 FOR OP/ED): Performed by: SURGERY

## 2025-08-27 PROCEDURE — 7100000009 HC PHASE TWO TIME - INITIAL BASE CHARGE: Performed by: SURGERY

## 2025-08-27 PROCEDURE — RXMED WILLOW AMBULATORY MEDICATION CHARGE

## 2025-08-27 PROCEDURE — 7100000001 HC RECOVERY ROOM TIME - INITIAL BASE CHARGE: Performed by: SURGERY

## 2025-08-27 PROCEDURE — 3700000001 HC GENERAL ANESTHESIA TIME - INITIAL BASE CHARGE: Performed by: SURGERY

## 2025-08-27 PROCEDURE — A50590 PR FRAGMENT KIDNEY STONE/ ESWL: Performed by: ANESTHESIOLOGY

## 2025-08-27 PROCEDURE — 3600000008 HC OR TIME - EACH INCREMENTAL 1 MINUTE - PROCEDURE LEVEL THREE: Performed by: SURGERY

## 2025-08-27 PROCEDURE — 7100000002 HC RECOVERY ROOM TIME - EACH INCREMENTAL 1 MINUTE: Performed by: SURGERY

## 2025-08-27 PROCEDURE — A50590 PR FRAGMENT KIDNEY STONE/ ESWL: Performed by: ANESTHESIOLOGIST ASSISTANT

## 2025-08-27 PROCEDURE — 7100000010 HC PHASE TWO TIME - EACH INCREMENTAL 1 MINUTE: Performed by: SURGERY

## 2025-08-27 PROCEDURE — 50590 FRAGMENTING OF KIDNEY STONE: CPT | Performed by: SURGERY

## 2025-08-27 PROCEDURE — 2720000007 HC OR 272 NO HCPCS: Performed by: SURGERY

## 2025-08-27 PROCEDURE — 3700000002 HC GENERAL ANESTHESIA TIME - EACH INCREMENTAL 1 MINUTE: Performed by: SURGERY

## 2025-08-27 PROCEDURE — 74018 RADEX ABDOMEN 1 VIEW: CPT

## 2025-08-27 PROCEDURE — 74018 RADEX ABDOMEN 1 VIEW: CPT | Performed by: RADIOLOGY

## 2025-08-27 PROCEDURE — 2500000004 HC RX 250 GENERAL PHARMACY W/ HCPCS (ALT 636 FOR OP/ED): Performed by: ANESTHESIOLOGIST ASSISTANT

## 2025-08-27 RX ORDER — SUCCINYLCHOLINE CHLORIDE 20 MG/ML
INJECTION INTRAMUSCULAR; INTRAVENOUS AS NEEDED
Status: DISCONTINUED | OUTPATIENT
Start: 2025-08-27 | End: 2025-08-27

## 2025-08-27 RX ORDER — HYDRALAZINE HYDROCHLORIDE 20 MG/ML
5 INJECTION INTRAMUSCULAR; INTRAVENOUS EVERY 30 MIN PRN
Status: DISCONTINUED | OUTPATIENT
Start: 2025-08-27 | End: 2025-08-27 | Stop reason: HOSPADM

## 2025-08-27 RX ORDER — LIDOCAINE HYDROCHLORIDE 10 MG/ML
0.1 INJECTION, SOLUTION INFILTRATION; PERINEURAL ONCE
Status: DISCONTINUED | OUTPATIENT
Start: 2025-08-27 | End: 2025-08-27 | Stop reason: HOSPADM

## 2025-08-27 RX ORDER — ALBUTEROL SULFATE 0.83 MG/ML
2.5 SOLUTION RESPIRATORY (INHALATION) ONCE AS NEEDED
Status: DISCONTINUED | OUTPATIENT
Start: 2025-08-27 | End: 2025-08-27 | Stop reason: HOSPADM

## 2025-08-27 RX ORDER — ROCURONIUM BROMIDE 10 MG/ML
INJECTION, SOLUTION INTRAVENOUS AS NEEDED
Status: DISCONTINUED | OUTPATIENT
Start: 2025-08-27 | End: 2025-08-27

## 2025-08-27 RX ORDER — MEPERIDINE HYDROCHLORIDE 25 MG/ML
12.5 INJECTION INTRAMUSCULAR; INTRAVENOUS; SUBCUTANEOUS EVERY 10 MIN PRN
Status: DISCONTINUED | OUTPATIENT
Start: 2025-08-27 | End: 2025-08-27 | Stop reason: HOSPADM

## 2025-08-27 RX ORDER — FENTANYL CITRATE 50 UG/ML
INJECTION, SOLUTION INTRAMUSCULAR; INTRAVENOUS AS NEEDED
Status: DISCONTINUED | OUTPATIENT
Start: 2025-08-27 | End: 2025-08-27

## 2025-08-27 RX ORDER — TAMSULOSIN HYDROCHLORIDE 0.4 MG/1
0.4 CAPSULE ORAL DAILY
Qty: 30 CAPSULE | Refills: 0 | Status: SHIPPED | OUTPATIENT
Start: 2025-08-27

## 2025-08-27 RX ORDER — PROPOFOL 10 MG/ML
INJECTION, EMULSION INTRAVENOUS AS NEEDED
Status: DISCONTINUED | OUTPATIENT
Start: 2025-08-27 | End: 2025-08-27

## 2025-08-27 RX ORDER — SODIUM CHLORIDE, SODIUM LACTATE, POTASSIUM CHLORIDE, CALCIUM CHLORIDE 600; 310; 30; 20 MG/100ML; MG/100ML; MG/100ML; MG/100ML
100 INJECTION, SOLUTION INTRAVENOUS CONTINUOUS
Status: DISCONTINUED | OUTPATIENT
Start: 2025-08-27 | End: 2025-08-27 | Stop reason: HOSPADM

## 2025-08-27 RX ORDER — SODIUM CHLORIDE, SODIUM LACTATE, POTASSIUM CHLORIDE, CALCIUM CHLORIDE 600; 310; 30; 20 MG/100ML; MG/100ML; MG/100ML; MG/100ML
50 INJECTION, SOLUTION INTRAVENOUS CONTINUOUS
Status: DISCONTINUED | OUTPATIENT
Start: 2025-08-27 | End: 2025-08-27 | Stop reason: HOSPADM

## 2025-08-27 RX ORDER — HYDROCODONE BITARTRATE AND ACETAMINOPHEN 5; 325 MG/1; MG/1
1 TABLET ORAL EVERY 6 HOURS PRN
Qty: 15 TABLET | Refills: 0 | Status: SHIPPED | OUTPATIENT
Start: 2025-08-27

## 2025-08-27 RX ORDER — CEFAZOLIN SODIUM 2 G/50ML
2 SOLUTION INTRAVENOUS ONCE
Status: DISCONTINUED | OUTPATIENT
Start: 2025-08-27 | End: 2025-08-27 | Stop reason: HOSPADM

## 2025-08-27 RX ORDER — ONDANSETRON HYDROCHLORIDE 2 MG/ML
INJECTION, SOLUTION INTRAVENOUS AS NEEDED
Status: DISCONTINUED | OUTPATIENT
Start: 2025-08-27 | End: 2025-08-27

## 2025-08-27 RX ORDER — PHENYLEPHRINE HCL IN 0.9% NACL 1 MG/10 ML
SYRINGE (ML) INTRAVENOUS AS NEEDED
Status: DISCONTINUED | OUTPATIENT
Start: 2025-08-27 | End: 2025-08-27

## 2025-08-27 RX ORDER — ONDANSETRON HYDROCHLORIDE 2 MG/ML
4 INJECTION, SOLUTION INTRAVENOUS ONCE AS NEEDED
Status: DISCONTINUED | OUTPATIENT
Start: 2025-08-27 | End: 2025-08-27 | Stop reason: HOSPADM

## 2025-08-27 RX ORDER — MIDAZOLAM HYDROCHLORIDE 1 MG/ML
1 INJECTION, SOLUTION INTRAMUSCULAR; INTRAVENOUS ONCE AS NEEDED
Status: DISCONTINUED | OUTPATIENT
Start: 2025-08-27 | End: 2025-08-27 | Stop reason: HOSPADM

## 2025-08-27 RX ORDER — MIDAZOLAM HYDROCHLORIDE 1 MG/ML
INJECTION, SOLUTION INTRAMUSCULAR; INTRAVENOUS AS NEEDED
Status: DISCONTINUED | OUTPATIENT
Start: 2025-08-27 | End: 2025-08-27

## 2025-08-27 RX ORDER — FENTANYL CITRATE 50 UG/ML
50 INJECTION, SOLUTION INTRAMUSCULAR; INTRAVENOUS EVERY 5 MIN PRN
Status: DISCONTINUED | OUTPATIENT
Start: 2025-08-27 | End: 2025-08-27 | Stop reason: HOSPADM

## 2025-08-27 RX ORDER — LIDOCAINE HYDROCHLORIDE 20 MG/ML
INJECTION, SOLUTION INFILTRATION; PERINEURAL AS NEEDED
Status: DISCONTINUED | OUTPATIENT
Start: 2025-08-27 | End: 2025-08-27

## 2025-08-27 RX ORDER — CEFAZOLIN 1 G/1
INJECTION, POWDER, FOR SOLUTION INTRAVENOUS AS NEEDED
Status: DISCONTINUED | OUTPATIENT
Start: 2025-08-27 | End: 2025-08-27

## 2025-08-27 RX ADMIN — SUGAMMADEX 200 MG: 100 INJECTION, SOLUTION INTRAVENOUS at 12:31

## 2025-08-27 RX ADMIN — LIDOCAINE HYDROCHLORIDE 100 MG: 20 INJECTION, SOLUTION INFILTRATION; PERINEURAL at 11:53

## 2025-08-27 RX ADMIN — SODIUM CHLORIDE, SODIUM LACTATE, POTASSIUM CHLORIDE, AND CALCIUM CHLORIDE: .6; .31; .03; .02 INJECTION, SOLUTION INTRAVENOUS at 11:45

## 2025-08-27 RX ADMIN — Medication 100 MCG: at 12:09

## 2025-08-27 RX ADMIN — SODIUM CHLORIDE, SODIUM LACTATE, POTASSIUM CHLORIDE, AND CALCIUM CHLORIDE 50 ML/HR: 600; 310; 30; 20 INJECTION, SOLUTION INTRAVENOUS at 10:39

## 2025-08-27 RX ADMIN — ROCURONIUM BROMIDE 10 MG: 10 INJECTION, SOLUTION INTRAVENOUS at 11:58

## 2025-08-27 RX ADMIN — FENTANYL CITRATE 50 MCG: 0.05 INJECTION, SOLUTION INTRAMUSCULAR; INTRAVENOUS at 11:53

## 2025-08-27 RX ADMIN — Medication 50 MCG: at 12:17

## 2025-08-27 RX ADMIN — MIDAZOLAM 0.5 MG: 1 INJECTION INTRAMUSCULAR; INTRAVENOUS at 11:45

## 2025-08-27 RX ADMIN — CEFAZOLIN 3 G: 1 INJECTION, POWDER, FOR SOLUTION INTRAMUSCULAR; INTRAVENOUS at 11:58

## 2025-08-27 RX ADMIN — ONDANSETRON HYDROCHLORIDE 4 MG: 2 INJECTION INTRAMUSCULAR; INTRAVENOUS at 12:23

## 2025-08-27 RX ADMIN — PROPOFOL 140 MG: 10 INJECTION, EMULSION INTRAVENOUS at 11:53

## 2025-08-27 RX ADMIN — SUCCINYLCHOLINE CHLORIDE 140 MG: 20 INJECTION, SOLUTION INTRAMUSCULAR; INTRAVENOUS at 11:53

## 2025-08-27 SDOH — HEALTH STABILITY: MENTAL HEALTH: CURRENT SMOKER: 0

## 2025-08-27 ASSESSMENT — PAIN - FUNCTIONAL ASSESSMENT
PAIN_FUNCTIONAL_ASSESSMENT: 0-10

## 2025-08-27 ASSESSMENT — PAIN SCALES - GENERAL
PAINLEVEL_OUTOF10: 0 - NO PAIN
PAIN_LEVEL: 2
PAINLEVEL_OUTOF10: 0 - NO PAIN
PAINLEVEL_OUTOF10: 2
PAINLEVEL_OUTOF10: 0 - NO PAIN
PAINLEVEL_OUTOF10: 0 - NO PAIN

## 2025-08-27 ASSESSMENT — PAIN DESCRIPTION - DESCRIPTORS: DESCRIPTORS: ACHING

## 2025-08-28 ENCOUNTER — TELEPHONE (OUTPATIENT)
Dept: UROLOGY | Facility: CLINIC | Age: 65
End: 2025-08-28
Payer: COMMERCIAL

## 2025-08-28 DIAGNOSIS — N20.0 KIDNEY CALCULI: ICD-10-CM

## 2025-08-29 ENCOUNTER — OFFICE VISIT (OUTPATIENT)
Dept: ORTHOPEDIC SURGERY | Facility: CLINIC | Age: 65
End: 2025-08-29
Payer: COMMERCIAL

## 2025-08-29 DIAGNOSIS — M17.0 ARTHRITIS OF BOTH KNEES: Primary | ICD-10-CM

## 2025-08-29 PROCEDURE — 1125F AMNT PAIN NOTED PAIN PRSNT: CPT

## 2025-08-29 PROCEDURE — 1036F TOBACCO NON-USER: CPT

## 2025-08-29 PROCEDURE — 20610 DRAIN/INJ JOINT/BURSA W/O US: CPT

## 2025-08-29 PROCEDURE — 1159F MED LIST DOCD IN RCRD: CPT

## 2025-08-29 PROCEDURE — 99214 OFFICE O/P EST MOD 30 MIN: CPT

## 2025-08-29 RX ORDER — TRIAMCINOLONE ACETONIDE 40 MG/ML
2.5 INJECTION, SUSPENSION INTRA-ARTICULAR; INTRAMUSCULAR
Status: COMPLETED | OUTPATIENT
Start: 2025-08-29 | End: 2025-08-29

## 2025-08-29 RX ADMIN — TRIAMCINOLONE ACETONIDE 2.5 MG: 40 INJECTION, SUSPENSION INTRA-ARTICULAR; INTRAMUSCULAR at 12:25

## 2025-08-29 ASSESSMENT — PAIN SCALES - GENERAL: PAINLEVEL_OUTOF10: 7

## 2025-09-01 LAB
ATRIAL RATE: 59 BPM
ATRIAL RATE: 60 BPM
ATRIAL RATE: 61 BPM
P AXIS: -11 DEGREES
P AXIS: -12 DEGREES
P AXIS: 61 DEGREES
P OFFSET: 138 MS
P OFFSET: 152 MS
P OFFSET: 162 MS
P ONSET: 100 MS
P ONSET: 111 MS
P ONSET: 127 MS
PR INTERVAL: 188 MS
PR INTERVAL: 206 MS
PR INTERVAL: 216 MS
Q ONSET: 203 MS
Q ONSET: 219 MS
Q ONSET: 221 MS
QRS COUNT: 10 BEATS
QRS DURATION: 100 MS
QRS DURATION: 120 MS
QRS DURATION: 126 MS
QT INTERVAL: 478 MS
QT INTERVAL: 498 MS
QT INTERVAL: 504 MS
QTC CALCULATION(BAZETT): 478 MS
QTC CALCULATION(BAZETT): 498 MS
QTC CALCULATION(BAZETT): 501 MS
QTC FREDERICIA: 478 MS
QTC FREDERICIA: 500 MS
QTC FREDERICIA: 501 MS
R AXIS: -13 DEGREES
R AXIS: -20 DEGREES
R AXIS: 49 DEGREES
T AXIS: -34 DEGREES
T AXIS: -39 DEGREES
T AXIS: 74 DEGREES
T OFFSET: 452 MS
T OFFSET: 458 MS
T OFFSET: 473 MS
VENTRICULAR RATE: 59 BPM
VENTRICULAR RATE: 60 BPM
VENTRICULAR RATE: 61 BPM

## 2025-09-02 ENCOUNTER — APPOINTMENT (OUTPATIENT)
Dept: PRIMARY CARE | Facility: CLINIC | Age: 65
End: 2025-09-02
Payer: COMMERCIAL

## 2025-09-02 DIAGNOSIS — J96.11 CHRONIC RESPIRATORY FAILURE WITH HYPOXIA: ICD-10-CM

## 2025-09-02 ASSESSMENT — PATIENT HEALTH QUESTIONNAIRE - PHQ9
9. THOUGHTS THAT YOU WOULD BE BETTER OFF DEAD, OR OF HURTING YOURSELF: NOT AT ALL
1. LITTLE INTEREST OR PLEASURE IN DOING THINGS: SEVERAL DAYS
7. TROUBLE CONCENTRATING ON THINGS, SUCH AS READING THE NEWSPAPER OR WATCHING TELEVISION: SEVERAL DAYS
8. MOVING OR SPEAKING SO SLOWLY THAT OTHER PEOPLE COULD HAVE NOTICED. OR THE OPPOSITE, BEING SO FIGETY OR RESTLESS THAT YOU HAVE BEEN MOVING AROUND A LOT MORE THAN USUAL: NOT AT ALL
5. POOR APPETITE OR OVEREATING: SEVERAL DAYS
SUM OF ALL RESPONSES TO PHQ QUESTIONS 1-9: 6
4. FEELING TIRED OR HAVING LITTLE ENERGY: SEVERAL DAYS
6. FEELING BAD ABOUT YOURSELF - OR THAT YOU ARE A FAILURE OR HAVE LET YOURSELF OR YOUR FAMILY DOWN: SEVERAL DAYS
3. TROUBLE FALLING OR STAYING ASLEEP OR SLEEPING TOO MUCH: NOT AT ALL
2. FEELING DOWN, DEPRESSED OR HOPELESS: SEVERAL DAYS
SUM OF ALL RESPONSES TO PHQ9 QUESTIONS 1 AND 2: 2

## 2025-09-02 ASSESSMENT — ANXIETY QUESTIONNAIRES
4. TROUBLE RELAXING: SEVERAL DAYS
7. FEELING AFRAID AS IF SOMETHING AWFUL MIGHT HAPPEN: SEVERAL DAYS
IF YOU CHECKED OFF ANY PROBLEMS ON THIS QUESTIONNAIRE, HOW DIFFICULT HAVE THESE PROBLEMS MADE IT FOR YOU TO DO YOUR WORK, TAKE CARE OF THINGS AT HOME, OR GET ALONG WITH OTHER PEOPLE: SOMEWHAT DIFFICULT
1. FEELING NERVOUS, ANXIOUS, OR ON EDGE: SEVERAL DAYS
6. BECOMING EASILY ANNOYED OR IRRITABLE: SEVERAL DAYS
5. BEING SO RESTLESS THAT IT IS HARD TO SIT STILL: NOT AT ALL
GAD7 TOTAL SCORE: 6
3. WORRYING TOO MUCH ABOUT DIFFERENT THINGS: SEVERAL DAYS
2. NOT BEING ABLE TO STOP OR CONTROL WORRYING: SEVERAL DAYS

## 2025-09-02 ASSESSMENT — ENCOUNTER SYMPTOMS
SORE THROAT: 0
NUMBNESS: 0
EYE DISCHARGE: 0
LIGHT-HEADEDNESS: 0
ARTHRALGIAS: 0
WHEEZING: 0
SLEEP DISTURBANCE: 0
VOMITING: 0
UNEXPECTED WEIGHT CHANGE: 0
HEMATURIA: 0
FLANK PAIN: 0
FEVER: 0
COUGH: 0
EYE ITCHING: 0
DYSURIA: 0
DIZZINESS: 0
DYSPHORIC MOOD: 0
BLOOD IN STOOL: 0
MYALGIAS: 0
ACTIVITY CHANGE: 0
WEAKNESS: 0
SHORTNESS OF BREATH: 0
HEADACHES: 0
CONSTIPATION: 0
RHINORRHEA: 0
FATIGUE: 1
DIARRHEA: 0
SINUS PRESSURE: 0
JOINT SWELLING: 0
APPETITE CHANGE: 0
ABDOMINAL PAIN: 0
NERVOUS/ANXIOUS: 0
PALPITATIONS: 0
NAUSEA: 0

## 2025-09-04 RX ORDER — ALBUTEROL SULFATE 90 UG/1
INHALANT RESPIRATORY (INHALATION)
Qty: 6.7 G | Refills: 11 | Status: SHIPPED | OUTPATIENT
Start: 2025-09-04

## 2025-09-15 ENCOUNTER — APPOINTMENT (OUTPATIENT)
Dept: PRIMARY CARE | Facility: CLINIC | Age: 65
End: 2025-09-15
Payer: COMMERCIAL

## 2025-09-17 ENCOUNTER — APPOINTMENT (OUTPATIENT)
Dept: UROLOGY | Facility: CLINIC | Age: 65
End: 2025-09-17
Payer: COMMERCIAL

## 2025-10-14 ENCOUNTER — APPOINTMENT (OUTPATIENT)
Dept: PODIATRY | Facility: CLINIC | Age: 65
End: 2025-10-14
Payer: COMMERCIAL

## 2025-10-17 ENCOUNTER — APPOINTMENT (OUTPATIENT)
Dept: UROLOGY | Facility: CLINIC | Age: 65
End: 2025-10-17
Payer: COMMERCIAL

## 2025-10-28 ENCOUNTER — APPOINTMENT (OUTPATIENT)
Dept: UROLOGY | Facility: CLINIC | Age: 65
End: 2025-10-28
Payer: COMMERCIAL

## 2025-12-01 ENCOUNTER — APPOINTMENT (OUTPATIENT)
Dept: PRIMARY CARE | Facility: CLINIC | Age: 65
End: 2025-12-01
Payer: COMMERCIAL

## 2026-03-09 ENCOUNTER — APPOINTMENT (OUTPATIENT)
Dept: PRIMARY CARE | Facility: CLINIC | Age: 66
End: 2026-03-09
Payer: COMMERCIAL

## (undated) DEVICE — ANGIO KIT, RIGHT HEART, LF, CUSTOM

## (undated) DEVICE — TUBING, SUCTION, CONNECTING, NON-CONDUCTIVE, SURE GRIP CONNECTORS, 3/16 IN X 10 FT

## (undated) DEVICE — TOWEL PACK, STERILE, 4/PACK, BLUE

## (undated) DEVICE — COLLECTION BAG, FLUID, NON-STERILE

## (undated) DEVICE — GLOVE, SURGICAL, PROTEXIS PI , 7.5, PF, LF

## (undated) DEVICE — Device

## (undated) DEVICE — ACCESS KIT, MINI MAK, 5FR X 10CM, 0.018 X 40CM, SS/SS, STANDARD NEEDLE

## (undated) DEVICE — CATHETER, THERMODILUTION, SWAN GANZ, 7 FR, 110CM, STANDARD

## (undated) DEVICE — PREP TRAY, SKIN, DRY, W/GLOVES

## (undated) DEVICE — CATHETER, URETERAL, POLLACK, OPEN END, 5.5 FR, 70 CM

## (undated) DEVICE — SOLUTION, INJECTION, SODIUM CHLORIDE 9%, 3000ML

## (undated) DEVICE — LASER FIBER, HOLMIUM MP 365

## (undated) DEVICE — IRRIGATION SET, CYSTOSCOPY, TURP, Y, CONTINUOUS, 81 IN

## (undated) DEVICE — ACCESS KIT, S-MAK MINI, 5FR 10CM 0.018IN 40CM, NT/PT, ECHO ENHANCE NEEDLE

## (undated) DEVICE — GUIDEWIRE, INQWIRE, 3MM J, .035, 150

## (undated) DEVICE — SHEATH, PINNACLE, W/.038 GUIDEWIRE, 10 CM,  7FR INTRODUCER, 7FR DIA, 2.5 CM DIALATOR

## (undated) DEVICE — IRRIGATION SET, CYSTOSCOPY, F/CONSTANT/INTERMITTENT, 8 GTT/CC, 77 IN

## (undated) DEVICE — SLEEVE, SCD EXPRESS, KNEE LENGTH-MEDIUM

## (undated) DEVICE — CATHETER, URETHRAL, FOLEY, 2 WAY, 12 FR, 5 CC, SILICONE

## (undated) DEVICE — SOLUTION, INJECTION, CONTRAST, IOTHALAMATE MEGLUMINE 60%, CONRAY, 50 CC, VIAL

## (undated) DEVICE — SHEATH, PINNACLE, W/O GUIDEWIRE, 10 CM,  8FR INTRODUCER, 8FR DIA, 2.5 CM DIALATOR